# Patient Record
Sex: FEMALE | Race: WHITE | NOT HISPANIC OR LATINO | Employment: FULL TIME | URBAN - METROPOLITAN AREA
[De-identification: names, ages, dates, MRNs, and addresses within clinical notes are randomized per-mention and may not be internally consistent; named-entity substitution may affect disease eponyms.]

---
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
## 2020-01-08 ENCOUNTER — COMMUNICATION - HEALTHEAST (OUTPATIENT)
Dept: FAMILY MEDICINE | Facility: CLINIC | Age: 20
End: 2020-01-08

## 2020-01-17 ENCOUNTER — OFFICE VISIT - HEALTHEAST (OUTPATIENT)
Dept: FAMILY MEDICINE | Facility: CLINIC | Age: 20
End: 2020-01-17

## 2020-01-17 DIAGNOSIS — E66.01 CLASS 3 SEVERE OBESITY DUE TO EXCESS CALORIES WITHOUT SERIOUS COMORBIDITY WITH BODY MASS INDEX (BMI) OF 50.0 TO 59.9 IN ADULT (H): ICD-10-CM

## 2020-01-17 DIAGNOSIS — Z63.6 CAREGIVER BURDEN: ICD-10-CM

## 2020-01-17 DIAGNOSIS — E66.813 CLASS 3 SEVERE OBESITY DUE TO EXCESS CALORIES WITHOUT SERIOUS COMORBIDITY WITH BODY MASS INDEX (BMI) OF 50.0 TO 59.9 IN ADULT (H): ICD-10-CM

## 2020-01-17 SDOH — SOCIAL STABILITY - SOCIAL INSECURITY: DEPENDENT RELATIVE NEEDING CARE AT HOME: Z63.6

## 2020-01-17 ASSESSMENT — MIFFLIN-ST. JEOR: SCORE: 2236.43

## 2020-03-28 ENCOUNTER — RECORDS - HEALTHEAST (OUTPATIENT)
Dept: ADMINISTRATIVE | Facility: OTHER | Age: 20
End: 2020-03-28

## 2020-04-10 ENCOUNTER — COMMUNICATION - HEALTHEAST (OUTPATIENT)
Dept: SCHEDULING | Facility: CLINIC | Age: 20
End: 2020-04-10

## 2020-04-11 ENCOUNTER — COMMUNICATION - HEALTHEAST (OUTPATIENT)
Dept: SCHEDULING | Facility: CLINIC | Age: 20
End: 2020-04-11

## 2020-04-13 ENCOUNTER — OFFICE VISIT - HEALTHEAST (OUTPATIENT)
Dept: FAMILY MEDICINE | Facility: CLINIC | Age: 20
End: 2020-04-13

## 2020-04-13 DIAGNOSIS — R10.9 ABDOMINAL PAIN OF UNKNOWN CAUSE: ICD-10-CM

## 2020-04-13 DIAGNOSIS — K59.01 SLOW TRANSIT CONSTIPATION: ICD-10-CM

## 2020-04-15 ENCOUNTER — COMMUNICATION - HEALTHEAST (OUTPATIENT)
Dept: FAMILY MEDICINE | Facility: CLINIC | Age: 20
End: 2020-04-15

## 2020-04-17 ENCOUNTER — OFFICE VISIT - HEALTHEAST (OUTPATIENT)
Dept: FAMILY MEDICINE | Facility: CLINIC | Age: 20
End: 2020-04-17

## 2020-04-17 DIAGNOSIS — B37.31 YEAST INFECTION OF THE VAGINA: ICD-10-CM

## 2020-04-17 DIAGNOSIS — R10.11 RUQ ABDOMINAL PAIN: ICD-10-CM

## 2020-04-17 DIAGNOSIS — R11.0 NAUSEA: ICD-10-CM

## 2020-05-13 ENCOUNTER — COMMUNICATION - HEALTHEAST (OUTPATIENT)
Dept: SCHEDULING | Facility: CLINIC | Age: 20
End: 2020-05-13

## 2020-05-13 ENCOUNTER — COMMUNICATION - HEALTHEAST (OUTPATIENT)
Dept: FAMILY MEDICINE | Facility: CLINIC | Age: 20
End: 2020-05-13

## 2020-05-13 DIAGNOSIS — B37.31 CANDIDIASIS OF VAGINA: ICD-10-CM

## 2020-05-19 ENCOUNTER — COMMUNICATION - HEALTHEAST (OUTPATIENT)
Dept: FAMILY MEDICINE | Facility: CLINIC | Age: 20
End: 2020-05-19

## 2020-05-20 ENCOUNTER — COMMUNICATION - HEALTHEAST (OUTPATIENT)
Dept: FAMILY MEDICINE | Facility: CLINIC | Age: 20
End: 2020-05-20

## 2020-05-20 DIAGNOSIS — K59.01 SLOW TRANSIT CONSTIPATION: ICD-10-CM

## 2020-05-20 DIAGNOSIS — R10.84 ABDOMINAL PAIN, GENERALIZED: ICD-10-CM

## 2020-05-26 ENCOUNTER — TRANSFERRED RECORDS (OUTPATIENT)
Dept: HEALTH INFORMATION MANAGEMENT | Facility: CLINIC | Age: 20
End: 2020-05-26

## 2020-05-26 ENCOUNTER — RECORDS - HEALTHEAST (OUTPATIENT)
Dept: ADMINISTRATIVE | Facility: OTHER | Age: 20
End: 2020-05-26

## 2020-06-02 ENCOUNTER — OFFICE VISIT - HEALTHEAST (OUTPATIENT)
Dept: FAMILY MEDICINE | Facility: CLINIC | Age: 20
End: 2020-06-02

## 2020-06-02 ENCOUNTER — COMMUNICATION - HEALTHEAST (OUTPATIENT)
Dept: FAMILY MEDICINE | Facility: CLINIC | Age: 20
End: 2020-06-02

## 2020-06-02 ENCOUNTER — COMMUNICATION - HEALTHEAST (OUTPATIENT)
Dept: SCHEDULING | Facility: CLINIC | Age: 20
End: 2020-06-02

## 2020-06-02 DIAGNOSIS — B37.31 YEAST VAGINITIS: ICD-10-CM

## 2020-06-02 DIAGNOSIS — R11.2 NON-INTRACTABLE VOMITING WITH NAUSEA, UNSPECIFIED VOMITING TYPE: ICD-10-CM

## 2020-06-02 DIAGNOSIS — Z11.59 SCREENING FOR VIRAL DISEASE: ICD-10-CM

## 2020-06-17 ENCOUNTER — AMBULATORY - HEALTHEAST (OUTPATIENT)
Dept: FAMILY MEDICINE | Facility: CLINIC | Age: 20
End: 2020-06-17

## 2020-06-17 DIAGNOSIS — Z11.59 SCREENING FOR VIRAL DISEASE: ICD-10-CM

## 2020-06-18 ENCOUNTER — TRANSFERRED RECORDS (OUTPATIENT)
Dept: HEALTH INFORMATION MANAGEMENT | Facility: CLINIC | Age: 20
End: 2020-06-18

## 2020-06-18 ENCOUNTER — MEDICAL CORRESPONDENCE (OUTPATIENT)
Dept: HEALTH INFORMATION MANAGEMENT | Facility: CLINIC | Age: 20
End: 2020-06-18

## 2020-06-19 ENCOUNTER — TRANSFERRED RECORDS (OUTPATIENT)
Dept: HEALTH INFORMATION MANAGEMENT | Facility: CLINIC | Age: 20
End: 2020-06-19

## 2020-06-19 ENCOUNTER — COMMUNICATION - HEALTHEAST (OUTPATIENT)
Dept: FAMILY MEDICINE | Facility: CLINIC | Age: 20
End: 2020-06-19

## 2020-06-23 ENCOUNTER — RECORDS - HEALTHEAST (OUTPATIENT)
Dept: ADMINISTRATIVE | Facility: OTHER | Age: 20
End: 2020-06-23

## 2020-06-23 ENCOUNTER — COMMUNICATION - HEALTHEAST (OUTPATIENT)
Dept: FAMILY MEDICINE | Facility: CLINIC | Age: 20
End: 2020-06-23

## 2020-06-23 DIAGNOSIS — B37.31 YEAST VAGINITIS: ICD-10-CM

## 2020-06-25 ENCOUNTER — OFFICE VISIT - HEALTHEAST (OUTPATIENT)
Dept: FAMILY MEDICINE | Facility: CLINIC | Age: 20
End: 2020-06-25

## 2020-06-25 DIAGNOSIS — Z02.89 ENCOUNTER FOR COMPLETION OF FORM WITH PATIENT: ICD-10-CM

## 2020-07-07 ENCOUNTER — COMMUNICATION - HEALTHEAST (OUTPATIENT)
Dept: SCHEDULING | Facility: CLINIC | Age: 20
End: 2020-07-07

## 2020-09-22 ENCOUNTER — COMMUNICATION - HEALTHEAST (OUTPATIENT)
Dept: FAMILY MEDICINE | Facility: CLINIC | Age: 20
End: 2020-09-22

## 2020-09-22 DIAGNOSIS — B37.31 YEAST VAGINITIS: ICD-10-CM

## 2020-11-05 ENCOUNTER — RECORDS - HEALTHEAST (OUTPATIENT)
Dept: ADMINISTRATIVE | Facility: OTHER | Age: 20
End: 2020-11-05

## 2020-11-09 ENCOUNTER — RECORDS - HEALTHEAST (OUTPATIENT)
Dept: ADMINISTRATIVE | Facility: OTHER | Age: 20
End: 2020-11-09

## 2020-11-10 ENCOUNTER — TELEPHONE (OUTPATIENT)
Dept: FAMILY MEDICINE | Facility: CLINIC | Age: 20
End: 2020-11-10

## 2020-11-10 NOTE — TELEPHONE ENCOUNTER
I was asked to speak with pt.  She has appt tomorrow and pt asks if ok to wait until tomorrow for appt.    Pt has not established care with this care system yet but she has pending appt tomorrow at 4:20 with Dr Purcell.    Pt explains that she has generalized abdominal pain for 2 weeks.  Pt rates her pain as severe, 8 of 10.  Pt went to [facility] in [place] 5 days ago.  Had labs, urine, CT.  Diagnosed with cellulitis below belly button.  She was placed on cephalexin and oxycodone.      Pain persisted with no improvement so yesterday pt returned to [facility] (not [place] this time).  She had pelvic and abdominal ultrasound and was told that she may have a kidney stone too.    Pt says that she is eating and drinking.  She is drinking fluids.  Denies changes in bowel or bladder habits.  Urine is pale yellow and denies visible blood in uring.    Denies fever or chills.    Advised to return to ED if ANY new or worsening symptoms. Continue generous hydration.    Be seen tomorrow as planned.    Samara Kaufman RN

## 2020-11-10 NOTE — TELEPHONE ENCOUNTER
Reason for call:  Patient reporting a symptom    Symptom or request: Mother and patient on the phone at the same time. Patient went to [facility] Saturday. [place] they did a CT and stated that she has cellulitis under her belly button. They gave her oxycodone for the pain and the pain is still at a 8. Patient is also 18 days late on her period and is never late the last 3 years. Has been going on two weeks with this.     Also has appointment tomorrow at 4:20 with [facility]. They want to know if we are able to do Care Everywhere to get the records from the other facilities.     Duration (how long have symptoms been present): over a week almost 2 weeks    Have you been treated for this before? Yes    Phone Number patient can be reached at:  Home number on file [phone] (home)    Best Time:  any    Can we leave a detailed message on this number:  YES    Call taken on 11/10/2020 at 4:26 PM by Laury Flores

## 2020-11-11 ENCOUNTER — OFFICE VISIT (OUTPATIENT)
Dept: FAMILY MEDICINE | Facility: CLINIC | Age: 20
End: 2020-11-11
Payer: COMMERCIAL

## 2020-11-11 VITALS
WEIGHT: 293 LBS | HEART RATE: 91 BPM | BODY MASS INDEX: 47.09 KG/M2 | TEMPERATURE: 99.2 F | DIASTOLIC BLOOD PRESSURE: 82 MMHG | SYSTOLIC BLOOD PRESSURE: 128 MMHG | OXYGEN SATURATION: 98 % | HEIGHT: 66 IN

## 2020-11-11 DIAGNOSIS — N30.00 ACUTE CYSTITIS WITHOUT HEMATURIA: Primary | ICD-10-CM

## 2020-11-11 DIAGNOSIS — L03.311 CELLULITIS OF ABDOMINAL WALL: ICD-10-CM

## 2020-11-11 PROCEDURE — 87086 URINE CULTURE/COLONY COUNT: CPT | Performed by: FAMILY MEDICINE

## 2020-11-11 PROCEDURE — 87106 FUNGI IDENTIFICATION YEAST: CPT | Performed by: FAMILY MEDICINE

## 2020-11-11 PROCEDURE — 99214 OFFICE O/P EST MOD 30 MIN: CPT | Performed by: FAMILY MEDICINE

## 2020-11-11 RX ORDER — CEPHALEXIN 500 MG/1
500 CAPSULE ORAL
COMMUNITY
Start: 2020-11-05 | End: 2020-11-12

## 2020-11-11 RX ORDER — ONDANSETRON 4 MG/1
4 TABLET, ORALLY DISINTEGRATING ORAL EVERY 8 HOURS PRN
Qty: 15 TABLET | Refills: 1 | Status: SHIPPED | OUTPATIENT
Start: 2020-11-11 | End: 2021-06-11

## 2020-11-11 RX ORDER — CIPROFLOXACIN 500 MG/1
500 TABLET, FILM COATED ORAL 2 TIMES DAILY
Qty: 14 TABLET | Refills: 0 | Status: SHIPPED | OUTPATIENT
Start: 2020-11-11 | End: 2020-11-18

## 2020-11-11 RX ORDER — OXYCODONE HYDROCHLORIDE 5 MG/1
5 TABLET ORAL
COMMUNITY
Start: 2020-11-09 | End: 2020-12-08

## 2020-11-11 RX ORDER — KETOROLAC TROMETHAMINE 10 MG/1
10 TABLET, FILM COATED ORAL EVERY 6 HOURS PRN
Qty: 20 TABLET | Refills: 0 | Status: SHIPPED | OUTPATIENT
Start: 2020-11-11 | End: 2020-12-08

## 2020-11-11 SDOH — HEALTH STABILITY: MENTAL HEALTH: HOW OFTEN DO YOU HAVE A DRINK CONTAINING ALCOHOL?: NEVER

## 2020-11-11 ASSESSMENT — PAIN SCALES - GENERAL: PAINLEVEL: SEVERE PAIN (7)

## 2020-11-11 ASSESSMENT — MIFFLIN-ST. JEOR: SCORE: 2245.79

## 2020-11-11 NOTE — PATIENT INSTRUCTIONS
(N30.00) Acute cystitis without hematuria  (primary encounter diagnosis)  Comment:   Plan: ciprofloxacin (CIPRO) 500 MG tablet, Urine         Culture Aerobic Bacterial, ketorolac (TORADOL)         10 MG tablet, ondansetron (ZOFRAN-ODT) 4 MG ODT        tab         We reviewed and discussed the urine tests and there is an infection. See below and start Cipro at 500 mg twice daily with one tonight.   Take this for 7 days. The urine culture is done today and the results will be called in two days. The test will check sensitivity for about 15 antibiotics.   For the pain, we added Toradol at 10 mg per dose, with food, every 6 hours as needed. For the nausea use the Zofran ODT at 4 mg every 8 hours as needed.   See the work letter. If worse or even not better then return to the Emergency Dept.     (L03.311) Cellulitis of abdominal wall  Comment:   Plan: finish Cephalexin and see above.

## 2020-11-11 NOTE — PROGRESS NOTES
"Subjective     Norma Collins is a 20 year old female who presents to clinic today for the following health issues:  Chief Complaint   Patient presents with     ER F/U     [facility] 11/5/20 and 11/9/20, abdominal pain x 2 weeks      She states she is not sexually active.     The urine showed infection and no culture was done.     The others labs are essentially normal with a mildly elevated WBC count.     The pelvic US was normal.     The CT scan of the abdomen last week showed:     There is localized mild inflammation surrounding the umbilicus raising question of cellulitis along the lining of the umbilicus. There is no abscess or mass. No extension of this inflammatory appearance into the peritoneal cavity.    IMPRESSION:   1.  Appendix and bowel unremarkable.  2.  There is mild localized inflammatory soft tissue stranding involving the base of the umbilicus suggesting localized infection.    HPI         ED/UC Followup:    Facility:  [facility]   Date of visit: 11/5/20 and 11/9/20  Reason for visit: abdominal pain and lower back pain   Current Status: symptoms getting worse.   Pain is upper right, upper left, lower right and pain moved to lower back on Monday.   Family is concerned because pain in getting worse and moving a bit.   She is taking Oxycodone 5 mg twice daily with Tylenol.        Current Outpatient Medications   Medication Instructions     cephALEXin (KEFLEX) 500 mg, Oral     oxyCODONE (ROXICODONE) 5 mg, Oral       There is no problem list on file for this patient.      Blood pressure 128/82, pulse 91, temperature 99.2  F (37.3  C), temperature source Tympanic, height 1.687 m (5' 6.42\"), weight 145.2 kg (320 lb 3.2 oz), last menstrual period 09/22/2020, SpO2 98 %.    Exam:  GENERAL APPEARANCE: healthy, alert and no distress  EYES: EOMI,  PERRL  ABDOMEN: tender over the bladder and some on the bilateral CVA areas. No masses. No redness or warmth around the umbilicus.   SKIN: no suspicious " lesions or rashes  PSYCH: mentation appears normal and affect normal/bright      (N30.00) Acute cystitis without hematuria  (primary encounter diagnosis)  Comment:   Plan: ciprofloxacin (CIPRO) 500 MG tablet, Urine         Culture Aerobic Bacterial, ketorolac (TORADOL)         10 MG tablet, ondansetron (ZOFRAN-ODT) 4 MG ODT        tab         We reviewed and discussed the urine tests and there is an infection. See below and start Cipro at 500 mg twice daily with one tonight.   Take this for 7 days. The urine culture is done today and the results will be called in two days. The test will check sensitivity for about 15 antibiotics.   For the pain, we added Toradol at 10 mg per dose, with food, every 6 hours as needed. For the nausea use the Zofran ODT at 4 mg every 8 hours as needed.   See the work letter. If worse or even not better then return to the Emergency Dept.     (L03.311) Cellulitis of abdominal wall  Comment:   Plan: finish Cephalexin and see above.         Efra Purcell MD

## 2020-11-11 NOTE — LETTER
[facility]  [address] [address]  Phone: [phone]    November 11, 2020      Norma Collins  [address] [address]      Dear MsBalaji Collins    For medical reasons you should be excused from work from 11-7-20 to 11-18-20.   Return on 11-19-20.     Sincerely,    / Efra Purcell MD

## 2020-11-13 LAB
BACTERIA SPEC CULT: ABNORMAL
Lab: ABNORMAL
SPECIMEN SOURCE: ABNORMAL

## 2020-11-16 DIAGNOSIS — B37.41 CANDIDAL CYSTITIS AND URETHRITIS: Primary | ICD-10-CM

## 2020-11-16 RX ORDER — FLUCONAZOLE 150 MG/1
150 TABLET ORAL DAILY
Qty: 5 TABLET | Refills: 0 | Status: SHIPPED | OUTPATIENT
Start: 2020-11-16 | End: 2020-11-21

## 2020-12-01 ENCOUNTER — TELEPHONE (OUTPATIENT)
Dept: FAMILY MEDICINE | Facility: CLINIC | Age: 20
End: 2020-12-01

## 2020-12-04 ENCOUNTER — VIRTUAL VISIT (OUTPATIENT)
Dept: FAMILY MEDICINE | Facility: CLINIC | Age: 20
End: 2020-12-04
Payer: COMMERCIAL

## 2020-12-04 DIAGNOSIS — N30.00 ACUTE CYSTITIS WITHOUT HEMATURIA: Primary | ICD-10-CM

## 2020-12-04 PROCEDURE — 99212 OFFICE O/P EST SF 10 MIN: CPT | Mod: 95 | Performed by: FAMILY MEDICINE

## 2020-12-04 NOTE — TELEPHONE ENCOUNTER
"Dr. Purcell indicated he needs a telephone visit with patient in order to complete her short term disability form from [employer].    Form is in \"Awaiting Response\" tray on forms desk.   "

## 2020-12-04 NOTE — PATIENT INSTRUCTIONS
Thank you for choosing [facility].  You may be receiving an email and/or telephone survey request from [facility] [employer] regarding your visit today.  Please take a few minutes to respond to the survey to let us know how we are doing.      If you have questions or concerns, please contact us via [employer] or you can contact your care team at [phone].    Our Clinic hours are:  Monday 6:40 am  to 7:00 pm  Tuesday -Friday 6:40 am to 5:00 pm    The [place] outpatient lab hours are:  Monday - Friday 6:10 am to 4:45 pm  Saturdays 7:00 am to 11:00 am  Appointments are required, call [phone]    If you have clinical questions after hours or would like to schedule an appointment,  call the clinic at [phone].

## 2020-12-04 NOTE — TELEPHONE ENCOUNTER
I notified pt.    She says that these forms have to be in by today.    Will ask Dr Purcell if he is able to work pt into his schedule?    Samara Kaufman RN

## 2020-12-04 NOTE — PROGRESS NOTES
"Norma Collins is a 20 year old female who is being evaluated via a billable telephone visit.      The patient has been notified of following:     \"This telephone visit will be conducted via a call between you and your physician/provider. We have found that certain health care needs can be provided without the need for a physical exam.  This service lets us provide the care you need with a short phone conversation.  If a prescription is necessary we can send it directly to your pharmacy.  If lab work is needed we can place an order for that and you can then stop by our lab to have the test done at a later time.    Telephone visits are billed at different rates depending on your insurance coverage. During this emergency period, for some insurers they may be billed the same as an in-person visit.  Please reach out to your insurance provider with any questions.    If during the course of the call the physician/provider feels a telephone visit is not appropriate, you will not be charged for this service.\"    Patient has given verbal consent for Telephone visit?  Yes    What phone number would you like to be contacted at? [phone].    How would you like to obtain your AVS? MyChart    Subjective     Norma Collins is a 20 year old female who presents via phone visit today for the following health issues:    HPI     Chief Complaint   Patient presents with     Forms     Patient is needing a form completed for [employer].  This will need to be faxed today.  Primary condition is acute cystitis without hematuria.  Secondary condition is cellulitis of the abdominal wall.  Subjective symptoms are abdominal and lower back pain as listed on her form.       Current Outpatient Medications   Medication Instructions     ketorolac (TORADOL) 10 mg, Oral, EVERY 6 HOURS PRN     ondansetron (ZOFRAN-ODT) 4 mg, Oral, EVERY 8 HOURS PRN     oxyCODONE (ROXICODONE) 5 mg, Oral       There is no problem list on file for this " patient.    (N30.00) Acute cystitis without hematuria  (primary encounter diagnosis)  Comment:   Plan: we reviewed the recent tests and office visits. We filled out the form for her insurance, [employer], and faxed it to [phone].   We will keep a copy in our chart. She is feeling well and has recovered fully. Follow up here as needed.       Efra Purcell MD      Phone call duration:  12 minutes

## 2020-12-08 ENCOUNTER — OFFICE VISIT (OUTPATIENT)
Dept: FAMILY MEDICINE | Facility: CLINIC | Age: 20
End: 2020-12-08
Payer: COMMERCIAL

## 2020-12-08 VITALS
HEIGHT: 67 IN | BODY MASS INDEX: 45.99 KG/M2 | TEMPERATURE: 97 F | WEIGHT: 293 LBS | DIASTOLIC BLOOD PRESSURE: 70 MMHG | HEART RATE: 88 BPM | SYSTOLIC BLOOD PRESSURE: 124 MMHG | OXYGEN SATURATION: 98 %

## 2020-12-08 DIAGNOSIS — E66.813 CLASS 3 SEVERE OBESITY DUE TO EXCESS CALORIES WITHOUT SERIOUS COMORBIDITY WITH BODY MASS INDEX (BMI) OF 50.0 TO 59.9 IN ADULT (H): ICD-10-CM

## 2020-12-08 DIAGNOSIS — Z30.011 ENCOUNTER FOR INITIAL PRESCRIPTION OF CONTRACEPTIVE PILLS: ICD-10-CM

## 2020-12-08 DIAGNOSIS — Z23 NEED FOR PROPHYLACTIC VACCINATION AND INOCULATION AGAINST INFLUENZA: ICD-10-CM

## 2020-12-08 DIAGNOSIS — Z00.00 ENCOUNTER FOR PREVENTIVE HEALTH EXAMINATION: Primary | ICD-10-CM

## 2020-12-08 DIAGNOSIS — E66.01 CLASS 3 SEVERE OBESITY DUE TO EXCESS CALORIES WITHOUT SERIOUS COMORBIDITY WITH BODY MASS INDEX (BMI) OF 50.0 TO 59.9 IN ADULT (H): ICD-10-CM

## 2020-12-08 DIAGNOSIS — Z11.3 SCREEN FOR STD (SEXUALLY TRANSMITTED DISEASE): ICD-10-CM

## 2020-12-08 PROBLEM — J35.3 ADENOTONSILLAR HYPERTROPHY: Status: ACTIVE | Noted: 2017-01-04

## 2020-12-08 PROBLEM — M19.079 ARTHROPATHY OF ANKLE AND FOOT: Status: ACTIVE | Noted: 2020-12-08

## 2020-12-08 PROBLEM — L83 ACANTHOSIS NIGRICANS: Status: ACTIVE | Noted: 2020-12-08

## 2020-12-08 PROBLEM — R03.0 ELEVATED BP WITHOUT DIAGNOSIS OF HYPERTENSION: Status: ACTIVE | Noted: 2017-10-16

## 2020-12-08 PROBLEM — K80.20 CHOLELITHIASIS: Status: ACTIVE | Noted: 2017-10-05

## 2020-12-08 LAB
CHOLEST SERPL-MCNC: 164 MG/DL
GLUCOSE BLD-MCNC: 80 MG/DL (ref 70–99)
HDLC SERPL-MCNC: 57 MG/DL
LDLC SERPL CALC-MCNC: 82 MG/DL
NONHDLC SERPL-MCNC: 107 MG/DL
TRIGL SERPL-MCNC: 126 MG/DL

## 2020-12-08 PROCEDURE — 87591 N.GONORRHOEAE DNA AMP PROB: CPT | Performed by: NURSE PRACTITIONER

## 2020-12-08 PROCEDURE — 90686 IIV4 VACC NO PRSV 0.5 ML IM: CPT | Performed by: NURSE PRACTITIONER

## 2020-12-08 PROCEDURE — 87389 HIV-1 AG W/HIV-1&-2 AB AG IA: CPT | Performed by: NURSE PRACTITIONER

## 2020-12-08 PROCEDURE — 99213 OFFICE O/P EST LOW 20 MIN: CPT | Mod: 25 | Performed by: NURSE PRACTITIONER

## 2020-12-08 PROCEDURE — 99000 SPECIMEN HANDLING OFFICE-LAB: CPT | Performed by: NURSE PRACTITIONER

## 2020-12-08 PROCEDURE — 36415 COLL VENOUS BLD VENIPUNCTURE: CPT | Performed by: NURSE PRACTITIONER

## 2020-12-08 PROCEDURE — 80061 LIPID PANEL: CPT | Performed by: NURSE PRACTITIONER

## 2020-12-08 PROCEDURE — 82947 ASSAY GLUCOSE BLOOD QUANT: CPT | Performed by: NURSE PRACTITIONER

## 2020-12-08 PROCEDURE — 99395 PREV VISIT EST AGE 18-39: CPT | Mod: 25 | Performed by: NURSE PRACTITIONER

## 2020-12-08 PROCEDURE — 90471 IMMUNIZATION ADMIN: CPT | Performed by: NURSE PRACTITIONER

## 2020-12-08 PROCEDURE — 86780 TREPONEMA PALLIDUM: CPT | Mod: 90 | Performed by: NURSE PRACTITIONER

## 2020-12-08 PROCEDURE — 86803 HEPATITIS C AB TEST: CPT | Performed by: NURSE PRACTITIONER

## 2020-12-08 PROCEDURE — 87491 CHLMYD TRACH DNA AMP PROBE: CPT | Performed by: NURSE PRACTITIONER

## 2020-12-08 RX ORDER — NORGESTIMATE AND ETHINYL ESTRADIOL 7DAYSX3 28
1 KIT ORAL DAILY
Qty: 84 TABLET | Refills: 3 | Status: SHIPPED | OUTPATIENT
Start: 2020-12-08 | End: 2021-06-11

## 2020-12-08 ASSESSMENT — MIFFLIN-ST. JEOR: SCORE: 2264.68

## 2020-12-08 NOTE — PROGRESS NOTES
SUBJECTIVE:   CC: Norma Collins is an 20 year old woman who presents for preventive health visit.     Patient has been advised of split billing requirements and indicates understanding: Yes  Healthy Habits:     Getting at least 3 servings of Calcium per day:  Yes    Bi-annual eye exam:  Yes    Dental care twice a year:  NO    Sleep apnea or symptoms of sleep apnea:  None    Diet:  Regular (no restrictions)    Frequency of exercise:  1 day/week    Duration of exercise:  N/A    Taking medications regularly:  Yes    Medication side effects:  None    PHQ-2 Total Score: 0    Additional concerns today:  Yes (std testing and birth control )      Eye exam with ophthalmology on this date: Never has had a dilated eye exam  Chlamydia testing done on this date: 12/8/20  Dentist: scared of the dentist; recommended at least go once a year    Today's PHQ-2 Score:   PHQ-2 ( 1999 Pf[employer]) 12/7/2020   Q1: Little interest or pleasure in doing things 0   Q2: Feeling down, depressed or hopeless 0   PHQ-2 Score 0   Q1: Little interest or pleasure in doing things Not at all   Q2: Feeling down, depressed or hopeless Not at all   PHQ-2 Score 0     Abuse: Current or Past (Physical, Sexual or Emotional) - No  Do you feel safe in your environment? Yes    Social History     Tobacco Use     Smoking status: Former Smoker     Smokeless tobacco: Never Used   Substance Use Topics     Alcohol use: Never     Frequency: Never     If you drink alcohol do you typically have >3 drinks per day or >7 drinks per week? Not applicable    Alcohol Use 12/8/2020   Prescreen: >3 drinks/day or >7 drinks/week? -   Prescreen: >3 drinks/day or >7 drinks/week? Not Applicable     Reviewed orders with patient.  Reviewed health maintenance and updated orders accordingly - Yes  BP Readings from Last 3 Encounters:   12/08/20 124/70   11/11/20 128/82    Wt Readings from Last 3 Encounters:   12/08/20 146.7 kg (323 lb 6.4 oz)   11/11/20 145.2 kg (320 lb 3.2 oz)              Patient Active Problem List   Diagnosis     Acanthosis nigricans     PCOS (polycystic ovarian syndrome)     Obesity, Class III, BMI 40-49.9 (morbid obesity) (H)     Elevated BP without diagnosis of hypertension     Adenotonsillar hypertrophy     Arthropathy of ankle and foot     Cholelithiasis     Past Surgical History:   Procedure Laterality Date     LAPAROSCOPIC CHOLECYSTECTOMY       TONSILLECTOMY         Social History     Tobacco Use     Smoking status: Former Smoker     Smokeless tobacco: Never Used   Substance Use Topics     Alcohol use: Never     Frequency: Never     Family History   Problem Relation Age of Onset     Colon Cancer Mother 43     Liver Disease Father         cirrhosis of liver     Other - See Comments Father         high iron     Myocardial Infarction Maternal Grandmother      Other - See Comments Maternal Grandfather         heart attack or cancer/unsure     Cirrhosis Paternal Grandfather      Other - See Comments Paternal Grandfather         high iron     Diabetes Paternal Grandfather      Colon Cancer Maternal Great-Grandmother         70-90 years old          Current Outpatient Medications   Medication Sig Dispense Refill     norgestim-eth estrad triphasic (ORTHO TRI-CYCLEN, 28,) 0.18/0.215/0.25 MG-35 MCG tablet Take 1 tablet by mouth daily 84 tablet 3     ondansetron (ZOFRAN-ODT) 4 MG ODT tab Take 1 tablet (4 mg) by mouth every 8 hours as needed for nausea 15 tablet 1     Allergies   Allergen Reactions     [place] Sprouts [Brassica Oleracea Italica]      Other Food Allergy Rash     [place] sprouts     Mammogram not appropriate for this patient based on age.    Pertinent mammograms are reviewed under the imaging tab.  History of abnormal Pap smear: NO - under age 21, PAP not appropriate for age   Colonoscopy 2019 or  - Mom has hx of colon cancer and is  at age 43; normal for her test    Reviewed and updated as needed this visit by clinical staff  Tobacco  Allergies  Meds   "Problems  Med Hx  Surg Hx  Fam Hx  Soc Hx          Reviewed and updated as needed this visit by Provider  Tobacco  Allergies  Meds  Problems  Med Hx  Surg Hx  Fam Hx         History reviewed. No pertinent past medical history.   Past Surgical History:   Procedure Laterality Date     LAPAROSCOPIC CHOLECYSTECTOMY       TONSILLECTOMY         Review of Systems  CONSTITUTIONAL: NEGATIVE for fever, chills, change in weight  INTEGUMENTARU/SKIN: NEGATIVE for worrisome rashes, moles or lesions  EYES: NEGATIVE for vision changes or irritation  ENT: NEGATIVE for ear, mouth and throat problems  RESP: NEGATIVE for significant cough or SOB  BREAST: NEGATIVE for masses, tenderness or discharge  CV: NEGATIVE for chest pain, palpitations or peripheral edema  GI: POSITIVE for constipation and nausea   female: concerns today for STD due to recent sexual encounter and concerns since he has been with other people, frequent yeast infections usually before and after menses monthly, not always treating it.  MUSCULOSKELETAL: NEGATIVE for significant arthralgias or myalgia  NEURO: NEGATIVE for weakness, dizziness or paresthesias  ENDOCRINE: NEGATIVE for temperature intolerance, skin/hair changes  HEME/ALLERGY/IMMUNE: NEGATIVE for bleeding problems  PSYCHIATRIC: NEGATIVE for changes in mood or affect     OBJECTIVE:   /70 (BP Location: Right arm, Patient Position: Sitting, Cuff Size: Adult Large)   Pulse 88   Temp 97  F (36.1  C) (Tympanic)   Ht 1.694 m (5' 6.69\")   Wt 146.7 kg (323 lb 6.4 oz)   LMP 11/18/2020 (Exact Date)   SpO2 98%   BMI 51.12 kg/m    Physical Exam  GENERAL: healthy, alert and no distress  EYES: Eyes grossly normal to inspection, PERRL and conjunctivae and sclerae normal  HENT: ear canals and TM's normal, nose and mouth without ulcers or lesions  NECK: no adenopathy, no asymmetry, masses, or scars and thyroid normal to palpation  RESP: lungs clear to auscultation - no rales, rhonchi or " wheezes  BREAST: deferred  CV: regular rate and rhythm, normal S1 S2, no S3 or S4, no murmur, click or rub, no peripheral edema and peripheral pulses strong  ABDOMEN: soft, nontender, no hepatosplenomegaly, no masses and bowel sounds normal  MS: no gross musculoskeletal defects noted, no edema  SKIN: no suspicious lesions or rashes  NEURO: Normal strength and tone, mentation intact and speech normal  PSYCH: mentation appears normal, affect normal/bright  LYMPH: no cervical adenopathy    Diagnostic Test Results:  Labs reviewed in Epic    ASSESSMENT/PLAN:   1. Encounter for preventive health examination  Labs ordered since she is fasting today for lipids and glucose due to family history and obesity as a high risk. I will call her with results.  Referral given for comprehensive weight management since she is concerned about this ongoing.  STD testing ordered for high risk patient with new partner known to have multiple partners.  I will call with results.  Recommend follow-up in 1 year for preventative exam and can complete PAP at that time.  - Glucose whole blood  - Lipid panel reflex to direct LDL Fasting    2. Class 3 severe obesity due to excess calories without serious comorbidity with body mass index (BMI) of 50.0 to 59.9 in adult (H)  Discussed exercise and portion sizes with diet along with eating healthy.  Referral to weight management due to high BMI for assistance in management.  - COMPREHENSIVE WEIGHT MANAGEMENT    3. Encounter for initial prescription of contraceptive pills  Discussed all birth control methods and patient wants to start pill form.  Due to BMI will start her on ortho tri-cycline.  Recommend follow-up if unable to remember daily for further discussion on Nexplanon, Nuvaring and IUD options.  - norgestim-eth estrad triphasic (ORTHO TRI-CYCLEN, 28,) 0.18/0.215/0.25 MG-35 MCG tablet; Take 1 tablet by mouth daily  Dispense: 84 tablet; Refill: 3    4. Screen for STD (sexually transmitted  "disease)  Labs ordered.  Patient will be notified of results.  - NEISSERIA GONORRHOEA PCR  - CHLAMYDIA TRACHOMATIS PCR  - HIV Antigen Antibody Combo  - Treponema Abs w Reflex to RPR and Titer  - Hepatitis C antibody    5. Need for prophylactic vaccination and inoculation against influenza  - INFLUENZA VACCINE IM > 6 MONTHS VALENT IIV4 [09947]    Patient has been advised of split billing requirements and indicates understanding: Yes  COUNSELING:  Reviewed preventive health counseling, as reflected in patient instructions       Regular exercise       Healthy diet/nutrition       Vision screening       Immunizations    Vaccinated for: Influenza             Contraception       Safe sex practices/STD prevention    Estimated body mass index is 51.12 kg/m  as calculated from the following:    Height as of this encounter: 1.694 m (5' 6.69\").    Weight as of this encounter: 146.7 kg (323 lb 6.4 oz).    Weight management plan: Patient referred to endocrine and/or weight management specialty Discussed healthy diet and exercise guidelines    She reports that she has quit smoking. She has never used smokeless tobacco.    Harini Santamaria NP  [facility]  "

## 2020-12-08 NOTE — PATIENT INSTRUCTIONS
1.  Make appointment at [facility] weight management clinic.  2.  Start birth control after next period and continue daily.  3.  If you miss a dose, you could get pregnant for 7 days so you would need to use condoms.  4.  For BMs take fiber gummies daily, drink 64 oz of non caffienated beverages daily.  Add Colace 100 mg (stool softner) once daily and if still having constipation you can increase to 2 times daily.  Use Miralax as needed but if still constipated can start every other day or every couple days to keep stools normal.  5.  Labs today, I will call you with results and recommendations if needed.  6.  Follow-up in 1 year for preventative exam with PAP.    Preventive Health Recommendations  Female Ages 18 to 20     Yearly exam:     See your health care provider every year in order to  o Review health changes.   o Discuss preventive care.    o Review your medicines if your doctor has prescribed any.      You should be tested each year for STDs (sexually transmitted diseases).       After age 20, talk to your provider about how often you should have cholesterol testing.      If you are at risk for diabetes, you should have a diabetes test (fasting glucose).     Shots:     Get a flu shot each year.     Get a tetanus shot every 10 years.     Consider getting the shot (vaccine) that prevents cervical cancer (Gardasil).    Nutrition:     Eat at least 5 servings of fruits and vegetables each day.    Eat whole-grain bread, whole-wheat pasta and brown rice instead of white grains and rice.    Get adequate Calcium and Vitamin D.     Lifestyle    Exercise at least 150 minutes a week each week (30 minutes a day, 5 days a week). This will help you control your weight and prevent disease.    No smoking.     Wear sunscreen to prevent skin cancer.    See your dentist every six months for an exam and cleaning.

## 2020-12-09 LAB
C TRACH DNA SPEC QL NAA+PROBE: NEGATIVE
HCV AB SERPL QL IA: NONREACTIVE
HIV 1+2 AB+HIV1 P24 AG SERPL QL IA: NONREACTIVE
N GONORRHOEA DNA SPEC QL NAA+PROBE: NEGATIVE
SPECIMEN SOURCE: NORMAL
SPECIMEN SOURCE: NORMAL
T PALLIDUM AB SER QL: NONREACTIVE

## 2021-01-19 ENCOUNTER — OFFICE VISIT (OUTPATIENT)
Dept: FAMILY MEDICINE | Facility: CLINIC | Age: 21
End: 2021-01-19
Payer: COMMERCIAL

## 2021-01-19 VITALS
HEIGHT: 67 IN | SYSTOLIC BLOOD PRESSURE: 110 MMHG | BODY MASS INDEX: 45.99 KG/M2 | OXYGEN SATURATION: 98 % | DIASTOLIC BLOOD PRESSURE: 80 MMHG | RESPIRATION RATE: 16 BRPM | TEMPERATURE: 98 F | WEIGHT: 293 LBS | HEART RATE: 84 BPM

## 2021-01-19 DIAGNOSIS — B96.89 BACTERIAL VAGINOSIS: Primary | ICD-10-CM

## 2021-01-19 DIAGNOSIS — N76.0 BACTERIAL VAGINOSIS: Primary | ICD-10-CM

## 2021-01-19 DIAGNOSIS — R30.0 DYSURIA: ICD-10-CM

## 2021-01-19 LAB
ALBUMIN UR-MCNC: NEGATIVE MG/DL
APPEARANCE UR: ABNORMAL
BACTERIA #/AREA URNS HPF: ABNORMAL /HPF
BILIRUB UR QL STRIP: NEGATIVE
COLOR UR AUTO: YELLOW
GLUCOSE UR STRIP-MCNC: NEGATIVE MG/DL
HCG UR QL: NEGATIVE
HGB UR QL STRIP: NEGATIVE
KETONES UR STRIP-MCNC: NEGATIVE MG/DL
LEUKOCYTE ESTERASE UR QL STRIP: ABNORMAL
NITRATE UR QL: NEGATIVE
NON-SQ EPI CELLS #/AREA URNS LPF: ABNORMAL /LPF
PH UR STRIP: 6 PH (ref 5–7)
RBC #/AREA URNS AUTO: ABNORMAL /HPF
SOURCE: ABNORMAL
SP GR UR STRIP: 1.02 (ref 1–1.03)
SPECIMEN SOURCE: ABNORMAL
UROBILINOGEN UR STRIP-ACNC: 0.2 EU/DL (ref 0.2–1)
WBC #/AREA URNS AUTO: ABNORMAL /HPF
WET PREP SPEC: ABNORMAL

## 2021-01-19 PROCEDURE — 81001 URINALYSIS AUTO W/SCOPE: CPT | Performed by: FAMILY MEDICINE

## 2021-01-19 PROCEDURE — 81025 URINE PREGNANCY TEST: CPT | Performed by: FAMILY MEDICINE

## 2021-01-19 PROCEDURE — 87210 SMEAR WET MOUNT SALINE/INK: CPT | Performed by: FAMILY MEDICINE

## 2021-01-19 PROCEDURE — 99213 OFFICE O/P EST LOW 20 MIN: CPT | Performed by: FAMILY MEDICINE

## 2021-01-19 RX ORDER — METRONIDAZOLE 500 MG/1
500 TABLET ORAL 2 TIMES DAILY
Qty: 14 TABLET | Refills: 0 | Status: SHIPPED | OUTPATIENT
Start: 2021-01-19 | End: 2021-01-26

## 2021-01-19 ASSESSMENT — MIFFLIN-ST. JEOR: SCORE: 2300.17

## 2021-01-19 NOTE — RESULT ENCOUNTER NOTE
Pregnancy test has returned negative.  Urine studies show small leukocyte esterase which is an indication for infection likely caused by the bacterial vaginosis.  Complete the treatment as discussed at today's appointment.

## 2021-01-19 NOTE — PROGRESS NOTES
Assessment & Plan     Bacterial vaginosis  Wet prep positive for clue cells. Will also obtain urinalysis to ensure no UTI.also obtain urinalysis to rule out UTI. Pregnancy test will be completed as sexually active and not on birth control currently. No fevers, chills, or flank pain.   - metroNIDAZOLE (FLAGYL) 500 MG tablet  Dispense: 14 tablet; Refill: 0  - HCG qualitative urine    Dysuria  Wet prep positive for clue cells will treat as above. Will obtain UA to rule out UTI.   - Wet prep  - UA reflex to Microscopic and Culture      Boom Barnes DO  [facility] DARIO Green is a 21 year old who presents to clinic today for the following health issues     HPI   21-year-old female with a past medical history of PCOS, obesity who presents to clinic for urinary concerns.    Abdominal/Flank Pain  Onset/Duration: 1 week  Description:   Character: Cramping  Location: lower bilateral abdominal pain  Radiation: upper right back pain  Intensity: moderate  Progression of Symptoms:  same  Accompanying Signs & Symptoms:  Fever/Chills: no  Gas/Bloating: YES  Nausea: no  Vomitting: no  Diarrhea: no  Constipation: no  Dysuria or Hematuria: no  History:   Trauma: no  Previous similar pain: YES- has had this pain in the past and has been UTI or vaginal infection  Previous tests done: Ultrasounds in the past and other testing.  Precipitating factors:   Does the pain change with:     Food: no    Bowel Movement: no    Urination: no   Other factors:  Worse in the AM and PM, she quit her BCP 1 week because she thought it was causing her the abdominal pain, she has frequency at night.  Therapies tried and outcome: None  Patient's last menstrual period was 12/28/2020 (exact date).    Not currently on birth control. Is sexually active. Wet Prep positive for clue cells. No fevers or flank pain.     Review of Systems   Constitutional, HEENT, cardiovascular, pulmonary, gi and gu systems are negative, except  "as otherwise noted.      Objective    /80 (BP Location: Left arm, Patient Position: Chair, Cuff Size: Adult Large)   Pulse 84   Temp 98  F (36.7  C) (Tympanic)   Resp 16   Ht 1.689 m (5' 6.5\")   Wt (!) 151 kg (333 lb)   LMP 12/28/2020 (Exact Date)   SpO2 98%   BMI 52.94 kg/m    Body mass index is 52.94 kg/m .  Physical Exam   General: alert, cooperative, no acute distress   CV: RRR, no murmur  Resp: non-labored breathing, clear to auscultation, no wheezing or rales   Abdomen: Soft, non-tender, no guarding. No flank pain, no back pain.     "

## 2021-01-19 NOTE — PATIENT INSTRUCTIONS
Bacterial Vaginosis    Treatment with Metronidazole 500 mg twice daily for 7 days    Urine sample, and pregnancy test.      Patient Education     Bacterial Vaginosis    You have a vaginal infection called bacterial vaginosis (BV). Both good and bad bacteria are present in a healthy vagina. BV occurs when these bacteria get out of balance. The number of bad bacteria increase. And the number of good bacteria decrease. Although BV is associated with sexual activity, it is not a sexually transmitted disease.  BV may or may not cause symptoms. If symptoms do occur, they can include:    Thin, gray, milky-white, or sometimes green discharge    Unpleasant odor or  fishy  smell    Itching, burning, or pain in or around the vagina  It is not known what causes BV, but certain factors can make the problem more likely. This can include:    Douching    Having sex with a new partner    Having sex with more than one partner  BV will sometimes go away on its own. But treatment is usually recommended. This is because untreated BV can increase the risk of more serious health problems such as:    Pelvic inflammatory disease (PID)     delivery (giving birth to a baby early if you re pregnant)    HIV and certain other sexually transmitted diseases (STDs)    Infection after surgery on the reproductive organs  Home care  General care    BV is most often treated with medicines called antibiotics. These may be given as pills or as a vaginal cream. If antibiotics are prescribed, be sure to use them exactly as directed. Also, be sure to complete all of the medicine, even if your symptoms go away.    Don't douche or having sex during treatment.    If you have sex with a female partner, ask your healthcare provider if she should also be treated.  Prevention    Don't douche.    Don't have sex. If you do have sex, then take steps to lower your risk:  ? Use condoms when having sex.  ? Limit the number of sexual partners you have.  Follow-up  care  Follow up with your healthcare provider, or as advised.  When to seek medical advice  Call your healthcare provider right away if:    You have a fever of 100.4 F (38 C) or higher, or as directed by your provider.    Your symptoms worsen, or they don t go away within a few days of starting treatment.    You have new pain in the lower belly or pelvic region.    You have side effects that bother you or a reaction to the pills or cream you re prescribed.    You or any partners you have sex with have new symptoms, such as a rash, joint pain, or sores.  [employer] last reviewed this educational content on 10/1/2017    1274-6993 [employer] [address] [place] [address], [place] [address]. All rights reserved. This information is not intended as a substitute for professional medical care. Always follow your healthcare professional's instructions.               Thank you for choosing [facility].  You may be receiving an email and/or telephone survey request from [facility] regarding your visit today.  Please take a few minutes to respond to the survey to let us know how we are doing.      If you have questions or concerns, please contact us via [employer] or you can contact your care team at [phone].    Our Clinic hours are:  Monday 6:40 am  to 7:00 pm  Tuesday -Friday 6:40 am to 5:00 pm    The [place] outpatient lab hours are:  Monday - Friday 6:10 am to 4:45 pm  Saturdays 7:00 am to 11:00 am  Appointments are required, call [phone]    If you have clinical questions after hours or would like to schedule an appointment,  call the clinic at [phone].

## 2021-02-05 ENCOUNTER — RECORDS - HEALTHEAST (OUTPATIENT)
Dept: ADMINISTRATIVE | Facility: OTHER | Age: 21
End: 2021-02-05

## 2021-04-03 ENCOUNTER — HEALTH MAINTENANCE LETTER (OUTPATIENT)
Age: 21
End: 2021-04-03

## 2021-04-16 ENCOUNTER — RECORDS - HEALTHEAST (OUTPATIENT)
Dept: ADMINISTRATIVE | Facility: OTHER | Age: 21
End: 2021-04-16

## 2021-04-23 ENCOUNTER — OFFICE VISIT (OUTPATIENT)
Dept: FAMILY MEDICINE | Facility: CLINIC | Age: 21
End: 2021-04-23
Payer: COMMERCIAL

## 2021-04-23 VITALS
BODY MASS INDEX: 45.99 KG/M2 | SYSTOLIC BLOOD PRESSURE: 120 MMHG | OXYGEN SATURATION: 97 % | HEIGHT: 67 IN | HEART RATE: 67 BPM | DIASTOLIC BLOOD PRESSURE: 78 MMHG | WEIGHT: 293 LBS | TEMPERATURE: 98.2 F | RESPIRATION RATE: 16 BRPM

## 2021-04-23 DIAGNOSIS — R10.84 ABDOMINAL PAIN, GENERALIZED: Primary | ICD-10-CM

## 2021-04-23 DIAGNOSIS — N89.8 VAGINAL DISCHARGE: ICD-10-CM

## 2021-04-23 DIAGNOSIS — R11.2 NON-INTRACTABLE VOMITING WITH NAUSEA, UNSPECIFIED VOMITING TYPE: ICD-10-CM

## 2021-04-23 DIAGNOSIS — R82.81 PYURIA: ICD-10-CM

## 2021-04-23 LAB
ALBUMIN UR-MCNC: NEGATIVE MG/DL
APPEARANCE UR: ABNORMAL
BACTERIA #/AREA URNS HPF: ABNORMAL /HPF
BILIRUB UR QL STRIP: NEGATIVE
COLOR UR AUTO: YELLOW
GLUCOSE UR STRIP-MCNC: NEGATIVE MG/DL
HGB UR QL STRIP: NEGATIVE
KETONES UR STRIP-MCNC: 15 MG/DL
LEUKOCYTE ESTERASE UR QL STRIP: ABNORMAL
NITRATE UR QL: NEGATIVE
NON-SQ EPI CELLS #/AREA URNS LPF: ABNORMAL /LPF
PH UR STRIP: 6.5 PH (ref 5–7)
RBC #/AREA URNS AUTO: ABNORMAL /HPF
SOURCE: ABNORMAL
SP GR UR STRIP: 1.02 (ref 1–1.03)
SPECIMEN SOURCE: NORMAL
UROBILINOGEN UR STRIP-ACNC: 0.2 EU/DL (ref 0.2–1)
WBC #/AREA URNS AUTO: ABNORMAL /HPF
WET PREP SPEC: NORMAL

## 2021-04-23 PROCEDURE — 81001 URINALYSIS AUTO W/SCOPE: CPT | Performed by: NURSE PRACTITIONER

## 2021-04-23 PROCEDURE — 87086 URINE CULTURE/COLONY COUNT: CPT | Performed by: NURSE PRACTITIONER

## 2021-04-23 PROCEDURE — 87210 SMEAR WET MOUNT SALINE/INK: CPT | Performed by: NURSE PRACTITIONER

## 2021-04-23 PROCEDURE — 99214 OFFICE O/P EST MOD 30 MIN: CPT | Performed by: NURSE PRACTITIONER

## 2021-04-23 RX ORDER — METOCLOPRAMIDE 5 MG/1
5-10 TABLET ORAL 3 TIMES DAILY PRN
Qty: 30 TABLET | Refills: 1 | Status: SHIPPED | OUTPATIENT
Start: 2021-04-23 | End: 2021-06-11

## 2021-04-23 RX ORDER — NITROFURANTOIN 25; 75 MG/1; MG/1
100 CAPSULE ORAL 2 TIMES DAILY
Qty: 10 CAPSULE | Refills: 0 | Status: SHIPPED | OUTPATIENT
Start: 2021-04-23 | End: 2021-04-28

## 2021-04-23 RX ORDER — OMEPRAZOLE 10 MG/1
20 CAPSULE, DELAYED RELEASE ORAL DAILY
COMMUNITY
End: 2021-04-23

## 2021-04-23 ASSESSMENT — PAIN SCALES - GENERAL: PAINLEVEL: EXTREME PAIN (8)

## 2021-04-23 ASSESSMENT — MIFFLIN-ST. JEOR: SCORE: 2195.84

## 2021-04-23 NOTE — PATIENT INSTRUCTIONS
Schedule appointment with GI.  Start nitrofurantoin for urine.  Use metoclopramide for nausea.  Increase omeprazole to 40mg daily.

## 2021-04-23 NOTE — PROGRESS NOTES
Assessment & Plan     Abdominal pain, generalized  Unclear etiology. Negative CT on 4/21. Will increase omeprazole to intended dose. Recommend follow up with GI for further evaluation.  - UA reflex to Microscopic and Culture  - Urine Microscopic  - Urine Culture Aerobic Bacterial  - omeprazole (PRILOSEC) 20 MG DR capsule; Take 1 capsule (20 mg) by mouth 2 times daily  - GASTROENTEROLOGY ADULT REF CONSULT ONLY; Future    Vaginal discharge  Negative wet prep.  - Wet prep    Pyuria  5-10 WBC, however contaminated specimen. Will culture, however start macrobid in the meantime.  - nitroFURantoin macrocrystal-monohydrate (MACROBID) 100 MG capsule; Take 1 capsule (100 mg) by mouth 2 times daily for 5 days    Non-intractable vomiting with nausea, unspecified vomiting type  Can use reglan as she reports Zofran is not helpful. Recommend follow up with GI.  - metoclopramide (REGLAN) 5 MG tablet; Take 1-2 tablets (5-10 mg) by mouth 3 times daily as needed (nausea and vomiting)      Patient Instructions   Schedule appointment with GI.  Start nitrofurantoin for urine.  Use metoclopramide for nausea.  Increase omeprazole to 40mg daily.      Return in about 1 week (around 4/30/2021).    XENA Perez CNP  [facility] is a 21 year old who presents for the following health issues     HPI     Abdominal/Flank Pain  Onset/Duration: abdominal pain worsening x 2 weeks  - ER in [place] last Wednesday and Thursday - blood work and a CT was done -Dx'd with acid reflux  Description:   Character: Stabbing and Cramping  Location: bernabe-umbilical region  Radiation:  Back on left side  Intensity: severe - 8-9  Progression of Symptoms:  worsening and constant  Accompanying Signs & Symptoms:  Fever/Chills: YES  Gas/Bloating: no  Nausea: YES  Vomitting: YES  Diarrhea: YES - from dulcolax, miralax  - she thought it might be constipation  Constipation: no  Dysuria or Hematuria: YES - feels like  she has to go but doesn't  History:   Trauma: no  Previous similar pain: YES  Previous tests done: Colonoscopy, Upper Endoscopy and pill cam  Precipitating factors:   Does the pain change with:     Food: YES    Bowel Movement: no    Urination: no   Other factors:  no  Therapies tried and outcome: laxatives  Patient's last menstrual period was 03/27/2021 (exact date).      Genitourinary - Female  Onset/Duration: x weeks  Description:   Painful urination (Dysuria): YES           Frequency: YES  Blood in urine (Hematuria): no  Delay in urine (Hesitency): YES  Intensity: moderate  Progression of Symptoms:  constant  Accompanying Signs & Symptoms:  Fever/chills: YES  Flank pain: YES  Nausea and vomiting: YES  Vaginal symptoms: discharge  Abdominal/Pelvic Pain: YES  History:   History of frequent UTI s: YES  History of kidney stones: no  Sexually Active: no  Possibility of pregnancy: No  Precipitating or alleviating factors: None  Therapies tried and outcome: Increase fluid intake      Above HPI reviewed. Additionally, seen 4/21/21 for same, CT with noted normal appendix, mild tissue haziness and thickening around umbilicus but no clinical evidence of cellulitis, no leukocytosis. Remaining labs unremarkable. Did have negative UA at that time. Was advised to increase omeprazole to 40mg daily, although there was a miscommunication and she has been taking 20mg daily.  She did note some mild improvement in symptoms yesterday, but today started to vomit again. Pain has not improved. She is s/p cholecystectomy. Has had EGD and colonoscopy in 11/2019, she reports these were normal. Does note she has had some diarrhea but this started after using miralax.     Now notes bladder pressure and sensation of incomplete emptying and vaginal discharge. No fevers. Is not currently sexually active, has not been since last December and has had negative GC and chlamydia since then. Negative pregnancy test 4/21.  Denies having a GI provider.   "    Review of Systems   Constitutional, HEENT, cardiovascular, pulmonary, gi and gu systems are negative, except as otherwise noted.      Objective    /78 (BP Location: Right arm, Patient Position: Sitting, Cuff Size: Adult Large)   Pulse 67   Temp 98.2  F (36.8  C) (Tympanic)   Resp 16   Ht 1.689 m (5' 6.5\")   Wt 140.6 kg (310 lb)   LMP 03/27/2021 (Exact Date)   SpO2 97%   Breastfeeding No   BMI 49.29 kg/m    Body mass index is 49.29 kg/m .  Physical Exam  Vitals signs and nursing note reviewed.   Constitutional:       Appearance: Normal appearance. She is obese.   HENT:      Head: Normocephalic and atraumatic.      Right Ear: Tympanic membrane and ear canal normal.      Left Ear: Tympanic membrane and ear canal normal.      Nose: Nose normal. No rhinorrhea.      Right Sinus: No maxillary sinus tenderness or frontal sinus tenderness.      Left Sinus: No maxillary sinus tenderness or frontal sinus tenderness.      Mouth/Throat:      Lips: Pink.      Mouth: Mucous membranes are moist.      Pharynx: Oropharynx is clear.   Eyes:      General: Lids are normal.      Conjunctiva/sclera: Conjunctivae normal.      Comments: Non icteric   Neck:      Musculoskeletal: Neck supple.   Cardiovascular:      Rate and Rhythm: Normal rate and regular rhythm.      Pulses: Normal pulses.      Heart sounds: Normal heart sounds, S1 normal and S2 normal.   Pulmonary:      Effort: Pulmonary effort is normal.      Breath sounds: Normal breath sounds and air entry.   Abdominal:      General: Bowel sounds are normal.      Palpations: Abdomen is soft.      Tenderness: There is abdominal tenderness (generalized). There is no right CVA tenderness or left CVA tenderness.      Comments: Exam difficult due to body habitus   Lymphadenopathy:      Cervical: No cervical adenopathy.   Skin:     General: Skin is warm and dry.   Neurological:      General: No focal deficit present.      Mental Status: She is alert and oriented to person, " place, and time.   Psychiatric:         Mood and Affect: Mood normal.         Behavior: Behavior normal.         Thought Content: Thought content normal.         Judgment: Judgment normal.            Results for orders placed or performed in visit on 04/23/21 (from the past 24 hour(s))   UA reflex to Microscopic and Culture    Specimen: Midstream Urine   Result Value Ref Range    Color Urine Yellow     Appearance Urine Slightly Cloudy     Glucose Urine Negative NEG^Negative mg/dL    Bilirubin Urine Negative NEG^Negative    Ketones Urine 15 (A) NEG^Negative mg/dL    Specific Gravity Urine 1.020 1.003 - 1.035    Blood Urine Negative NEG^Negative    pH Urine 6.5 5.0 - 7.0 pH    Protein Albumin Urine Negative NEG^Negative mg/dL    Urobilinogen Urine 0.2 0.2 - 1.0 EU/dL    Nitrite Urine Negative NEG^Negative    Leukocyte Esterase Urine Small (A) NEG^Negative    Source Midstream Urine    Wet prep    Specimen: Vagina   Result Value Ref Range    Specimen Description Vagina     Wet Prep No Trichomonas seen     Wet Prep No clue cells seen     Wet Prep No yeast seen     Wet Prep WBC'S seen  Few      Urine Microscopic   Result Value Ref Range    WBC Urine 5-10 (A) OTO5^0 - 5 /HPF    RBC Urine O - 2 OTO2^O - 2 /HPF    Squamous Epithelial /LPF Urine Moderate (A) FEW^Few /LPF    Bacteria Urine Few (A) NEG^Negative /HPF

## 2021-04-23 NOTE — LETTER
April 23, 2021      Norma Collins  [address] [facility] [address]        To Whom It May Concern:    Norma Collins  was seen on 4/23/2021.  Kathryn may need to miss work or work reduced hours due to illness over the next 2 weeks.      Sincerely,        XENA Perez CNP

## 2021-04-24 DIAGNOSIS — R10.84 ABDOMINAL PAIN, GENERALIZED: ICD-10-CM

## 2021-04-24 LAB
BACTERIA SPEC CULT: NORMAL
Lab: NORMAL
SPECIMEN SOURCE: NORMAL

## 2021-04-26 RX ORDER — PANTOPRAZOLE SODIUM 40 MG/1
TABLET, DELAYED RELEASE ORAL
Qty: 90 TABLET | Refills: 1 | Status: SHIPPED | OUTPATIENT
Start: 2021-04-26 | End: 2021-06-11

## 2021-05-04 ENCOUNTER — TRANSFERRED RECORDS (OUTPATIENT)
Dept: HEALTH INFORMATION MANAGEMENT | Facility: CLINIC | Age: 21
End: 2021-05-04

## 2021-05-29 ENCOUNTER — RECORDS - HEALTHEAST (OUTPATIENT)
Dept: ADMINISTRATIVE | Facility: CLINIC | Age: 21
End: 2021-05-29

## 2021-05-30 ENCOUNTER — RECORDS - HEALTHEAST (OUTPATIENT)
Dept: ADMINISTRATIVE | Facility: CLINIC | Age: 21
End: 2021-05-30

## 2021-05-30 VITALS — WEIGHT: 293 LBS | BODY MASS INDEX: 45.99 KG/M2 | HEIGHT: 67 IN

## 2021-05-30 VITALS — WEIGHT: 293 LBS | HEIGHT: 66 IN | BODY MASS INDEX: 47.09 KG/M2

## 2021-05-30 NOTE — PROGRESS NOTES
Assessment:     1. Acute suppurative otitis media of right ear without spontaneous rupture of tympanic membrane, recurrence not specified  amoxicillin (AMOXIL) 250 MG capsule         Plan:     We will also recommend Benadryl 25 mg at night to help open the plugged eustachian tube on the right.    Subjective:      Gael is a 19 y.o. female presenting to my clinic for evaluation of right-sided ear pain and plugged ear on the right.  Kathryn says she noticed this after she went swimming about a week ago on Sunday.  She has been trying over-the-counter eardrops.  She does not have much of a history of ear infections.  She has been having this issue for at least a week.  Her discomfort is limited to the right side left is clear.  She denies throat pain or cough..        Current Outpatient Medications on File Prior to Visit   Medication Sig Dispense Refill     ALTAVERA, 28, 0.15-0.03 mg per tablet TAKE 1 TABLET BY MOUTH DAILY 84 tablet 2     methylPREDNISolone (MEDROL DOSEPACK) 4 mg tablet follow package directions 21 tablet 0     NECON 0.5/35, 28, 0.5-35 mg-mcg per tablet TAKE 1 TABLET BY MOUTH DAILY 84 tablet 2     cyclobenzaprine (FLEXERIL) 10 MG tablet Take 1 tablet (10 mg total) by mouth 3 (three) times a day as needed for muscle spasms. 30 tablet 0     ketoconazole (NIZORAL) 2 % cream Apply to the affected area twice daily. 60 g 2     No current facility-administered medications on file prior to visit.      Allergies   Allergen Reactions     Other Food Allergy Rash     Era sprouts     Past Medical History:   Diagnosis Date     Acanthosis nigricans      Adenotonsillar hypertrophy      Arthropathy of ankle/foot      Obesity      PCOS (polycystic ovarian syndrome)      Past Surgical History:   Procedure Laterality Date     none       TONSILLECTOMY AND ADENOIDECTOMY Bilateral 2/16/2017    Procedure: BILATERAL TONSILLECTOMY AND ADENOIDECTOMY;  Surgeon: Jacob Arguello MD;  Location: [facility];   Service:      Social History     Socioeconomic History     Marital status: Single     Spouse name: Not on file     Number of children: Not on file     Years of education: Not on file     Highest education level: Not on file   Occupational History     Not on file   Social Needs     Financial resource strain: Not on file     Food insecurity:     Worry: Not on file     Inability: Not on file     Transportation needs:     Medical: Not on file     Non-medical: Not on file   Tobacco Use     Smoking status: Never Smoker     Smokeless tobacco: Current User     Tobacco comment: occas. e-cigarette   Substance and Sexual Activity     Alcohol use: No     Drug use: No     Sexual activity: Not Currently   Lifestyle     Physical activity:     Days per week: Not on file     Minutes per session: Not on file     Stress: Not on file   Relationships     Social connections:     Talks on phone: Not on file     Gets together: Not on file     Attends [employer] service: Not on file     Active member of club or organization: Not on file     Attends meetings of clubs or organizations: Not on file     Relationship status: Not on file     Intimate partner violence:     Fear of current or ex partner: Not on file     Emotionally abused: Not on file     Physically abused: Not on file     Forced sexual activity: Not on file   Other Topics Concern     Not on file   Social History Narrative    Lives with father.  4 siblings including twin brother and 3 sisters.  Works at [employer].     Family History   Problem Relation Age of Onset     Cancer Mother         colon     Venous thrombosis Mother         cancer-related     Stroke Paternal Grandmother        ROS:  I have performed a 10 point ROS.  All pertinent positives and negatives are found in the HPI.  All others are negative.      Objective:     Physical Exam:  /70 (Patient Site: Right Arm, Patient Position: Sitting, Cuff Size: Adult Large)   Pulse 96   Wt (!) 320 lb 1.6 oz (145.2 kg)    SpO2 98%   BMI 50.89 kg/m    General Appearance: Alert, cooperative, no distress, appears stated age    Head: Normocephalic, without obvious abnormality, atraumatic  Eyes: PERRL, conjunctiva/corneas clear, EOM's intact  Ears: Right TM is dull bulging mild erythema and no light reflex/left TM is clear  Nose: Nares normal, septum midline,mucosa normal, no drainage  Throat: Lips, /posterior pharyngeal thick clear drainage/ tongue normal; teeth and gums normal  Neck: Supple, symmetrical, trachea midline, no adenopathy;  thyroid: not enlarged, symmetric, no tenderness/mass/nodules; no carotid bruit or JVD  Lungs: Clear to auscultation bilaterally, respirations unlabored  Heart: Regular rate and rhythm, S1 and S2 normal, no murmur, rub, or gallop,     Skin: Skin color, texture, turgor normal, no rashes or lesions  Lymph nodes: Cervical, supraclavicular, and axillary nodes normal  Neurologic: Normal   Mental status good spirits and cooperative:

## 2021-05-31 VITALS — WEIGHT: 293 LBS | HEIGHT: 67 IN | BODY MASS INDEX: 45.99 KG/M2

## 2021-05-31 VITALS — BODY MASS INDEX: 45.99 KG/M2 | HEIGHT: 67 IN | WEIGHT: 293 LBS

## 2021-05-31 VITALS — WEIGHT: 293 LBS | BODY MASS INDEX: 50 KG/M2

## 2021-05-31 VITALS — WEIGHT: 293 LBS | BODY MASS INDEX: 45.99 KG/M2 | HEIGHT: 67 IN

## 2021-05-31 NOTE — TELEPHONE ENCOUNTER
Patient has 2 different BCp on med list.  Both a little different strength.    Not sure which is current dose??    To provider for review.    Unable to fill per protocol.    Trinity Post, RN, [facility] Nurse Triage/Med Refills RN

## 2021-06-01 VITALS — WEIGHT: 293 LBS | HEIGHT: 67 IN | BODY MASS INDEX: 45.99 KG/M2

## 2021-06-01 VITALS — BODY MASS INDEX: 45.99 KG/M2 | HEIGHT: 67 IN | WEIGHT: 293 LBS

## 2021-06-01 NOTE — PROGRESS NOTES
Assessment and Plan:     1. Elevated ferritin  Ambulatory referral to Oncology/Hematology Adult   2. Abdominal pain, epigastric  Ambulatory referral to Gastroenterology    Celiac(Gluten)Antibody Panel ($$$)    oxyCODONE-acetaminophen (PERCOCET/ENDOCET) 5-325 mg per tablet   3. Abnormal chest CT  Ambulatory referral to Pulmonology   4. Rash  mupirocin (BACTROBAN) 2 % ointment     Patient had a CT scan showing a possible cystic structure on her left ovary.  Patient had a pelvic ultrasound yesterday which was normal.  Patient may have a ruptured ovarian cyst.  I did provide Percocet to use sparingly as needed.  She is to avoid taking this with other sedatives. She has canceled her appointment with gastroenterology twice due to these ER visits.  I encouraged her to reschedule this.  We will rule out celiac.  Patient does have an elevated ferritin and her father has hereditary hemochromatosis.  We did discuss symptoms of this which may be causing some of her abdominal pain.  Will refer to hematology for further evaluation.  Previously, we prescribed Seasonale which would only allow her to get her period every 3 months.  I recommend restarting a monthly contraceptive.  Patient states she is not currently taking any contraceptive and she is not sexually active.  She will wait until after she speaks to the hematologist.  Patient had an abnormal chest CT showing some atelectasis versus infiltrate.  She remains asymptomatic from pneumonia.  Will refer to pulmonology.  Provided refill of Bactroban to use as needed on her rash.  She is content with the plan.    Subjective:     Norma is a 19 y.o. female presenting to the clinic for concerns for worsening abdominal pain.  Patient was seen on 9/5/2019 for physical.  She was concerned of midepigastric abdominal pain for 1 month.  Patient has a history of a cholecystectomy.  She describes the pain as an intermittent ache within her mid epigastric abdominal region.  Initial labs  showed a normal hemogram, CMP, lipase.  Ferritin was mildly elevated at 65.  Her father has hereditary hemochromatosis.  Patient presented to the emergency room on 9/6/2019 with worsening symptoms.  White blood count had increased to 11.3.  ALT was mildly elevated at 51.  CT of the abdomen showed the following:    IMPRESSION:   CONCLUSION:   1.  4.0 x 3.6 x 2.9 cm cystic left adnexal lesion. See guidelines below.  2.  Mild fibroatelectasis with equivocal infiltrate left lower lobe consistent with pneumonia in the appropriate clinical setting although possibly just more prominent atelectasis.  3.  Hepatic steatosis.    Patient was not treated for pneumonia she did not exhibit symptoms of pneumonia.  She denied cough and fever.  There was a questionable cystic structure in the left ovary.  Patient refused endovaginal examination.  Patient was recommended to follow-up with gastroenterology.  Patient presented to the emergency room twice yesterday.  Patient states the initial evaluation was limited and the provider did not to run further testing.  She was told that she had gastritis and was to avoid NSAIDs.  Patient had a transvaginal ultrasound yesterday which was normal.  Patient was prescribed Bentyl.  Urinalysis was suspicious for urinary tract infection, so she was discharged with Macrobid.  She presents today with ongoing pain.  She describes the pain as an intermittent ache throughout her abdomen.  She has had nausea and has been dry heaving.  She is taking omeprazole which helps minimally.  Applying pressure to the abdomen provides assistance.  She has had lack of appetite.  She has been taking MiraLAX after meals and has been having more bowel movements which does improve her symptoms. No fever has been present.    Patient requests refill of Bactroban.  She uses this as needed a rash on her legs.  She denies rash today.    Review of Systems: A complete 14 point review of systems was obtained and is negative or  as stated in the history of present illness.    Social History     Socioeconomic History     Marital status: Single     Spouse name: Not on file     Number of children: Not on file     Years of education: Not on file     Highest education level: Not on file   Occupational History     Not on file   Social Needs     Financial resource strain: Not on file     Food insecurity:     Worry: Not on file     Inability: Not on file     Transportation needs:     Medical: Not on file     Non-medical: Not on file   Tobacco Use     Smoking status: Never Smoker     Smokeless tobacco: Current User     Tobacco comment: occas. e-cigarette   Substance and Sexual Activity     Alcohol use: No     Drug use: No     Sexual activity: Never     Birth control/protection: Pill   Lifestyle     Physical activity:     Days per week: Not on file     Minutes per session: Not on file     Stress: Not on file   Relationships     Social connections:     Talks on phone: Not on file     Gets together: Not on file     Attends Pentecostal service: Not on file     Active member of club or organization: Not on file     Attends meetings of clubs or organizations: Not on file     Relationship status: Not on file     Intimate partner violence:     Fear of current or ex partner: Not on file     Emotionally abused: Not on file     Physically abused: Not on file     Forced sexual activity: Not on file   Other Topics Concern     Not on file   Social History Narrative    Lives with father.  4 siblings including twin brother and 3 sisters.  Works at [employer].       Active Ambulatory Problems     Diagnosis Date Noted     Acanthosis Nigricans      Arthropathy Of The Ankle / Foot      Obesity, Class III, BMI 40-49.9 (morbid obesity) (H) 10/30/2015     PCOS (polycystic ovarian syndrome) 02/29/2016     Adenotonsillar hypertrophy 01/04/2017     Cholelithiasis 10/05/2017     Resolved Ambulatory Problems     Diagnosis Date Noted     Joint Pain Fingers      Obesity      Limb  "Pain      Acute pharyngitis      Acute Upper Respiratory Infection      Ankle Joint Pain      Ankle Sprain      Sore throat 07/02/2016     Past Medical History:   Diagnosis Date     Acanthosis nigricans      Adenotonsillar hypertrophy      Arthropathy of ankle/foot      Obesity      PCOS (polycystic ovarian syndrome)        Family History   Problem Relation Age of Onset     Cancer Mother         colon     Venous thrombosis Mother         cancer-related     Stroke Paternal Grandmother        Objective:     /60   Pulse 80   Ht 5' 6\" (1.676 m)   Wt (!) 311 lb (141.1 kg)   SpO2 97%   BMI 50.20 kg/m      Patient is alert, in no obvious distress.   Skin: Warm, dry.  No lesions or rashes.  Skin turgor rapid return.   HEENT:  Head normocephalic, atraumatic.  Eyes normal.  Ears normal.  Nose patent, mucosa pink.  Oropharynx mucosa pink.  No lesions or tonsillar enlargement.   Neck: Supple, no lymphadenopathy.  Lungs:  Clear to auscultation. Respirations even and unlabored.  No wheezing or rales noted.   Heart:  Regular rate and rhythm.  No murmurs, S3, S4, gallops, or rubs.    Abdomen: Soft, tenderness to palpation left lower abdomen and midepigastric.   No organomegaly. Bowel sounds normoactive. No guarding or masses noted.               "

## 2021-06-01 NOTE — TELEPHONE ENCOUNTER
Reason contacted:  results  Information relayed:  See message  Additional questions:  No  Further follow-up needed:  Yes  Okay to leave a detailed message:  No

## 2021-06-01 NOTE — PROGRESS NOTES
Patient here today for initial consultation with Dr. Alvarez for benign hematology (high iron)/VADIM Rascon RN

## 2021-06-01 NOTE — TELEPHONE ENCOUNTER
Reason for Disposition    [1] Drinking very little AND [2] dehydration suspected (e.g., no urine > 12 hours, very dry mouth, very lightheaded)    Protocols used: NAUSEA-A-AH

## 2021-06-01 NOTE — PROGRESS NOTES
Assessment and Plan:    1. Routine general medical examination at a health care facility  Discussed consuming a healthy diet and exercising.  She declines meningococcal B and Gardasil vaccines today.  Patient may schedule a nurse only appointment.    2. Class 3 severe obesity due to excess calories without serious comorbidity with body mass index (BMI) of 50.0 to 59.9 in adult (H)  We will check thyroid cascade and notify patient of results.   The following high BMI interventions were performed this visit: encouragement to exercise and lifestyle education regarding diet  - Thyroid Cascade    3. Screen for STD (sexually transmitted disease)  Discussed safe sex practices.  Will notify patient of results.  - Chlamydia trachomatis & Neisseria gonorrhoeae, Amplified Detection    4. Birth control counseling  Will start Seasonale.  Educate ossifications and side effects including increased risk of blood clots.  She denies family history of blood clots.  - levonorgestrel-ethinyl estradiol (SEASONALE) 0.15 mg-30 mcg (91) per tablet; Take 1 tablet by mouth daily.  Dispense: 84 tablet; Refill: 3    5. Abdominal pain, epigastric  Discussed possible dumping syndrome or sludge within her bile ducts.  Will refer to gastroenterology.  Will check hemogram, CMP, lipase.  Other differentials include GERD, gastritis, gastric ulcer, pancreatitis.  Will start omeprazole 20 mg daily.  Educated on its indications and side effects.  Educated on foods to avoid and remain upright for 2 hours after eating.  - Ambulatory referral to Gastroenterology  - HM2(CBC w/o Differential)  - Comprehensive Metabolic Panel  - Lipase  - omeprazole (PRILOSEC) 20 MG capsule; Take 1 capsule (20 mg total) by mouth daily.  Dispense: 30 capsule; Refill: 1    6. Family history of hemochromatosis  Suspect patient has family history of hemochromatosis.  We will check ferritin and rule out hemochromatosis.  We discussed that if she does test positive, we may have to  return to having her take monthly oral contraceptives so she has her period once a month.  She is content with the plan.  - Ferritin  - Hemochromatosis Mutation Analysis (S65C, C282Y and H63D) (HHMUT)      Subjective:     Norma is a 19 y.o. female presenting to the clinic for a female physical.     LMP: 8/4/19 regular   Hx of abnormal pap smear: n/a  Last pap smear: n/a  Perform self-breast exams: yes   Vaginal discharge or irritation: none   Sexually active: no, has not been sexually active ever; she is currently single   Contraception: ocp   Concerns for STDs: none   Previous pregnancies:none     Patient is interested in changing oral contraceptives.  She complains of menstrual cramps with her period.  Patient has been diagnosed with polycystic ovarian syndrome in the past.    Patient is concerned of midepigastric abdominal pain for 1 month.  Patient has a history of a cholecystectomy.  Most recent pain is an intermittent ache.  She has found no correlation with food.  She feels the pain when she wakes up in the morning or in mid afternoon.  She will have nausea and occasional diarrhea.  She does not vomit.  She denies any blood or mucus in the stool.    Patient was told that her father has elevated iron levels.  He is to have his blood drawn once a month due to this.  Patient needs to have her iron level checked today.  I asked her if her father has hemochromatosis, but she is unsure.    Lastly, patient is trying to lose weight.  She was seen at the [place] [facility] but did not find improvement with her weight.  She is consuming a healthy diet.  She would like her thyroid checked today.    Review of systems:  I performed a 10 point review of systems.  All pertinent positives and negatives are noted in the HPI. All others are negative.     Allergies   Allergen Reactions     Other Food Allergy Rash     [place] sprouts       Current Outpatient Medications on File Prior to Visit   Medication Sig  Dispense Refill     KURVELO, 28, 0.15-0.03 mg per tablet TAKE 1 TABLET BY MOUTH DAILY 84 tablet 0     mupirocin (BACTROBAN) 2 % ointment Apply topically.       methylPREDNISolone (MEDROL DOSEPACK) 4 mg tablet follow package directions 21 tablet 0     NECON 0.5/35, 28, 0.5-35 mg-mcg per tablet TAKE 1 TABLET BY MOUTH DAILY 84 tablet 2     No current facility-administered medications on file prior to visit.        Social History     Socioeconomic History     Marital status: Single     Spouse name: Not on file     Number of children: Not on file     Years of education: Not on file     Highest education level: Not on file   Occupational History     Not on file   Social Needs     Financial resource strain: Not on file     Food insecurity:     Worry: Not on file     Inability: Not on file     Transportation needs:     Medical: Not on file     Non-medical: Not on file   Tobacco Use     Smoking status: Never Smoker     Smokeless tobacco: Current User     Tobacco comment: occas. e-cigarette   Substance and Sexual Activity     Alcohol use: No     Drug use: No     Sexual activity: Not Currently   Lifestyle     Physical activity:     Days per week: Not on file     Minutes per session: Not on file     Stress: Not on file   Relationships     Social connections:     Talks on phone: Not on file     Gets together: Not on file     Attends [employer] service: Not on file     Active member of club or organization: Not on file     Attends meetings of clubs or organizations: Not on file     Relationship status: Not on file     Intimate partner violence:     Fear of current or ex partner: Not on file     Emotionally abused: Not on file     Physically abused: Not on file     Forced sexual activity: Not on file   Other Topics Concern     Not on file   Social History Narrative    Lives with father.  4 siblings including twin brother and 3 sisters.  Works at [employer]       Past Medical History:   Diagnosis Date     Acanthosis nigricans       Adenotonsillar hypertrophy      Arthropathy of ankle/foot      Obesity      PCOS (polycystic ovarian syndrome)        Family History   Problem Relation Age of Onset     Cancer Mother         colon     Venous thrombosis Mother         cancer-related     Stroke Paternal Grandmother        Past Surgical History:   Procedure Laterality Date     none       TONSILLECTOMY AND ADENOIDECTOMY Bilateral 2/16/2017    Procedure: BILATERAL TONSILLECTOMY AND ADENOIDECTOMY;  Surgeon: Jacob Arguello MD;  Location: [facility];  Service:        Objective:     Vitals:    09/05/19 0729   BP: 108/62   Pulse: 80   SpO2: 98%       Patient is alert, no obvious distress.   Skin: Warm, dry.  No rashes or lesions. Skin turgor rapid return.   HEENT:  Eyes normal.  Ears normal.  Nose patent, mucosa pink.  Oropharynx mucosa pink, no lesions or tonsil enlargement.   Neck:  Supple, without lymphadenopathy, bruits, JVD. Thyroid normal texture and size.    Lungs:  Clear to auscultation.  No wheezing, rales noted.  Respirations even and unlabored.   Heart:  Regular rate and rhythm.  No murmurs.   Breasts:  Normal.  No surrounding adenopathy.   Abdomen: Soft, tenderness to palpation midepigastric.  No organomegaly.  Bowel sounds normoactive.  No guarding or masses noted.   :  deferred  Musculoskeletal:  Full ROM of extremities.  Muscle strength equal +5/5.   Neurological:  Cranial nerves 2-12 intact.

## 2021-06-01 NOTE — TELEPHONE ENCOUNTER
----- Message from Zoraida Casey CMA sent at 9/16/2019  8:33 AM CDT -----      ----- Message -----  From: Kerline Silveira CNP  Sent: 9/15/2019   2:16 PM  To: Kerline Silveira Care Team Pool    Please notify the patient that her labs are normal except her ferritin remains elevated.  I still encourage her to see the hematologist as we discussed.  Thanks.

## 2021-06-01 NOTE — TELEPHONE ENCOUNTER
Seen ER last night and Friday by same provider at [facility] ED.  Dry heaves.  Bad pain in stomach.  Feels like something stuck in throat.   Constant nausea.    Rates pain now as 8/10.    Says she does not want to see same provider as before as he just gave her antinausea medication and sent her home.    Advised her to go to [place] ED.  Patient agrees.    Trinity Post, RN, [facility] Nurse Triage/Med Refills RN

## 2021-06-01 NOTE — PROGRESS NOTES
"Pulmonary Clinic Consult Note  Date of Service: 9/27/2019    Reason for Consultation: Abnormal CT scan of the chest    History:     HPI: Patient is a pleasant 19-year-old morbidly obese female, with minimal smoking history, and history of PCOS was referred here for abnormal imaging.    Patient had been having abdominal issues from possibly ruptured ovarian cyst.  During evaluation, she underwent CT scan of the abdomen that showed an infiltrate versus atelectasis at the lung base and was therefore sent to pulmonary clinic for further evaluation.  Pt denied any cough, fever or chills. She has PND.  She is not functionally limited. 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 She is asymptomatic from a respiratory perspective. No fever or night sweats.    PMHx/PSHx:  PCOS  Obesity  Acanthosis nigricans  History of cholecystectomy  Tonsillectomy and adenoidectomy 2017.    Social Hx:  Former smoker.  Used to smoke packs/day 2017-8/2019. She used to vape but she is not.  Works at [employer].      Review of Systems - 10 point review of system negative except for what is mentioned in the HPI.    Meds and Allergies:  See EHR for the updated medication list and Allergies. These were reviewed.     Family History   Problem Relation Age of Onset     Cancer Mother         colon     Venous thrombosis Mother         cancer-related     Stroke Paternal Grandmother        Exam/Data:   /62   Pulse 71   Resp 12   Ht 5' 6\" (1.676 m)   Wt (!) 308 lb (139.7 kg)   SpO2 97%   Breastfeeding? No   BMI 49.71 kg/m      EXAM:  GEN: comfortable, NAD  HEENT: NCAT, EMOI, mmm  LN: no cervical LAD   CVS: S1S2, RRR  Lung: good air entry bilaterally, no wheezing  Abd: soft, nt, + BS. No masses  Ext: no c/c/e  Vasc: intact radial pulses bilaterally  Neuro: nonfocal  Skin: no visible rash  Musculoskeletal: FROM all extremities  Psych: normal affect    DATA    Ct Abdomen Pelvis Without Oral With Iv " Contrast  Result Date: 9/6/2019  EXAM: CT ABDOMEN PELVIS WO ORAL W IV CONTRAST LOCATION: [facility] DATE/TIME: 9/6/2019 7:21 PM INDICATION: Abdominal pain, acute, nonlocalized diffuse abd pain. COMPARISON: Ultrasound 10/16/2017, no prior CT. TECHNIQUE: Helical enhanced thin-section CT scan of the abdomen and pelvis was performed following injection of IV contrast. Multiplanar reformats were obtained. Dose reduction techniques were used. CONTRAST: Iohexol (Omni) 100 mL. FINDINGS: LUNG BASES: Mild fibroatelectasis with possible superimposed infiltrate left lower lobe. ABDOMEN: Cholecystectomy. Hepatic steatosis. Spleen, adrenal glands, kidneys, and pancreas unremarkable. No aortic aneurysm. No adenopathy. PELVIS: 3.6 x 2.9 x 4.0 cm left adnexal cystic lesion. Normal appendix. No bowel obstruction. Pelvic organs otherwise unremarkable. MUSCULOSKELETAL: Degenerative disease.   CONCLUSION: 1.  4.0 x 3.6 x 2.9 cm cystic left adnexal lesion. See guidelines below. 2.  Mild fibroatelectasis with equivocal infiltrate left lower lobe consistent with pneumonia in the appropriate clinical setting although possibly just more prominent atelectasis. 3.  Hepatic steatosis. REFERENCE: Guidelines for incidental benign or probably benign adnexal cyst on CT/MRI. Managing Incidental Findings on Abdominal and Pelvic CT and MRI, Part 1: White Paper of the ACR Incidental Findings Committee II on Adnexal Findings. J Am Ross Radiol 2013;10:675-681. Premenopausal: Less than 5 cm: Benign, no follow up. Greater than 5 cm: US follow up in 8 weeks.       Assessment/Plan:   Norma Collins is a 19 y.o. female, with minimal smoking history, was referred here for abnormal CT scan.  Patient had a CT abdomen on September 6, 2019 that showed mild fibroatelectasis and equivocal infiltrate in the left lower lobe.  Patient is completely asymptomatic.  Unfortunately, we do not have dedicated CT scan of the chest.    Recommendations:  CT scan of the  chest without contrast    FOLLOW UP: 3 months    Kendra Carson MD  Pulmonary and Critical Care Medicine  9/27/2019        Allergies   Allergen Reactions     Other Food Allergy Rash     [place] sprouts       Medications:     Current Outpatient Medications   Medication Sig Dispense Refill     oxyCODONE-acetaminophen (PERCOCET/ENDOCET) 5-325 mg per tablet Take 1 tablet by mouth every 8 (eight) hours as needed for pain. 12 tablet 0     polyethylene glycol (MIRALAX) 17 gram packet Take 1 packet (17 g total) by mouth daily. 14 each 0     No current facility-administered medications for this visit.        Much or all of the text in this note was generated through the use of the Dragon Dictate voice-to-text software. Errors in spelling or words which seem out of context are unintentional. Sound alike errors, in particular, may have escaped editing.

## 2021-06-01 NOTE — TELEPHONE ENCOUNTER
Pt was called and scheduled for their Hematology appointment on 9/24 at 1:45pm with Dr. Alvarez at . Informational packet was mailed to pt today and included welcome letter with appointment information, MD bio card, 6-page health history form to fill out, and current medication list to review.       ----- Message from Porsha Larsen sent at 9/11/2019  9:35 AM CDT -----  Regarding: New pt benign heme WQ referral  Elevated ferritin.  Reason for Referral: elevated ferritin; father with hereditary hemochromatosis     Ref by Kerline Silveira, CNP.

## 2021-06-02 ENCOUNTER — RECORDS - HEALTHEAST (OUTPATIENT)
Dept: ADMINISTRATIVE | Facility: CLINIC | Age: 21
End: 2021-06-02

## 2021-06-02 VITALS — HEIGHT: 67 IN | WEIGHT: 293 LBS | BODY MASS INDEX: 45.99 KG/M2

## 2021-06-02 NOTE — TELEPHONE ENCOUNTER
RN cannot approve Refill Request    RN can NOT refill this medication medication not on med list. Last office visit: 9/10/2019 Kerline Silveira CNP Last Physical: 9/5/2019 Last MTM visit: Visit date not found Last visit same specialty: 9/10/2019 Kerline Silveira CNP.  Next visit within 3 mo: Visit date not found  Next physical within 3 mo: Visit date not found      Cassandra Villagran, [facility] Triage/Med Refill 11/1/2019    Requested Prescriptions   Pending Prescriptions Disp Refills     omeprazole (PRILOSEC) 20 MG capsule [Pharmacy Med Name: OMEPRAZOLE 20MG CAPSULES] 30 capsule 0     Sig: TAKE 1 CAPSULE(20 MG) BY MOUTH DAILY       GI Medications Refill Protocol Passed - 11/1/2019 10:00 AM        Passed - PCP or prescribing provider visit in last 12 or next 3 months.     Last office visit with prescriber/PCP: 9/10/2019 Kerline Silveira CNP OR same dept: 9/10/2019 Kerline Silveira CNP OR same specialty: 9/10/2019 Kerline Silveira CNP  Last physical: 9/5/2019 Last MTM visit: Visit date not found   Next visit within 3 mo: Visit date not found  Next physical within 3 mo: Visit date not found  Prescriber OR PCP: Kerline Silveira CNP  Last diagnosis associated with med order: 1. Abdominal pain, epigastric  - omeprazole (PRILOSEC) 20 MG capsule [Pharmacy Med Name: OMEPRAZOLE 20MG CAPSULES]; TAKE 1 CAPSULE(20 MG) BY MOUTH DAILY  Dispense: 30 capsule; Refill: 0    If protocol passes may refill for 12 months if within 3 months of last provider visit (or a total of 15 months).

## 2021-06-03 VITALS
WEIGHT: 293 LBS | BODY MASS INDEX: 47.09 KG/M2 | DIASTOLIC BLOOD PRESSURE: 62 MMHG | OXYGEN SATURATION: 97 % | RESPIRATION RATE: 12 BRPM | SYSTOLIC BLOOD PRESSURE: 110 MMHG | HEART RATE: 71 BPM | HEIGHT: 66 IN

## 2021-06-03 VITALS
BODY MASS INDEX: 45.99 KG/M2 | DIASTOLIC BLOOD PRESSURE: 62 MMHG | OXYGEN SATURATION: 98 % | SYSTOLIC BLOOD PRESSURE: 108 MMHG | HEART RATE: 80 BPM | HEIGHT: 67 IN | WEIGHT: 293 LBS

## 2021-06-03 VITALS
WEIGHT: 293 LBS | OXYGEN SATURATION: 97 % | DIASTOLIC BLOOD PRESSURE: 60 MMHG | BODY MASS INDEX: 47.09 KG/M2 | HEART RATE: 80 BPM | HEIGHT: 66 IN | SYSTOLIC BLOOD PRESSURE: 120 MMHG

## 2021-06-03 VITALS
DIASTOLIC BLOOD PRESSURE: 58 MMHG | HEART RATE: 71 BPM | OXYGEN SATURATION: 93 % | TEMPERATURE: 98.4 F | WEIGHT: 293 LBS | BODY MASS INDEX: 49.66 KG/M2 | SYSTOLIC BLOOD PRESSURE: 119 MMHG

## 2021-06-03 VITALS — BODY MASS INDEX: 50.89 KG/M2 | WEIGHT: 293 LBS

## 2021-06-03 NOTE — TELEPHONE ENCOUNTER
Medication Request  Medication name: Kurvelo  Pharmacy Name and Location: [facility]  Reason for request: Refill- not on current med list  When did you use medication last?:  Last filled per fax 8/20/2019  Okay to leave a detailed message: no

## 2021-06-04 VITALS
BODY MASS INDEX: 49.16 KG/M2 | RESPIRATION RATE: 24 BRPM | HEART RATE: 88 BPM | WEIGHT: 293 LBS | OXYGEN SATURATION: 97 % | SYSTOLIC BLOOD PRESSURE: 110 MMHG | DIASTOLIC BLOOD PRESSURE: 70 MMHG

## 2021-06-04 VITALS — WEIGHT: 293 LBS | BODY MASS INDEX: 45.99 KG/M2 | HEIGHT: 67 IN

## 2021-06-04 NOTE — PROGRESS NOTES
PULMONARY CLINIC FOLLOW UP NOTE    History:     HPI: Norma Collins is a 19 y.o. female, former smoker, with obesity and PCOS who is here for follow-up of abnormal imaging that she had back on September 2019 when she was being worked up for abdominal pain.    Interval History: She denies any respiratory symptoms.  She denies any respiratory limitations.  She denies any cough.  She denies any weight loss.  She continues to work at [employer].  She denies any exposures.  She had a CT scan of the chest for follow-up.    PMHx/PSHx:  PCOS  Obesity  Acanthosis nigricans  History of cholecystectomy  Tonsillectomy and adenoidectomy 2017.     Social Hx:  Former smoker.  Used to smoke packs/day 2017-8/2019. She used to vape but she is not.  Works at [employer].    ROS: 10 point review of system done. Pertinent findings are noted in the HPI.    Exam/Data:   /70   Pulse 88   Resp 24   Wt (!) 313 lb 14.4 oz (142.4 kg)   SpO2 97% Comment: RA  BMI 49.16 kg/m  , Body mass index is 49.16 kg/m .  GEN: comfortable, NAD  HEENT: NCAT, EMOI, mmm  CVS: S1S2, RRR  Lung: good air entry bilaterally, no wheezing  Abd: soft, nt, + BS. No masses  Ext: no c/c/e  Neuro: nonfocal  Skin: no visible rash  Vasc: intact radial pulses bilaterally  Musculoskeletal: FROM all extremities  Psych: normal affect    Data:   Labs and Imaging personally reviewed.  Pertinent findings include:    Ct Chest Without Contrast    Result Date: 12/26/2019  EXAM: CT CHEST WO CONTRAST LOCATION: [facility] DATE/TIME: 12/26/2019 2:14 PM INDICATION: Left lung base infiltrate/opacity on CT abdomen pelvis. CT chest as follow-up. COMPARISON: CT abdomen pelvis 09/06/2019. TECHNIQUE: CT chest without IV contrast. Multiplanar reformats were obtained. Dose reduction techniques were used. CONTRAST: None. FINDINGS: LUNGS AND PLEURA: Subtle area of linear atelectasis or fibrosis left lower lobe again seen. Lungs otherwise clear. Previous groundglass  opacities/infiltrates left lower lobe have resolved. No worrisome pulmonary mass or infiltrate. No effusion. MEDIASTINUM/AXILLAE: No lymphadenopathy. No thoracic aortic aneurysm. UPPER ABDOMEN: Cholecystectomy clips. Upper abdomen otherwise unremarkable. MUSCULOSKELETAL: Unremarkable.     1.  CT chest negative. Minimal fibrotic appearing scarring or linear atelectasis left lower lobe stable. Lungs otherwise clear. Previous left lower lobe infiltrates have resolved.     Mr Enterography With Without Contrast    Result Date: 12/17/2019  EXAM: MRI ENTEROGRAPHY/MRI ABDOMEN AND MRI PELVIS WITHOUT AND WITH CONTRAST LOCATION: [facility] DATE/TIME: 12/17/2019 11:45 AM INDICATION: Iron deficiency anemia secondary to blood loss (chronic) COMPARISON: None. TECHNIQUE: Routine enterography protocol including imaging of abdomen and pelvis with axial T1 in/out phase, axial T2, coronal T2. Post contrast abdomen and pelvis axial and coronal thin-section T1 with fat sat. 1 mg Glucagon. Oral VoLumen. CONTRAST: Gadavist 10 FINDINGS: ENTEROGRAPHY: Normal enterography. No mural thickening, enhancement or stratification of the bowel wall. No strictures or dilatation. No stranding in the adjacent mesenteric fat or engorgement of the vasa recta. No fistulas, abscess, or obstruction. The stomach and colon are within normal limits. ADDITIONAL FINDINGS: No significant abnormality in the liver, spleen, kidneys, pancreas, and adrenal glands where visualized. No adenopathy. Normal pelvis.     1.  Normal MR enterography.      Assessment/Plan:     Norma Collins is a 19 y.o. female, former smoker, with history of obesity and PCOS who was being worked up for abdominal pain back in September 2019 when the lower cuts of her CT abdomen showed possible infiltrate in the left lower lobe.  We did not have a dedicated CT scan of the chest at that time.  CT scan of the chest was done and shows minimal fibrotic scarring or linear atelectasis of the  left lower lobe stable.  Suspect this is most likely secondary to atelectasis given her obesity.    Recommendations:  Counseled on diet, weight loss, and exercise  No further work-up indicated    Follow-up as needed    Kendra Carson MD  Pulmonary and Critical Care Medicine  Electronically Signed on 12/28/2019    No current outpatient medications on file.     No current facility-administered medications for this visit.      Allergies   Allergen Reactions     Other Food Allergy Rash     [place] sprouts       Meds and Allergies: See EHR for the updated medication list and Allergies. These were reviewed.     Much or all of the text in this note was generated through the use of the Dragon Dictate voice-to-text software. Errors in spelling or words which seem out of context are unintentional. Sound alike errors, in particular, may have escaped editing.

## 2021-06-05 VITALS — WEIGHT: 293 LBS | BODY MASS INDEX: 46.99 KG/M2

## 2021-06-05 NOTE — TELEPHONE ENCOUNTER
Ok for an appointment on Friday with you?   Or do you want to work her into your schedule tomorrow?

## 2021-06-05 NOTE — PROGRESS NOTES
Assessment and Plan:     1. Caregiver burden  I completed patient's FMLA paperwork.  Please see forms for further details. She is content with the plan.     2. Class 3 severe obesity due to excess calories without serious comorbidity with body mass index (BMI) of 50.0 to 59.9 in adult (H)  The following high BMI interventions were performed this visit: encouragement to exercise and lifestyle education regarding diet          Subjective:     Norma is a 20 y.o. female presenting to the clinic for completion of FMLA paperwork.  Patient states her father was diagnosed with stage IV liver disease from hemochromatosis.  He has had fluctuating ammonia levels and has been hospitalized due to confusion and altered mental status.  Patient's father needs constant supervision as his ammonia level fluctuates.  She is requesting  FMLA so she is able to take him to schedule appointments and be at home with him if her sisters cannot be present. She has been coping well with this. She has no concerns for anxiety or depression.      Review of Systems: A complete 14 point review of systems was obtained and is negative or as stated in the history of present illness.    Social History     Socioeconomic History     Marital status: Single     Spouse name: Not on file     Number of children: Not on file     Years of education: Not on file     Highest education level: Not on file   Occupational History     Not on file   Social Needs     Financial resource strain: Not on file     Food insecurity:     Worry: Not on file     Inability: Not on file     Transportation needs:     Medical: Not on file     Non-medical: Not on file   Tobacco Use     Smoking status: Former Smoker     Packs/day: 0.00     Years: 2.00     Pack years: 0.00     Smokeless tobacco: Never Used   Substance and Sexual Activity     Alcohol use: No     Drug use: No     Sexual activity: Never     Birth control/protection: Pill   Lifestyle     Physical activity:     Days per week:  "Not on file     Minutes per session: Not on file     Stress: Not on file   Relationships     Social connections:     Talks on phone: Not on file     Gets together: Not on file     Attends Sikh service: Not on file     Active member of club or organization: Not on file     Attends meetings of clubs or organizations: Not on file     Relationship status: Not on file     Intimate partner violence:     Fear of current or ex partner: Not on file     Emotionally abused: Not on file     Physically abused: Not on file     Forced sexual activity: Not on file   Other Topics Concern     Not on file   Social History Narrative    Lives with father.  4 siblings including twin brother and 3 sisters.  Works at [employer].       Active Ambulatory Problems     Diagnosis Date Noted     Acanthosis Nigricans      Arthropathy Of The Ankle / Foot      Obesity, Class III, BMI 40-49.9 (morbid obesity) (H) 10/30/2015     PCOS (polycystic ovarian syndrome) 02/29/2016     Adenotonsillar hypertrophy 01/04/2017     Cholelithiasis 10/05/2017     Resolved Ambulatory Problems     Diagnosis Date Noted     Joint Pain Fingers      Obesity      Limb Pain      Acute pharyngitis      Acute Upper Respiratory Infection      Ankle Joint Pain      Ankle Sprain      Sore throat 07/02/2016     Past Medical History:   Diagnosis Date     Acanthosis nigricans      Obesity        Family History   Problem Relation Age of Onset     Cancer Mother         colon     Venous thrombosis Mother         cancer-related     Stroke Paternal Grandmother        Objective:     /78   Pulse 81   Ht 5' 7\" (1.702 m)   Wt (!) 318 lb 4.8 oz (144.4 kg)   SpO2 98%   BMI 49.85 kg/m      Patient is alert, in no obvious distress.   Skin: Warm, dry.   Other exam deferred per counseling.               "

## 2021-06-05 NOTE — TELEPHONE ENCOUNTER
Forms Request  Name of form/paperwork: JASMINE  Have you been seen for this request: No, patient has an appointment on 1/13/2020  Do we have the form: No  When is form needed by: patient would like to drop the form off tomorrow to get filled out, as she needs it filled out before 1/13/2020.  Her father had an appointment today with Kerline Silveira & brought the form but was told that Norma needed to be present. Please advise.  How would you like the form returned:   Patient Notified form requests are processed in 3-5 business days: No    Okay to leave a detailed message? Yes

## 2021-06-07 NOTE — TELEPHONE ENCOUNTER
Patient was seen at ED yesterday for abdominal pain and is now vomiting; has vomited twice.  Per ED note, advised to return to ED.    Viviana Simmons RN  [facility] Triage Nurse Advisor

## 2021-06-07 NOTE — PROGRESS NOTES
"Norma Collins is a 20 y.o. female who is being evaluated via a billable telephone visit.      The patient has been notified of following:     \"This telephone visit will be conducted via a call between you and your physician/provider. We have found that certain health care needs can be provided without the need for a physical exam.  This service lets us provide the care you need with a short phone conversation.  If a prescription is necessary we can send it directly to your pharmacy.  If lab work is needed we can place an order for that and you can then stop by our lab to have the test done at a later time.    Telephone visits are billed at different rates depending on your insurance coverage. During this emergency period, for some insurers they may be billed the same as an in-person visit.  Please reach out to your insurance provider with any questions.    If during the course of the call the physician/provider feels a telephone visit is not appropriate, you will not be charged for this service.\"    Patient has given verbal consent to a Telephone visit? Yes    Patient would like to receive their AVS by AVS Preference: Mail a copy.    Additional provider notes: Norma is evaluated today via telephone encounter for follow-up on emergency department visit.  She was seen at the emergency department on 2 occasions over the weekend for concerns of abdominal pain, nausea and vomiting.  Initially presented on Friday with symptoms of urinary frequency and nausea.  She was not having any other UTI symptoms such as dysuria and hematuria.  She was discharged home.  Returned to the emergency department the following day with new symptom of vomiting.  She did undergo a CT scan of the abdomen and pelvis as well as pelvic ultrasound.  No significant abnormalities.  She had blood work done which did show mild elevation of white blood cell count to 12.  Recheck of white blood cell count the following day showed white blood cell count " to be within normal range.  Wet prep did show yeast.  She was not having any symptoms of vaginal itching, irritation or discharge.  She was prescribed fluconazole as well as ondansetron and discharged home.  Today she reports that her pain is improved but is still present.  Currently rates it about a 5 out of 10.  She has had some trouble with constipation.  She did have a bowel movement this morning but it was hard.  She wonders if she could take a stool softener which she does have at home.  Pain in her abdomen is primarily on the right side.  It is in both the upper and lower abdomen.  States it feels similar to when she had her gallbladder removed a few years ago.  States she continues to have nausea but she feels this is due to some postnasal drainage.  She has not had fevers or chills.  She has not had any new symptoms.  She feels tired.  Pain is described as constant.  She is uncomfortable.  She feels full.  States she also has some discomfort in her lower back.  She has had decreased appetite so she has been only eating about once a day.  Discomfort is a little bit worse with eating.  She has not had any sick contacts.  She is not sexually active.  Review of systems is otherwise negative.  She has no other concerns or questions today.    Assessment/Plan:  1. Abdominal pain of unknown cause  Reviewed recent lab and imaging results that were performed in the emergency department with patient.  Provided reassurance that there were no significant lab or imaging abnormalities.  She is having some improvement in her discomfort.  At this time, ! recommended continuing with conservative treatments and observation for a few more days.  Suggested bland or liquid diet.  Continue ondansetron as needed.  Drink adequate fluids.  Send [employer] message later this week with an update on symptoms.  If worsening or not improving, would suggest GI consult as next step.    2. Slow transit constipation  Recommended use of MiraLAX.   She does not wish to go out to the store to purchase this and would rather try the stool softener that she has at home first.  Agreed that this would be reasonable but discussed it may only soften the stool and not necessarily promote a bowel movement.  We did discuss that some of her abdominal discomfort and nausea could be related to constipation.  Advised adequate fluids, regular physical activity and increased fiber as tolerated.  She will send [employer] message with update later this week.        Phone call duration:  14 minutes    Cyndi Malloy MD

## 2021-06-07 NOTE — TELEPHONE ENCOUNTER
Are we able to schedule this patient in clinic to see Dr. Jacques?  I would like him to recheck her abdomen since she is still very uncomfortable.

## 2021-06-07 NOTE — TELEPHONE ENCOUNTER
Appointment scheduled with DR Jacques 4/17/2020 at 2:20 PM per DR Malloy. Pt is still having stomach discomfort with nausea,  feels backed up but was able to have a BM after using Miralax.

## 2021-06-07 NOTE — PROGRESS NOTES
"Assessment/Plan:    1. RUQ abdominal pain  Right upper quadrant abdominal pain, intermittent question intestinal colic etiology with possible constipation.  Magnesium citrate 300 mL p.o. x1.  Use of fiber including MiraLAX 17 g daily plus dietary sources.  Notify of persistent concern and consider GI consult for consideration of repeat endoscopy versus small bowel follow-through testing.  Reviewed results of CT abdomen and pelvis as well as a pelvis ultrasound that appear nonspecific and unremarkable at this time.    2. Nausea  Zofran available.  Has not tried.  Will use as directed.    3. Yeast vaginitis  Completing treatment for yeast vaginitis.  Not sexually active previously.  No history of STI.        Subjective:    Norma Collins is seen today for abdominal pain.  Right upper quadrant described.  Was seen initially on Friday for right lower quadrant abdominal discomfort.  CT abdomen and pelvis as well as pelvic ultrasound were completed at that time that appeared nonspecific.  Labs including UA, comprehensive metabolic panel, CBC, CRP, lipase and lactic acid levels were obtained.  Mild white count elevation at 12.0 otherwise was seen the following day and [facility] ER for follow-up due to ongoing concerns and nausea with vomiting.  CBC improved to 10.5.  Denies cough or hemoptysis.  No shortness of breath.  No diarrhea.  Comprehensive review of systems as above otherwise all negative.    Single  No children  Tobacco: none (prior vape but quit)  EtOH: none  Mom -  45 \"stage 4 colon CA\"  Dad - \"stage 4 cirrhosis of liver due to high iron\"  3 sis - Mely Miller and Charlotte  1 fraternal twin brother - Efra  Surgeries: tonsils; gallbladder (~ 2018)  Hospitalizations: stayed overnight following gallbladder surgery  Work: [employer]  Hobbies:    Past Surgical History:   Procedure Laterality Date     CHOLECYSTECTOMY       none       TONSILLECTOMY AND ADENOIDECTOMY Bilateral 2017    Procedure: " BILATERAL TONSILLECTOMY AND ADENOIDECTOMY;  Surgeon: Jacob Arguello MD;  Location: [facility] OR;  Service:         Family History   Problem Relation Age of Onset     Cancer Mother         colon     Venous thrombosis Mother         cancer-related     Stroke Paternal Grandmother         Past Medical History:   Diagnosis Date     Acanthosis nigricans      Adenotonsillar hypertrophy      Arthropathy of ankle/foot      Obesity      PCOS (polycystic ovarian syndrome)         Social History     Tobacco Use     Smoking status: Former Smoker     Packs/day: 0.00     Years: 2.00     Pack years: 0.00     Smokeless tobacco: Never Used   Substance Use Topics     Alcohol use: No     Drug use: No        Current Outpatient Medications   Medication Sig Dispense Refill     ondansetron (ZOFRAN-ODT) 4 MG disintegrating tablet Take 1 tablet (4 mg total) by mouth every 8 (eight) hours as needed for nausea. 12 tablet 0     No current facility-administered medications for this visit.           Objective:    Vitals:    04/17/20 1424   BP: 120/76   Pulse: 80   SpO2: 97%   Weight: (!) 324 lb (147 kg)      Body mass index is 50.75 kg/m .    aAlert.  No apparent distress.  Morbid obesity.  HEENT exam normal.  Chest clear.  Cardiac exam regular.  Abdomen nondistended.  No guarding or rebound.  Extremities warm and dry.      Recent Results (from the past 240 hour(s))   Urinalysis-UC if Indicated   Result Value Ref Range    Color, UA Yellow Colorless, Yellow, Straw, Light Yellow    Clarity, UA Clear Clear    Glucose, UA Negative Negative    Bilirubin, UA Negative Negative    Ketones, UA Negative Negative    Specific Gravity, UA 1.010 1.001 - 1.030    Blood, UA Negative Negative    pH, UA 7.0 4.5 - 8.0    Protein, UA Negative Negative mg/dL    Urobilinogen, UA <2.0 E.U./dL <2.0 E.U./dL, 2.0 E.U./dL    Nitrite, UA Negative Negative    Leukocytes, UA Negative Negative   POCT pregnancy, urine   Result Value Ref Range    POC Preg, Urine  Negative Negative    POCT Kit Lot Number [id number]     POCT Kit Expiration Date 2021-09-30     Pos Control Valid Control Valid Control    Neg Control Valid Control Valid Control   Comprehensive Metabolic Panel   Result Value Ref Range    Sodium 140 136 - 145 mmol/L    Potassium 4.2 3.5 - 5.0 mmol/L    Chloride 105 98 - 107 mmol/L    CO2 23 22 - 31 mmol/L    Anion Gap, Calculation 12 5 - 18 mmol/L    Glucose 97 70 - 125 mg/dL    BUN 15 8 - 22 mg/dL    Creatinine 0.86 0.60 - 1.10 mg/dL    GFR MDRD Af Amer >60 >60 mL/min/1.73m2    GFR MDRD Non Af Amer >60 >60 mL/min/1.73m2    Bilirubin, Total 0.2 0.0 - 1.0 mg/dL    Calcium 9.5 8.5 - 10.5 mg/dL    Protein, Total 7.7 6.0 - 8.0 g/dL    Albumin 4.2 3.5 - 5.0 g/dL    Alkaline Phosphatase 84 45 - 120 U/L    AST 35 0 - 40 U/L    ALT 44 0 - 45 U/L   C-Reactive Protein   Result Value Ref Range    CRP 0.8 0.0 - 0.8 mg/dL   HM1 (CBC with Diff)   Result Value Ref Range    WBC 12.0 (H) 4.0 - 11.0 thou/uL    RBC 4.65 3.80 - 5.40 mill/uL    Hemoglobin 12.9 12.0 - 16.0 g/dL    Hematocrit 39.7 35.0 - 47.0 %    MCV 85 80 - 100 fL    MCH 27.7 27.0 - 34.0 pg    MCHC 32.5 32.0 - 36.0 g/dL    RDW 13.2 11.0 - 14.5 %    Platelets 291 140 - 440 thou/uL    MPV 10.1 8.5 - 12.5 fL    Neutrophils % 60 50 - 70 %    Lymphocytes % 30 20 - 40 %    Monocytes % 6 2 - 10 %    Eosinophils % 2 0 - 6 %    Basophils % 1 0 - 2 %    Neutrophils Absolute 7.3 2.0 - 7.7 thou/uL    Lymphocytes Absolute 3.7 0.8 - 4.4 thou/uL    Monocytes Absolute 0.8 0.0 - 0.9 thou/uL    Eosinophils Absolute 0.3 0.0 - 0.4 thou/uL    Basophils Absolute 0.1 0.0 - 0.2 thou/uL   Urinalysis-UC if Indicated   Result Value Ref Range    Color, UA Straw Colorless, Yellow, Straw, Light Yellow    Clarity, UA Clear Clear    Glucose, UA Negative Negative    Bilirubin, UA Negative Negative    Ketones, UA Negative Negative    Specific Gravity, UA 1.005 1.001 - 1.030    Blood, UA Negative Negative    pH, UA 5.0 4.5 - 8.0    Protein, UA Negative  Negative mg/dL    Urobilinogen, UA <2.0 E.U./dL <2.0 E.U./dL, 2.0 E.U./dL    Nitrite, UA Negative Negative    Leukocytes, UA Negative Negative   Comprehensive Metabolic Panel   Result Value Ref Range    Sodium 139 136 - 145 mmol/L    Potassium 4.0 3.5 - 5.0 mmol/L    Chloride 107 98 - 107 mmol/L    CO2 23 22 - 31 mmol/L    Anion Gap, Calculation 9 5 - 18 mmol/L    Glucose 102 70 - 125 mg/dL    BUN 12 8 - 22 mg/dL    Creatinine 0.76 0.60 - 1.10 mg/dL    GFR MDRD Af Amer >60 >60 mL/min/1.73m2    GFR MDRD Non Af Amer >60 >60 mL/min/1.73m2    Bilirubin, Total 0.4 0.0 - 1.0 mg/dL    Calcium 9.3 8.5 - 10.5 mg/dL    Protein, Total 7.3 6.0 - 8.0 g/dL    Albumin 4.2 3.5 - 5.0 g/dL    Alkaline Phosphatase 77 45 - 120 U/L    AST 24 0 - 40 U/L    ALT 38 0 - 45 U/L   Lipase   Result Value Ref Range    Lipase 18 0 - 52 U/L   Lactic Acid   Result Value Ref Range    Lactic Acid 0.7 0.5 - 2.2 mmol/L   ECG 12 lead nursing unit performed   Result Value Ref Range    SYSTOLIC BLOOD PRESSURE      DIASTOLIC BLOOD PRESSURE      VENTRICULAR RATE 73 BPM    ATRIAL RATE 73 BPM    P-R INTERVAL 140 ms    QRS DURATION 82 ms    Q-T INTERVAL 386 ms    QTC CALCULATION (BEZET) 425 ms    P Axis 39 degrees    R AXIS 68 degrees    T AXIS 45 degrees    MUSE DIAGNOSIS       Normal sinus rhythm with sinus arrhythmia  Normal ECG  When compared with ECG of 09-SEP-2019 00:02,  No significant change was found  Confirmed by SEE ED PROVIDER NOTE FOR, ECG INTERPRETATION ([id number]),  SIM ACHARYA ([id number]) on 4/11/2020 1:56:53 PM     HM2 (CBC W/O DIFF)   Result Value Ref Range    WBC 10.5 4.0 - 11.0 thou/uL    RBC 4.42 3.80 - 5.40 mill/uL    Hemoglobin 12.4 12.0 - 16.0 g/dL    Hematocrit 37.9 35.0 - 47.0 %    MCV 86 80 - 100 fL    MCH 28.1 27.0 - 34.0 pg    MCHC 32.7 32.0 - 36.0 g/dL    RDW 13.0 11.0 - 14.5 %    Platelets 276 140 - 440 thou/uL    MPV 9.8 8.5 - 12.5 fL   Wet Prep, Vaginal    Specimen: Genital   Result Value Ref Range    Yeast Result Yeast Seen (!)  No yeast seen    Trichomonas No Trichomonas seen No Trichomonas seen    Clue Cells, Wet Prep No Clue cells seen No Clue cells seen          EXAM: CT ABDOMEN AND PELVIS WITHOUT CONTRAST  LOCATION: [facility]  DATE/TIME: 4/11/2020 2:30 AM     INDICATION: Nonlocalized right lower quadrant abdominal pain.     COMPARISON: 9/6/2019.     TECHNIQUE: CT scan of the abdomen and pelvis was performed without IV contrast. Multiplanar reformats were obtained. Dose reduction techniques were used.     CONTRAST: None.     FINDINGS:  LOWER CHEST: Unremarkable.     HEPATOBILIARY: Prior cholecystectomy.     SPLEEN: Unremarkable.     PANCREAS: Unremarkable.     ADRENAL GLANDS: Unremarkable.     KIDNEYS/BLADDER: Unremarkable.     BOWEL: Normal appendix.     LYMPH NODES: Unremarkable.     OTHER: A trace amount of free fluid in the pelvis.     IMPRESSION:   1. A trace amount of nonspecific free fluid in the pelvis.  2. No other acute abnormality identified in the abdomen or pelvis. Normal appendix.         Us Pelvis With Transvaginal Non Ob  Result Date: 4/11/2020    EXAM: ULTRASOUND PELVIS WITH ENDOVAGINAL IMAGING AND DOPPLER LOCATION: [facility] DATE/TIME: 4/11/2020 1:51 AM INDICATION: Right adnexal pain. COMPARISON: None. TECHNIQUE: Transabdominal scans were performed. Endovaginal ultrasound was performed to better visualize the adnexa. Spectral waveform and color Doppler evaluation were performed of the ovaries. FINDINGS: UTERUS: 6.6 x 3.5 x 3.1 cm in long axis, short axis and transverse dimensions respectively. No myometrial masses. ENDOMETRIUM: Trilaminar, measuring 0.7 cm in thickness. RIGHT OVARY: Unremarkable, measuring 4.3 x 2.4 x 2.0 cm. Blood flow is visualized in the ovary. LEFT OVARY: Unremarkable, measuring 4.8 x 3.4 x 3.0 cm. Blood flow is visualized in the ovary. OTHER: No adnexal masses. A trace amount of simple-appearing free fluid in the pelvis.      1. Unremarkable appearance of the uterus and ovaries.  Blood flow is visualized in both ovaries. 2. A trace amount of nonspecific free fluid in the pelvis.       This note has been dictated using voice recognition software and as a result may contain minor grammatical errors and unintended word substitutions.

## 2021-06-07 NOTE — TELEPHONE ENCOUNTER
Pt reports bilateral lower abdominal pain in stomach, feeling need to urinate frequently every 2-5 minutes but having a little bit of output when she does urinate. Shooting pain in bottom of R heel. Some chest pain pt thinks is related to heartburn. Pain is intermittent and comes back when urge to void occurs. Feels it's progressing. 8/10 on pain scale. Had abdominal pain similar previously associated with gallstones but without the other symptoms listed in this call. C/o nausea, 2 episodes of diarrhea. States no chance of pregnancy. LMP was 4/3/20. Oral temp 97.1.    Pt advised to be seen in [facility] ED now d/t severe pain rating and presence over an hour. Pt agreeable. Advised to call back if symptoms worsen.       Reason for Disposition    [1] SEVERE pain (e.g., excruciating) AND [2] present > 1 hour    Protocols used: ABDOMINAL PAIN - FEMALE-SUKHI-JACKIE Leyva RN

## 2021-06-08 NOTE — ANESTHESIA POSTPROCEDURE EVALUATION
Patient: Norma Collins  BILATERAL TONSILLECTOMY AND ADENOIDECTOMY  Anesthesia type: general    Patient location: PACU  Last vitals:   Vitals:    02/16/17 1415   BP: (P) 126/62   Pulse:    Resp:    Temp:    SpO2:      Post vital signs: stable  Level of consciousness: drowsy but answers questions.  Post-anesthesia pain: pain controlled  Post-anesthesia nausea and vomiting: no  Pulmonary: supplemental oxygen when seen.  Cardiovascular: stable and blood pressure at baseline  Hydration: adequate  Anesthetic events: no    QCDR Measures:  ASA# 11 - Betsy-op Cardiac Arrest: ASA11B - Patient did NOT experience unanticipated cardiac arrest  ASA# 12 - Betsy-op Mortality Rate: ASA12B - Patient did NOT die  ASA# 13 - PACU Re-Intubation Rate: ASA13B - Patient did NOT require a new airway mgmt  ASA# 10 - Composite Anes Safety: ASA10A - No serious adverse event  ASA# 38 - New Corneal Injury: ASA38A - No new exposure keratitis or corneal abrasion in PACU    Additional Notes:

## 2021-06-08 NOTE — TELEPHONE ENCOUNTER
Left message to call back for: appointment   Information to relay to patient:  Please help arrange a virtual visit for patient.

## 2021-06-08 NOTE — PROGRESS NOTES
Assessment/Plan:     1. Preop examination  Surgical risks include obesity and suspected sleep apnea which should improve with surgery.  She may proceed with surgery as scheduled without further clinical clarification or evaluation.  May proceed with choice of anesthesia.  Would advise pregnancy test morning of surgery, did not perform today as surgery is more than 1 week away.  - Hemoglobin    2. Adenotonsillar hypertrophy  To proceed with surgical treatment as scheduled.    3. Snoring  To proceed with tonsillectomy and adenoidectomy as scheduled.  If persistent snoring following this, would consider formal sleep study.    4. Obesity, Class III, BMI 40-49.9 (morbid obesity)    5. PCOS (polycystic ovarian syndrome)  Stable currently.    Subjective:     Scheduled Procedure: Tonsillectomy and Adenoidectomy  Surgery Date:  2-16-17  Surgery Location:  [facility]  Surgeon:  Dr Saundra Collins is a 17-year-old female presented to clinic today for preoperative assessment prior to tonsillectomy and adenoidectomy.  She has had large tonsils her entire life.  She has snoring as result of this, also has had recurrent strep throat infections, about 3 times each year.  For that reason is interested in pursuing tonsillectomy and adenoidectomy.    She is otherwise been healthy.  Recent difficulties with ankle pain.  No prior major hospitalizations or's previous surgeries.  Intermittent difficulty from external hemorrhoids.  History of obesity, working lifestyle habits to assist with management of this.  Known history of PCO S as well.    Current Outpatient Prescriptions   Medication Sig Dispense Refill     acetaminophen (TYLENOL) 325 MG tablet Take 650 mg by mouth every 6 (six) hours as needed for pain.       No current facility-administered medications for this visit.        No Known Allergies    Immunization History   Administered Date(s) Administered     Hep B, historic 2000     Influenza, seasonal,quad inj 6-35 mos  01/16/2013     MMR 2000     Meningococcal MCV4P 07/26/2012, 02/02/2017     Tdap 07/26/2012     Varicella 07/26/2012    Our vaccination records are incomplete, we suspect that she has had prior MMR and varicella vaccines, beyond those listed, will check her records at home and school.    Patient Active Problem List   Diagnosis     Acanthosis Nigricans     Arthropathy Of The Ankle / Foot     Obesity, Class III, BMI 40-49.9 (morbid obesity)     PCOS (polycystic ovarian syndrome)     Adenotonsillar hypertrophy       History reviewed. No pertinent past medical history.    Social History     Social History     Marital status: Single     Spouse name: N/A     Number of children: N/A     Years of education: N/A     Occupational History     Not on file.     Social History Main Topics     Smoking status: Never Smoker     Smokeless tobacco: Never Used     Alcohol use No     Drug use: No     Sexual activity: Not Currently     Other Topics Concern     Not on file     Social History Narrative    Lives with father.  4 siblings including twin brother and 3 sisters.  Works at [employer].       History reviewed. No pertinent surgical history.    History of Present Illness  Recent Health  Fever: no  Chills: no  Fatigue: no  Chest Pain: no  Cough: no  Dyspnea: no  Urinary Frequency: no  Nausea: no  Vomiting: no  Diarrhea: no  Abdominal Pain: no  Easy Bruising: no  Lower Extremity Swelling: no  Poor Exercise Tolerance: no    Pertinent History  Prior Anesthesia: no  Previous Anesthesia Reaction:  NA  Diabetes: no  Cardiovascular Disease: no  Pulmonary Disease: no  Renal Disease: no  GI Disease: no  Sleep Apnea: snoring; ROXANA suspected, which in art is why she is pursuing surgery  Thromboembolic Problems: no  Clotting Disorder: no  Bleeding Disorder: no  Transfusion Reaction: no  Impaired Immunity: no  Steroid use in the last 6 months: no  Frequent Aspirin use: no    After surgery, the patient plans to recover at home with  "family.    Review of Systems  Pertinent items are noted in HPI.  A 12 point comprehensive review of systems was negative except as noted.          Objective:         Vitals:    02/02/17 0741   BP: 122/80   Pulse: 88   Resp: 20   Temp: 98.1  F (36.7  C)   TempSrc: Oral   SpO2: 98%   Weight: (!) 322 lb (146.1 kg)   Height: 5' 6\" (1.676 m)       Physical Exam:  General Appearance: Alert, cooperative, no distress, appears stated age, obese  Head: Normocephalic, without obvious abnormality, atraumatic  Eyes: PERRL, conjunctiva/corneas clear, EOM's intact; glasses  Ears: Normal TM's and external ear canals, both ears  Nose: Nares normal, septum midline,mucosa normal, no drainage  Throat: Lips, mucosa, and tongue normal; teeth and gums normal; 3+ size tonsils  Neck: Supple, symmetrical, trachea midline, no adenopathy;  thyroid: not enlarged  Lungs: Clear to auscultation bilaterally, respirations unlabored  Heart: Regular rate and rhythm, S1 and S2 normal, no murmur, rub, or gallop,   Abdomen: Soft, non-tender, bowel sounds active all four quadrants,  no masses, no organomegaly  Pelvic:Not examined  Extremities: Extremities normal, atraumatic, no cyanosis or edema  Skin: Skin color, texture, turgor normal, no rashes or lesions  Lymph nodes: Cervical, supraclavicular, and axillary nodes normal  Neurologic: Normal          Physical on 02/02/2017   Component Date Value Ref Range Status     Hemoglobin 02/02/2017 12.6  12.0 - 16.0 g/dL Final      "

## 2021-06-08 NOTE — PROGRESS NOTES
"Norma Collins is a 20 y.o. female who is being evaluated via a billable video visit.      The patient has been notified of following:     \"This video visit will be conducted via a call between you and your physician/provider. We have found that certain health care needs can be provided without the need for an in-person physical exam.  This service lets us provide the care you need with a video conversation.  If a prescription is necessary we can send it directly to your pharmacy.  If lab work is needed we can place an order for that and you can then stop by our lab to have the test done at a later time.    Video visits are billed at different rates depending on your insurance coverage. Please reach out to your insurance provider with any questions.    If during the course of the call the physician/provider feels a video visit is not appropriate, you will not be charged for this service.\"    Patient has given verbal consent to a Video visit? Yes    Patient would like to receive their AVS by AVS Preference: Talat.    Patient would like the video invitation sent by: Text to cell phone: [phone]    Will anyone else be joining your video visit? No        Video Start Time: 3:27 PM     Additional provider notes:  Assessment and Plan:     1. Non-intractable vomiting with nausea, unspecified vomiting type  Patient is currently being evaluated by [place] [facility].  She continues Zofran as needed.  She has lab work and a camera endoscopy ordered.  - ondansetron (ZOFRAN-ODT) 4 MG disintegrating tablet; Take 1 tablet (4 mg total) by mouth every 8 (eight) hours as needed for nausea.  Dispense: 12 tablet; Refill: 1    2. Yeast vaginitis  Provided prescription for Diflucan to take as needed for yeast vaginitis.  - fluconazole (DIFLUCAN) 150 MG tablet; Take 1 tablet (150 mg total) by mouth once for 1 dose.  Dispense: 1 tablet; Refill: 1    3. Screening for viral disease  Provided order for COVID screening prior to " her upcoming procedure.  She is content with the plan.  - Asymptomatic COVID-19 Virus (CORONAVIRUS) PCR; Future        Subjective:     Norma is a 20 y.o. female presenting for a video visit.  Patient has been experiencing abdominal pain intermittently since August 2019.  She has been evaluated in the emergency room.  She is currently seeing [place] [facility] with labs ordered for the 11th and a camera endoscopy scheduled for the 18th.  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  Patient did have an episode of vomiting last Thursday.  She has had intermittent nausea since.  She has had bouts of diarrhea.  She denies blood or mucus in the stool.  Patient has been taking Zofran as needed.  Secondly, patient requests refill of Diflucan.  Patient develops vaginal discharge and urinary urgency after her menstrual periods.  She is due for her period in 3 days.  She has no concerns for STDs.  She is currently single.    Review of Systems: A complete 14 point review of systems was obtained and is negative or as stated in the history of present illness.    Social History     Socioeconomic History     Marital status: Single     Spouse name: Not on file     Number of children: Not on file     Years of education: Not on file     Highest education level: Not on file   Occupational History     Not on file   Social Needs     Financial resource strain: Not on file     Food insecurity     Worry: Not on file     Inability: Not on file     Transportation needs     Medical: Not on file     Non-medical: Not on file   Tobacco Use     Smoking status: Former Smoker     Packs/day: 0.00     Years: 2.00     Pack years: 0.00     Smokeless tobacco: Never Used   Substance and Sexual Activity     Alcohol use: No     Drug use: No     Sexual activity: Never     Birth control/protection: Pill   Lifestyle     Physical activity     Days per week: Not on file     Minutes per session:  Not on file     Stress: Not on file   Relationships     Social connections     Talks on phone: Not on file     Gets together: Not on file     Attends [employer] service: Not on file     Active member of club or organization: Not on file     Attends meetings of clubs or organizations: Not on file     Relationship status: Not on file     Intimate partner violence     Fear of current or ex partner: Not on file     Emotionally abused: Not on file     Physically abused: Not on file     Forced sexual activity: Not on file   Other Topics Concern     Not on file   Social History Narrative    Lives with father.  4 siblings including twin brother and 3 sisters.  Works at [employer].       Active Ambulatory Problems     Diagnosis Date Noted     Acanthosis Nigricans      Arthropathy Of The Ankle / Foot      Obesity, Class III, BMI 40-49.9 (morbid obesity) (H) 10/30/2015     PCOS (polycystic ovarian syndrome) 02/29/2016     Adenotonsillar hypertrophy 01/04/2017     Cholelithiasis 10/05/2017     Resolved Ambulatory Problems     Diagnosis Date Noted     Joint Pain Fingers      Obesity      Limb Pain      Acute pharyngitis      Acute Upper Respiratory Infection      Ankle Joint Pain      Ankle Sprain      Sore throat 07/02/2016     Past Medical History:   Diagnosis Date     Acanthosis nigricans      Obesity        Family History   Problem Relation Age of Onset     Cancer Mother         colon     Venous thrombosis Mother         cancer-related     Stroke Paternal Grandmother        Objective:     There were no vitals taken for this visit.     GENERAL: Healthy, alert and no distress  EYES: Eyes grossly normal to inspection. No discharge or erythema, or obvious scleral/conjunctival abnormalities.  HENT: Normal cephalic/atraumatic.  External ears, nose and mouth without ulcers or lesions. No nasal drainage visible.  NECK: No asymmetry, masses or scars  RESP: No audible wheeze, cough, or visible cyanosis.  No visible retractions or  increased work of breathing.    MS: No gross musculoskeletal defects noted.  Normal range of motion. No visible edema.  SKIN: Visible skin clear. No significant rash, abnormal pigmentation or lesions.  NEURO: Cranial nerves grossly intact. Mentation and speech appropriate for age.  PSYCH: Mentation appears normal, affect normal/bright, judgement and insight intact, normal speech and appearance well-groomed      Video-Visit Details    Type of service:  Video Visit    Video End Time (time video stopped): 3:35 PM  Originating Location (pt. Location): Home    Distant Location (provider location):  [facility]     Platform used for Video Visit: Odalis Silveira, YOLI

## 2021-06-08 NOTE — TELEPHONE ENCOUNTER
RN Triage:    Patient is calling about: COVID testing questions    Patient states she has a lot of medical issues going on and that she needs her blood drawn.    Patient was scheduled to have blood drawn today but it was cancelled due to a positive COVID case at her work.    Patient states she was told she needs a COVID test before she can get her blood drawn.    Patient denies all COVID symptoms     Writer informed patient that we do not order or schedule the testing. Encouraged her to reach out to her PCP and ask for assistance. Also provided patient with [email or web address] website on COVID testing info.      Viviana Sage RN       Reason for Disposition    COVID-19 Testing, questions about    Additional Information    Negative: SEVERE difficulty breathing (e.g., struggling for each breath, speaks in single words)    Negative: Bluish (or gray) lips or face now    Negative: Sounds like a life-threatening emergency to the triager    Negative: [1] Adult has symptoms of COVID-19 (fever, cough, or SOB) AND [2] lab test positive    Negative: [1] Adult has symptoms of COVID-19 (fever, cough or SOB) AND [2] major community spread where patient lives AND [3] testing not being done for mild symptoms    Negative: [1] Difficulty breathing (shortness of breath) occurs AND [2] onset > 14 days after COVID-19 EXPOSURE (Close Contact) AND [3] no major community spread    Negative: [1] Dry cough occurs AND [2] onset > 14 days after COVID-19 EXPOSURE AND [3] no major community spread    Negative: [1] Wet cough (i.e., white-yellow, yellow, green, or agnes colored sputum) AND [2] onset > 14 days after COVID-19 EXPOSURE AND [3] no major community spread    Negative: [1] Common cold symptoms AND [2] onset > 14 days after COVID-19 EXPOSURE AND [3] no major community spread    Negative: [1] Difficulty breathing occurs AND [2] within 14 days of COVID-19 EXPOSURE (Close Contact)    Negative: Patient sounds very sick or weak to the triager    Negative:  [1] Fever (or feeling feverish) OR cough AND [2] within 14 Days of COVID-19 EXPOSURE (Close Contact)    Negative: [1] Fever (or feeling feverish) OR cough occurs AND [2] within 14 days of travel from another country (international travel)    Negative: [1] Fever (or feeling feverish) OR cough occurs AND [2] within 14 days of travel from a city or area with major community spread    Negative: [1] Fever (or feeling feverish) OR cough occurs AND [2] living in area with major community spread AND [3] testing being done in the community for symptoms    Negative: [1] Mild body aches, chills, diarrhea, headache, runny nose, or sore throat AND [2] within 14 days of COVID-19 EXPOSURE    Negative: [1] COVID-19 EXPOSURE within last 14 days AND [2] NO cough, fever, or breathing difficulty AND [3] exposed person is a healthcare worker who was NOT using all recommended personal protective equipment (i.e., a respirator-N95 mask, eye protection, gloves, and gown)    Negative: [1] COVID-19 EXPOSURE (Close Contact) within last 14 days AND [2] NO cough, fever, or breathing difficulty    Negative: [1] Living in area with major community spread within last 14 days AND [2] NO cough or fever or breathing difficulty    Negative: [1] Travel from area with major community spread or international travel within last 14 days AND [2] NO cough or fever or breathing difficulty    Negative: [1] COVID-19 EXPOSURE AND [2] 15 or more days ago AND [3] NO cough or fever or breathing difficulty    Negative: [1] No COVID-19 EXPOSURE BUT [2] living with someone who was exposed and who has no fever or cough    Negative: [1] Caller concerned that exposure to COVID-19 occurred BUT [2] does not meet COVID-19 EXPOSURE criteria from CDC    Negative: COVID -19, questions about    Negative: COVID-19 Prevention and Healthy Living, questions about    [facility] [place] Specific Disposition  - REQUIRED: [facility] [place] Specific Patient Instructions  COVID 19 Nurse  Triage Plan/Patient Instructions    Please be aware that novel coronavirus (COVID-19) may be circulating in the community. If you develop symptoms such as fever, cough, or SOB or if you have concerns about the presence of another infection including coronavirus (COVID-19), please contact your health care provider or visit [email or web address].       Patient to stay at home and follow home care protocol based instructions. and Additional COVID19 information to add for patients.       Additional General Information About COVID-19    Whether or not you've been tested for COVID-19    Stay home and away from others (self-isolate) until:  At least 10 days have passed since your symptoms started. And   You've had no fever--and no medicine that reduces fever--for 3 full days (72 hours). And    Your other symptoms have resolved (gotten better).     During this time:  Stay in your own room (and use your own bathroom), if you can.  Stay away from others in your home. No hugging, kissing or shaking hands.  No visitors.  Don't go to work, school or anywhere else.   Clean  high touch  surfaces often (doorknobs, counters, handles, etc.). Use a household cleaning spray or wipes.  Cover your mouth and nose with a mask, tissue or wash cloth to avoid spreading germs.  Wash your hands and face often. Use soap and water.  For more tips, go to https://www.cdc.gov/coronavirus/2019-ncov/downloads/10Things.pdf.    How can I take care of myself?    Get lots of rest. Drink extra fluids (unless a doctor has told you not to).     Take Tylenol (acetaminophen) for fever or pain. If you have liver or kidney problems, ask your family doctor if it's okay to take Tylenol.     Adults can take either:   650 mg (two 325 mg pills) every 4 to 6 hours, or   1,000 mg (two 500 mg pills) every 8 hours as needed.   Note: Don't take more than 3,000 mg in one day.   Acetaminophen is found in many medicines (both prescribed and over-the-counter medicines). Read all  labels to be sure you don't take too much.   For children, check the Tylenol bottle for the right dose. The dose is based on  the child's age or weight.    If you have other health problems (like cancer, heart failure, an organ transplant or severe kidney disease): Call your specialty clinic if you don't feel better in the next 2 days.    Know when to call 911: If your breathing is so bad that it keeps you from doing normal activities, call 911 or go to the emergency room. Tell them that you've been staying home and may have COVID-19..      What are the symptoms of COVID-19?   The most common symptoms are cough, fever and trouble breathing.   Less common symptoms include body aches, chills, diarrhea (loose, watery poops), fatigue (feeling very tired), headache, runny nose, sore throat and loss of smell.   COVID-19 can cause severe coughing (bronchitis) and lung infection (pneumonia).    How does it spread?   The virus may spread when a person coughs or sneezes into the air. The virus can travel about 6 feet this way, and it can live on surfaces.    Common  (household disinfectants) will kill the virus.    Who is at risk?  Anyone can catch COVID-19 if they're around someone who has the virus.    How can others protect themselves?   Stay away from people who have COVID-19 (or symptoms of COVID-19).  Wash hands often with soap and water. Or, use hand  with at least 60% alcohol.  Avoid touching the eyes, nose or mouth.   Wear a face mask when you go out in public, when sick or when caring for a sick person.      For more about COVID-19 and caring for yourself at home, please visit the CDC website at https://www.cdc.gov/coronavirus/2019-ncov/about/steps-when-sick.html.     To learn about care at [facility], go to [email or web address].    Below are the COVID-19 hotlines at the [place] [facility] ([facility]). Interpreters are available.   For health questions: Call  [phone] or [phone] (7 a.m. to 7 p.m.)  For questions about schools and childcare: Call [phone] or [phone] (7 a.m. to 7 p.m.)        Thank you for limiting contact with others, wearing a simple mask to cover your cough, practice good hand hygiene habits and accessing our virtual services where possible to limit the spread of this virus.    For more information about COVID19 and options for caring for yourself at home, please visit the CDC website at https://www.cdc.gov/coronavirus/2019-ncov/about/steps-when-sick.html  For more options for care at [facility], please visit our website at [email or web address]    For more information, please use the [place] [facility] ([facility]) COVID-19 Hotlines (Interpreters available):   Health questions: Phone Number: [phone] or [phone] and Hours: 7 a.m. to 7 p.m.  Schools and  questions: Phone Number: [phone] or [phone] and Hours 7 a.m. to 7 p.m.    Protocols used: CORONAVIRUS (COVID-19) EXPOSURE-A- 4.22.20

## 2021-06-08 NOTE — ANESTHESIA CARE TRANSFER NOTE
Last vitals:   Vitals:    02/16/17 1357   BP: (!) 148/71   Pulse: 100   Resp: 20   Temp: 36.9  C (98.5  F)   SpO2: 100%     Patient's level of consciousness is awake  Spontaneous respirations: yes  Maintains airway independently: yes  Dentition unchanged: yes  Oropharynx: oropharynx clear of all foreign objects    QCDR Measures:  ASA# 20 - Surgical Safety Checklist: ASA20A - Safety Checks Done  PQRS# 430 - Adult PONV Prevention: 4558F - Pt received => 2 anti-emetic agents (different classes) preop & intraop  ASA# 8 - Peds PONV Prevention: NA - Not pediatric patient, not GA or 2 or more risk factors NOT present  PQRS# 424 - Betsy-op Temp Management: 4559F - At least one body temp DOCUMENTED => 35.5C or 95.9F within required timeframe  PQRS# 426 - PACU Transfer Protocol: - Transfer of care checklist used  ASA# 14 - Acute Post-op Pain: ASA14B - Patient did NOT experience pain >= 7 out of 10    I completed my SBAR handoff to the receiving nurse per policy and procedure.

## 2021-06-08 NOTE — ANESTHESIA PREPROCEDURE EVALUATION
Anesthesia Evaluation      Patient summary reviewed   No history of anesthetic complications     Airway   Mallampati: I  Neck ROM: full   Pulmonary - negative ROS and normal exam                          Cardiovascular - negative ROS and normal exam   Neuro/Psych - negative ROS     Endo/Other    (+) obesity,      GI/Hepatic/Renal - negative ROS      Other findings: Morbid obesity, BMI 50. Very large tonsils, likely obstructive.  PCOS.      Dental - normal exam                        Anesthesia Plan  Planned anesthetic: general endotracheal    ASA 3   Induction: intravenous   Anesthetic plan and risks discussed with: patient and parent/guardian    Post-op plan: routine recovery

## 2021-06-08 NOTE — PROGRESS NOTES
Norma Collins is a 16 y.o. female seen in consultation at the request of Dr. Coreas for adenotonsillar hypertrophy. Patient notes significant snoring at night and obstruction to breathing when exercising.  She notes 3 episodes of strep per year for years.     ALLERGY:  No Known Allergies    MEDICATIONS:     Current Outpatient Prescriptions on File Prior to Visit   Medication Sig Dispense Refill     acetaminophen (TYLENOL) 325 MG tablet Take 650 mg by mouth every 6 (six) hours as needed for pain.       No current facility-administered medications on file prior to visit.        Past Medical/Surgical History, Family History and Social History reviewed in detail and documented separately in the medical record.    Complete Review of Systems:  A 10-point review was performed.  Pertinent positives are noted in the HPI and on a separate scanned document in the chart.    EXAM:  There were no vitals filed for this visit.    Nurse documentation reviewed  and documented separately.    General Appearance: Pleasant, alert, appropriate appearance for age. No acute distress.  Obese    Head Exam: Normal. Normocephalic, atraumatic.    Eye Exam: Normal external eye, conjunctiva, lids, cornea. Extra-ocular movements are intact.    Left external ear: normal  Left otoscopic exam: Normal EAC. Normal TM     Right external ear: normal  Right otoscopic exam: Normal EAC. Normal TM    Nose Exam: Normal external nose. Septum midline. Nasal mucosa normal.  Inferior turbinates normal.    OroPharynx Exam: Dental hygiene adequate. Normal tongue. Normal buccal mucosa. Normal palate.  Normal pharynx. Tonsils 3 + quiet.    Neck Exam: Supple, no masses or nodes. Trachea and larynx midline.    Thyroid Exam: No tenderness, nodules or enlargement.    Salivary Glands: nontender without masses    Neuro: Alert and oriented times 3, CN 2-12 grossly intact, no nystagmus, PERRL, EOMI, normal speech and gait    Chest/Respiratory Exam: Normal chest wall motion  and respiratory effort. No audible stridor or wheezing.    Cardiovascular Exam: Regular rate and rhythm.  No cyanosis, clubbing or edema.    Pulses: carotid pulses normal    ASSESSMENT:  1. Adenotonsillar hypertrophy        PLAN: Findings, assessment, and management options were discussed. Discussed rationale for adenotonsillectomy.   Discussed risks, benefits and alternatives to surgery.  The patient and her mother understood the discussion and agreed with the plan.  We will be in contact with them soon to schedule.

## 2021-06-09 NOTE — PATIENT INSTRUCTIONS - HE
Try alternative masks such as: cloth masks, bandana, scarf, buff, etc  Take breaks as able and remove mask for short time (away from others or outside)  Think calm thoughts while wearing mask and focus on slow, even breaths

## 2021-06-09 NOTE — PROGRESS NOTES
"Norma Collins is a 20 y.o. female who is being evaluated via a billable video visit.      The patient has been notified of following:     \"This video visit will be conducted via a call between you and your physician/provider. We have found that certain health care needs can be provided without the need for an in-person physical exam.  This service lets us provide the care you need with a video conversation.  If a prescription is necessary we can send it directly to your pharmacy.  If lab work is needed we can place an order for that and you can then stop by our lab to have the test done at a later time.    Video visits are billed at different rates depending on your insurance coverage. Please reach out to your insurance provider with any questions.    If during the course of the call the physician/provider feels a video visit is not appropriate, you will not be charged for this service.\"    Patient has given verbal consent to a Video visit? Yes    Will anyone else be joining your video visit? No        Video Start Time: 1:39pm    Additional provider notes:   Assessment/Plan:    1. Encounter for completion of form with patient  Discussed that there are very few medical conditions that would warrant a doctor's note to not wear a mask - pt does not have any of these conditions. I do recommend wearing a mask for her and customer safety. I recommend trying alternative mask options (cloth masks, different type of medical mask, scarf/bandana/buff) to see what works better for her. We discussed taking breaks and removing mask away from others when able/needed. We discussed thinking calm thoughts and focusing on slow, even breaths when wearing mask. A letter was provided regarding these recommendations for work if needed.      Follow up: as needed    Bev Denny MD  [facility]    Subjective:    Patient ID: Norma Collins is a 20 y.o. female being seen today via video visit for letter for work    Letter " "for work  -pt works at [employer] and reports that it is mandatory for employees to wear masks at all times during their shift   -pt reports working 10 hr shifts and feels like she will have difficulty breathing, \"see stars\" and almost pass out when she wears a mask that long  -she is currently wearing a blue medical mask  -she is wondering if she can get a note saying that she doesn't need to wear a mask      Patient Active Problem List   Diagnosis     Acanthosis Nigricans     Arthropathy Of The Ankle / Foot     Obesity, Class III, BMI 40-49.9 (morbid obesity) (H)     PCOS (polycystic ovarian syndrome)     Adenotonsillar hypertrophy     Cholelithiasis     Past Medical History:   Diagnosis Date     Acanthosis nigricans      Adenotonsillar hypertrophy      Arthropathy of ankle/foot      Obesity      PCOS (polycystic ovarian syndrome)      Past Surgical History:   Procedure Laterality Date     CHOLECYSTECTOMY       none       TONSILLECTOMY AND ADENOIDECTOMY Bilateral 2/16/2017    Procedure: BILATERAL TONSILLECTOMY AND ADENOIDECTOMY;  Surgeon: Jacob Arguello MD;  Location: [facility];  Service:      Current Outpatient Medications on File Prior to Visit   Medication Sig Dispense Refill     fluconazole (DIFLUCAN) 150 MG tablet Take 1 tablet (150 mg total) by mouth every 30 (thirty) days. 3 tablet 0     ondansetron (ZOFRAN-ODT) 4 MG disintegrating tablet Take 1 tablet (4 mg total) by mouth every 8 (eight) hours as needed for nausea. 12 tablet 1     No current facility-administered medications on file prior to visit.      Allergies   Allergen Reactions     Other Food Allergy Rash     Goodrich sprouts     Social History     Socioeconomic History     Marital status: Single     Spouse name: Not on file     Number of children: Not on file     Years of education: Not on file     Highest education level: Not on file   Occupational History     Not on file   Social Needs     Financial resource strain: Not on file "     Food insecurity     Worry: Not on file     Inability: Not on file     Transportation needs     Medical: Not on file     Non-medical: Not on file   Tobacco Use     Smoking status: Former Smoker     Packs/day: 0.00     Years: 2.00     Pack years: 0.00     Smokeless tobacco: Never Used   Substance and Sexual Activity     Alcohol use: No     Drug use: No     Sexual activity: Never     Birth control/protection: Pill   Lifestyle     Physical activity     Days per week: Not on file     Minutes per session: Not on file     Stress: Not on file   Relationships     Social connections     Talks on phone: Not on file     Gets together: Not on file     Attends Mosque service: Not on file     Active member of club or organization: Not on file     Attends meetings of clubs or organizations: Not on file     Relationship status: Not on file     Intimate partner violence     Fear of current or ex partner: Not on file     Emotionally abused: Not on file     Physically abused: Not on file     Forced sexual activity: Not on file   Other Topics Concern     Not on file   Social History Narrative    Lives with father.  4 siblings including twin brother and 3 sisters.  Works at [employer].     Family History   Problem Relation Age of Onset     Cancer Mother         colon     Venous thrombosis Mother         cancer-related     Stroke Paternal Grandmother      Review of systems is as stated in HPI, and the remainder of system review is otherwise negative.    Objective:      There were no vitals taken for this visit.    General appearance: awake, NAD, obese  HEENT: atraumatic, normocephalic  Lungs: breathing comfortably on room air, no cough  Neuro: alert, oriented x3, CNs grossly intact, no focal deficits appreciated  Psych: normal mood/affect/behavior, answering questions appropriately, linear thought process        Video-Visit Details    Type of service:  Video Visit    Video End Time (time video stopped): 1:48pm  Originating Location  (pt. Location): Home    Distant Location (provider location):  [facility] MEDICINE/OB     Platform used for Video Visit: Rick Denny MD

## 2021-06-09 NOTE — TELEPHONE ENCOUNTER
She needs a virtual visit to discuss a letter for mask use.  There are often alternatives that can be made (masking material used, relaxation exercises, etc) and a medical determination should be made before I would be comfortable writing a letter.  Rx for diflucan sent.  VJ

## 2021-06-11 ENCOUNTER — OFFICE VISIT (OUTPATIENT)
Dept: FAMILY MEDICINE | Facility: CLINIC | Age: 21
End: 2021-06-11
Payer: COMMERCIAL

## 2021-06-11 VITALS
RESPIRATION RATE: 18 BRPM | OXYGEN SATURATION: 98 % | WEIGHT: 293 LBS | BODY MASS INDEX: 45.99 KG/M2 | HEART RATE: 74 BPM | HEIGHT: 67 IN | DIASTOLIC BLOOD PRESSURE: 70 MMHG | SYSTOLIC BLOOD PRESSURE: 122 MMHG | TEMPERATURE: 98.3 F

## 2021-06-11 DIAGNOSIS — K59.00 CONSTIPATION, UNSPECIFIED CONSTIPATION TYPE: ICD-10-CM

## 2021-06-11 DIAGNOSIS — N76.0 BACTERIAL VAGINOSIS: ICD-10-CM

## 2021-06-11 DIAGNOSIS — B96.89 BACTERIAL VAGINOSIS: ICD-10-CM

## 2021-06-11 DIAGNOSIS — R39.9 URINARY SYMPTOM OR SIGN: ICD-10-CM

## 2021-06-11 DIAGNOSIS — N89.8 VAGINAL DISCHARGE: ICD-10-CM

## 2021-06-11 DIAGNOSIS — N30.00 ACUTE CYSTITIS WITHOUT HEMATURIA: Primary | ICD-10-CM

## 2021-06-11 LAB
ALBUMIN UR-MCNC: ABNORMAL MG/DL
APPEARANCE UR: CLEAR
BACTERIA #/AREA URNS HPF: ABNORMAL /HPF
BILIRUB UR QL STRIP: NEGATIVE
COLOR UR AUTO: YELLOW
GLUCOSE UR STRIP-MCNC: NEGATIVE MG/DL
HGB UR QL STRIP: NEGATIVE
KETONES UR STRIP-MCNC: NEGATIVE MG/DL
LEUKOCYTE ESTERASE UR QL STRIP: ABNORMAL
NITRATE UR QL: NEGATIVE
NON-SQ EPI CELLS #/AREA URNS LPF: ABNORMAL /LPF
PH UR STRIP: 6 PH (ref 5–7)
RBC #/AREA URNS AUTO: ABNORMAL /HPF
SOURCE: ABNORMAL
SP GR UR STRIP: >1.03 (ref 1–1.03)
SPECIMEN SOURCE: ABNORMAL
UROBILINOGEN UR STRIP-ACNC: 1 EU/DL (ref 0.2–1)
WBC #/AREA URNS AUTO: ABNORMAL /HPF
WET PREP SPEC: ABNORMAL

## 2021-06-11 PROCEDURE — 99214 OFFICE O/P EST MOD 30 MIN: CPT | Performed by: NURSE PRACTITIONER

## 2021-06-11 PROCEDURE — 81001 URINALYSIS AUTO W/SCOPE: CPT | Performed by: NURSE PRACTITIONER

## 2021-06-11 PROCEDURE — 87210 SMEAR WET MOUNT SALINE/INK: CPT | Performed by: NURSE PRACTITIONER

## 2021-06-11 RX ORDER — METRONIDAZOLE 500 MG/1
500 TABLET ORAL 2 TIMES DAILY
Qty: 14 TABLET | Refills: 0 | Status: SHIPPED | OUTPATIENT
Start: 2021-06-11 | End: 2021-06-18

## 2021-06-11 RX ORDER — OMEPRAZOLE 40 MG/1
20 CAPSULE, DELAYED RELEASE ORAL 2 TIMES DAILY
COMMUNITY
Start: 2021-04-16 | End: 2022-08-23

## 2021-06-11 RX ORDER — DICYCLOMINE HCL 20 MG
20 TABLET ORAL 2 TIMES DAILY
COMMUNITY
Start: 2021-06-02 | End: 2021-07-08

## 2021-06-11 RX ORDER — SULFAMETHOXAZOLE/TRIMETHOPRIM 800-160 MG
1 TABLET ORAL 2 TIMES DAILY
Qty: 6 TABLET | Refills: 0 | Status: SHIPPED | OUTPATIENT
Start: 2021-06-11 | End: 2021-06-14

## 2021-06-11 ASSESSMENT — MIFFLIN-ST. JEOR: SCORE: 2288.37

## 2021-06-11 NOTE — PROGRESS NOTES
Assessment/Plan:     1. PCOS (polycystic ovarian syndrome)  2. Obesity, Class III, BMI 40-49.9 (morbid obesity)  Did discuss continued weight loss to help with PCO S.  Patient did not find metformin to be very helpful and does not want to restart.  She would like to try birth control pills at this time.  Did discuss other methods of contraception and she likes the idea of pills.  Possible risks and side effects discussed.  Also discussed possible further workup including pelvic exam or ultrasound but declines will consider if symptoms worsen or do not improve.  Also advised nonsteroidal anti-inflammatories or pain.    - levonorgestrel-ethinyl estradiol (SRONYX) 0.1-20 mg-mcg per tablet; Take 1 tablet by mouth daily.  Dispense: 1 Package; Refill: 13    Note given for school since she missed due to heavy painful periods    Subjective:      Norma Collins is a 17 y.o. female comes in today to discuss painful irregular periods.  She states that she had history of irregular periods in the past and then they had become more consistent and then she states last several years she will have many months in between having periods.  Over the last several months she states that periods have been coming consistent again but it they have been coming about every 3 weeks.  She states they are pretty regular.  She states that she will get significant cramping she did find Midol to be helpful and has been taking it.  She states that she has a history of being diagnosed with PCO S and wants to try birth control to control her periods.  She has missed school because of the have a heavy painful periods.  She was previously tried on Metformin and she did notice that that helped.  Had tried to lose weight with the [place] [facility] clinic.  Is my first time meeting with her so did briefly review past visit notes labs from primary care provider and weight loss provider.  She actually has lost some weight on her own after discontinuing  the weight loss program since her last visit here.  She has not previously been on birth control pills.  She denies ever being sexually active and has no concerns for pregnancies or STDs.  She has no other new concerns today.    Current Outpatient Prescriptions   Medication Sig Dispense Refill     levonorgestrel-ethinyl estradiol (SRONYX) 0.1-20 mg-mcg per tablet Take 1 tablet by mouth daily. 1 Package 13     No current facility-administered medications for this visit.        Past Medical History, Family History, and Social History reviewed.  Past Medical History:   Diagnosis Date     Acanthosis nigricans      Adenotonsillar hypertrophy      Arthropathy of ankle/foot      Obesity      PCOS (polycystic ovarian syndrome)      Past Surgical History:   Procedure Laterality Date     none       TONSILLECTOMY AND ADENOIDECTOMY Bilateral 2/16/2017    Procedure: BILATERAL TONSILLECTOMY AND ADENOIDECTOMY;  Surgeon: Jacob Arguello MD;  Location: [facility];  Service:      Other food allergy  Family History   Problem Relation Age of Onset     Cancer Mother      colon     Venous thrombosis Mother      cancer-related     Stroke Paternal Grandmother      Social History     Social History     Marital status: Single     Spouse name: N/A     Number of children: N/A     Years of education: N/A     Occupational History     Not on file.     Social History Main Topics     Smoking status: Never Smoker     Smokeless tobacco: Current User      Comment: occas. e-cigarette     Alcohol use No     Drug use: No     Sexual activity: Not Currently     Other Topics Concern     Not on file     Social History Narrative    Lives with father.  4 siblings including twin brother and 3 sisters.  Works at [employer].         Review of systems is as stated in HPI, and the remainder of the 10 system review is otherwise negative.    Objective:     Vitals:    05/30/17 1128   BP: 118/88   Pulse: 77   SpO2: 99%   Weight: (!) 309 lb (140.2 kg)  "  Height: 5' 7\" (1.702 m)    Body mass index is 48.4 kg/(m^2).    General Appearance:    Alert, cooperative, no distress, appears stated age, obese    Head:    Normocephalic, without obvious abnormality, atraumatic   Eyes:    PERRL   Ears:    Normalexternal ear canals   Nose:   Mucosa normal, no drainage      Throat:   Oropharynx is clear   Neck:   Supple, symmetrical, no adenopathy, no thyromegally       Lungs:     Clear to auscultation bilaterally, respirations unlabored   Chest Wall:    No tenderness or deformity    Heart:    Regular rate and rhythm, S1 and S2 normal, no murmur, rub    or gallop       Abdomen:    body habitus does limit exam however abdomen is soft, non-tender, bowel sounds active all four quadrants,     no masses, no organomegaly declines pelvic exam            Extremities:   Extremities normal, atraumatic, no cyanosis or edema       Skin:   No rashes or lesions         This note has been dictated using voice recognition software. Any grammatical or context distortions are unintentional and inherent to the the software.   "

## 2021-06-11 NOTE — PATIENT INSTRUCTIONS
1.  Take antibiotics as ordered.  2.  Take probiotic daily while on Flagyl (7 days) - Align, culturelle or generic of these you can get them at the pharmacy  3.  Push 64 oz of fluids daily.  Take metamucil/citrucel once or twice daily OR Benefiber 2-3 times daily OR you can do fiber gummies.  4.  Add stool softeners (colace 100mg capsules) start once daily and go to twice daily and increase up to 2 capsules twice daily.  5.  Add miralax if needed (daily, every other day or every couple days)  6.  Starting today, use Miralax one capful in a liquid drink twice daily for 3 days to clean out.

## 2021-06-11 NOTE — LETTER
[facility]  [address] [address]  Phone: [phone]    June 11, 2021        Norma Collins  [address] [address] [address]          To whom it may concern:    RE: Norma Collins    Patient was seen and treated today at our clinic and missed work 6/10/21 - 6/13/21 and can return to work on 6/14/21.    Please contact me for questions or concerns.      Sincerely,        Harini Santamaria NP

## 2021-06-11 NOTE — PROGRESS NOTES
Assessment & Plan     Acute cystitis without hematuria  UA is positive for UTI.  Treating with Bactrim DS for 3 days.  Recommend pushing fluids and follow-up if no resolution of symptoms.  - sulfamethoxazole-trimethoprim (BACTRIM DS) 800-160 MG tablet; Take 1 tablet by mouth 2 times daily for 3 days    Urinary symptom or sign  - UA with Microscopic reflex to Culture    Bacterial vaginosis  Wet prep is positive for clue cells.  Treating with Flagyl for 7 days.  Recommended probiotic with this daily.  Follow-up if no improvement in symptoms.  - metroNIDAZOLE (FLAGYL) 500 MG tablet; Take 1 tablet (500 mg) by mouth 2 times daily for 7 days    Vaginal discharge  - Wet prep    Constipation, unspecified constipation type  Recommended fluids and fiber daily, use of stool softeners as needed and miralax as needed ongoing.  Today, she should start Miralax twice daily for 3 days for cleanout.  Recommend follow-up if no improvement in symptoms.    See Patient Instructions    Return in about 1 week (around 6/18/2021), or if symptoms worsen or fail to improve.    Harini Santamaria NP  [facility] DARIO Green is a 21 year old who presents for the following health issues;     HPI     Genitourinary - Female  Onset/Duration: 1 week   Description:   Painful urination (Dysuria): no           Frequency: YES  Blood in urine (Hematuria): no  Delay in urine (Hesitency): no  Intensity: moderate  Progression of Symptoms:  worsening and constant  Accompanying Signs & Symptoms:  Fever/chills: YES- chills   Flank pain: no  Nausea and vomiting: YES- nausea and vomiting  Vaginal symptoms: discharge clear and mucousy, this seems to typically occur after periods and she gets infection, denies anything exposed to vaginal area than usual soap etc.  Abdominal/Pelvic Pain: YES- upper abdomen cramping  History:   History of frequent UTI s: no  History of kidney stones: YES  Sexually Active: no  Possibility of  "pregnancy: No  Precipitating or alleviating factors: None  Therapies tried and outcome:  Azo yeast plus, did not help      Always happens after period and now feels worse.  No use of vaginal creams or douches.  Has been having diarrhea in small amounts for 1 week with no real bowel movement and states that she typically has issues with contipation.  Has taken 2 stool softeners on Monday. Still having trouble with the constipation.  She daily drinks 2-3 16 oz water bottles and a propel with this during the day.      Review of Systems   CONSTITUTIONAL: NEGATIVE for fever, chills, change in weight  RESP: NEGATIVE for significant cough or SOB  CV: NEGATIVE for chest pain, palpitations or peripheral edema  GI: POSITIVE for abdominal pain epigastric, LUQ and LLQ, constipation, diarrhea, nausea and vomiting  : frequency and vaginal discharge  PSYCHIATRIC: NEGATIVE for changes in mood or affect  ROS otherwise negative      Objective    /70   Pulse 74   Temp 98.3  F (36.8  C) (Tympanic)   Resp 18   Ht 1.689 m (5' 6.5\")   Wt 149.9 kg (330 lb 6.4 oz)   SpO2 98%   BMI 52.53 kg/m    Body mass index is 52.53 kg/m .  Physical Exam   GENERAL: healthy, alert and no distress  ABDOMEN: tenderness epigastric, LUQ and LLQ, no organomegaly or masses and bowel sounds hypoactive  MS: no gross musculoskeletal defects noted, no edema  PSYCH: mentation appears normal, affect normal/bright    Results for orders placed or performed in visit on 06/11/21 (from the past 24 hour(s))   UA with Microscopic reflex to Culture    Specimen: Midstream Urine   Result Value Ref Range    Color Urine Yellow     Appearance Urine Clear     Glucose Urine Negative NEG^Negative mg/dL    Bilirubin Urine Negative NEG^Negative    Ketones Urine Negative NEG^Negative mg/dL    Specific Gravity Urine >1.030 1.003 - 1.035    pH Urine 6.0 5.0 - 7.0 pH    Protein Albumin Urine Trace (A) NEG^Negative mg/dL    Urobilinogen Urine 1.0 0.2 - 1.0 EU/dL    Nitrite " Urine Negative NEG^Negative    Blood Urine Negative NEG^Negative    Leukocyte Esterase Urine Small (A) NEG^Negative    Source Midstream Urine     WBC Urine 5-10 (A) OTO5^0 - 5 /HPF    RBC Urine O - 2 OTO2^O - 2 /HPF    Squamous Epithelial /LPF Urine Few FEW^Few /LPF    Bacteria Urine Few (A) NEG^Negative /HPF   Wet prep    Specimen: Vagina   Result Value Ref Range    Specimen Description Vagina     Wet Prep No Trichomonas seen     Wet Prep Clue cells seen  Few   (A)     Wet Prep No yeast seen     Wet Prep WBC'S seen  Rare

## 2021-06-11 NOTE — LETTER
[facility]  [address] [address]  Phone: [phone]    June 11, 2021        Norma Collins  [address] [address] [address]          To whom it may concern:    RE: Norma Collins    Patient was seen and treated today at our clinic and missed work 6/10/21.    Please contact me for questions or concerns.      Sincerely,        Harini Santamaria NP

## 2021-06-11 NOTE — TELEPHONE ENCOUNTER
RN cannot approve Refill Request    RN can NOT refill this medication med is not covered by policy/route to provider. Last office visit: 1/17/2020 Kerline Silveira CNP Last Physical: 9/5/2019 Last MTM visit: Visit date not found Last visit same specialty: 4/17/2020 Christophe Jacques MD.  Next visit within 3 mo: Visit date not found  Next physical within 3 mo: Visit date not found      Yuliana Diaz, [facility] Triage/Med Refill 9/23/2020    Requested Prescriptions   Pending Prescriptions Disp Refills     fluconazole (DIFLUCAN) 150 MG tablet [Pharmacy Med Name: fluconazole 150 mg tablet] 1 tablet 0     Sig: TAKE 1 TABLET BY MOUTH ONCE       There is no refill protocol information for this order

## 2021-06-12 NOTE — PROGRESS NOTES
Assessment/Plan:     1. Epigastric pain  We discussed differential diagnosis.  Information provided regarding gastritis diet, to avoid NSAIDs, to avoid excessive fatty foods which could increase risk of gallbladder disease or hepatic steatosis.  Will await labs as noted.  If unremarkable, advised that she initiate omeprazole daily for at least 2 weeks.  If symptoms persist despite this, would plan to obtain ultrasound.  - Comprehensive Metabolic Panel  - Lipase  - HM2(CBC w/o Differential)    2. Irregular menses  Will increase dose of hormones in her oral contraceptive pill.  Notify with inadequate effect.      Patient presented to clinic today unaccompanied by an adult.  I contacted her father via phone who provided permission for evaluation.  I advised that at future visits to be present with her.  I discussed my findings, patient concerns, and he did not have further information to add.  He is comfortable with the plan as outlined.      Subjective:      Norma Collins is a 17 y.o. female presented to clinic today along with a friend for evaluation of epigastric pain.  She felt that it was occurring primarily between her menses so she also notes her most recent episode was during menses.  Typically will last 1-2 hours though sometimes it will last throughout the day or cause her to need to leave school.  Occurring about 4 times per month.  Describes stab-like pain in her upper stomach, occasionally will radiate lower.  Seems to come and go.  She has not identified any pattern or triggers.  She has not noticed if it is associated with eating or fasting but it does cause her to feel little nauseated at times and makes her not want to eat.  No vomiting.  Bowel movements are normal.  No melena or hematochezia.  Occasionally mildly bloated.  Tylenol and ibuprofen have not helped.  She took some Midol and that maybe helped a bit.  No prior history of gallbladder disease or ulcer.  No family history of same.  Has a  sister who had her gallbladder removed as part of weight loss surgery.  She denies use of alcohol or tobacco.  She does drink coffee.    In May she was initiated on oral contraceptive pill to regulate menses due to polycystic ovarian syndrome.  Initially notes menses are regular, now they become more heavy and sometimes are occurring every 2 weeks, not following anticipated pattern with birth control pills.  She is wondering about other options.  She feels that she has been taking her pills on a regular basis and not missing doses.  Denies risk of pregnancy.      Current Outpatient Prescriptions   Medication Sig Dispense Refill     levonorgestrel-ethinyl estradiol (NORDETTE) 0.15-0.03 mg per tablet Take 1 tablet by mouth daily. 84 tablet 3     No current facility-administered medications for this visit.        Past Medical History, Family History, and Social History reviewed.  Past Medical History:   Diagnosis Date     Acanthosis nigricans      Adenotonsillar hypertrophy      Arthropathy of ankle/foot      Obesity      PCOS (polycystic ovarian syndrome)      Past Surgical History:   Procedure Laterality Date     none       TONSILLECTOMY AND ADENOIDECTOMY Bilateral 2/16/2017    Procedure: BILATERAL TONSILLECTOMY AND ADENOIDECTOMY;  Surgeon: Jacob Arguello MD;  Location: [facility] OR;  Service:      Other food allergy  Family History   Problem Relation Age of Onset     Cancer Mother      colon     Venous thrombosis Mother      cancer-related     Stroke Paternal Grandmother      Social History     Social History     Marital status: Single     Spouse name: N/A     Number of children: N/A     Years of education: N/A     Occupational History     Not on file.     Social History Main Topics     Smoking status: Never Smoker     Smokeless tobacco: Current User      Comment: occas. e-cigarette     Alcohol use No     Drug use: No     Sexual activity: Not Currently     Other Topics Concern     Not on file  "    Social History Narrative    Lives with father.  4 siblings including twin brother and 3 sisters.  Works at [employer].         Review of systems is as stated in HPI, and the remainder of the 10 system review is otherwise negative.    Objective:     Vitals:    08/16/17 1048   BP: 120/82   Patient Site: Right Arm   Patient Position: Sitting   Cuff Size: Adult Large   Pulse: 72   Resp: 20   Temp: 98.4  F (36.9  C)   TempSrc: Oral   Weight: (!) 317 lb 9.6 oz (144.1 kg)   Height: 5' 7\" (1.702 m)    Body mass index is 49.74 kg/(m^2).    Alert female.  Mucous members moist.  Heart with regular rate and rhythm.  Lungs clear.  Abdomen is soft.  She has mild tenderness palpation in the epigastric region, no palpable organomegaly or masses.  She is obese which limits exam.  No CVA tenderness.      This note has been dictated using voice recognition software. Any grammatical or context distortions are unintentional and inherent to the the software.   "

## 2021-06-15 NOTE — PROGRESS NOTES
Assessment/Plan:     1. Dysfunctional uterine bleeding  Thyroid Stimulating Hormone (TSH)    HM2(CBC w/o Differential)   2. Tremor of left hand         Diagnoses and all orders for this visit:    Dysfunctional uterine bleeding  -     Thyroid Stimulating Hormone (TSH)  -     HM2(CBC w/o Differential)  -Check above labs today.  Dysfunctional uterine bleeding likely due to instability of endometrial lining.  She is contemplating the Nexplanon but would like to try a different birth control pill in the interim.  Provided prescription for Necon.  Discussed that this contains a different type of progesterone than the previous two birth control pills that she has tried.  Directed her to start taking them birth control today.  Discussed risks and side effects of birth control pills including headaches, nausea, breast tenderness and risk of blood clots.  Follow-up with primary care provider if symptoms do not improve with this treatment.    Tremor of left hand  This occurred once on Nathanael day and has not recurred.  No neurological abnormalities on exam at this time.  Provided reassurance.  Follow-up with primary care provider if she has recurrent symptoms.    Other orders  -     norethindrone-ethinyl estradiol (NECON) 0.5-35 mg-mcg per tablet; Take 1 tablet by mouth daily.  Dispense: 84 tablet; Refill: 3             Subjective:      Norma Collins is a 17 y.o. female who comes in today to discuss concern about irregular menstrual bleeding.  She has a history of polycystic ovarian syndrome.  She saw Dr. Fontana in May of this year and was given a prescription for oral birth control pills.  Saw her primary care provider a few months ago and the dosage of the birth control pills was increased.  She reports that this seemed to work okay to help regulate her menstrual cycles.  She would typically have her period during the placebo week of the pills.  However she is concerned because she now has had menstrual bleeding for  the past 4 weeks.  She took her last placebo pill of her last pill pack on November 30, 2017.  She reports that she started having some bleeding on November 28, 2017.  She did not call to request a refill of her birth control pills and has continued to have menstrual bleeding since November 30.  She reports that the first 2 weeks of bleeding was heavier and now for the past couple of weeks it has been light spotting and brownish discharge.  She has not had any pelvic pain or cramping.  She has not had any vaginal discharge, itching or odor.  She is not sexually active and never has been.  She reports that she did have a tremor of her left hand on Nathanael Day, which is 2 days ago.  Hand was resting on the table and she noticed that her hand started shaking for no particular reason.  No other associated symptoms.  Has not had recurrence of that tremor.  She is otherwise feeling well.  Review of systems is assessed and is otherwise negative.  Medications and allergies are reviewed and updated.    Current Outpatient Prescriptions   Medication Sig Dispense Refill     norethindrone-ethinyl estradiol (NECON) 0.5-35 mg-mcg per tablet Take 1 tablet by mouth daily. 84 tablet 3     No current facility-administered medications for this visit.        Past Medical History, Family History, and Social History reviewed.  Past Medical History:   Diagnosis Date     Acanthosis nigricans      Adenotonsillar hypertrophy      Arthropathy of ankle/foot      Obesity      PCOS (polycystic ovarian syndrome)      Past Surgical History:   Procedure Laterality Date     none       TONSILLECTOMY AND ADENOIDECTOMY Bilateral 2/16/2017    Procedure: BILATERAL TONSILLECTOMY AND ADENOIDECTOMY;  Surgeon: Jacob Arguello MD;  Location: [facility];  Service:      Other food allergy  Family History   Problem Relation Age of Onset     Cancer Mother      colon     Venous thrombosis Mother      cancer-related     Stroke Paternal Grandmother       Social History     Social History     Marital status: Single     Spouse name: N/A     Number of children: N/A     Years of education: N/A     Occupational History     Not on file.     Social History Main Topics     Smoking status: Never Smoker     Smokeless tobacco: Current User      Comment: occas. e-cigarette     Alcohol use No     Drug use: No     Sexual activity: Not Currently     Other Topics Concern     Not on file     Social History Narrative    Lives with father.  4 siblings including twin brother and 3 sisters.  Works at [employer].         Review of systems is as stated in HPI, and the remainder of the 10 system review is otherwise negative.    Objective:     Vitals:    12/27/17 1449 12/27/17 1504   BP: (!) 168/98 112/68   Patient Site: Left Arm    Patient Position: Sitting    Cuff Size: Adult Large    Pulse: 97    Temp: 98.8  F (37.1  C)    TempSrc: Tympanic    SpO2: 98%    Weight: (!) 319 lb 4 oz (144.8 kg)     There is no height or weight on file to calculate BMI.    General appearance: alert, appears stated age and cooperative  Lungs: clear to auscultation bilaterally  Heart: regular rate and rhythm, S1, S2 normal, no murmur, click, rub or gallop  Abdomen: soft, non-tender; bowel sounds normal; no masses,  no organomegaly  Neurologic: Grossly normal; no tremor present      This note has been dictated using voice recognition software. Any grammatical or context distortions are unintentional and inherent to the the software.

## 2021-06-15 NOTE — PROGRESS NOTES
Assessment and Plan:     1. Insomnia, unspecified type  Discussed good sleep hygiene.  Will start hydroxyzine to be used as needed.  Educated on its indications and side effects.  She is to avoid taking this with other sedatives.  If symptoms persist, may consider referral to sleep center for further evaluation of underlying obstructive sleep apnea.  - hydrOXYzine HCl (ATARAX) 25 MG tablet; Take 1-2 tablets (25-50 mg total) by mouth at bedtime as needed (for sleep).  Dispense: 30 tablet; Refill: 0    2. Birth control counseling  Patient is not using her oral contraceptive as birth control.  She is concerned as she tends to take the oral contraceptive 2-3 hours later than the scheduled time.  Her health teacher stressed that it needs to be taken at the same time every day.  We discussed that if she is not using this as birth control, then she does not need to necessarily take it within the same hour every day.  Patient is comfortable continuing an oral contraceptive then.  Discussed setting an alarm on her cell phone as a reminder. She is content with the plan.     3. Health care maintenance  - Influenza, Seasonal,Quad Inj, 36+ MOS        Subjective:     Norma is a 18 y.o. female presenting to the clinic for multiple concerns.  Patient states that she occasionally forgets to take oral contraceptive.  Her last menstrual period was on 1/24/18.  She is single and is not sexually active.  She has not been sexually active in the past.  Patient does states she uses the oral contraceptive to regulate her period.  She is interested in another form of contraception.  Patient also complains of insomnia.  Over the past 3-4 months, she has had difficulty both falling asleep and staying asleep.  Patient works at [employer] and has a regular sleep pattern except occasionally she sleeps later on the weekends.  She does snore.  She complains of daytime fatigue.  She has tried over-the-counter melatonin, Benadryl, NyQuil with no  EXAM DESCRIPTION:  MRI HEAD COMBO



IMAGES COMPLETED DATE/TIME:  6/5/2020 6:38 pm



REASON FOR STUDY:  SZ ACTIVITY, SYNCOPAL EPISODES



COMPARISON:  None.



TECHNIQUE:  Multiplanar imaging includes noncontrasted T1, T2, FLAIR, diffusion with ADC map and post
gadolinium contrast T1 sequences. Images stored on PACS.



CONTRAST TYPE AND DOSE:  20 mL Prohance.



RENAL FUNCTION:  Not indicated.  ACR Type II contrast agent associated with few, if any, unconfounded
 cases of NSF



LIMITATIONS:  None.



FINDINGS:  ANATOMY: No anomalies. Normal vascular flow voids. Pituitary fossa normal.

CSF SPACES: Normal in size and contour. No hemorrhage.

CEREBRUM: Sulci and gyri normal in size and contour. Normal white matter signal on FLAIR imaging. No 
evidence of hemorrhage, mass, or extraaxial fluid collection. No abnormal enhancement post contrast.

POSTERIOR FOSSA: No signal alteration. No hemorrhage. No edema, masses, or mass effect. Internal suha
tory canals, cerebellopontine angles, mastoids normal. No enhancing lesions. No abnormal enhancement 
post contrast.

DIFFUSION IMAGING: Negative for acute or subacute infarction.

ORBITS: No masses. Globes normal.

PARANASAL SINUSES: No fluid levels. Mucosa normal.

OTHER: No other significant finding.



IMPRESSION:  NORMAL MRI OF THE BRAIN WITHOUT AND WITH INTRAVENOUS GADOLINIUM CONTRAST.

EVIDENCE OF ACUTE STROKE: NO.



TECHNICAL DOCUMENTATION:  JOB ID:  [id number]

 2011 [employer]- All Rights Reserved



Reading location - IP/workstation name: [email or web address] "[email or web address].    Review of Systems: A complete 14 point review of systems was obtained and is negative or as stated in the history of present illness.    Social History     Social History     Marital status: Single     Spouse name: N/A     Number of children: N/A     Years of education: N/A     Occupational History     Not on file.     Social History Main Topics     Smoking status: Never Smoker     Smokeless tobacco: Current User      Comment: occas. e-cigarette     Alcohol use No     Drug use: No     Sexual activity: Not Currently     Other Topics Concern     Not on file     Social History Narrative    Lives with father.  4 siblings including twin brother and 3 sisters.  Works at [employer].       Active Ambulatory Problems     Diagnosis Date Noted     Acanthosis Nigricans      Arthropathy Of The Ankle / Foot      Obesity, Class III, BMI 40-49.9 (morbid obesity) 10/30/2015     PCOS (polycystic ovarian syndrome) 02/29/2016     Adenotonsillar hypertrophy 01/04/2017     Cholelithiasis 10/05/2017     Resolved Ambulatory Problems     Diagnosis Date Noted     Joint Pain Fingers      Obesity      Limb Pain      Acute pharyngitis      Acute Upper Respiratory Infection      Ankle Joint Pain      Ankle Sprain      Sore throat 07/02/2016     Past Medical History:   Diagnosis Date     Acanthosis nigricans      Adenotonsillar hypertrophy      Arthropathy of ankle/foot      Obesity      PCOS (polycystic ovarian syndrome)        Family History   Problem Relation Age of Onset     Cancer Mother      colon     Venous thrombosis Mother      cancer-related     Stroke Paternal Grandmother        Objective:     /68  Pulse 83  Ht 5' 7\" (1.702 m)  Wt (!) 323 lb 14.4 oz (146.9 kg)  LMP 01/24/2018 (Approximate)  BMI 50.73 kg/m2    Patient is alert, in no obvious distress.   Skin: Warm, dry.    Lungs:  Clear to auscultation. Respirations even and unlabored.  No wheezing or rales noted.   Heart:  Regular rate and rhythm.  No " murmurs.  Abdomen: Soft, nontender.  No organomegaly. Bowel sounds normoactive. No guarding or masses noted.

## 2021-06-16 NOTE — PROGRESS NOTES
Assessment and Plan:     1. Insomnia, unspecified type  She will discontinue hydroxyzine.  Discussed other treatment options.  Will start trazodone 50 mg 1/2-1 tablet at bedtime as needed.  If no improvement in symptoms, may consider increasing the dose. She will notify me via "[employer]hart.  If she does not respond well to trazodone, will refer to sleep center for further evaluation of underlying sleep apnea or sleep disorders.  - traZODone (DESYREL) 50 MG tablet; Take 0.5-1 tablets (25-50 mg total) by mouth at bedtime. As needed for sleep  Dispense: 60 tablet; Refill: 0    2. Bunionette of left foot  Discussed symptomatic treatment.  Will refer to podiatry for possible orthotics.  She is content with the plan.  - Ambulatory referral to Podiatry        Subjective:     Norma is a 18 y.o. female presenting to the clinic for multiple concerns today.  Patient has been experiencing insomnia for 4-5 months.  She has had difficulty both falling asleep and staying asleep.  She does have a regular sleep pattern but occasionally sleeps later on the weekends.  She does snore.  She has tried over-the-counter melatonin, Benadryl, NyQuil with no relief.  Patient was seen on 2/6/18 where I prescribed hydroxyzine.  Patient has found no improvement with her symptoms.  She would like to try another sleep aid.  Patient is also concerned of left foot fifth toe pain.  This is been present for 1 year.  She has noticed some redness after exercising.  Tighter shoes are bothersome to her.  She denies any difficulty ambulating.  She denies any injury.    Review of Systems: A complete 14 point review of systems was obtained and is negative or as stated in the history of present illness.    Social History     Social History     Marital status: Single     Spouse name: N/A     Number of children: N/A     Years of education: N/A     Occupational History     Not on file.     Social History Main Topics     Smoking status: Never Smoker     Smokeless  "tobacco: Current User      Comment: occas. e-cigarette     Alcohol use No     Drug use: No     Sexual activity: Not Currently     Other Topics Concern     Not on file     Social History Narrative    Lives with father.  4 siblings including twin brother and 3 sisters.  Works at [employer].       Active Ambulatory Problems     Diagnosis Date Noted     Acanthosis Nigricans      Arthropathy Of The Ankle / Foot      Obesity, Class III, BMI 40-49.9 (morbid obesity) 10/30/2015     PCOS (polycystic ovarian syndrome) 02/29/2016     Adenotonsillar hypertrophy 01/04/2017     Cholelithiasis 10/05/2017     Resolved Ambulatory Problems     Diagnosis Date Noted     Joint Pain Fingers      Obesity      Limb Pain      Acute pharyngitis      Acute Upper Respiratory Infection      Ankle Joint Pain      Ankle Sprain      Sore throat 07/02/2016     Past Medical History:   Diagnosis Date     Acanthosis nigricans      Adenotonsillar hypertrophy      Arthropathy of ankle/foot      Obesity      PCOS (polycystic ovarian syndrome)        Family History   Problem Relation Age of Onset     Cancer Mother      colon     Venous thrombosis Mother      cancer-related     Stroke Paternal Grandmother        Objective:     /68  Pulse 92  Ht 5' 7\" (1.702 m)  Wt (!) 320 lb 8 oz (145.4 kg)  BMI 50.2 kg/m2    Patient is alert, in no obvious distress.   Skin: Warm, dry.    Lungs:  Clear to auscultation. Respirations even and unlabored.  No wheezing or rales noted.   Heart:  Regular rate and rhythm.  No murmurs.   Musculoskeletal:  She has a small bunionette present within her left lateral foot.                "

## 2021-06-16 NOTE — PROGRESS NOTES
ASSESSMENT: Left bunionette    PLAN: We reviewed conservative options including wider shoes and doughnut pads.  There are surgical options, but I think it wise to hold off on any until absolutely necessary.  She agrees.  Return to clinic as needed.      SUBJECTIVE: New patient visit at the [facility] regarding a painful bump on the lateral side of her left foot near the fifth toe.  Bunionette deformities run in her family.  This has been increasingly painful with standing and walking.  She was rubbing against the bump.  She denies any open wound or bleeding.  No injury or previous surgery.  Some shoes are more comfortable than others for her.  She had a cousin who had bunionette surgery, and ended up with some significant complications.  That makes her nervous.  BMI greater than 50.    PHYSICAL EXAM:  General: Pleasant 18 y.o. female in no acute distress.  Vascular: DP pulses are palpable. PT pulses are palpable. Pedal hair is present. Feet are warm to the touch.  Cardiac: Pulse is regular.  Lymphatic: No edema at the ankles.  Neuro: Sensation in the feet is grossly intact to light touch.  Derm: No open lesions.  Musculoskeletal: Prominent bunionette deformity on the left foot.  No tenderness with range of motion.    Past Medical History:   Diagnosis Date     Acanthosis nigricans      Adenotonsillar hypertrophy      Arthropathy of ankle/foot      Obesity      PCOS (polycystic ovarian syndrome)        She has a past surgical history that includes none and Tonsillectomy and adenoidectomy (Bilateral, 2/16/2017).    Allergies   Allergen Reactions     Other Food Allergy Rash     [place] sprouts       Current Outpatient Prescriptions   Medication Sig Dispense Refill     BO 0.15-0.03 mg per tablet Take 1 tablet by mouth daily.  3     traZODone (DESYREL) 50 MG tablet Take 0.5-1 tablets (25-50 mg total) by mouth at bedtime. As needed for sleep 60 tablet 0     No current facility-administered medications for this  visit.        Family History:  family history includes Cancer in her mother; Stroke in her paternal grandmother; Venous thrombosis in her mother.    Social History:  Reviewed, and she reports that she has never smoked. She uses smokeless tobacco. She reports that she does not drink alcohol or use illicit drugs.    Review of Systems:  A 12 point comprehensive review of systems was negative except as noted.

## 2021-06-19 NOTE — PROGRESS NOTES
Assessment and Plan:     1. Contusion of left foot, initial encounter  X-ray shows no acute fracture.  Discussed symptomatic treatment including rest, ice, elevation.  Provided a prescription for naproxen 500 mg twice daily with food as needed.  She is to follow-up in 1-2 weeks if symptoms persist or worsen.  She is content with the plan.  - XR Foot Left 3 or More VWS; Future    Subjective:     Norma is a 18 y.o. female presenting to the clinic for concerns for left foot injury.  Patient states she had a hitch drop on her foot on Tuesday.  Since then, it has been red and swollen.  She is able to bear weight.  States the pain is now radiating to her patella.  She does have a history of injury to her left foot in the past.  X-ray 2014 was negative for fracture.  She has not been taking any pain medication.  She rates her pain at 5/10.  She feels as though her symptoms are worsening.    Review of Systems: A complete 14 point review of systems was obtained and is negative or as stated in the history of present illness.    Social History     Social History     Marital status: Single     Spouse name: N/A     Number of children: N/A     Years of education: N/A     Occupational History     Not on file.     Social History Main Topics     Smoking status: Never Smoker     Smokeless tobacco: Current User      Comment: occas. e-cigarette     Alcohol use No     Drug use: No     Sexual activity: Not Currently     Other Topics Concern     Not on file     Social History Narrative    Lives with father.  4 siblings including twin brother and 3 sisters.  Works at [employer].       Active Ambulatory Problems     Diagnosis Date Noted     Acanthosis Nigricans      Arthropathy Of The Ankle / Foot      Obesity, Class III, BMI 40-49.9 (morbid obesity) (H) 10/30/2015     PCOS (polycystic ovarian syndrome) 02/29/2016     Adenotonsillar hypertrophy 01/04/2017     Cholelithiasis 10/05/2017     Resolved Ambulatory Problems     Diagnosis Date  "Noted     Joint Pain Fingers      Obesity      Limb Pain      Acute pharyngitis      Acute Upper Respiratory Infection      Ankle Joint Pain      Ankle Sprain      Sore throat 07/02/2016     Past Medical History:   Diagnosis Date     Acanthosis nigricans      Adenotonsillar hypertrophy      Arthropathy of ankle/foot      Obesity      PCOS (polycystic ovarian syndrome)        Family History   Problem Relation Age of Onset     Cancer Mother      colon     Venous thrombosis Mother      cancer-related     Stroke Paternal Grandmother        Objective:     /78  Pulse 82  Ht 5' 7\" (1.702 m)  Wt (!) 302 lb 6.4 oz (137.2 kg)  LMP 08/03/2018  BMI 47.36 kg/m2    Patient is alert, in no obvious distress.   Skin: Warm, dry.    Musculoskeletal:  She has full ROM of her left ankle foot and ankle.  Talar tilt and anterior drawer are negative.  She is tender to palpation of the dorsal mid food. She has mild erythema and swelling.  Pedal pulses present and palpable.  She ambulates without difficulty.      I ordered and personally reviewed left foot x-rays showing no obvious fracture.  Will have radiology review.              "

## 2021-06-19 NOTE — LETTER
Letter by Kerline Silveira CNP at      Author: Kerline Silveira CNP Service: -- Author Type: --    Filed:  Encounter Date: 9/10/2019 Status: (Other)         September 10, 2019     Patient: Norma Collins   YOB: 2000   Date of Visit: 9/10/2019       To Whom it May Concern:    Norma Collins was seen in my clinic on 9/10/2019. Please excuse patient from work from 9/6/19-9/10/19.     If you have any questions or concerns, please don't hesitate to call.    Sincerely,         Electronically signed by Kerline Silveira CNP

## 2021-06-19 NOTE — LETTER
Letter by Maggy Muller RN at      Author: Maggy Muller RN Service: -- Author Type: --    Filed:  Encounter Date: 9/11/2019 Status: (Other)       Dear Norma Collins    Thank you for choosing [facility] for your care.  We are committed to providing you with the highest quality and compassionate healthcare services.  The following information pertains to your first appointment with our clinic.    Date/Time of appointment:  Tuesday Sept. 24, 2019 at 1:45pm      Note: Please arrive 15 minutes prior to your appointment time.  This allows time to complete forms, possible labs and nursing assessment.    Name of your Physician:  Franklyn Alvarez MD    What to bring to your appointment:    Completed Patient History/Initial Nursing Assessment and Medication/Allergy List (these forms were sent to you).    Any paperwork or films from your physician that we have asked you to bring.    Your current insurance card(s).    Parking:    Please refer to the map included to direct you to [facility].    You can park in any visitor/patient parking area you choose. There is no charge for parking at [facility].     Enter the hospital at the front/main entrance.      Please check in with our  representatives who will escort you to the clinic located in [address] of the [facility].    We hope these instructions are helpful to you.  If you have any questions or concerns, please call us at [phone].  It is our pleasure to assist you.    Warm Regards,  Maggy Muller  Nurse Navigator  [phone]

## 2021-06-20 NOTE — LETTER
Letter by Bev Denny MD at      Author: Bev Denny MD Service: -- Author Type: --    Filed:  Encounter Date: 6/25/2020 Status: (Other)         June 25, 2020     Patient: Norma Collins   YOB: 2000   Date of Visit: 6/25/2020       To Whom It May Concern:    It is my medical opinion that Norma Collins be allowed to try alternative mask options as she is having difficulty wearing the blue medical mask that is currently provided at work. We discussed trying other masks such as cloth masks, alternative medical masks, scarf/bandana/buff to see which option works best for her and keeps her and customers safe. I recommend that she be allowed to take breaks and remove mask when away from others a few times each day to prevent anxiety while wearing mask.    If you have any questions or concerns, please don't hesitate to call.    Sincerely,        Electronically signed by Bev Denny MD

## 2021-06-20 NOTE — LETTER
Letter by Sneha Davis RN at      Author: Sneha Davis RN Service: -- Author Type: --    Filed:  Encounter Date: 6/19/2020 Status: (Other)       6/19/2020        Norma Collins  [address]  [facility] [address]    This letter provides a written record that you were tested for COVID-19 on 6/18/20.     Your result was negative. This means that we didnt find the virus that causes COVID-19 in your sample. A test may show negative when you do actually have the virus. This can happen when the virus is in the early stages of infection, before you feel illness symptoms.    If you have symptoms   Stay home and away from others (self-isolate) until you meet ALL of the guidelines below:    Youve had no fever--and no medicine that reduces fever--for 3 full days (72 hours). And ?    Your other symptoms have gotten better. For example, your cough or breathing has improved. And?    At least 10 days have passed since your symptoms started.    During this time:    Stay home. Dont go to work, school or anywhere else.     Stay in your own room, including for meals. Use your own bathroom if you can.    Stay away from others in your home. No hugging, kissing or shaking hands. No visitors.    Clean high touch surfaces often (doorknobs, counters, handles, etc.). Use a household cleaning spray or wipes. You can find a full list on the EPA website at www.epa.gov/pesticide-registration/list-n-disinfectants-use-against-sars-cov-2.    Cover your mouth and nose with a mask, tissue or washcloth to avoid spreading germs.    Wash your hands and face often with soap and water.    Going back to work  Check with your employer for any guidelines to follow for going back to work.    Employers: This document serves as formal notice that your employee tested negative for COVID-19, as of the testing date shown above.
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
## 2021-06-21 NOTE — PROGRESS NOTES
Assessment and Plan:     1. Candidal intertrigo  Will treat with ketoconazole cream.  Educated on its indications and side effects. Recommend keeping area dry.  Will refer to dermatology if symptoms persist.   - ketoconazole (NIZORAL) 2 % cream; Apply to the affected area twice daily.  Dispense: 60 g; Refill: 2  - Ambulatory referral to Dermatology    2. Obesity, Class III, BMI 40-49.9 (morbid obesity) (H)  The following high BMI interventions were performed this visit: encouragement to exercise and lifestyle education regarding diet      Subjective:     Norma is a 18 y.o. female presenting to the clinic for concerns for rash present since August.  Patient complains of erythema and dry skin within her inner thighs and around her waist.  Patient states the rash is primarily pruritic at night.  She has been applying moisturizing lotion.  She lives with her family and no one else has a rash.  She is feeling well and denies any recent cold symptoms or fever.  She has not tried any over-the-counter products for her symptoms.    Review of Systems: A complete 14 point review of systems was obtained and is negative or as stated in the history of present illness.    Social History     Social History     Marital status: Single     Spouse name: N/A     Number of children: N/A     Years of education: N/A     Occupational History     Not on file.     Social History Main Topics     Smoking status: Never Smoker     Smokeless tobacco: Current User      Comment: occas. e-cigarette     Alcohol use No     Drug use: No     Sexual activity: Not Currently     Other Topics Concern     Not on file     Social History Narrative    Lives with father.  4 siblings including twin brother and 3 sisters.  Works at [employer].       Active Ambulatory Problems     Diagnosis Date Noted     Acanthosis Nigricans      Arthropathy Of The Ankle / Foot      Obesity, Class III, BMI 40-49.9 (morbid obesity) (H) 10/30/2015     PCOS (polycystic ovarian  "syndrome) 02/29/2016     Adenotonsillar hypertrophy 01/04/2017     Cholelithiasis 10/05/2017     Resolved Ambulatory Problems     Diagnosis Date Noted     Joint Pain Fingers      Obesity      Limb Pain      Acute pharyngitis      Acute Upper Respiratory Infection      Ankle Joint Pain      Ankle Sprain      Sore throat 07/02/2016     Past Medical History:   Diagnosis Date     Acanthosis nigricans      Adenotonsillar hypertrophy      Arthropathy of ankle/foot      Obesity      PCOS (polycystic ovarian syndrome)        Family History   Problem Relation Age of Onset     Cancer Mother      colon     Venous thrombosis Mother      cancer-related     Stroke Paternal Grandmother        Objective:     /70  Pulse 72  Ht 5' 7\" (1.702 m)  Wt (!) 317 lb 3.2 oz (143.9 kg)  BMI 49.68 kg/m2    Patient is alert, in no obvious distress.   Skin: Warm, dry. Patient has an erythematous, dry scaly rash within her inner thighs and beneath the pannus of her abdomen.  Small scabs are present.   Lungs:  Clear to auscultation. Respirations even and unlabored.  No wheezing or rales noted.   Heart:  Regular rate and rhythm.  No murmurs, S3, S4, gallops, or rubs.    Abdomen: Soft, nontender.  No organomegaly. Bowel sounds normoactive. No guarding or masses noted.                "

## 2021-06-24 NOTE — TELEPHONE ENCOUNTER
Refill Approved    Rx renewed per Medication Renewal Policy. Medication was last renewed on 12/12/18.    Marti Morales, [facility] Triage/Med Refill 3/1/2019     Requested Prescriptions   Pending Prescriptions Disp Refills     NECON 0.5/35, 28, 0.5-35 mg-mcg per tablet [Pharmacy Med Name: NECON 0.5/35 TABLETS 28S (BLUE)] 84 tablet 0     Sig: TAKE 1 TABLET BY MOUTH DAILY    Oral Contraceptives Protocol Passed - 3/1/2019  3:15 AM       Passed - Visit with PCP or prescribing provider visit in last 12 months     Last office visit with prescriber/PCP: 12/27/2017 Cyndi Malloy MD OR same dept: 11/8/2018 Kerline Silveira CNP OR same specialty: 11/8/2018 Kerline Silveira CNP  Last physical: Visit date not found Last MTM visit: Visit date not found   Next visit within 3 mo: Visit date not found  Next physical within 3 mo: Visit date not found  Prescriber OR PCP: Cyndi Malloy MD  Last diagnosis associated with med order: There are no diagnoses linked to this encounter.  If protocol passes may refill for 12 months if within 3 months of last provider visit (or a total of 15 months).

## 2021-07-03 NOTE — ADDENDUM NOTE
Addendum Note by Raffi Meraz MD at 2/16/2017  3:49 PM     Author: Raffi Meraz MD Service: -- Author Type: Physician    Filed: 2/16/2017  3:49 PM Date of Service: 2/16/2017  3:49 PM Status: Signed    : Raffi Meraz MD (Physician)       Addendum  created 02/16/17 1549 by Raffi Meraz MD    Order sets accessed

## 2021-07-08 ENCOUNTER — OFFICE VISIT (OUTPATIENT)
Dept: FAMILY MEDICINE | Facility: CLINIC | Age: 21
End: 2021-07-08
Payer: COMMERCIAL

## 2021-07-08 VITALS
HEIGHT: 67 IN | DIASTOLIC BLOOD PRESSURE: 86 MMHG | HEART RATE: 87 BPM | OXYGEN SATURATION: 98 % | RESPIRATION RATE: 17 BRPM | BODY MASS INDEX: 45.99 KG/M2 | TEMPERATURE: 98.2 F | WEIGHT: 293 LBS | SYSTOLIC BLOOD PRESSURE: 122 MMHG

## 2021-07-08 DIAGNOSIS — E66.01 CLASS 3 SEVERE OBESITY DUE TO EXCESS CALORIES WITHOUT SERIOUS COMORBIDITY WITH BODY MASS INDEX (BMI) OF 50.0 TO 59.9 IN ADULT (H): ICD-10-CM

## 2021-07-08 DIAGNOSIS — R10.13 ABDOMINAL PAIN, EPIGASTRIC: ICD-10-CM

## 2021-07-08 DIAGNOSIS — Z83.49 FAMILY HISTORY OF HEMOCHROMATOSIS: ICD-10-CM

## 2021-07-08 DIAGNOSIS — E66.813 CLASS 3 SEVERE OBESITY DUE TO EXCESS CALORIES WITHOUT SERIOUS COMORBIDITY WITH BODY MASS INDEX (BMI) OF 50.0 TO 59.9 IN ADULT (H): ICD-10-CM

## 2021-07-08 DIAGNOSIS — Z80.3 FAMILY HISTORY OF MALIGNANT NEOPLASM OF BREAST: ICD-10-CM

## 2021-07-08 DIAGNOSIS — N76.0 VAGINITIS AND VULVOVAGINITIS: ICD-10-CM

## 2021-07-08 DIAGNOSIS — Z80.0 FAMILY HISTORY OF COLON CANCER: ICD-10-CM

## 2021-07-08 DIAGNOSIS — Z12.4 SCREENING FOR MALIGNANT NEOPLASM OF CERVIX: Primary | ICD-10-CM

## 2021-07-08 LAB
SPECIMEN SOURCE: NORMAL
WET PREP SPEC: NORMAL

## 2021-07-08 PROCEDURE — G0145 SCR C/V CYTO,THINLAYER,RESCR: HCPCS | Performed by: NURSE PRACTITIONER

## 2021-07-08 PROCEDURE — 87210 SMEAR WET MOUNT SALINE/INK: CPT | Performed by: NURSE PRACTITIONER

## 2021-07-08 PROCEDURE — 99214 OFFICE O/P EST MOD 30 MIN: CPT | Performed by: NURSE PRACTITIONER

## 2021-07-08 RX ORDER — FLUCONAZOLE 150 MG/1
150 TABLET ORAL ONCE
Qty: 1 TABLET | Refills: 0 | Status: SHIPPED | OUTPATIENT
Start: 2021-07-08 | End: 2021-07-08

## 2021-07-08 RX ORDER — DICYCLOMINE HCL 20 MG
20 TABLET ORAL 2 TIMES DAILY
Qty: 60 TABLET | Refills: 0 | Status: SHIPPED | OUTPATIENT
Start: 2021-07-08 | End: 2021-09-20

## 2021-07-08 RX ORDER — METRONIDAZOLE 7.5 MG/G
1 GEL VAGINAL DAILY
Qty: 25 G | Refills: 4 | Status: SHIPPED | OUTPATIENT
Start: 2021-07-08 | End: 2021-07-13

## 2021-07-08 ASSESSMENT — MIFFLIN-ST. JEOR: SCORE: 2313.78

## 2021-07-08 NOTE — RESULT ENCOUNTER NOTE
Zena Green    Your vaginal swab results came back within normal limits. Since I saw evidence of both BV and yeast on exam I will treat you for both. The yeast treatment is a 1 time pill. The BV treatment is a vaginal gel insert at bedtime for 5 nights. I did put some refills on it if you have recurrent symptoms after future periods. If you are still having symptoms after the next 3 days please follow up in clinic for a recheck. Please let us know if you have any questions.     Take care,    XENA Rojo CNP

## 2021-07-08 NOTE — PROGRESS NOTES
"    Assessment & Plan     Screening for malignant neoplasm of cervix    - Pap imaged thin layer screen only - recommended age 21 - 24 years    Vaginitis and vulvovaginitis    - Wet prep  - metroNIDAZOLE (METROGEL) 0.75 % vaginal gel; Place 1 applicator (5 g) vaginally daily for 5 days  - fluconazole (DIFLUCAN) 150 MG tablet; Take 1 tablet (150 mg) by mouth once for 1 dose    Abdominal pain, epigastric  Following with [facility]- due for follow up  - dicyclomine (BENTYL) 20 MG tablet; Take 1 tablet (20 mg) by mouth 2 times daily    Family history of colon cancer  Mother at age 40  - CANCER RISK MGMT/CANCER GENETIC COUNSELING REFERRAL    Family history of malignant neoplasm of breast  Grandmother  - CANCER RISK MGMT/CANCER GENETIC COUNSELING REFERRAL    Family history of hemochromatosis  Father and Grandfather  - CANCER RISK MGMT/CANCER GENETIC COUNSELING REFERRAL    Class 3 severe obesity due to excess calories without serious comorbidity with body mass index (BMI) of 50.0 to 59.9 in adult (H)        Review of prior external note(s) from - Outside records from [facility]         BMI:   Estimated body mass index is 53.42 kg/m  as calculated from the following:    Height as of this encounter: 1.689 m (5' 6.5\").    Weight as of this encounter: 152.4 kg (336 lb).       FUTURE APPOINTMENTS:       - Follow up in 3 days for symptoms that are not improving, sooner for new or worsening sx.     Time spent in chart review in preparation to see patient, time with patient for interview/exam, ordering medications/tests/and/or procedures, and time spent in charting and coordinating care: 32 minutes.       Patient Instructions       Patient Education     Preventing Vaginitis     Use mild, unscented soap when you bathe or shower to avoid irritating your vagina.    Vaginitis is irritation or infection of the vagina or the outside opening of it (vulva). Vaginitis can be caused by bacteria, viruses, parasites, or yeast. Chemicals such as in " perfumes or soaps or in spermicides can sometimes be a cause. Vaginitis can be caused by hormone changes in pregnancy or with menopause. You can help prevent vaginitis. Follow the tips below. And see your healthcare provider if you have any symptoms.   Hygiene    Stay away from chemicals. Don't use vaginal sprays. Don't use scented toilet paper or tampons that are scented. Sprays and scents have chemicals that can irritate your vagina.    Don't douche unless you are told to by your healthcare provider. Douching is rarely needed. And it upsets the normal balance in the vagina.    Wash yourself well. Wash the outer vaginal area (vulva) every day with mild, unscented soap. Keep it as dry as possible.    Wipe correctly. Make sure to wipe from front to back after a bowel movement. This helps keep from spreading bacteria from your anus to your vagina.    Change your tampon often. During your period, make sure to change your tampon as often as directed on the package. This allows the normal flow of vaginal discharge and blood.    Lifestyle    Limit your number of sexual partners. The more partners you have, the greater your risk of infection. Using condoms helps reduce your risk.    Get enough sleep. Sleep helps keep your body s immune system healthy. This helps you fight infection.    Lose weight, if needed. Excess weight can reduce air circulation around your vagina. This can increase your risk of infection.    Exercise regularly. Regular activity helps keep your body healthy.    Take antibiotics only as directed. Antibiotics can change the normal chemical balance in the vagina.      Clothing    Don t sit in wet clothes. Yeast thrives when it s warm and damp.    Don t wear tight pants. And don t wear tights, leggings, or hose without a cotton crotch. These types of clothing trap warmth and moisture.    Wear cotton underwear. Cotton lets air circulate around the vagina.    Symptoms of vaginitis    Irritation, swelling, or  itching of the genital area    Vaginal discharge    Bad vaginal odor    Pain or burning during urination    [employer] last reviewed this educational content on 2019-2021 [employer]. All rights reserved. This information is not intended as a substitute for professional medical care. Always follow your healthcare professional's instructions.           Patient Education     Bacterial Vaginosis    You have a vaginal infection called bacterial vaginosis (BV). Both good and bad bacteria are present in a healthy vagina. BV occurs when these bacteria get out of balance. The number of bad bacteria increase. And the number of good bacteria decrease. BV is linked with sexual activity, but it's not a sexually transmitted infection (STI).   BV may or may not cause symptoms. If symptoms do occur, they can include:     Thin, gray, milky-white, or sometimes green discharge    Unpleasant odor or  fishy  smell    Itching, burning, or pain in or around the vagina  It is not known what causes BV, but certain factors can make the problem more likely. These can include:     Douching    Spermicides    Use of antibiotics    Change in hormone levels with pregnancy, breastfeeding, or menopause    Having sex with a new partner    Having sex with more than one partner  BV will sometimes go away on its own. But treatment is often advised. This is because untreated BV can raise the risk of more serious health problems such as:     Pelvic inflammatory disease (PID)     delivery (giving birth to a baby early if you re pregnant)    HIV and some other sexually transmitted infections (STIs)    Infection after surgery on the reproductive organs  Home care  General care    BV is most often treated with medicines called antibiotics. These may be given as pills or as a vaginal cream. If antibiotics are prescribed, be sure to use them exactly as directed. And complete all of the medicine, even if your symptoms go  away.    Don't douche or having sex during treatment.    If you have sex with a female partner, ask your healthcare provider if she should also be treated.  Prevention    Don't douche.    Don't have sex. If you do have sex, then take steps to lower your risk:  ? Use condoms when having sex.  ? Limit the number of sex partners you have.    Follow-up care  Follow up with your healthcare provider, or as advised.   When to get medical advice  Call your healthcare provider right away if:     You have a fever of 100.4 F (38 C) or higher, or as directed by your provider.    Your symptoms get worse, or they don t go away within a few days of starting treatment.    You have new pain in the lower belly or pelvic region.    You have side effects that bother you or a reaction to the pills or cream you re prescribed.    You or any of your sex partners have new symptoms, such as a rash, joint pain, or sores.  [employer] last reviewed this educational content on 6/1/2020 2000-2021 [employer]. All rights reserved. This information is not intended as a substitute for professional medical care. Always follow your healthcare professional's instructions.               No follow-ups on file.    XENA Deng [facility]    Jason Green is a 21 year old who presents for the following health issues;     HPI   Chief Complaint   Patient presents with     Vaginal Problem     Gyn Exam       Vaginal Symptoms  Onset/Duration: 1 week   Description:  Vaginal Discharge: white clear   Itching (Pruritis): YES  Burning sensation:  no  Odor: YES  Accompanying Signs & Symptoms:  Urinary symptoms: no  Abdominal pain: YES  Fever: no  History:   Sexually active: no  New Partner: no  Possibility of Pregnancy:  no  Recent antibiotic use: no  Previous vaginitis issues: YES- has a history of  BV,  Yeast and bladder infections. This happens after every period.  Denies dysuria or frequency.  "  Precipitating or alleviating factors: None  Therapies tried and outcome: Azo yeast and cranberry juice never help       Medication Followup of Dicyclomine    Taking Medication as prescribed: yes    Side Effects:  None    Medication Helping Symptoms:  yes       Review of Systems   Constitutional, HEENT, cardiovascular, pulmonary, gi and gu systems are negative, except as otherwise noted.      Objective    /86   Pulse 87   Temp 98.2  F (36.8  C) (Tympanic)   Resp 17   Ht 1.689 m (5' 6.5\")   Wt (!) 152.4 kg (336 lb)   SpO2 98%   BMI 53.42 kg/m    Body mass index is 53.42 kg/m .  Physical Exam   GENERAL: healthy, alert and no distress   (female): normal female external genitalia, normal urethral meatus , vaginal mucosa pink, moist, well rugated, vaginal discharge - white, thick and curd-like, normal cervix, adnexae, and uterus without masses. and whiff test positive  NEURO: Normal strength and tone, mentation intact and speech normal    Results for orders placed or performed in visit on 07/08/21 (from the past 24 hour(s))   Wet prep    Specimen: Vagina   Result Value Ref Range    Specimen Description Vagina     Wet Prep No yeast seen     Wet Prep No clue cells seen     Wet Prep No Trichomonas seen     Wet Prep Moderate  WBC'S seen                  "

## 2021-07-08 NOTE — PATIENT INSTRUCTIONS
Patient Education     Preventing Vaginitis     Use mild, unscented soap when you bathe or shower to avoid irritating your vagina.    Vaginitis is irritation or infection of the vagina or the outside opening of it (vulva). Vaginitis can be caused by bacteria, viruses, parasites, or yeast. Chemicals such as in perfumes or soaps or in spermicides can sometimes be a cause. Vaginitis can be caused by hormone changes in pregnancy or with menopause. You can help prevent vaginitis. Follow the tips below. And see your healthcare provider if you have any symptoms.   Hygiene    Stay away from chemicals. Don't use vaginal sprays. Don't use scented toilet paper or tampons that are scented. Sprays and scents have chemicals that can irritate your vagina.    Don't douche unless you are told to by your healthcare provider. Douching is rarely needed. And it upsets the normal balance in the vagina.    Wash yourself well. Wash the outer vaginal area (vulva) every day with mild, unscented soap. Keep it as dry as possible.    Wipe correctly. Make sure to wipe from front to back after a bowel movement. This helps keep from spreading bacteria from your anus to your vagina.    Change your tampon often. During your period, make sure to change your tampon as often as directed on the package. This allows the normal flow of vaginal discharge and blood.    Lifestyle    Limit your number of sexual partners. The more partners you have, the greater your risk of infection. Using condoms helps reduce your risk.    Get enough sleep. Sleep helps keep your body s immune system healthy. This helps you fight infection.    Lose weight, if needed. Excess weight can reduce air circulation around your vagina. This can increase your risk of infection.    Exercise regularly. Regular activity helps keep your body healthy.    Take antibiotics only as directed. Antibiotics can change the normal chemical balance in the vagina.      Clothing    Don t sit in wet  clothes. Yeast thrives when it s warm and damp.    Don t wear tight pants. And don t wear tights, leggings, or hose without a cotton crotch. These types of clothing trap warmth and moisture.    Wear cotton underwear. Cotton lets air circulate around the vagina.    Symptoms of vaginitis    Irritation, swelling, or itching of the genital area    Vaginal discharge    Bad vaginal odor    Pain or burning during urination    [employer] last reviewed this educational content on 2019-2021 [employer]. All rights reserved. This information is not intended as a substitute for professional medical care. Always follow your healthcare professional's instructions.           Patient Education     Bacterial Vaginosis    You have a vaginal infection called bacterial vaginosis (BV). Both good and bad bacteria are present in a healthy vagina. BV occurs when these bacteria get out of balance. The number of bad bacteria increase. And the number of good bacteria decrease. BV is linked with sexual activity, but it's not a sexually transmitted infection (STI).   BV may or may not cause symptoms. If symptoms do occur, they can include:     Thin, gray, milky-white, or sometimes green discharge    Unpleasant odor or  fishy  smell    Itching, burning, or pain in or around the vagina  It is not known what causes BV, but certain factors can make the problem more likely. These can include:     Douching    Spermicides    Use of antibiotics    Change in hormone levels with pregnancy, breastfeeding, or menopause    Having sex with a new partner    Having sex with more than one partner  BV will sometimes go away on its own. But treatment is often advised. This is because untreated BV can raise the risk of more serious health problems such as:     Pelvic inflammatory disease (PID)     delivery (giving birth to a baby early if you re pregnant)    HIV and some other sexually transmitted infections (STIs)    Infection  after surgery on the reproductive organs  Home care  General care    BV is most often treated with medicines called antibiotics. These may be given as pills or as a vaginal cream. If antibiotics are prescribed, be sure to use them exactly as directed. And complete all of the medicine, even if your symptoms go away.    Don't douche or having sex during treatment.    If you have sex with a female partner, ask your healthcare provider if she should also be treated.  Prevention    Don't douche.    Don't have sex. If you do have sex, then take steps to lower your risk:  ? Use condoms when having sex.  ? Limit the number of sex partners you have.    Follow-up care  Follow up with your healthcare provider, or as advised.   When to get medical advice  Call your healthcare provider right away if:     You have a fever of 100.4 F (38 C) or higher, or as directed by your provider.    Your symptoms get worse, or they don t go away within a few days of starting treatment.    You have new pain in the lower belly or pelvic region.    You have side effects that bother you or a reaction to the pills or cream you re prescribed.    You or any of your sex partners have new symptoms, such as a rash, joint pain, or sores.  [employer] last reviewed this educational content on 6/1/2020 2000-2021 [employer]. All rights reserved. This information is not intended as a substitute for professional medical care. Always follow your healthcare professional's instructions.

## 2021-07-10 LAB
COPATH REPORT: NORMAL
PAP: NORMAL

## 2021-08-19 ENCOUNTER — OFFICE VISIT (OUTPATIENT)
Dept: FAMILY MEDICINE | Facility: CLINIC | Age: 21
End: 2021-08-19
Payer: COMMERCIAL

## 2021-08-19 VITALS
HEART RATE: 82 BPM | SYSTOLIC BLOOD PRESSURE: 112 MMHG | HEIGHT: 67 IN | BODY MASS INDEX: 45.99 KG/M2 | OXYGEN SATURATION: 94 % | TEMPERATURE: 97.2 F | WEIGHT: 293 LBS | RESPIRATION RATE: 16 BRPM | DIASTOLIC BLOOD PRESSURE: 88 MMHG

## 2021-08-19 DIAGNOSIS — Z83.49 FAMILY HISTORY OF HEMOCHROMATOSIS: Primary | ICD-10-CM

## 2021-08-19 DIAGNOSIS — R74.8 ELEVATED LIVER ENZYMES: ICD-10-CM

## 2021-08-19 DIAGNOSIS — K76.0 STEATOSIS OF LIVER: ICD-10-CM

## 2021-08-19 LAB
ALBUMIN SERPL-MCNC: 3.7 G/DL (ref 3.4–5)
ALP SERPL-CCNC: 86 U/L (ref 40–150)
ALT SERPL W P-5'-P-CCNC: 93 U/L (ref 0–50)
ANION GAP SERPL CALCULATED.3IONS-SCNC: 7 MMOL/L (ref 3–14)
AST SERPL W P-5'-P-CCNC: 51 U/L (ref 0–45)
BILIRUB SERPL-MCNC: 0.2 MG/DL (ref 0.2–1.3)
BUN SERPL-MCNC: 14 MG/DL (ref 7–30)
CALCIUM SERPL-MCNC: 9 MG/DL (ref 8.5–10.1)
CHLORIDE BLD-SCNC: 106 MMOL/L (ref 94–109)
CO2 SERPL-SCNC: 25 MMOL/L (ref 20–32)
CREAT SERPL-MCNC: 0.78 MG/DL (ref 0.52–1.04)
ERYTHROCYTE [DISTWIDTH] IN BLOOD BY AUTOMATED COUNT: 13.5 % (ref 10–15)
FERRITIN SERPL-MCNC: 61 NG/ML (ref 12–150)
GFR SERPL CREATININE-BSD FRML MDRD: >90 ML/MIN/1.73M2
GLUCOSE BLD-MCNC: 92 MG/DL (ref 70–99)
HCT VFR BLD AUTO: 40 % (ref 35–47)
HGB BLD-MCNC: 13.1 G/DL (ref 11.7–15.7)
MCH RBC QN AUTO: 28.2 PG (ref 26.5–33)
MCHC RBC AUTO-ENTMCNC: 32.8 G/DL (ref 31.5–36.5)
MCV RBC AUTO: 86 FL (ref 78–100)
PLATELET # BLD AUTO: 256 10E3/UL (ref 150–450)
POTASSIUM BLD-SCNC: 4 MMOL/L (ref 3.4–5.3)
PROT SERPL-MCNC: 7.3 G/DL (ref 6.8–8.8)
RBC # BLD AUTO: 4.64 10E6/UL (ref 3.8–5.2)
SODIUM SERPL-SCNC: 138 MMOL/L (ref 133–144)
WBC # BLD AUTO: 9.6 10E3/UL (ref 4–11)

## 2021-08-19 PROCEDURE — 36415 COLL VENOUS BLD VENIPUNCTURE: CPT | Performed by: NURSE PRACTITIONER

## 2021-08-19 PROCEDURE — 82728 ASSAY OF FERRITIN: CPT | Performed by: NURSE PRACTITIONER

## 2021-08-19 PROCEDURE — 85027 COMPLETE CBC AUTOMATED: CPT | Performed by: NURSE PRACTITIONER

## 2021-08-19 PROCEDURE — 80053 COMPREHEN METABOLIC PANEL: CPT | Performed by: NURSE PRACTITIONER

## 2021-08-19 PROCEDURE — 99213 OFFICE O/P EST LOW 20 MIN: CPT | Performed by: NURSE PRACTITIONER

## 2021-08-19 ASSESSMENT — MIFFLIN-ST. JEOR: SCORE: 2359.14

## 2021-08-19 NOTE — PROGRESS NOTES
"    Assessment & Plan     Family history of hemochromatosis    - Ferritin; Future  - CBC with platelets; Future  - Comprehensive metabolic panel (BMP + Alb, Alk Phos, ALT, AST, Total. Bili, TP); Future  - Ferritin  - CBC with platelets  - Comprehensive metabolic panel (BMP + Alb, Alk Phos, ALT, AST, Total. Bili, TP)             BMI:   Estimated body mass index is 55.01 kg/m  as calculated from the following:    Height as of this encounter: 1.689 m (5' 6.5\").    Weight as of this encounter: 156.9 kg (346 lb).       FUTURE APPOINTMENTS:       - Follow-up for annual visit or as needed    No follow-ups on file.    XENA Deng CNP  [facility]    Jason Green is a 21 year old who presents for the following health issues     HPI     Chief Complaint   Patient presents with     Patient Request     Father recently passed hemachromatosis. Paternal grandfather also had it. Here to be tested.       Grandfather and Father with hx of hemachromatosis. Has had elevated ferritin in the past. Upon chart review was tested for the mutation in 09/2019- she did not remember this. She was having abdominal pain at the time which she says has resolved since she cut out dairy.     Review of Systems   Constitutional, HEENT, cardiovascular, pulmonary, gi and gu systems are negative, except as otherwise noted.      Objective    /88   Pulse 82   Temp 97.2  F (36.2  C) (Tympanic)   Resp 16   Ht 1.689 m (5' 6.5\")   Wt (!) 156.9 kg (346 lb)   SpO2 94%   BMI 55.01 kg/m    Body mass index is 55.01 kg/m .  Physical Exam   GENERAL: healthy, alert and no distress  CV: regular rates and rhythm  NEURO: Normal strength and tone, mentation intact and speech normal                "

## 2021-08-19 NOTE — RESULT ENCOUNTER NOTE
Zena Green    Your iron level came back within normal limits. Blood counts are normal. The kidney function and blood sugar/ electrolytes are all normal. The liver enzymes are up from last check. I did order an ultrasound to evaluate the liver. Please call [facility] Department to schedule your imaging [phone].    Please let us know if you have any questions.     Take care,    XENA Rojo CNP

## 2021-08-19 NOTE — PATIENT INSTRUCTIONS
Patient Education     Discharge Instructions for Hereditary Hemochromatosis  You have been diagnosed with hereditary hemochromatosis (HH). This is an inherited disease that causes you to absorb too much iron. Iron is needed for making red blood cells. But too much of it can cause serious health problems. Here's what you need to know.   Home care    Tell your children, brothers, and sisters that you have hemochromatosis. The disease is inherited. Other family members may have it and not know it. Your first-degree family members should talk to their healthcare providers about the need for blood testing.    Have your iron levels checked regularly.    Don't eat large amounts of iron-rich foods. These include red meats (especially liver) and food products with iron added.    Never take iron supplements.    Don't take multivitamins that have iron. Even small amounts of iron in some multivitamins can be harmful.    Don t take pills with more than 500 mg of vitamin C each day. Vitamin C increases iron absorption from food. It s OK to eat foods that have vitamin C.    Follow-up care    Make a follow-up appointment.    Keep your follow-up appointments. You may need to have a pint of blood removed (phlebotomy) on a regular basis to keep your iron levels normal.    When to call your healthcare provider  Call your healthcare provider right away if you have any of these:     Tiredness    Irregular pulse or heartbeat    Any chest pain    Loss of appetite, nausea, or vomiting    Trouble breathing or exercising    Increased thirst or increased need to urinate    Fever of 100.4 F (38 C) or higher, or as directed by your healthcare provider    Muscle aches, joint pains, or pain in your belly    Yellowing of the skin or whites of the eyes (jaundice)  [employer] last reviewed this educational content on 6/1/2020 2000-2021 [employer]. All rights reserved. This information is not intended as a substitute for professional  medical care. Always follow your healthcare professional's instructions.

## 2021-08-26 ENCOUNTER — HOSPITAL ENCOUNTER (OUTPATIENT)
Dept: ULTRASOUND IMAGING | Facility: CLINIC | Age: 21
Discharge: HOME | End: 2021-08-26
Attending: NURSE PRACTITIONER | Admitting: NURSE PRACTITIONER
Payer: COMMERCIAL

## 2021-08-26 DIAGNOSIS — Z83.49 FAMILY HISTORY OF HEMOCHROMATOSIS: ICD-10-CM

## 2021-08-26 DIAGNOSIS — R74.8 ELEVATED LIVER ENZYMES: ICD-10-CM

## 2021-08-26 PROCEDURE — 76705 ECHO EXAM OF ABDOMEN: CPT

## 2021-08-26 NOTE — RESULT ENCOUNTER NOTE
"Zena Green    Your ultrasound shows some liver disease that could be \"fatty liver\" but I want you to see a liver specialist to rule out other causes given your family history.      [facility]  [facility] [address]  Phone: [phone]    [facility] will call you to coordinate your care as prescribed by the provider.  If you don't hear from a representative within 2 business days, please call the number listed above.    Please let us know if you have any questions.     Take care,    XENA Rojo CNP"

## 2021-08-26 NOTE — TELEPHONE ENCOUNTER
RECORDS RECEIVED FROM: Internal   Appt Date: 08.31.2021   NOTES STATUS DETAILS   OFFICE NOTE from referring provider Internal 08.19.2021 Consult Nathaly Erazo APRN CNP   OFFICE NOTES from other specialists N/A    DISCHARGE SUMMARY from hospital N/A    MEDICATION LIST Internal    LIVER BIOSPY (IF APPLICABLE)      PATHOLOGY REPORTS  N/A    IMAGING     ENDOSCOPY (IF AVAILABLE) Care Everywhere 11.22.2019   COLONOSCOPY (IF AVAILABLE) Care Everywhere 10.30.2019   ULTRASOUND LIVER Internal 08.26.2021 US ABDOMEN LIMITED   CT OF ABDOMEN N/A    MRI OF LIVER N/A    FIBROSCAN, US ELASTOGRAPHY, FIBROSIS SCAN, MR ELASTOGRAPHY N/A    LABS     HEPATIC PANEL (LIVER PANEL) Care Everywhere 04.16.2021   BASIC METABOLIC PANEL Care Everywhere 06.09.2021   COMPLETE METABOLIC PANEL Internal 08.19.2021   COMPLETE BLOOD COUNT (CBC) Internal 08.19.2021   INTERNATIONAL NORMALIZED RATIO (INR) N/A    HEPATITIS C ANTIBODY Internal 12.08.2020   HEPATITIS C VIRAL LOAD/PCR N/A    HEPATITIS C GENOTYPE N/A    HEPATITIS B SURFACE ANTIGEN N/A    HEPATITIS B SURFACE ANTIBODY N/A    HEPATITIS B DNA QUANT LEVEL N/A    HEPATITIS B CORE ANTIBODY N/A

## 2021-08-31 ENCOUNTER — OFFICE VISIT (OUTPATIENT)
Dept: GASTROENTEROLOGY | Facility: CLINIC | Age: 21
End: 2021-08-31
Attending: NURSE PRACTITIONER
Payer: COMMERCIAL

## 2021-08-31 ENCOUNTER — PRE VISIT (OUTPATIENT)
Dept: GASTROENTEROLOGY | Facility: CLINIC | Age: 21
End: 2021-08-31

## 2021-08-31 VITALS
SYSTOLIC BLOOD PRESSURE: 117 MMHG | BODY MASS INDEX: 54.85 KG/M2 | OXYGEN SATURATION: 99 % | HEART RATE: 98 BPM | WEIGHT: 293 LBS | DIASTOLIC BLOOD PRESSURE: 72 MMHG

## 2021-08-31 DIAGNOSIS — Z83.49 FAMILY HISTORY OF HEMOCHROMATOSIS: ICD-10-CM

## 2021-08-31 DIAGNOSIS — R74.8 ELEVATED LIVER ENZYMES: ICD-10-CM

## 2021-08-31 DIAGNOSIS — K76.0 STEATOSIS OF LIVER: ICD-10-CM

## 2021-08-31 PROCEDURE — 99204 OFFICE O/P NEW MOD 45 MIN: CPT | Performed by: INTERNAL MEDICINE

## 2021-08-31 RX ORDER — POLYETHYLENE GLYCOL 3350 17 G/17G
1 POWDER, FOR SOLUTION ORAL DAILY
COMMUNITY
End: 2022-08-23

## 2021-08-31 NOTE — LETTER
2021         RE: Norma Collins  [address] [facility] [address]        Dear Colleague,    Thank you for referring your patient, Norma Collins, to the [facility] [place]. Please see a copy of my visit note below.    Date of Service: 2021     Referring Provider: XENA Rojo    Subjective:            Norma Collins is a 21 year old female presenting for evaluation of abnormal liver tests    History of Present Illness   Norma Collins is a 21 year old female with past medical history of class III obesity and reports of polycystic ovarian syndrome who presents with concerns of abnormal liver tests and imaging demonstrating hepatic steatosis.    On today's clinical encounter she weighed around 345 pounds.  In discussion she reports she is weighed close to this weight since her senior year of high school and has not had significant fluctuations.  She notes that she has reached out to the weight loss clinic in [place] several years ago, but did not have close clinical follow-up.  She has had relatively close follow-up with primary provider as she has had issues with abnormal labs and a concern for hemochromatosis as she reports her father and paternal grandfather both  of complications of the disease.  She reports that her father  of complications of cirrhosis and was followed at [facility].  Her mother had colon cancer in her early 40s and  from complications of disease.  She reports that both she and her twin brother have undergone screening colonoscopies already.  As part of routine care she underwent labs on  which demonstrated AST 51, ALT 93, total bilirubin 0.2 and otherwise normal labs.  An abdominal ultrasound was then performed on  which demonstrated liver parenchyma consistent with moderate to severe hepatic steatosis.    Notes very rare alcohol use, no more than twice per week.  Denies any IV  inhaled drugs of abuse.  Notes intermittent CBD or marijuana use.    She reports she was given a diagnosis of PCOS during a encounter with a nonclinician, and she is unsure if she truly has this diagnosis.    Past Medical History:  Past Medical History:   Diagnosis Date     Acanthosis nigricans      Adenotonsillar hypertrophy      Arthropathy of ankle and foot      Obesity      PCOS (polycystic ovarian syndrome)        Surgical History:  Past Surgical History:   Procedure Laterality Date     CHOLECYSTECTOMY       LAPAROSCOPIC CHOLECYSTECTOMY       OTHER SURGICAL HISTORY      none     TONSILLECTOMY       TONSILLECTOMY & ADENOIDECTOMY Bilateral 2/16/2017    Procedure: BILATERAL TONSILLECTOMY AND ADENOIDECTOMY;  Surgeon: Jacob Arguello MD;  Location: [facility];  Service:        Social History:  Social History     Tobacco Use     Smoking status: Former Smoker     Smokeless tobacco: Never Used   Substance Use Topics     Alcohol use: Yes     Comment: occ     Drug use: Never        Family History:  Family History   Problem Relation Age of Onset     Colon Cancer Mother 43     Cancer Mother         colon     Venous thrombosis Mother         cancer-related     Cerebrovascular Disease Mother      Liver Disease Father         cirrhosis of liver     Other - See Comments Father         high iron     Cirrhosis Father      Hemochromatosis Father      Hypertension Father      Hyperlipidemia Father      Myocardial Infarction Maternal Grandmother      Heart Disease Maternal Grandmother      Dementia Maternal Grandmother      Cerebrovascular Disease Paternal Grandmother      Other - See Comments Maternal Grandfather         heart attack or cancer/unsure     Bladder Cancer Maternal Grandfather      Cirrhosis Paternal Grandfather      Other - See Comments Paternal Grandfather         high iron     Diabetes Paternal Grandfather      Hemochromatosis Paternal Grandfather      Kidney Disease Paternal Grandfather       Hypertension Paternal Grandfather      Colon Cancer Maternal Great-Grandmother         70-90 years old      Uterine Cancer Maternal Great-Grandmother      Breast Cancer Maternal Great-Grandmother      Brain Cancer Maternal Aunt        Medications:  Current Outpatient Medications   Medication     omeprazole (PRILOSEC) 40 MG DR capsule     polyethylene glycol (MIRALAX) 17 g packet     dicyclomine (BENTYL) 20 MG tablet     No current facility-administered medications for this visit.       Review of Systems  A complete 10 point review of systems was asked and answered in the negative unless specifically commented upon in the HPI    Objective:         Vitals:    08/31/21 1031   BP: 117/72   Pulse: 98   SpO2: 99%   Weight: (!) 156.5 kg (345 lb)     Body mass index is 54.85 kg/m .     Physical Exam    Vitals reviewed.   Constitutional: Well-developed, well-nourished, in no apparent distress.    HEENT: Normocephalic. no scleral icterus.   Neck/Lymph: Normal ROM, supple.  Cardiac:  Regular rate  Respiratory: Normal respiratory excursion   GI:  Abdomen soft, non-distended, obese  Skin:  Skin is warm and dry. No rash noted.   Peripheral Vascular: no lower extremity edema. 2+ pulses in all extremities  Musculoskeletal:  ROM intact, normal muscle bulk    Psychiatric: Normal mood and affect. Behavior is normal.  Neuro:  no tremor    Labs and Diagnostic tests:  Lab Results   Component Value Date    BILITOTAL 0.2 08/19/2021    ALT 93 08/19/2021    AST 51 08/19/2021    ALKPHOS 86 08/19/2021     Lab Results   Component Value Date    ALBUMIN 3.7 08/19/2021    PROTTOTAL 7.3 08/19/2021          Imaging:  Abd US 8/26/2021  FINDINGS:     GALLBLADDER: Cholecystectomy.     BILE DUCTS: No biliary dilatation. The common duct measures 4 mm.     LIVER: Echogenic parenchyma with smooth contour. No focal mass.     RIGHT KIDNEY: No hydronephrosis.     PANCREAS: The visualized portions.     No ascites.                                                                       IMPRESSION:  1.  Moderate to severe hepatic steatosis.  2.  Cholecystectomy.    Assessment and Plan:    Abnormal Liver Tests:    -Based on available information it does appear most likely the patient has abnormal liver tests related to nonalcoholic fatty liver disease with nonalcoholic steatohepatitis  -She does not have overt risk factors for the development of chronic liver disease from other causes  -She has had HFE gene testing and has been negative for any mutation and iron studies have been within normal limits  -Her risk factors for development of nonalcoholic fatty liver disease would include class III obesity and possibly polycystic ovarian syndrome  -Felt appropriate to screen for autoimmune liver disease as well, although this is less likely based on clinical presentation and imaging  -We will plan to obtain completion of hepatitis serologies and autoimmune liver tests with her next blood draw  -Repeat liver tests in 3 months after implementing lifestyle modifications as per below    Non-Alcoholic Fatty Liver Disease  - I had a long discussion with the patient about nonalcoholic fatty liver disease (NAFLD).  We discussed how fat deposits in the liver, how this leads to inflammation, and how chronic inflammation (MCCALL) can ultimately lead to scarring and cirrhosis.  The long-term complications of fatty liver disease were discussed with the patient, including the increased risk of cardiovascular disease complications,the risk of developing diabetes (if not already), and variable progression to cirrhosis and end stage liver disease.    Management of NAFLD/MCCALL  - We spent time discussing an appropriate diet, exercise and weight loss plan.      - Recommend exercise regularly: 4+ times per week, with an average of about 45 minutes per day.      - It has shown that patients who exercise regularly can have improvement of insulin resistance and resolution of fatty liver disease, even if they  "are not able to lose weight.     - Recommend a low-carbohydrate, low-calorie diet (3577-7970 calories per day).  - An ideal weight loss plan would be to lose 7-10% of body weight over the next six months  - Recommend aggressive diabetes management: ideal goal Hgb A1c goal of =/< 7%. If possible addition of insulin sensitizing agents like metformin or liraglutide will be helpful.    - Management of cholesterol is also very important.    - The use of \"statins\" (HMG-CoA reductase medications) are an effective means of therapy and are not contra-indicated in those with abnormal liver tests OR those with cirrhosis.  The value of these medications in this population far outweigh the minor risks of abnormal liver tests.   - Goal LDL in those with MCCALL are < 100 mg/dL  - Consider the utility of liberalizing coffee consumption as some data that this may slow progression and reverse effects of MCCALL-related fibrosis.  - We plan to order liver tests to be checked every 3 months to assess for dynamic changes, as a potential marker of another cause of chronic liver disease.  - We will place a referral to the Weight Management clinic      Routine Health Care in Patient with Chronic Liver Disease:  - We recommend screening for hepatitis A and B, please vaccinate if not immune  - All patients with liver disease, particularly those with cholestatic liver disease, are at an increased risk for osteoporosis.  We strongly recommend screening for Vitamin D deficiency at least twice yearly with aggressive supplementation/replacement as indicated.    - We also recommend a screening DEXA scan to evaluate for osteoporosis.  If present, should treat with calcium, Vitamin D supplementation, and recommend consideration of bisphosphonate therapy.  Also recommend follow up DEXA scans to evaluate for improvement of bone density on therapy.  - All patients with liver disease should avoid the use of Non-steroidal Anti-Inflammatory (NSAID) medications " as they can cause significant injury to the kidneys in this population    Follow Up:  ~ 3 months     Thank you very much for the opportunity to participate in the care of this patient.  If you have any further questions, please don't hesitate to contact our office.    Thomas M. Leventhal, M.D.   of Medicine  Advanced & Transplant Hepatology  [facility]        Again, thank you for allowing me to participate in the care of your patient.        Sincerely,        Thomas M. Leventhal, MD

## 2021-08-31 NOTE — PATIENT INSTRUCTIONS
"- Labs in 3 months      Non-Alcoholic Fatty Liver Disease  - I had a long discussion with the patient about nonalcoholic fatty liver disease (NAFLD).  We discussed how fat deposits in the liver, how this leads to inflammation, and how chronic inflammation (MCCALL) can ultimately lead to scarring and cirrhosis.  The long-term complications of fatty liver disease were discussed with the patient, including the increased risk of cardiovascular disease complications,the risk of developing diabetes (if not already), and variable progression to cirrhosis and end stage liver disease.  - If the patient only has benign steatosis, they will have low risk of progression and no treatment will be needed at that time except for lifestyle modifications.    Management of NAFLD/MCCALL  - We spent time discussing an appropriate diet, exercise and weight loss plan.      - Recommend exercise regularly: 4+ times per week, with an average of about 45 minutes per day.      - It has shown that patients who exercise regularly can have improvement of insulin resistance and resolution of fatty liver disease, even if they are not able to lose weight.     - Recommend a low-carbohydrate, low-calorie diet (4557-9734 calories per day).    - An ideal weight loss plan would be to lose 7-10% of body weight over the next six months  - Recommend aggressive diabetes management: ideal goal Hgb A1c goal of =/< 7%. If possible addition of insulin sensitizing agents like metformin or liraglutide will be helpful.    - Management of cholesterol is also very important.    - The use of \"statins\" (HMG-CoA reductase medications) are an effective means of therapy and are not contra-indicated in those with abnormal liver tests OR those with cirrhosis.  The value of these medications in this population far outweigh the minor risks of abnormal liver tests.   - Goal LDL in those with MCCALL are < 100 mg/dL  - Consider the utility of liberalizing coffee consumption as some " data that this may slow progression and reverse effects of MCCALL-related fibrosis.  - We plan to order liver tests to be checked every 3 months to assess for dynamic changes, as a potential marker of another cause of chronic liver disease.

## 2021-08-31 NOTE — PROGRESS NOTES
Date of Service: 2021     Referring Provider: XENA Rojo    Subjective:            Norma Collins is a 21 year old female presenting for evaluation of abnormal liver tests    History of Present Illness   Norma Collins is a 21 year old female with past medical history of class III obesity and reports of polycystic ovarian syndrome who presents with concerns of abnormal liver tests and imaging demonstrating hepatic steatosis.    On today's clinical encounter she weighed around 345 pounds.  In discussion she reports she is weighed close to this weight since her senior year of high school and has not had significant fluctuations.  She notes that she has reached out to the weight loss clinic in [place] several years ago, but did not have close clinical follow-up.  She has had relatively close follow-up with primary provider as she has had issues with abnormal labs and a concern for hemochromatosis as she reports her father and paternal grandfather both  of complications of the disease.  She reports that her father  of complications of cirrhosis and was followed at [facility].  Her mother had colon cancer in her early 40s and  from complications of disease.  She reports that both she and her twin brother have undergone screening colonoscopies already.  As part of routine care she underwent labs on  which demonstrated AST 51, ALT 93, total bilirubin 0.2 and otherwise normal labs.  An abdominal ultrasound was then performed on  which demonstrated liver parenchyma consistent with moderate to severe hepatic steatosis.    Notes very rare alcohol use, no more than twice per week.  Denies any IV inhaled drugs of abuse.  Notes intermittent CBD or marijuana use.    She reports she was given a diagnosis of PCOS during a encounter with a nonclinician, and she is unsure if she truly has this diagnosis.    Past Medical History:  Past Medical History:    Diagnosis Date     Acanthosis nigricans      Adenotonsillar hypertrophy      Arthropathy of ankle and foot      Obesity      PCOS (polycystic ovarian syndrome)        Surgical History:  Past Surgical History:   Procedure Laterality Date     CHOLECYSTECTOMY       LAPAROSCOPIC CHOLECYSTECTOMY       OTHER SURGICAL HISTORY      none     TONSILLECTOMY       TONSILLECTOMY & ADENOIDECTOMY Bilateral 2/16/2017    Procedure: BILATERAL TONSILLECTOMY AND ADENOIDECTOMY;  Surgeon: Jacob Arguello MD;  Location: [facility];  Service:        Social History:  Social History     Tobacco Use     Smoking status: Former Smoker     Smokeless tobacco: Never Used   Substance Use Topics     Alcohol use: Yes     Comment: occ     Drug use: Never        Family History:  Family History   Problem Relation Age of Onset     Colon Cancer Mother 43     Cancer Mother         colon     Venous thrombosis Mother         cancer-related     Cerebrovascular Disease Mother      Liver Disease Father         cirrhosis of liver     Other - See Comments Father         high iron     Cirrhosis Father      Hemochromatosis Father      Hypertension Father      Hyperlipidemia Father      Myocardial Infarction Maternal Grandmother      Heart Disease Maternal Grandmother      Dementia Maternal Grandmother      Cerebrovascular Disease Paternal Grandmother      Other - See Comments Maternal Grandfather         heart attack or cancer/unsure     Bladder Cancer Maternal Grandfather      Cirrhosis Paternal Grandfather      Other - See Comments Paternal Grandfather         high iron     Diabetes Paternal Grandfather      Hemochromatosis Paternal Grandfather      Kidney Disease Paternal Grandfather      Hypertension Paternal Grandfather      Colon Cancer Maternal Great-Grandmother         70-90 years old      Uterine Cancer Maternal Great-Grandmother      Breast Cancer Maternal Great-Grandmother      Brain Cancer Maternal Aunt        Medications:  Current  Outpatient Medications   Medication     omeprazole (PRILOSEC) 40 MG DR capsule     polyethylene glycol (MIRALAX) 17 g packet     dicyclomine (BENTYL) 20 MG tablet     No current facility-administered medications for this visit.       Review of Systems  A complete 10 point review of systems was asked and answered in the negative unless specifically commented upon in the HPI    Objective:         Vitals:    08/31/21 1031   BP: 117/72   Pulse: 98   SpO2: 99%   Weight: (!) 156.5 kg (345 lb)     Body mass index is 54.85 kg/m .     Physical Exam    Vitals reviewed.   Constitutional: Well-developed, well-nourished, in no apparent distress.    HEENT: Normocephalic. no scleral icterus.   Neck/Lymph: Normal ROM, supple.  Cardiac:  Regular rate  Respiratory: Normal respiratory excursion   GI:  Abdomen soft, non-distended, obese  Skin:  Skin is warm and dry. No rash noted.   Peripheral Vascular: no lower extremity edema. 2+ pulses in all extremities  Musculoskeletal:  ROM intact, normal muscle bulk    Psychiatric: Normal mood and affect. Behavior is normal.  Neuro:  no tremor    Labs and Diagnostic tests:  Lab Results   Component Value Date    BILITOTAL 0.2 08/19/2021    ALT 93 08/19/2021    AST 51 08/19/2021    ALKPHOS 86 08/19/2021     Lab Results   Component Value Date    ALBUMIN 3.7 08/19/2021    PROTTOTAL 7.3 08/19/2021          Imaging:  Abd US 8/26/2021  FINDINGS:     GALLBLADDER: Cholecystectomy.     BILE DUCTS: No biliary dilatation. The common duct measures 4 mm.     LIVER: Echogenic parenchyma with smooth contour. No focal mass.     RIGHT KIDNEY: No hydronephrosis.     PANCREAS: The visualized portions.     No ascites.                                                                      IMPRESSION:  1.  Moderate to severe hepatic steatosis.  2.  Cholecystectomy.    Assessment and Plan:    Abnormal Liver Tests:    -Based on available information it does appear most likely the patient has abnormal liver tests related  to nonalcoholic fatty liver disease with nonalcoholic steatohepatitis  -She does not have overt risk factors for the development of chronic liver disease from other causes  -She has had HFE gene testing and has been negative for any mutation and iron studies have been within normal limits  -Her risk factors for development of nonalcoholic fatty liver disease would include class III obesity and possibly polycystic ovarian syndrome  -Felt appropriate to screen for autoimmune liver disease as well, although this is less likely based on clinical presentation and imaging  -We will plan to obtain completion of hepatitis serologies and autoimmune liver tests with her next blood draw  -Repeat liver tests in 3 months after implementing lifestyle modifications as per below    Non-Alcoholic Fatty Liver Disease  - I had a long discussion with the patient about nonalcoholic fatty liver disease (NAFLD).  We discussed how fat deposits in the liver, how this leads to inflammation, and how chronic inflammation (MCCALL) can ultimately lead to scarring and cirrhosis.  The long-term complications of fatty liver disease were discussed with the patient, including the increased risk of cardiovascular disease complications,the risk of developing diabetes (if not already), and variable progression to cirrhosis and end stage liver disease.    Management of NAFLD/MCCALL  - We spent time discussing an appropriate diet, exercise and weight loss plan.      - Recommend exercise regularly: 4+ times per week, with an average of about 45 minutes per day.      - It has shown that patients who exercise regularly can have improvement of insulin resistance and resolution of fatty liver disease, even if they are not able to lose weight.     - Recommend a low-carbohydrate, low-calorie diet (7651-6395 calories per day).  - An ideal weight loss plan would be to lose 7-10% of body weight over the next six months  - Recommend aggressive diabetes management: ideal  "goal Hgb A1c goal of =/< 7%. If possible addition of insulin sensitizing agents like metformin or liraglutide will be helpful.    - Management of cholesterol is also very important.    - The use of \"statins\" (HMG-CoA reductase medications) are an effective means of therapy and are not contra-indicated in those with abnormal liver tests OR those with cirrhosis.  The value of these medications in this population far outweigh the minor risks of abnormal liver tests.   - Goal LDL in those with MCCALL are < 100 mg/dL  - Consider the utility of liberalizing coffee consumption as some data that this may slow progression and reverse effects of MCCALL-related fibrosis.  - We plan to order liver tests to be checked every 3 months to assess for dynamic changes, as a potential marker of another cause of chronic liver disease.  - We will place a referral to the Weight Management clinic      Routine Health Care in Patient with Chronic Liver Disease:  - We recommend screening for hepatitis A and B, please vaccinate if not immune  - All patients with liver disease, particularly those with cholestatic liver disease, are at an increased risk for osteoporosis.  We strongly recommend screening for Vitamin D deficiency at least twice yearly with aggressive supplementation/replacement as indicated.    - We also recommend a screening DEXA scan to evaluate for osteoporosis.  If present, should treat with calcium, Vitamin D supplementation, and recommend consideration of bisphosphonate therapy.  Also recommend follow up DEXA scans to evaluate for improvement of bone density on therapy.  - All patients with liver disease should avoid the use of Non-steroidal Anti-Inflammatory (NSAID) medications as they can cause significant injury to the kidneys in this population    Follow Up:  ~ 3 months     Thank you very much for the opportunity to participate in the care of this patient.  If you have any further questions, please don't hesitate to contact " our office.    Thomas M. Leventhal, M.D.   of Medicine  Advanced & Transplant Hepatology  [facility]

## 2021-09-18 ENCOUNTER — HEALTH MAINTENANCE LETTER (OUTPATIENT)
Age: 21
End: 2021-09-18

## 2021-09-20 ENCOUNTER — OFFICE VISIT (OUTPATIENT)
Dept: FAMILY MEDICINE | Facility: CLINIC | Age: 21
End: 2021-09-20
Payer: COMMERCIAL

## 2021-09-20 VITALS
TEMPERATURE: 97.5 F | HEIGHT: 67 IN | DIASTOLIC BLOOD PRESSURE: 86 MMHG | HEART RATE: 80 BPM | SYSTOLIC BLOOD PRESSURE: 122 MMHG | WEIGHT: 293 LBS | OXYGEN SATURATION: 97 % | RESPIRATION RATE: 16 BRPM | BODY MASS INDEX: 45.99 KG/M2

## 2021-09-20 DIAGNOSIS — R74.8 ELEVATED LIVER ENZYMES: ICD-10-CM

## 2021-09-20 LAB
ALBUMIN SERPL-MCNC: 3.8 G/DL (ref 3.4–5)
ALP SERPL-CCNC: 77 U/L (ref 40–150)
ALT SERPL W P-5'-P-CCNC: 59 U/L (ref 0–50)
ANION GAP SERPL CALCULATED.3IONS-SCNC: 4 MMOL/L (ref 3–14)
AST SERPL W P-5'-P-CCNC: 35 U/L (ref 0–45)
BILIRUB DIRECT SERPL-MCNC: <0.1 MG/DL (ref 0–0.2)
BILIRUB SERPL-MCNC: 0.5 MG/DL (ref 0.2–1.3)
BUN SERPL-MCNC: 12 MG/DL (ref 7–30)
CALCIUM SERPL-MCNC: 9.6 MG/DL (ref 8.5–10.1)
CHLORIDE BLD-SCNC: 106 MMOL/L (ref 94–109)
CO2 SERPL-SCNC: 28 MMOL/L (ref 20–32)
CREAT SERPL-MCNC: 0.8 MG/DL (ref 0.52–1.04)
GFR SERPL CREATININE-BSD FRML MDRD: >90 ML/MIN/1.73M2
GLUCOSE BLD-MCNC: 92 MG/DL (ref 70–99)
HBV SURFACE AB SERPL IA-ACNC: 125.83 M[IU]/ML
POTASSIUM BLD-SCNC: 4.1 MMOL/L (ref 3.4–5.3)
PROT SERPL-MCNC: 7.4 G/DL (ref 6.8–8.8)
SODIUM SERPL-SCNC: 138 MMOL/L (ref 133–144)
TSH SERPL DL<=0.005 MIU/L-ACNC: 1.96 MU/L (ref 0.4–4)

## 2021-09-20 PROCEDURE — 82040 ASSAY OF SERUM ALBUMIN: CPT | Performed by: FAMILY MEDICINE

## 2021-09-20 PROCEDURE — 82248 BILIRUBIN DIRECT: CPT | Performed by: FAMILY MEDICINE

## 2021-09-20 PROCEDURE — 80048 BASIC METABOLIC PNL TOTAL CA: CPT | Performed by: FAMILY MEDICINE

## 2021-09-20 PROCEDURE — 86706 HEP B SURFACE ANTIBODY: CPT | Performed by: FAMILY MEDICINE

## 2021-09-20 PROCEDURE — 82784 ASSAY IGA/IGD/IGG/IGM EACH: CPT | Performed by: FAMILY MEDICINE

## 2021-09-20 PROCEDURE — 84443 ASSAY THYROID STIM HORMONE: CPT | Performed by: FAMILY MEDICINE

## 2021-09-20 PROCEDURE — 86038 ANTINUCLEAR ANTIBODIES: CPT | Performed by: FAMILY MEDICINE

## 2021-09-20 ASSESSMENT — MIFFLIN-ST. JEOR: SCORE: 2334.19

## 2021-09-20 NOTE — PROGRESS NOTES
"INTRODUCTION: Ms. Norma Collins is a 21 year old y.o. female with obesity who presents for her initial visit to the Lifestyle [facility] Program for Weight Management referred by gastroenterology at the [facility] after they discovered elevated LFTs.      Ms. Collins was seen a few months ago to be tested for hemachromatosis, but was referred to a liver doctor who referred here. She describes always \"being bigger\" her whole life. At current weight for several years. Went to [place] [facility] clinic in 2015. It did not work for her, did not like the doctor and stopped. Mom had encouraged her to lose. Has not tried any diet/lifestyle changes recently.     Social: Mom passed away 2015, Dad passed away earlier this year (due to iron in liver). 2 older sisters, twin brother, younger sister. Lives with \"lifetime friend\" Ameya. Working full time at [employer].     Patient Supplied Answers To [facility] Lifestyle Weight Management Intake Questionnaire:    Lifestyle Management Questionnaire Responses  Reasons for coming:  Lifestyle Mgmt Reason for Coming 9/19/2021   Were you referred here? Yes   Please describe your reasons for coming to this weight management program: Liver issues       History of obesity and weight loss attempts:  Lifestyle Mgmt Obesity History and Wgt Loss Attempts 9/19/2021   Have you tried other weight loss programs?  Please check all that apply: Other (please list below)   Please list any other weight loss programs that you have tried: [place] [facility]       Dietary history:  Lifestyle Mgmt Dietary History 9/19/2021   Stress makes me eat Mostly true   Feeling sad makes me eat Mostly false   Feeling lonely makes me eat Definitely false   Do you go on eating binges even though you are not hungry? No       Sleep Habits:  Lifestyle [facility] Sleep Habits 9/19/2021   Do you have a scale at home? No   Have you ever been told you have sleep apnea? No   Do you generally get up in the morning feeling " rested and ready for the day? No   Does sleepiness during the day affect your ability to function at work? No   Have you ever fallen asleep while driving? No   Have you ever fallen asleep during work? No   I find it difficult to resist eating tasty food even if I am not hungry 3 =  Neutral   If I want something, I may buy it even if it is beyond my financial budget 2 = Moderately disagree   I go to bed early so that I can feel better the next day 3 = Neutral   I try to find time for exercises that make me feel good 3 = Neutral   I would rather have $10.00 today than $15.00 in a week  4 = Moderately agree       Physical Activity History:  Lifestyle Mgmt Physical Activity History 9/19/2021   STRENUOUS EXERCISE (HEART BEATS RAPIDLY) Number of times per week 1   Do you have access to a gymnasium? No         Additional DIETARY History:   Work schedule is inconsistent  Breakfast 7 am-11:30 am: bagel, cream cheese - just started eating breakfast lately, after last appointment felt she should eat 3 meals per day  Lunch/meal 2: salad with fruit or bell pepper with meat and fruit - or ranges between a burger, leftovers, fast food  Dinner/meal 3: none  Snack: cheese its, chips, etc, sometimes fruit    Ameya does grocery shopping and they are both moving towards a more healthy way of eating    Additional PHYSICAL activity history: - Works at [employer].   Walks about 7-8K steps/day when at work and about 2-3K steps/day on off days.   Enjoys walking and knows that it can help her feel better.     Review of systems: Feels really well.      Past Medical History:  has a past medical history of Acanthosis nigricans, Adenotonsillar hypertrophy, Arthropathy of ankle and foot, Obesity, and PCOS (polycystic ovarian syndrome).    Speciality comments:  No specialty comments available.    Lab Results  TSH   Date Value Ref Range Status   09/05/2019 3.28 0.30 - 5.00 uIU/mL Final         PHYSICAL EXAM:  Blood pressure 122/86, pulse 80,  "temperature 97.5  F (36.4  C), resp. rate 16, height 1.689 m (5' 6.5\"), weight (!) 154.4 kg (340 lb 8 oz), last menstrual period 08/29/2021, SpO2 97 %, not currently breastfeeding.    No flowsheet data found.    Ms. Collins is well and in no apparent distress. Able to rise from a seated position without difficulty and ambulate with a normal gait.     HEENT: Normal  CV: Regular rate and rhythm without murmer  Lungs: Clear to Ausculation  Abdomen: Large, soft and without tenderness  Musculoskeletal: No limitations noted in hips, knees or lower legs and feet    IMPRESSION: 21 year old y.o. with obesity who is motivated to lose weight in order to improve hepatic steatosis and decrease chance of Diabetes (has history of acanthosis nigricans.        Initial goal is to achieve a 5% weight loss of 28 lbs (i.e. A body weight of 312 lbs) within the next 3-4 months.    We hope that Norma Collins can do this through sustainable lifestyle changes.    DIET ASSESSMENT/PLAN:    Norma has already started to make some changes in eating pattern, aiming for more consistent meal and snack patter (less skipping meals)    Discussed complex vs. Processed carbohydrates with goal of at least 50% of carbs being complex carbohydrate    Before next visit keep a food record for 3-5 days so we can better target some changes that are specific to your schedule     PHYSICAL ACTIVITY ASSESSMENT/PLAN:    Given that Norma enjoys walking, will focus on that.     Goal we selected will be 10,000 steps/day. She's near that on work days and has a treadmill at home.     Miscellaneous Issues:     Will check TSH    Also, discussed Covid vaccine. Norma is not interested at this time.     Follow-up:    In 2 weeks with Dietician/Nutrition; Monday October 4 at 10:00 via video    Will Dr. Freire as needed    45 minutes spent on the date of the encounter doing chart review, history and exam, documentation and further activities as noted in the note.   --Rajan " MD Tani

## 2021-09-20 NOTE — NURSING NOTE
"21 year old  Chief Complaint   Patient presents with     Lifestyle Weight Mgmt       Blood pressure 122/86, pulse 80, temperature 97.5  F (36.4  C), resp. rate 16, height 1.689 m (5' 6.5\"), weight (!) 154.4 kg (340 lb 8 oz), last menstrual period 08/29/2021, SpO2 97 %, not currently breastfeeding. Body mass index is 54.13 kg/m .  Patient Active Problem List   Diagnosis     Acanthosis nigricans     PCOS (polycystic ovarian syndrome)     Class 3 severe obesity due to excess calories without serious comorbidity with body mass index (BMI) of 50.0 to 59.9 in adult (H)     Elevated BP without diagnosis of hypertension     Adenotonsillar hypertrophy     Arthropathy of ankle and foot     Cholelithiasis     Family history of colon cancer     Family history of hemochromatosis       Wt Readings from Last 2 Encounters:   09/20/21 (!) 154.4 kg (340 lb 8 oz)   08/31/21 (!) 156.5 kg (345 lb)     BP Readings from Last 3 Encounters:   09/20/21 122/86   08/31/21 117/72   08/19/21 112/88         Current Outpatient Medications   Medication     omeprazole (PRILOSEC) 40 MG DR capsule     polyethylene glycol (MIRALAX) 17 g packet     dicyclomine (BENTYL) 20 MG tablet     No current facility-administered medications for this visit.       Social History     Tobacco Use     Smoking status: Former Smoker     Smokeless tobacco: Never Used   Substance Use Topics     Alcohol use: Yes     Comment: occ     Drug use: Never       Health Maintenance Due   Topic Date Due     ADVANCE CARE PLANNING  Never done     HEPATITIS B IMMUNIZATION (2 of 3 - 3-dose primary series) 2000     HPV IMMUNIZATION (1 - 2-dose series) Never done     COVID-19 Vaccine (1) Never done     INFLUENZA VACCINE (1) 09/01/2021       Lab Results   Component Value Date    PAP NIL 07/08/2021 September 20, 2021 9:58 AM  "

## 2021-09-20 NOTE — PATIENT INSTRUCTIONS
Initial goal is to achieve a 5% weight loss of 28 lbs (i.e. A body weight of 312 lbs) within the next 3-4 months.    We hope that Norma Collins can do this through sustainable lifestyle changes.    DIET ASSESSMENT/PLAN:    Norma has already started to make some changes in eating pattern, aiming for more consistent meal and snack patter (less skipping meals)    Discussed complex vs. Processed carbohydrates with goal of at least 50% of carbs being complex carbohydrate    Before next visit keep a food record for 3-5 days so we can better target some changes that are specific to your schedule     PHYSICAL ACTIVITY ASSESSMENT/PLAN:    Given that Norma enjoys walking, will focus on that.     Goal we selected will be 10,000 steps/day. She's near that on work days and has a treadmill at home.     Miscellaneous Issues:     Will check TSH    Also, discussed Covid vaccine. Norma is not interested at this time.     Follow-up:     In 2 weeks with Dietician/Nutrition; Monday October 4 at 10:00 via video    Will Dr. Freire as needed

## 2021-09-21 LAB
ANA SER QL IF: NEGATIVE
IGG SERPL-MCNC: 914 MG/DL (ref 610–1616)

## 2021-09-22 LAB — SMA IGG SER-ACNC: 9 UNITS

## 2021-10-04 ENCOUNTER — VIRTUAL VISIT (OUTPATIENT)
Dept: FAMILY MEDICINE | Facility: CLINIC | Age: 21
End: 2021-10-04
Payer: COMMERCIAL

## 2021-10-04 DIAGNOSIS — Z71.3 DIETARY COUNSELING AND SURVEILLANCE: Primary | ICD-10-CM

## 2021-10-04 NOTE — PATIENT INSTRUCTIONS
IMPRESSION:     21 year old with obesity motivated to lose weight in order to improve hepatic steatosis and decrease chance of diabetes    She has continued to maintain a more consistent pattern of eating, including more whole grains (switched to whole wheat bread) and decreased intake of processed foods      DIETARY ASSESSMENT/PLAN:     Talked through some other breakfast options: Oatmeal with nuts and fruit; greek yogurt with fruit and nuts; Whole wheat tortilla or whole wheat bread with eggs; english muffin with peanut butter and or fruit and hard boiled egg as well    Building a breakfast - start with a complex carbohydrate, add a protein or fat (eggs or nuts/nut butter) and fruit also if needing more to eat    Miscellaneous Issues:     Norma is walking but interested in getting a bike instead, and/or some better walking shoes    Follow-up:     Monday November 1 at 10:00 am via video    Stacy Balderas RD

## 2021-10-04 NOTE — PROGRESS NOTES
"PATIENT HISTORY:   Ms. Norma Collins returns for her nutrition visit to the lifestyle medicine weight management program. Previous measurements have been as follows:    Wt Readings from Last 5 Encounters:   09/20/21 (!) 154.4 kg (340 lb 8 oz)   08/31/21 (!) 156.5 kg (345 lb)   08/19/21 (!) 156.9 kg (346 lb)   07/08/21 (!) 152.4 kg (336 lb)   06/11/21 149.9 kg (330 lb 6.4 oz)       Initial goal is to achieve a 5% weight loss of 28 lbs (i.e. A body weight of 312 lbs) within the next 3-4 months.      Since her last visit, she reports things have been going well. She is sticking to her dietary changes. Kept a food record and shared with RD via [employer]. Was helpful keeping a food record at first, but hard to continue. Noticed that she is sometimes not having dinner if past 8 pm, or too busy. Sometimes she is struggling with variety, wanting to change some meals but unsure of finding foods that are \"good for her\". Discussed some breakfast options: Oatmeal with nuts and fruit; greek yogurt, whole wheat tortilla or whole wheat bread, english muffin with peanut butter. Dinners can be \"breakfast foods\" too, or just a light snack. No scale at home. Norma feels like a scale and regular weight checks can create negative feedback for her and she would rather have this checked less regularly.     DIETARY HISTORY:   Monday 9/20/21   breakfast: bagel with cream cheese   Lunch: 6 inch sub BMT   dinner: salad, banana     Tuesday 9/21/21   Breakfast: bagel with cream cheese, egg, sausage   Lunch: salad, banana, peach   Other: smoothies, peanut M&Ms     Wednesday 9/22/21   Breakfast: bagel with cream cheese, egg, sausage   Lunch: pepper, chicken, bananas, Broccoli   Dinner: none    PHYSICAL ACTIVITY HISTORY: Goal of 10,000 steps per day. Not going as good as she'd like, doing the treadmill or walking the neighborhood but not loving the treadmill (thinks it's off level). Looking to get a bike.      Social: Mom passed away 2015, Dad " "passed away earlier this year (due to iron in liver). 2 older sisters, twin brother, younger sister. Lives with lifetime family friend, Ameya. Working full time at [employer].     OBJECTIVE:   Estimated body mass index is 54.13 kg/m  as calculated from the following:    Height as of 9/20/21: 1.689 m (5' 6.5\").    Weight as of 9/20/21: 154.4 kg (340 lb 8 oz).      IMPRESSION:     21 year old with obesity motivated to lose weight in order to improve hepatic steatosis and decrease change of diabetes    She has continued to maintain a more consistent pattern of eating, including more whole grains (switched to whole wheat bread) and decreased intake of processed foods      DIETARY ASSESSMENT/PLAN:     Talked through some other breakfast options: Oatmeal with nuts and fruit; greek yogurt with fruit and nuts; Whole wheat tortilla or whole wheat bread with eggs; english muffin with peanut butter and or fruit and hard boiled egg as well    Building a breakfast - start with a complex carbohydrate, add a protein or fat (eggs or nuts/nut butter) and fruit also if needing more to eat    Miscellaneous Issues:     Norma is walking but interested in getting a bike instead, and/or some better walking shoes    Weight loss is primary goal however weighing self frequently is not motivational for Norma, we will focus most on energy, mood, and sleep     Follow-up:     Monday November 1 at 10:00 am via video    Patient referred by XENA Deng CNP   Total time spent with patient 19 minutes    Stacy Balderas RD     Family Medicine Video Visit Note  Norma is being evaluated via a billable video visit.             Video Visit Consent     The patient has been notified of following:     \"This video visit will be conducted via a call between you and your physician/provider. We have found that certain health care needs can be provided without the need for an in-person physical exam.  This service lets us provide the care you " "need with a video conversation.  If a prescription is necessary we can send it directly to your pharmacy.  If lab work is needed we can place an order for that and you can then stop by our lab to have the test done at a later time.    Video visits are billed at different rates depending on your insurance coverage.  Please reach out to your insurance provider with any questions.    If during the course of the call the physician/provider feels a video visit is not appropriate, you will not be charged for this service.\"    Patient has given verbal consent for Video visit? Yes    How would you like to obtain your AVS? MyChart    Patient would like the video invitation sent by: Other e-mail: My Chart    Will anyone else be joining your video visit? No       Video-Visit Details    Type of service:  Video Visit    Video Start Time: 10:01 AM  THIS is the time provider and patient connect.    Video End Time (time video stopped): 10:20 AM    Originating Location (pt. Location): Home    Distant Location (provider location):  [facility]     Mode of Communication:  Video Conference via Lars Balderas RD                        "

## 2021-10-14 ENCOUNTER — HOSPITAL ENCOUNTER (EMERGENCY)
Facility: CLINIC | Age: 21
Discharge: HOME | End: 2021-10-15
Attending: EMERGENCY MEDICINE | Admitting: EMERGENCY MEDICINE
Payer: COMMERCIAL

## 2021-10-14 DIAGNOSIS — Z20.822 SUSPECTED 2019 NOVEL CORONAVIRUS INFECTION: ICD-10-CM

## 2021-10-14 DIAGNOSIS — G43.109 MIGRAINE WITH AURA AND WITHOUT STATUS MIGRAINOSUS, NOT INTRACTABLE: ICD-10-CM

## 2021-10-14 PROCEDURE — U0005 INFEC AGEN DETEC AMPLI PROBE: HCPCS | Performed by: EMERGENCY MEDICINE

## 2021-10-14 PROCEDURE — 96372 THER/PROPH/DIAG INJ SC/IM: CPT | Performed by: EMERGENCY MEDICINE

## 2021-10-14 PROCEDURE — 99284 EMERGENCY DEPT VISIT MOD MDM: CPT | Performed by: EMERGENCY MEDICINE

## 2021-10-14 PROCEDURE — 250N000011 HC RX IP 250 OP 636: Performed by: EMERGENCY MEDICINE

## 2021-10-14 PROCEDURE — 250N000012 HC RX MED GY IP 250 OP 636 PS 637: Performed by: EMERGENCY MEDICINE

## 2021-10-14 RX ORDER — KETOROLAC TROMETHAMINE 30 MG/ML
60 INJECTION, SOLUTION INTRAMUSCULAR; INTRAVENOUS ONCE
Status: COMPLETED | OUTPATIENT
Start: 2021-10-14 | End: 2021-10-14

## 2021-10-14 RX ORDER — SUMATRIPTAN 6 MG/.5ML
6 INJECTION, SOLUTION SUBCUTANEOUS ONCE
Status: COMPLETED | OUTPATIENT
Start: 2021-10-14 | End: 2021-10-14

## 2021-10-14 RX ORDER — SUMATRIPTAN 50 MG/1
TABLET, FILM COATED ORAL
Qty: 9 TABLET | Refills: 0 | Status: SHIPPED | OUTPATIENT
Start: 2021-10-14 | End: 2021-11-19

## 2021-10-14 RX ADMIN — KETOROLAC TROMETHAMINE 60 MG: 30 INJECTION, SOLUTION INTRAMUSCULAR; INTRAVENOUS at 23:11

## 2021-10-14 RX ADMIN — SUMATRIPTAN SUCCINATE 6 MG: 6 INJECTION SUBCUTANEOUS at 23:11

## 2021-10-14 ASSESSMENT — MIFFLIN-ST. JEOR: SCORE: 2339.86

## 2021-10-15 ENCOUNTER — NURSE TRIAGE (OUTPATIENT)
Dept: FAMILY MEDICINE | Facility: CLINIC | Age: 21
End: 2021-10-15

## 2021-10-15 ENCOUNTER — HOSPITAL ENCOUNTER (EMERGENCY)
Facility: CLINIC | Age: 21
Discharge: HOME | End: 2021-10-15
Attending: NURSE PRACTITIONER | Admitting: NURSE PRACTITIONER
Payer: COMMERCIAL

## 2021-10-15 ENCOUNTER — APPOINTMENT (OUTPATIENT)
Dept: GENERAL RADIOLOGY | Facility: CLINIC | Age: 21
End: 2021-10-15
Attending: NURSE PRACTITIONER
Payer: COMMERCIAL

## 2021-10-15 VITALS
HEART RATE: 91 BPM | DIASTOLIC BLOOD PRESSURE: 67 MMHG | HEIGHT: 67 IN | OXYGEN SATURATION: 99 % | RESPIRATION RATE: 18 BRPM | BODY MASS INDEX: 45.99 KG/M2 | WEIGHT: 293 LBS | SYSTOLIC BLOOD PRESSURE: 122 MMHG | TEMPERATURE: 99.7 F

## 2021-10-15 VITALS
OXYGEN SATURATION: 97 % | BODY MASS INDEX: 53.25 KG/M2 | DIASTOLIC BLOOD PRESSURE: 93 MMHG | RESPIRATION RATE: 18 BRPM | HEART RATE: 86 BPM | SYSTOLIC BLOOD PRESSURE: 131 MMHG | TEMPERATURE: 97 F | WEIGHT: 293 LBS

## 2021-10-15 DIAGNOSIS — J06.9 VIRAL UPPER RESPIRATORY TRACT INFECTION: Primary | ICD-10-CM

## 2021-10-15 DIAGNOSIS — J20.9 ACUTE BRONCHITIS: ICD-10-CM

## 2021-10-15 LAB — SARS-COV-2 RNA RESP QL NAA+PROBE: NEGATIVE

## 2021-10-15 PROCEDURE — 99283 EMERGENCY DEPT VISIT LOW MDM: CPT | Mod: 25 | Performed by: NURSE PRACTITIONER

## 2021-10-15 PROCEDURE — 99284 EMERGENCY DEPT VISIT MOD MDM: CPT | Performed by: NURSE PRACTITIONER

## 2021-10-15 PROCEDURE — 71046 X-RAY EXAM CHEST 2 VIEWS: CPT

## 2021-10-15 RX ORDER — ALBUTEROL SULFATE 90 UG/1
1-2 AEROSOL, METERED RESPIRATORY (INHALATION) EVERY 4 HOURS PRN
Qty: 18 G | Refills: 0 | Status: SHIPPED | OUTPATIENT
Start: 2021-10-15 | End: 2022-04-01

## 2021-10-15 RX ORDER — PREDNISONE 20 MG/1
20 TABLET ORAL DAILY
Qty: 10 TABLET | Refills: 0 | Status: SHIPPED | OUTPATIENT
Start: 2021-10-15 | End: 2021-11-19

## 2021-10-15 ASSESSMENT — ENCOUNTER SYMPTOMS
CHILLS: 1
DYSURIA: 0
APPETITE CHANGE: 0
SEIZURES: 0
FACIAL SWELLING: 0
WEAKNESS: 0
HEADACHES: 0
WOUND: 0
COUGH: 1
DIZZINESS: 0
FEVER: 0
SINUS PAIN: 0
CHEST TIGHTNESS: 1
FATIGUE: 0
STRIDOR: 0
SHORTNESS OF BREATH: 0
CONFUSION: 0
SORE THROAT: 0
EYE REDNESS: 0
ACTIVITY CHANGE: 0
COLOR CHANGE: 0
DIFFICULTY URINATING: 0
ARTHRALGIAS: 0
NECK STIFFNESS: 0
WHEEZING: 0
ABDOMINAL PAIN: 0

## 2021-10-15 NOTE — TELEPHONE ENCOUNTER
Letter sent to patient via [employer]. Inhaler sent in. Recommend she come in early next week if not improving. To ER over the weekend for new or worsening symptoms.   XENA Rojo [facility]

## 2021-10-15 NOTE — ED PROVIDER NOTES
History     Chief Complaint   Patient presents with     Headache     Patient states started with migraine since Monday.     Covid Concern     Yesterday states that she is having SOB, dry mouth, cough and chest tightness. Exposed to covid a little over a week ago was tested Tuesday and it was negative but getting worse.     HPI  Norma Collins is a 21 year old female who presents for evaluation of COVID-19 symptoms which began 3 days ago, after exposure to someone with documented COVID-19 illness a little over a week ago.  She was tested for COVID-19 the day following onset of symptoms, 2 days ago, and the study was negative.  She continues to have worsening cough, chest tightness and shortness of breath, and has a frontal and posterior headache with nausea but no vomiting. Her cough is nonproductive and she has no hemoptysis or leg pain or leg swelling. She thinks she has a migraine headache as she had an aura like ring in her visual fields earlier and has not had similar headaches in the past, but has no history of migraine headaches.  She has no photophobia, neck stiffness, rash, visual deficit or motor or sensory deficit. No other acute problems, complaints or concerns.     Allergies:  Allergies   Allergen Reactions     D[place] Sprouts [Brassica Oleracea Italica]      Other Food Allergy Rash     D[place] sprouts       Problem List:    Patient Active Problem List    Diagnosis Date Noted     Family history of colon cancer 07/08/2021     Priority: Medium     Mother at age 40       Family history of hemochromatosis 07/08/2021     Priority: Medium     Father and Grandfather       Acanthosis nigricans 12/08/2020     Priority: Medium     Created by Conversion      Last Assessment & Plan:   Patient doing very well and adding exercise, and watching diet closely.  We'll now add metformin as per orders.  Side effects and precautions discussed.  Follow up in one month.       Arthropathy of ankle and foot 12/08/2020      "Priority: Medium     Created by Conversion    Replacement Utility updated for latest IMO load    Last Assessment & Plan:   With diffuse joint aches treating with progressive weight loss and lifestyle modifications.       Elevated BP without diagnosis of hypertension 10/16/2017     Priority: Medium     Cholelithiasis 10/05/2017     Priority: Medium     Adenotonsillar hypertrophy 01/04/2017     Priority: Medium     PCOS (polycystic ovarian syndrome) 02/29/2016     Priority: Medium     Last Assessment & Plan:   Her dietary changes are working very well.  Also working very well for the household.  She is exercising more, having more energy, and pants are fitting looser.  We'll plan for in body at next visit along with remeasurement of abdominal and neck circumference.  Also with her forgetting the metformin in the morning, will move it to the afternoon with lunch.       Class 3 severe obesity due to excess calories without serious comorbidity with body mass index (BMI) of 50.0 to 59.9 in adult (H) 10/30/2015     Priority: Medium     Last Assessment & Plan:   Continue lifestyle modifications.  Will breakfast a [place] protein-based with shakes or aches.  May also use chicken or steak.  Stopped carbohydrates in the morning.  Better food choices discussed.  Encouraged to watch the documentary, \"In defense of food\" before next visit.          Past Medical History:    Past Medical History:   Diagnosis Date     Acanthosis nigricans      Adenotonsillar hypertrophy      Arthropathy of ankle and foot      Obesity      PCOS (polycystic ovarian syndrome)        Past Surgical History:    Past Surgical History:   Procedure Laterality Date     CHOLECYSTECTOMY       LAPAROSCOPIC CHOLECYSTECTOMY       OTHER SURGICAL HISTORY      none     TONSILLECTOMY       TONSILLECTOMY & ADENOIDECTOMY Bilateral 2/16/2017    Procedure: BILATERAL TONSILLECTOMY AND ADENOIDECTOMY;  Surgeon: Jacob Arguello MD;  Location: [facility] OR;  " "Service:        Family History:    Family History   Problem Relation Age of Onset     Colon Cancer Mother 43     Cancer Mother         colon     Venous thrombosis Mother         cancer-related     Cerebrovascular Disease Mother      Liver Disease Father         cirrhosis of liver     Other - See Comments Father         high iron     Cirrhosis Father      Hemochromatosis Father      Hypertension Father      Hyperlipidemia Father      Myocardial Infarction Maternal Grandmother      Heart Disease Maternal Grandmother      Dementia Maternal Grandmother      Cerebrovascular Disease Paternal Grandmother      Other - See Comments Maternal Grandfather         heart attack or cancer/unsure     Bladder Cancer Maternal Grandfather      Cirrhosis Paternal Grandfather      Other - See Comments Paternal Grandfather         high iron     Diabetes Paternal Grandfather      Hemochromatosis Paternal Grandfather      Kidney Disease Paternal Grandfather      Hypertension Paternal Grandfather      Colon Cancer Maternal Great-Grandmother         70-90 years old      Uterine Cancer Maternal Great-Grandmother      Breast Cancer Maternal Great-Grandmother      Brain Cancer Maternal Aunt        Social History:  Marital Status:  Single [1]  Social History     Tobacco Use     Smoking status: Former Smoker     Smokeless tobacco: Never Used   Substance Use Topics     Alcohol use: Yes     Comment: occ     Drug use: Never        Medications:    SUMAtriptan (IMITREX) 50 MG tablet  omeprazole (PRILOSEC) 40 MG DR capsule  polyethylene glycol (MIRALAX) 17 g packet        Review of Systems  As mentioned above in the history present illness.  All other systems were reviewed and are negative.    Physical Exam   BP: (!) 144/91  Pulse: 91  Temp: 99.7  F (37.6  C)  Resp: 18  Height: 170.2 cm (5' 7\")  Weight: (!) 154.2 kg (340 lb)  SpO2: 98 %      Physical Exam  Vitals and nursing note reviewed.   Constitutional:       General: She is not in acute " distress.     Appearance: Normal appearance. She is well-developed. She is not ill-appearing, toxic-appearing or diaphoretic.      Comments: She does not appear ill or uncomfortable.   HENT:      Head: Normocephalic and atraumatic.      Right Ear: External ear normal.      Left Ear: External ear normal.      Mouth/Throat:      Mouth: Mucous membranes are moist.   Eyes:      General: No scleral icterus.     Extraocular Movements: Extraocular movements intact.      Conjunctiva/sclera: Conjunctivae normal.      Pupils: Pupils are equal, round, and reactive to light.   Neck:      Trachea: No tracheal deviation.   Cardiovascular:      Rate and Rhythm: Normal rate and regular rhythm.   Pulmonary:      Effort: Pulmonary effort is normal. No respiratory distress.      Breath sounds: No stridor.   Musculoskeletal:         General: No swelling or tenderness. Normal range of motion.      Cervical back: Normal range of motion and neck supple.      Right lower leg: No edema.      Left lower leg: No edema.   Skin:     General: Skin is warm and dry.      Coloration: Skin is not pale.      Findings: No erythema or rash.   Neurological:      General: No focal deficit present.      Mental Status: She is alert and oriented to person, place, and time.      Cranial Nerves: No cranial nerve deficit ( 2-12 appear intact) or facial asymmetry.      Motor: Motor function is intact. No weakness.      Coordination: Coordination is intact. Coordination normal.      Gait: Gait is intact. Gait normal.   Psychiatric:         Mood and Affect: Mood normal.         Behavior: Behavior normal.       ED Course        Procedures              No results found for this or any previous visit (from the past 24 hour(s)).    Medications   SUMAtriptan (IMITREX) injection 6 mg (6 mg Subcutaneous Given 10/14/21 2311)   ketorolac (TORADOL) injection 60 mg (60 mg Intramuscular Given 10/14/21 2311)     Much improved after Imitrex and Toradol, comfortable with  discharge home.    Assessments & Plan (with Medical Decision Making)   21 year old female who presents for evaluation of COVID-19 symptoms which began 3 days ago, after exposure to someone with documented COVID-19 illness a little over a week ago.  She was tested for COVID-19 the day following onset of symptoms, 2 days ago, and the study was negative.  She continues to have worsening cough, chest tightness and shortness of breath, and has a frontal and posterior headache with nausea but no vomiting.  No respiratory distress, hypoxia or fever.  Suspect COVID-19 illness.  No indication for chest x-ray, antibiotic therapy or dexamethasone at the present time.  Her headache history, signs and symptoms, and exam are consistent with migraine headache. Doubt SAH/ICH, CVA, venous sinus thrombosis, meningitis or encephalitis.  Significantly improved after migraine meds and comfortable with d/c home.  Will Rx Imitrex to use as needed for headaches refractory to NSAID. She was provided instructions for supportive care and will return as needed for worsened condition or worsening symptoms, or new problems or concerns.    I have reviewed the nursing notes.    I have reviewed the findings, diagnosis, plan and need for follow up with the patient.    Discharge Medication List as of 10/15/2021 12:00 AM      START taking these medications    Details   SUMAtriptan (IMITREX) 50 MG tablet 1 to 2 tabs by mouth for headache, may repeat in 2 hrs. If needed. Do not exceed 4 tabs in 24 hrs., Disp-9 tablet, R-0, E-Prescribe             Final diagnoses:   Suspected 2019 novel coronavirus infection   Migraine with aura and without status migrainosus, not intractable       10/14/2021   [facility] EMERGENCY DEPT     Saman Lua MD  10/16/21 5040

## 2021-10-15 NOTE — TELEPHONE ENCOUNTER
Nurse Triage SBAR    Is this a 2nd Level Triage? NO    Situation    : patient with URI.      Background    : Cough, congestion, shortness of breath and migraine. She was exposed to COVID at work a week or so ago.  Her symptoms began 2 days ago.  She went to the ED and was tested for COVID (negative) and given imitrex which helped her headache.  She has shortness of breath that is not improving.     Assessment   :  URI with negative COVID.  Shortness of breath not improving.      (See information below for more triage details.)    Recommendation   : Routing to provider for recommendation.  Can we send inhaler and provide note for work?      Protocol Recommended Disposition: Telephone advice    Reached patient/caregiver. Informed of providers recommendations and reasons to call back or seek care in the Clinic.  Patient verbalized understanding and agrees with the plan.     Reason for Disposition    Cough    Additional Information    Negative: Difficulty breathing, severe    Negative: [1] Wheezing (high pitched whistling sound) AND [2] previous asthma attacks or use of asthma medicines    Negative: Earache    Negative: Sinus pain or pressure    Negative: Sore throat    Negative: Cough, productive of sputum (phlegm)    Negative: Runny nose or congested nose    Negative: Fever    Cough, not productive (dry cough)    Negative: Severe difficulty breathing (e.g., struggling for each breath, speaks in single words)    Negative: Bluish (or gray) lips or face now    Negative: [1] Rapid onset of cough AND [2] has hives    Negative: Coughing started suddenly after medicine, an allergic food or bee sting    Negative: [1] Difficulty breathing AND [2] exposure to flames, smoke, or fumes    Negative: [1] Stridor AND [2] difficulty breathing    Negative: Sounds like a life-threatening emergency to the triager    Negative: Choked on object of food that could be caught in the throat    Negative: Chest pain is main symptom    Negative:  [1] Previous asthma attacks AND [2] this feels like asthma attack    Negative: Cough lasts > 3 weeks    Negative: Wet (productive) cough (i.e., white-yellow, yellow, green, or agnes colored sputum)    Negative: [1] Dry (non-productive) cough AND [2] within 14 days of COVID-19 Exposure    Negative: Chest pain  (Exception: MILD central chest pain, present only when coughing)    Negative: Difficulty breathing    Negative: Patient sounds very sick or weak to the triager    Negative: [1] Coughed up blood AND [2] > 1 tablespoon (15 ml) (Exception: blood-tinged sputum)    Negative: Fever > 103 F (39.4 C)    Negative: [1] Fever > 101 F (38.3 C) AND [2] age > 60    Negative: [1] Fever > 100.0 F (37.8 C) AND [2] bedridden (e.g., nursing home patient, CVA, chronic illness, recovering from surgery)    Negative: [1] Fever > 100.0 F (37.8 C) AND [2] diabetes mellitus or weak immune system (e.g., HIV positive, cancer chemo, splenectomy, organ transplant, chronic steroids)    Negative: Wheezing is present    Negative: [1] Ankle swelling AND [2] swelling is increasing    Negative: SEVERE coughing spells (e.g., whooping sound after coughing, vomiting after coughing)    Negative: [1] Continuous (nonstop) coughing interferes with work or school AND [2] no improvement using cough treatment per protocol    Negative: Fever present > 3 days (72 hours)    Negative: [1] Fever returns after gone for over 24 hours AND [2] symptoms worse or not improved    Negative: [1] Using nasal washes and pain medicine > 24 hours AND [2] sinus pain (around cheekbone or eye) persists    Negative: Earache is present    Negative: Cough has been present for > 3 weeks    Negative: [1] Nasal discharge AND [2] present > 10 days    Negative: [1] Coughed up blood-tinged sputum AND [2] more than once    Negative: [1] Patient also has allergy symptoms (e.g., itchy eyes, clear nasal discharge, postnasal drip) AND [2] they are acting up    Negative: Taking an ACE  Inhibitor medication  (e.g., benazepril/LOTENSIN, captopril/CAPOTEN, enalapril/VASOTEC, lisinopril/ZESTRIL)    Negative: Exposure to TB (Tuberculosis)    Protocols used: COUGH - ACUTE NON-PRODUCTIVE-A-AH, RESPIRATORY MULTIPLE SYMPTOMS - GUIDELINE [id number]

## 2021-10-15 NOTE — LETTER
October 15, 2021      To Whom It May Concern:      Norma Collins was seen in our Emergency Department today, 10/15/21.  I expect her condition to improve over the next few days.  She may return to work when improved.    Sincerely,        XENA Roman CNP

## 2021-10-15 NOTE — LETTER
10/15/2021    To whom it may concern:    Norma Collins was seen 10/14/2021 for an acute illness. Please excuse her for missed work while ill. She may return when her symptoms have improved. I expect her to improve in the next 3-5 days or she will return for another evaluation.           XENA Rojo [facility]

## 2021-10-15 NOTE — TELEPHONE ENCOUNTER
Reason for call:    Symptom or request:     Patient called stating that she was seen in ED on 10/14- due to HA x4 days, she reports breathing concerns, sob.    Tested neg for covid during ed visit.     Best Time:  any    Can we leave a detailed message on this number?  YES     Winnie GIRARD  Station

## 2021-10-16 NOTE — ED NOTES
Pt arrived via triage, c/o on going cough and chest heaviness.   Pt states her COVID test was negative, and her primary care MD gave her an inhaler today.  She is taking 2 puffs and has done it 3 times today with no relief.  Pt denies fever, some chills and sweats.  Pt was seen in ED yesterday for same concerns.       Skin color temp and moisture all normal.       Lungs: CTA bilat     Cardiac:   S1, S2, no MRG, slightly tachycardic  PLAN:   Will await MD assessment and orders.

## 2021-10-16 NOTE — TELEPHONE ENCOUNTER
"Patient is calling and symptoms have gotten worse, inhaler isn't working. The pain \"is unbearable at this point\".   Reviewed PCP note with patient below and for worsening symtpoms, she is to go to the Ed.  Patient verbalized understanding.    Nieves Paul RN on 10/15/2021 at 8:01 PM     "

## 2021-10-16 NOTE — ED TRIAGE NOTES
Has a cough and weakness and HA x 5 days. Was seen here last night but states she still has the sx.

## 2021-10-16 NOTE — DISCHARGE INSTRUCTIONS
Start prednisone today or tomorrow am and take two tablets daily for 5 days.  Use albuterol inhaler 2 puffs every 6 hours as needed for cough or shortness of breath  Follow up with primary in 5 days if no improvement  Return to the ed if worsening symptoms

## 2021-10-16 NOTE — ED PROVIDER NOTES
History     Chief Complaint   Patient presents with     Cough     HPI  Norma Collins is a 21 year old female who presents with concerns of cough, chest congestion, dry scratchy throat, feels like phlegm in back of throat, decreased appetite.  Pt reports on Monday she experienced a migraine that resolved yesterday when seen and reports on Wednesday the additional symptoms started that has persisted and worsened.  PT reports the cough is substantially worse.  Pt reports associative symptoms of intermittent chills and sweats (onset last night) and loose stools (onset today).  Patient admits to being seen yesterday and given medication for her migraine but reports her cough and chest congestion is worsening and she has not been able to attend work.  Patient states history of pneumonia in the past and wishes to be evaluated for possible pneumonia.  Patient does admit to cigarette use    Allergies:  Allergies   Allergen Reactions     [place] Sprouts [Brassica Oleracea Italica]      Other Food Allergy Rash     [place] sprouts       Problem List:    Patient Active Problem List    Diagnosis Date Noted     Family history of colon cancer 07/08/2021     Priority: Medium     Mother at age 40       Family history of hemochromatosis 07/08/2021     Priority: Medium     Father and Grandfather       Acanthosis nigricans 12/08/2020     Priority: Medium     Created by Conversion      Last Assessment & Plan:   Patient doing very well and adding exercise, and watching diet closely.  We'll now add metformin as per orders.  Side effects and precautions discussed.  Follow up in one month.       Arthropathy of ankle and foot 12/08/2020     Priority: Medium     Created by Conversion    Replacement Utility updated for latest IMO load    Last Assessment & Plan:   With diffuse joint aches treating with progressive weight loss and lifestyle modifications.       Elevated BP without diagnosis of hypertension 10/16/2017     Priority: Medium      "Cholelithiasis 10/05/2017     Priority: Medium     Adenotonsillar hypertrophy 01/04/2017     Priority: Medium     PCOS (polycystic ovarian syndrome) 02/29/2016     Priority: Medium     Last Assessment & Plan:   Her dietary changes are working very well.  Also working very well for the household.  She is exercising more, having more energy, and pants are fitting looser.  We'll plan for in body at next visit along with remeasurement of abdominal and neck circumference.  Also with her forgetting the metformin in the morning, will move it to the afternoon with lunch.       Class 3 severe obesity due to excess calories without serious comorbidity with body mass index (BMI) of 50.0 to 59.9 in adult (H) 10/30/2015     Priority: Medium     Last Assessment & Plan:   Continue lifestyle modifications.  Will breakfast a [place] protein-based with shakes or aches.  May also use chicken or steak.  Stopped carbohydrates in the morning.  Better food choices discussed.  Encouraged to watch the documentary, \"In defense of food\" before next visit.          Past Medical History:    Past Medical History:   Diagnosis Date     Acanthosis nigricans      Adenotonsillar hypertrophy      Arthropathy of ankle and foot      Obesity      PCOS (polycystic ovarian syndrome)        Past Surgical History:    Past Surgical History:   Procedure Laterality Date     CHOLECYSTECTOMY       LAPAROSCOPIC CHOLECYSTECTOMY       OTHER SURGICAL HISTORY      none     TONSILLECTOMY       TONSILLECTOMY & ADENOIDECTOMY Bilateral 2/16/2017    Procedure: BILATERAL TONSILLECTOMY AND ADENOIDECTOMY;  Surgeon: Jacob Arguello MD;  Location: [facility] OR;  Service:        Family History:    Family History   Problem Relation Age of Onset     Colon Cancer Mother 43     Cancer Mother         colon     Venous thrombosis Mother         cancer-related     Cerebrovascular Disease Mother      Liver Disease Father         cirrhosis of liver     Other - See Comments " Father         high iron     Cirrhosis Father      Hemochromatosis Father      Hypertension Father      Hyperlipidemia Father      Myocardial Infarction Maternal Grandmother      Heart Disease Maternal Grandmother      Dementia Maternal Grandmother      Cerebrovascular Disease Paternal Grandmother      Other - See Comments Maternal Grandfather         heart attack or cancer/unsure     Bladder Cancer Maternal Grandfather      Cirrhosis Paternal Grandfather      Other - See Comments Paternal Grandfather         high iron     Diabetes Paternal Grandfather      Hemochromatosis Paternal Grandfather      Kidney Disease Paternal Grandfather      Hypertension Paternal Grandfather      Colon Cancer Maternal Great-Grandmother         70-90 years old      Uterine Cancer Maternal Great-Grandmother      Breast Cancer Maternal Great-Grandmother      Brain Cancer Maternal Aunt        Social History:  Marital Status:  Single [1]  Social History     Tobacco Use     Smoking status: Former Smoker     Smokeless tobacco: Never Used   Substance Use Topics     Alcohol use: Yes     Comment: occ     Drug use: Never        Medications:    predniSONE (DELTASONE) 20 MG tablet  albuterol (PROAIR HFA/PROVENTIL HFA/VENTOLIN HFA) 108 (90 Base) MCG/ACT inhaler  omeprazole (PRILOSEC) 40 MG DR capsule  polyethylene glycol (MIRALAX) 17 g packet  SUMAtriptan (IMITREX) 50 MG tablet      Review of Systems   Constitutional: Positive for chills. Negative for activity change, appetite change, fatigue and fever.   HENT: Positive for congestion. Negative for ear pain, facial swelling, sinus pain and sore throat.    Eyes: Negative for redness and visual disturbance.   Respiratory: Positive for cough and chest tightness. Negative for shortness of breath, wheezing and stridor.    Cardiovascular: Negative for chest pain.   Gastrointestinal: Negative for abdominal pain.   Genitourinary: Negative for difficulty urinating and dysuria.   Musculoskeletal: Negative for  arthralgias and neck stiffness.   Skin: Negative for color change, rash and wound.   Neurological: Negative for dizziness, seizures, weakness and headaches.   Psychiatric/Behavioral: Negative for confusion and self-injury.   All other systems reviewed and are negative.    As mentioned above in the history present illness. All other systems were reviewed and are negative.    Physical Exam   BP: 119/67  Pulse: 86  Temp: 97  F (36.1  C)  Resp: 18  Weight: (!) 154.2 kg (340 lb)  SpO2: 97 %      Physical Exam  Vitals and nursing note reviewed.   Constitutional:       General: She is not in acute distress.     Appearance: She is well-developed. She is not diaphoretic.   HENT:      Head: Normocephalic and atraumatic.      Jaw: No trismus.      Right Ear: Hearing, tympanic membrane, ear canal and external ear normal.      Left Ear: Hearing, tympanic membrane, ear canal and external ear normal.      Nose: Nose normal. No mucosal edema or rhinorrhea.      Right Sinus: No maxillary sinus tenderness or frontal sinus tenderness.      Left Sinus: No maxillary sinus tenderness or frontal sinus tenderness.      Mouth/Throat:      Pharynx: Uvula midline. No oropharyngeal exudate, posterior oropharyngeal erythema or uvula swelling.      Tonsils: No tonsillar exudate or tonsillar abscesses. 0 on the right. 0 on the left.   Eyes:      General: No scleral icterus.        Right eye: No discharge.         Left eye: No discharge.      Conjunctiva/sclera: Conjunctivae normal.   Cardiovascular:      Rate and Rhythm: Normal rate and regular rhythm.      Heart sounds: Normal heart sounds.   Pulmonary:      Effort: No tachypnea, bradypnea, accessory muscle usage or respiratory distress.      Breath sounds: No stridor. Examination of the right-upper field reveals decreased breath sounds. Examination of the left-upper field reveals decreased breath sounds. Decreased breath sounds and wheezing present. No rales.   Chest:      Chest wall: No  tenderness.   Abdominal:      Tenderness: There is no abdominal tenderness.   Musculoskeletal:         General: No tenderness.   Skin:     General: Skin is warm.      Findings: No erythema or rash.   Neurological:      Mental Status: She is alert and oriented to person, place, and time.         ED Course        Procedures      Results for orders placed or performed during the hospital encounter of 10/15/21 (from the past 24 hour(s))   Chest XR,  PA & LAT    Narrative    EXAM: XR CHEST 2 VW  LOCATION: [facility]  DATE/TIME: 10/15/2021 10:37 PM    INDICATION: worsening cough, chest congestion  COMPARISON: 06/09/2021      Impression    IMPRESSION: Heart size is normal. Lungs are clear. No pneumothorax or pleural effusion.       Medications - No data to display    Assessments & Plan (with Medical Decision Making)  21-year-old female presenting to the emergency room for worsening cough and congestion with report of onset of symptoms 5 days ago and worsening over the past 24 hours.  Patient has past medical history of tobacco use and pneumonia but denies history of asthma and had a negative Covid test yesterday and is not Covid vaccinated.  On exam patient is not tachypneic, nor tachycardic, nor hypoxic.  Chest x-ray obtained to evaluate for possible pneumonia and no pneumothorax or pleural effusion and no evidence of community-acquired pneumonia.  Lung sounds are coarse in the upper airways consistent with a bronchitis.  Will initiate patient on a short burst of prednisone as she already had received a albuterol inhaler that she reports is not helping sufficiently.  Discussed side effects of prednisone and reasons to return.  Patient verbalized understanding and discharged in stable condition for     I have reviewed the nursing notes.    I have reviewed the findings, diagnosis, plan and need for follow up with the patient.    New Prescriptions    PREDNISONE (DELTASONE) 20 MG TABLET    Take 1  tablet (20 mg) by mouth daily       Final diagnoses:   Acute bronchitis       10/15/2021   [facility] EMERGENCY DEPT     Gabby Aviles, XENA CNP  10/15/21 [id number]

## 2021-10-22 DIAGNOSIS — R10.13 ABDOMINAL PAIN, EPIGASTRIC: ICD-10-CM

## 2021-10-26 RX ORDER — DICYCLOMINE HCL 20 MG
20 TABLET ORAL 4 TIMES DAILY PRN
Qty: 180 TABLET | Refills: 1 | Status: SHIPPED | OUTPATIENT
Start: 2021-10-26 | End: 2022-04-01

## 2021-10-26 NOTE — TELEPHONE ENCOUNTER
Pending Prescriptions:                       Disp   Refills    dicyclomine (BENTYL) 20 MG tablet [Pharma*60 tab*0            Sig: TAKE 1 TABLET BY MOUTH TWICE A DAY    Routing refill request to provider for review/approval because:  Drug not active on patient's medication list    Antonio Carroll, RN

## 2021-10-27 ENCOUNTER — OFFICE VISIT (OUTPATIENT)
Dept: FAMILY MEDICINE | Facility: CLINIC | Age: 21
End: 2021-10-27
Payer: COMMERCIAL

## 2021-10-27 VITALS
RESPIRATION RATE: 16 BRPM | SYSTOLIC BLOOD PRESSURE: 132 MMHG | HEIGHT: 67 IN | WEIGHT: 293 LBS | HEART RATE: 104 BPM | DIASTOLIC BLOOD PRESSURE: 86 MMHG | BODY MASS INDEX: 45.99 KG/M2 | OXYGEN SATURATION: 98 %

## 2021-10-27 DIAGNOSIS — R35.0 URINE FREQUENCY: ICD-10-CM

## 2021-10-27 DIAGNOSIS — N76.0 BACTERIAL VAGINOSIS: Primary | ICD-10-CM

## 2021-10-27 DIAGNOSIS — B96.89 BACTERIAL VAGINOSIS: Primary | ICD-10-CM

## 2021-10-27 LAB

## 2021-10-27 PROCEDURE — 99213 OFFICE O/P EST LOW 20 MIN: CPT | Performed by: FAMILY MEDICINE

## 2021-10-27 PROCEDURE — 87210 SMEAR WET MOUNT SALINE/INK: CPT | Performed by: FAMILY MEDICINE

## 2021-10-27 PROCEDURE — 81001 URINALYSIS AUTO W/SCOPE: CPT | Performed by: FAMILY MEDICINE

## 2021-10-27 PROCEDURE — 87086 URINE CULTURE/COLONY COUNT: CPT | Performed by: FAMILY MEDICINE

## 2021-10-27 RX ORDER — METRONIDAZOLE 500 MG/1
500 TABLET ORAL 2 TIMES DAILY
Qty: 14 TABLET | Refills: 0 | Status: SHIPPED | OUTPATIENT
Start: 2021-10-27 | End: 2021-11-03

## 2021-10-27 ASSESSMENT — MIFFLIN-ST. JEOR: SCORE: 2344.4

## 2021-10-27 NOTE — PATIENT INSTRUCTIONS
Patient Education     Bacterial Vaginosis    You have a vaginal infection called bacterial vaginosis (BV). Both good and bad bacteria are present in a healthy vagina. BV occurs when these bacteria get out of balance. The number of bad bacteria increase. And the number of good bacteria decrease. BV is linked with sexual activity, but it's not a sexually transmitted infection (STI).   BV may or may not cause symptoms. If symptoms do occur, they can include:     Thin, gray, milky-white, or sometimes green discharge    Unpleasant odor or  fishy  smell    Itching, burning, or pain in or around the vagina  It is not known what causes BV, but certain factors can make the problem more likely. These can include:     Douching    Spermicides    Use of antibiotics    Change in hormone levels with pregnancy, breastfeeding, or menopause    Having sex with a new partner    Having sex with more than one partner  BV will sometimes go away on its own. But treatment is often advised. This is because untreated BV can raise the risk of more serious health problems such as:     Pelvic inflammatory disease (PID)     delivery (giving birth to a baby early if you re pregnant)    HIV and some other sexually transmitted infections (STIs)    Infection after surgery on the reproductive organs  Home care  General care    BV is most often treated with medicines called antibiotics. These may be given as pills or as a vaginal cream. If antibiotics are prescribed, be sure to use them exactly as directed. And complete all of the medicine, even if your symptoms go away.    Don't douche or having sex during treatment.    If you have sex with a female partner, ask your healthcare provider if she should also be treated.  Prevention    Don't douche.    Don't have sex. If you do have sex, then take steps to lower your risk:  ? Use condoms when having sex.  ? Limit the number of sex partners you have.    Follow-up care  Follow up with your  healthcare provider, or as advised.   When to get medical advice  Call your healthcare provider right away if:     You have a fever of 100.4 F (38 C) or higher, or as directed by your provider.    Your symptoms get worse, or they don t go away within a few days of starting treatment.    You have new pain in the lower belly or pelvic region.    You have side effects that bother you or a reaction to the pills or cream you re prescribed.    You or any of your sex partners have new symptoms, such as a rash, joint pain, or sores.  [employer] last reviewed this educational content on 6/1/2020 2000-2021 [employer]. All rights reserved. This information is not intended as a substitute for professional medical care. Always follow your healthcare professional's instructions.

## 2021-10-27 NOTE — LETTER
November 2, 2021      Normarobinson Collins  [address]  [place] [address]        Dear ,    We are writing to inform you of your test results.    Urine culture came back unremarkable. Let us know if there are any questions.       Resulted Orders   UA macro with reflex to Microscopic and Culture - Clinc Collect   Result Value Ref Range    Color Urine Yellow Colorless, Straw, Light Yellow, Yellow    Appearance Urine Clear Clear    Glucose Urine Negative Negative mg/dL    Bilirubin Urine Negative Negative    Ketones Urine Negative Negative mg/dL    Specific Gravity Urine 1.015 1.003 - 1.035    Blood Urine Negative Negative    pH Urine 5.5 5.0 - 7.0    Protein Albumin Urine Negative Negative mg/dL    Urobilinogen Urine 0.2 0.2, 1.0 E.U./dL    Nitrite Urine Negative Negative    Leukocyte Esterase Urine Trace (A) Negative   Urine Microscopic   Result Value Ref Range    Bacteria Urine Few (A) None Seen /HPF    RBC Urine 0-2 0-2 /HPF /HPF    WBC Urine 0-5 0-5 /HPF /HPF    Squamous Epithelials Urine Few (A) None Seen /LPF    Narrative    Urine Culture not indicated   Wet prep - Clinic Collect   Result Value Ref Range    Trichomonas Absent Absent    Yeast Absent Absent    Clue Cells Present (A) Absent    WBCs/high power field 2+ (A) None   Urine Culture Aerobic Bacterial - lab collect   Result Value Ref Range    Culture 10,000-50,000 CFU/mL Mixture of urogenital lucy        If you have any questions or concerns, please call the clinic at the number listed above.       Sincerely,      Russel Yousif MD

## 2021-10-29 LAB — BACTERIA UR CULT: NORMAL

## 2021-11-01 ENCOUNTER — VIRTUAL VISIT (OUTPATIENT)
Dept: FAMILY MEDICINE | Facility: CLINIC | Age: 21
End: 2021-11-01
Payer: COMMERCIAL

## 2021-11-01 DIAGNOSIS — Z71.3 DIETARY COUNSELING AND SURVEILLANCE: Primary | ICD-10-CM

## 2021-11-01 NOTE — PROGRESS NOTES
"PATIENT HISTORY:   Ms. Norma Collins returns for her nutrition visit to the lifestyle medicine weight management program. Previous measurements have been as follows:    Wt Readings from Last 5 Encounters:   10/27/21 (!) 154.7 kg (341 lb)   10/15/21 (!) 154.2 kg (340 lb)   10/14/21 (!) 154.2 kg (340 lb)   09/20/21 (!) 154.4 kg (340 lb 8 oz)   08/31/21 (!) 156.5 kg (345 lb)        Initial goal is to achieve a 5% weight loss of 28 lbs (i.e. A body weight of 312 lbs) within the next 3-4 months.      Since her last visit, she reports not feeling great. Sick with bronchitis and forcing self to eat, getting back to normal pattern now. Reports not getting enough sleep lately due to using the restroom often in the night. Discussed sleep routine - doesn't sleep with tv on anymore, uses a fan for noise, generally good sleep when not waking to use the bathroom.    DIETARY HISTORY:   Breakfast: pc string cheese, hard boiled egg   Lunch: salad or soup  Dinner: \"hit or miss\", chicken and veggies, chili, tacos     Discussed portion sizes at breakfast and lunch, in which lunch can be much larger   Thinking about doing a morning and afternoon snack - sm packs of nuts, dried fruit, veggie straws, or protein bar    PHYSICAL ACTIVITY HISTORY: Goal of 10,000 steps per day. Has a treadmill at home, tries to walk for 60 minutes each time (doesn't enjoy). Would like to get a bike and use a bike  indoors in the winter.     Social: Mom passed away 2015, Dad passed away earlier this year (due to iron in liver). 2 older sisters, twin brother, younger sister. Lives with lifetime family friend, Ameya. Working full time at [employer].     OBJECTIVE:   Estimated body mass index is 53.41 kg/m  as calculated from the following:    Height as of 10/27/21: 1.702 m (5' 7\").    Weight as of 10/27/21: 154.7 kg (341 lb).      IMPRESSION:     21 year old with obesity motivated to lose weight in order to improve hepatic steatosis and decrease change of " "diabetes    She has continued to maintain a more consistent pattern of eating, including more whole grains (switched to whole wheat bread) and decreased intake of processed foods     DIETARY ASSESSMENT/PLAN:     Include 2 small snacks daily in order to reduce portion size at lunch, 1 mid morning snack and 1 mid afternoon snack. Snack ideas: easy to eat fruit (apple, banana, grapes), string cheese, unsalted nuts, protein bar.     Miscellaneous Issues:     Treadmill in the garage - goal of 3-4 times per week     [place] with YouTube for other movement options    Follow-up:     Monday December 6 at 10:00 am via video    Patient referred by XENA Deng CNP   Total time spent with patient 18 minutes    Stacy Balderas RD     Family Medicine Video Visit Note  Norma is being evaluated via a billable video visit.             Video Visit Consent     The patient has been notified of following:     \"This video visit will be conducted via a call between you and your physician/provider. We have found that certain health care needs can be provided without the need for an in-person physical exam.  This service lets us provide the care you need with a video conversation.  If a prescription is necessary we can send it directly to your pharmacy.  If lab work is needed we can place an order for that and you can then stop by our lab to have the test done at a later time.    Video visits are billed at different rates depending on your insurance coverage.  Please reach out to your insurance provider with any questions.    If during the course of the call the physician/provider feels a video visit is not appropriate, you will not be charged for this service.\"    Patient has given verbal consent for Video visit? Yes    How would you like to obtain your AVS? My Chart    Patient would like the video invitation sent by: Other e-mail: My Chart    Will anyone else be joining your video visit? No       Video-Visit " Details    Type of service:  Video Visit    Video Start Time: 10:01 AM  THIS is the time provider and patient connect.    Video End Time (time video stopped): 10:19 AM    Originating Location (pt. Location): Home    Distant Location (provider location):  [facility]     Mode of Communication:  Video Conference via Lars Balderas RD

## 2021-11-09 ENCOUNTER — MYC MEDICAL ADVICE (OUTPATIENT)
Dept: GASTROENTEROLOGY | Facility: CLINIC | Age: 21
End: 2021-11-09
Payer: COMMERCIAL

## 2021-11-09 DIAGNOSIS — R74.8 ELEVATED LIVER ENZYMES: Primary | ICD-10-CM

## 2021-11-19 ENCOUNTER — VIRTUAL VISIT (OUTPATIENT)
Dept: FAMILY MEDICINE | Facility: CLINIC | Age: 21
End: 2021-11-19
Payer: COMMERCIAL

## 2021-11-19 DIAGNOSIS — G44.211 INTRACTABLE EPISODIC TENSION-TYPE HEADACHE: ICD-10-CM

## 2021-11-19 DIAGNOSIS — Z20.828 EXPOSURE TO SARS-ASSOCIATED CORONAVIRUS: Primary | ICD-10-CM

## 2021-11-19 PROCEDURE — 99213 OFFICE O/P EST LOW 20 MIN: CPT | Mod: 95 | Performed by: NURSE PRACTITIONER

## 2021-11-19 RX ORDER — BUTALBITAL, ACETAMINOPHEN AND CAFFEINE 50; 325; 40 MG/1; MG/1; MG/1
1 TABLET ORAL EVERY 4 HOURS PRN
Qty: 20 TABLET | Refills: 1 | Status: SHIPPED | OUTPATIENT
Start: 2021-11-19 | End: 2022-04-01

## 2021-11-19 RX ORDER — PREDNISONE 20 MG/1
20 TABLET ORAL 2 TIMES DAILY
Qty: 10 TABLET | Refills: 0 | Status: SHIPPED | OUTPATIENT
Start: 2021-11-19 | End: 2021-11-24

## 2021-11-19 ASSESSMENT — ENCOUNTER SYMPTOMS: HEADACHES: 1

## 2021-11-19 NOTE — LETTER
November 19, 2021      Norma Collins  [address] [address] [address]        To Whom It May Concern:    Norma Collins  was seen on 11/19/21 for an acute illness.  Please excuse her from missed work. She is advised to quarantine until at least 5-7 days from COVID exposure and may return to work if COVID testing within that 5-7 day time period is negative. Only to return if her symptoms are improving and fever free for 24 hours without medications.        Sincerely,          XENA Deng CNP

## 2021-11-19 NOTE — PATIENT INSTRUCTIONS
"  Patient Education   After Your COVID-19 (Coronavirus) Test  You have been tested for COVID-19 (coronavirus).   If you'll have surgery in the next few days, we'll let you know ahead of time if you have the virus. Please call your surgeon's office with any questions.  For all other patients: Results are usually available in [email or web address] within 2 to 3 days.   If you do not have a [email or web address] account, you'll get a letter in the mail in about 7 to 10 days.   [facility] is often the fastest way to get test results. Please sign up if you do not already have a [email or web address] account. See the handout Getting COVID-19 Test Results in [email or web address] for help.  What if my test result is positive?  If your test is positive and you have not viewed your result in [email or web address], you'll get a phone call with your result. (A positive test means that you have the virus.)     Follow the tips under \"How do I self-isolate?\" below for 10 days (20 days if you have a weak immune system).    You don't need to be retested for COVID-19 before going back to school or work. As long as you're fever-free and feeling better, you can go back to school, work and other activities after waiting the 10 or 20 days.  What if I have questions after I get my results?  If you have questions about your results, please visit our testing website at [email or web address].   After 7 to 10 days, if you have not gotten your results:     Call [phone] ([phone]) and ask to speak with our COVID-19 results team.    If you're being treated at an infusion center: Call your infusion center directly.  What are the symptoms of COVID-19?  Cough, fever and trouble breathing are the most common signs of COVID-19.  Other symptoms can include new headaches, new muscle or body aches, new and unexplained fatigue (feeling very tired), chills, sore throat, congestion (stuffy or runny nose), diarrhea (loose poop), loss of taste or smell, belly pain, and nausea or vomiting " "(feeling sick to your stomach or throwing up).  You may already have symptoms of COVID-19, or they may show up later.  What should I do if I have symptoms?  If you're having surgery: Call your surgeon's office.  For all other patients: Stay home and away from others (self-isolate) until ...    You've had no fever--and no medicine that reduces fever--for 1 full day (24 hours), AND    Other symptoms have gotten better. For example, your cough or breathing has improved, AND    At least 10 days have passed since your symptoms first started.  How do I self-isolate?    Stay in your own room, even for meals. Use your own bathroom if you can.    Stay away from others in your home. No hugging, kissing or shaking hands. No visitors.    Don't go to work, school or anywhere else.    Clean \"high touch\" surfaces often (doorknobs, counters, handles). Use household cleaning spray or wipes. You'll find a full list of  on the EPA website: www.epa.gov/pesticide-registration/list-n-disinfectants-use-against-sars-cov-2.    Cover your mouth and nose with a mask or other face covering to avoid spreading germs.    Wash your hands and face often. Use soap and water.    Caregivers in these groups are at risk for severe illness due to COVID-19:  ? People 65 years and older  ? People who live in a nursing home or long-term care facility  ? People with chronic disease (lung, heart, cancer, diabetes, kidney, liver, immunologic)  ? People who have a weakened immune system, including those who:    Are in cancer treatment    Take medicine that weakens the immune system, such as corticosteroids    Had a bone marrow or organ transplant    Have an immune deficiency    Have poorly controlled HIV or AIDS    Are obese (body mass index of 40 or higher)    Smoke regularly    Caregivers should wear gloves while washing dishes, handling laundry and cleaning bedrooms and bathrooms.    Use caution when washing and drying laundry: Don't shake dirty " laundry and use the warmest water setting that you can.    For more tips on managing your health at home, go to www.cdc.gov/coronavirus/2019-ncov/downloads/10Things.pdf.  How can I take care of myself at home?  1. Get lots of rest. Drink extra fluids (unless a doctor has told you not to).  2. Take Tylenol (acetaminophen) for fever or pain. If you have liver or kidney problems, ask your family doctor if it's OK to take Tylenol.   Adults can take either:  ? 650 mg (two 325 mg pills) every 4 to 6 hours, or   ? 1,000 mg (two 500 mg pills) every 8 hours as needed.  ? Note: Don't take more than 3,000 mg in one day. Acetaminophen is found in many medicines (both prescribed and over-the-counter medicines). Read all labels to be sure you don't take too much.   For children, check the Tylenol bottle for the right dose. The dose is based on the child's age or weight.  3. If you have other health problems (like cancer, heart failure, an organ transplant or severe kidney disease): Call your specialty clinic if you don't feel better in the next 2 days.  4. Know when to call 911. Emergency warning signs include:  ? Trouble breathing or shortness of breath  ? Chest pain or pressure that doesn't go away  ? Feeling confused like you haven't felt before, or not being able to wake up  ? Bluish-colored lips or face  5. If your doctor prescribed a blood thinner medicine: Follow their instructions.  Where can I get more information?    [facility] - About COVID-19:   [email or web address]    CDC - If You're Sick: cdc.gov/coronavirus/2019-ncov/about/steps-when-sick.html    CDC - Ending Home Isolation: www.cdc.gov/coronavirus/2019-ncov/hcp/disposition-in-home-patients.html    CDC - Caring for Someone: www.cdc.gov/coronavirus/2019-ncov/if-you-are-sick/care-for-someone.html    [facility] - Interim Guidance for Hospital Discharge to Home: www.health.[facility].[place]/diseases/coronavirus/hcp/hospdischarge.pdf    [facility]  "clinical trials (COVID-19 research studies): [email or web address]-clinical-trials    Below are the COVID-19 hotlines at the [place] Department of Health ([facility]). Interpreters are available.  ? For health questions: Call 727-957-8941 or 1-611.115.3960 (7 a.m. to 7 p.m.)  ? For questions about schools and childcare: Call 320-963-3074 or [phone] (7 a.m. to 7 p.m.)    For informational purposes only. Not to replace the advice of your health care provider. Clinically reviewed by Infection Prevention and the [facility] COVID-19 Clinical Team. Copyright   2020 [facility]. All rights reserved. [employer] 711972 - Rev 11/11/20.       Patient Education       Coronavirus Disease 2019 (COVID-19): Caring for Yourself or Others  If you or a household member have symptoms of COVID-19, follow these guidelines for preventing spread of the virus and managing symptoms. This is regardless of your vaccination status.  If you have COVID-19 symptoms    Stay home. Call your healthcare provider and tell them you have symptoms of COVID-19. Do this before going to any hospital or clinic. Follow your provider's instructions. You may be advised to isolate yourself at home. This is called self-isolation. You may also be told to stay at least 6 feet from others to prevent the spread of COVID-19. This is called \"social distancing.\"    Stay away from work, school, and public places. Limit physical contact with family members. Limit visitors. Don't kiss anyone or share eating or drinking utensils. Clean surfaces you touch with disinfectant. This is to help prevent the virus from spreading.    If you need to cough or sneeze, do it into a tissue. Then throw the tissue into the trash. If you don't have tissues, cough or sneeze into the bend of your elbow.    Wear a cloth face mask with two or more layers of washable, breathable fabric and a nose wire while in public or when indoors with people who don't live with you. " Or you can wear a disposable paper mask with a cloth mask on top. Wear the mask so that it covers both your nose and mouth.    Don t share food or personal items with people in your household. This includes items like eating and drinking utensils, towels, and bedding.    If you need to go to a hospital or clinic, expect that the healthcare staff will wear protective equipment such as masks, gowns, gloves, and eye protection. You may be advised to wait in or enter through a separate area. This is to prevent the possible virus from spreading.    Tell the healthcare staff about recent travel. This includes local travel on public transport. Staff may need to find other people you have been in contact with.    Follow all instructions the healthcare staff give you.    If you have been diagnosed with COVID-19    Stay home and start self-isolation. Don t leave your home unless you need to get medical care. Don't go to work, school, or public areas. Don't use public transportation or taxis.    Follow all instructions from your healthcare provider. Call your healthcare provider s office before going. They can prepare and give you instructions. This will help prevent the virus from spreading.    If you need to go to a hospital or clinic, expect that the healthcare staff will wear protective equipment such as masks, gowns, gloves, and eye protection. You may be advised to wait in or enter through a separate area. This is to prevent the possible virus from spreading.    Wear a face mask with 2 or more layers and a nose wire. Use either a cloth mask with layers of tightly woven, breathable fabric or a disposable paper mask with a cloth mask on top. This is to protect other people from your germs. If you are not able to wear a mask, your caregivers should. Wear the mask so that it covers both your nose and mouth.    Stay away from other people in your home.    Stay away from pets and other animals.    Don t share food or personal  items with people in your household. This includes items like eating and drinking utensils, towels, and bedding.    If you need to cough or sneeze, do it into a tissue. Then throw the tissue into the trash. If you don't have tissues, cough or sneeze into the bend of your elbow.    Wash your hands often.    Self-care at home   Prevention  The FDA has approved several vaccines to prevent COVID-19 or reduce its severity. These vaccines also reduce how severe the illness will be if you get the virus. No vaccine is ever 100% effective in preventing any illness, but the COVID-19 vaccines work well and are safe. One vaccine has been approved for people as young as 12. Expert groups, including ACOG and CDC, advise pregnant or breastfeeding people to be vaccinated. Talk with your healthcare provider about which vaccine is best for you and your family.  Treatment  Current treatment is mainly aimed at helping your body while it fights the virus. This is known as supportive care. For serious COVID-19, you may need to stay in the hospital. Supportive care includes:    Getting rest. This helps your body fight the illness.    Staying hydrated.  Drinking liquids is the best way to prevent dehydration. Try to drink 6 to 8 glasses of liquids every day, or as advised by your provider. Also check with your provider about which fluids are best for you. Don't drink fluids that contain caffeine or alcohol.    Taking over-the-counter (OTC) pain medicine. These are used to help ease pain and reduce fever. Follow your healthcare provider's instructions for which OTC medicine to use.  If you've been treated for suspected or confirmed COVID-19 , follow all of your healthcare team's instructions. This will include when it's OK to stop self-isolation. You may also get instructions on position changes to help your breathing, such as lying on your belly (prone positioning). If you were treated at a hospital and discharged, you may be sent home  with a pulse oximeter. This is a small electronic device that you clip on your fingertip. It measures the amount of oxygen in your body. Follow your healthcare team's instructions on its use, how they will be in touch with you, and when to call them.  The FDA approved monoclonal antibody therapy for emergency investigational use in certain people who have a positive COVID-19 viral test and have mild to moderate symptoms but are not in the hospital. Your healthcare provider will advise you on whether monoclonal antibody therapy is appropriate for you. It's not approved to prevent COVID-19. It's approved for people 12 years and older who weigh about 88 pounds (40 kgs) and are at high risk for severe COVID-19 and a hospital stay. This includes people who are 65 years and older and people with certain chronic conditions. Monoclonal antibody therapy is not approved for people who:    Are in the hospital with COVID-19, or    Need oxygen therapy for COVID-19, or    Need oxygen therapy for a chronic condition and need to have oxygen flow increased because of COVID-19     If you've had confirmed COVID-19, your healthcare team may ask you to consider donating your plasma. This is called COVID-19 convalescent plasma donation. Plasma from people fully recovered from COVID-19 may contain antibodies to help fight COVID-19 in people who are currently seriously ill with the disease. Experts don't know the safety of COVID-19 convalescent plasma or how well it works. Research continues. The FDA has approved it for emergency use in certain people with serious or life-threatening COVID-19.  Home care for a sick person     Follow all instructions from healthcare staff.    Wash your hands often.    Wear protective clothing as advised.    Make sure the sick person wears a mask. If they can't wear a mask, don't stay in the same room with the person. If you must be in the same room, wear a face mask. When wearing a mask, make sure that it  covers both the nose and mouth.    Keep track of the sick person s symptoms.    Clean home surfaces often with disinfectant. This includes phones, kitchen counters, fridge door handle, bathroom surfaces, and others.    Don t let anyone share household items with the sick person. This includes eating and drinking tools, towels, sheets, or blankets.    Clean fabrics and laundry thoroughly.    Keep other people and pets away from the sick person.    When you can stop self-isolation  When you are sick with COVID-19, you should stay away from other people. This is called self-isolation.  For normally healthy children or adults with COVID-19 symptoms, CDC advises stopping self-isolation when all 3 of these are true:  1. You have had no fever for at least 24 hours. This means no fever without medicine that reduces fever, such as acetaminophen, for at least 24 hours.  2. Your symptoms such as cough or trouble breathing have improved.  3. It has been at least 10 days since your first symptoms started.  For people who have COVID-19 but no symptoms , isolation can stop 10 days after the first positive test.  If you have a weak immune system and COVID-19, or if you've had severe COVID-19,  your instructions on when to stop isolation will be somewhat different. Some conditions and treatments can cause a weak immune system. These include cancer treatment, bone marrow or organ transplants, and conditions such as HIV or other immune system disorders. You may be advised to self-isolate up to 20 days after your symptoms first started. Your healthcare provider may want to retest you for COVID-19. Follow your provider's instructions.  CDC mask guidance  Consider the CDC's guidance and your local community's instructions on face masks.       Cloth masks may help prevent people who have COVID-19 from spreading the virus to others.    Cloth masks are most likely to reduce COVID-19 spread when masks are widely used by people who are out in  the public.    Wear a mask inside your house if you live with someone who has symptoms of COVID-19 or has tested positive for COVID-19.    CDC advises all people older than 2 who are not fully vaccinated to wear a mask and stay 6 feet away from others while in public.    CDC advises people with weak immune systems, even if fully vaccinated, to continue wearing masks and to stay 6 feet away from others while in public.    CDC advises indoor masking for all teachers, staff, students, and visitors to schools, regardless of vaccination status.    Like other viruses, the virus that causes COVID-19 changes (mutates) all the time. This leads to variants that are easily spread, including the delta variant. To protect against variants, CDC advises all people over age 2, including those who are fully vaccinated, to wear a mask indoors in public settings if you are in an area of high numbers of COVID-19 cases. See the [facility]'s county data website for current transmission information in your area.     Certain people should not wear a face mask. This includes:    Children younger than 2 years old    Anyone with a health, developmental, or mental health condition that can be made worse by wearing a mask    Anyone who is unconscious or unable to remove the face covering without help     See the CDC's mask guidance.  When to call your healthcare provider  Call your healthcare provider right away if a sick person has any of these:    Trouble breathing    Pain or pressure in chest  If a sick person has any of these, call 911:    Trouble breathing that gets worse    Pain or pressure in chest that gets worse    Blue tint to lips or face    Fast or irregular heartbeat    Confusion or trouble waking    Fainting or loss of consciousness    Coughing up blood  Going home from the hospital  If you were diagnosed with COVID-19 and were recently discharged from the hospital:    Follow the instructions above for self-care and isolation.    Follow  the hospital healthcare team s specific instructions.    Ask questions if anything is unclear to you. Write down answers so you remember them.  Date last modified: 9/24/2021  [employer] last reviewed this educational content on 4/1/2020 2000-2021 [employer]. All rights reserved. This information is not intended as a substitute for professional medical care. Always follow your healthcare professional's instructions.           Patient Education     Managing Tension-type Headache Symptoms  A tension-type headache can develop slowly. Being aware of the symptoms helps you recognize a headache early. Then you can act to reduce pain and relieve tension. Methods for relieving your symptoms include self-care and medicine.     Tension-type symptoms  The earlier you recognize the symptoms of a tension-type headache, the easier it is to treat. Tension-type headaches may:     Start with fatigue, tension, or pain in the neck and shoulders    Feel like a band around the head    Be concentrated in the temple, the back of the head, behind the eyes, or in the face    Come and go, or last for days, weeks, or even longer    Involve referred pain -- this means that the area that hurts may not be where the problem starts  Self-care during a tension-type headache  When you have a tension-type headache, there are things you can do to relax, loosen muscles, and reduce the pain:     Brush your scalp lightly with a soft hairbrush.    Give yourself a massage. Knead the muscles running from your shoulders up the back of your skull. Or ask a friend to gently massage your neck and shoulders.    Use an ice pack. Wrap a thin cloth around a cold pack, a cold can of soda, or a bag of frozen vegetables. Apply this directly to the place where you feel pain (such as your neck or temples).    Use moist heat to relax your muscles. Take a warm shower or bath. Or drape a warm, moist towel around your neck and shoulders.  Relieving pain and  tension  Over-the-counter or prescription medicine can help relieve pain. Another way to reduce your pain is to use relaxation techniques to loosen tight muscles.   Medicine  Medicine used for tension-type headaches include the following:    NSAIDs (nonsteroidal anti-inflammatories), such as aspirin and ibuprofen, relieve inflammation and help block pain signals.    Acetaminophen treats pain, and some formulations contain caffeine.     Muscle relaxants can reduce painful muscle contractions.  Taking medicine safely  Be aware that:    Taking analgesics (pain relievers) or drinking too much coffee may lead to rebound headaches (frequent or severe headaches that can happen if you miss a dose of medication), so take pain medicines only as needed. If you think you have these headaches, contact your healthcare provider.    Taking too much medicine can cause sleep problems or stomach upset. Some over-the-counter headache medications may contain caffeine. These may disrupt sleep and worsen pain.  Relaxation techniques  A , class, book, or tape may help you learn these techniques. One or more of these methods may work for you:     Deep breathing. Slow, calm, deep breathing can help you relax. Breathe in for a count of 5 or more. Then slowly let the breath out.    Visualization. Imagining a peaceful, secure scene can give you a sense of control over your body and surroundings.    Progressive relaxation. This is done by tightening and then releasing muscle groups. Start at the top of your head and work your way down your body. Tighten each muscle group for 5 to 10 seconds. Then release the muscle group for the same amount of time.    Biofeedback. This is a type of training in which you learn to control certain physical functions and responses. This helps you learn to reduce muscle tension.  [employer] last reviewed this educational content on 4/1/2018 2000-2021 [employer]. All rights reserved. This  information is not intended as a substitute for professional medical care. Always follow your healthcare professional's instructions.           Patient Education     Tension Headache     A muscle tension headache is a very common cause of head pain. It s also called a stress headache. When some people are under stress, they tense the muscles of their shoulder, neck, and scalp without knowing it. If this tension lasts long enough, a headache can occur. A tension headache can be quite painful. It can last for hours or even days.  Home care  Follow these tips when caring for yourself at home:    Don t drive yourself home if you were given pain medicine for your headache. Instead, have someone else drive you home. Try to sleep when you get home. You should feel much better when you wake up.    Put heat on the back of your neck to help ease neck spasm.  How to prevent tension-type headaches    Figure out what is causing stress in your life. Learn new ways to handle your stress. Ideas include regular exercise, biofeedback, self-hypnosis, yoga, and meditation. Talk with your healthcare provider to find out more information about managing stress. Many books and digital media are also available on this subject.    Take time out at the first sign of a tension headache, if possible. Take yourself out of the stressful situation. Find a quiet, comfortable place to sit or lie down and let yourself relax. Heat and deep massage of the tight areas in the neck and shoulders may help ease muscle spasm. You may also get relief from a medicine such as acetaminophen, ibuprofen, naproxen, or a prescribed muscle relaxant.    Follow-up care  Follow up with your healthcare provider, or as advised. Talk with your provider if you have frequent headaches. He or she can figure out a treatment plan. Ask if you can have medicine to take at home the next time you get a bad headache. This may keep you from having to visit the emergency department in  the future. You may need to see a headache specialist (neurologist) if you continue to have headaches.  When to seek medical advice  Call your healthcare provider right away if any of these occur:    Your head pain gets worse during sexual intercourse or strenuous activity    Your head pain doesn t get better within 24 hours    You aren t able to keep liquids down (repeated vomiting)    Fever of 100.4 F (38 C) or higher, or as directed by your healthcare provider    Stiff neck    Extreme drowsiness, confusion, or fainting    Dizziness or dizziness with spinning sensation (vertigo)    Weakness in an arm or leg or one side of your face    You have trouble speaking    Your vision changes  Joseline last reviewed this educational content on 10/1/2019    7318-2413 [employer]. All rights reserved. This information is not intended as a substitute for professional medical care. Always follow your healthcare professional's instructions.

## 2021-11-19 NOTE — PROGRESS NOTES
"Norma is a 21 year old who is being evaluated via a billable telephone visit.      What phone number would you like to be contacted at? [phone]  How would you like to obtain your AVS? MyChart    Assessment & Plan     Exposure to SARS-associated coronavirus    - Symptomatic COVID-19 Virus (Coronavirus) by PCR Nose; Future    Intractable episodic tension-type headache    - predniSONE (DELTASONE) 20 MG tablet; Take 1 tablet (20 mg) by mouth 2 times daily for 5 days  - butalbital-acetaminophen-caffeine (ESGIC) -40 MG tablet; Take 1 tablet by mouth every 4 hours as needed for headaches             BMI:   Estimated body mass index is 53.41 kg/m  as calculated from the following:    Height as of 10/27/21: 1.702 m (5' 7\").    Weight as of 10/27/21: 154.7 kg (341 lb).       FUTURE APPOINTMENTS:       - Follow up in 3-5 days for symptoms that are not improving, sooner for new or worsening sx.     See Patient Instructions    No follow-ups on file.    XENA Deng [facility]    Direct line to schedule COVID screening/vaccination.   [phone]    Subjective   Norma is a 21 year old who presents for the following health issues.    Headache     History of Present Illness       Migraines:   Since the patient's last clinic visit, headaches are: worsened  The patient is getting headaches:  Everyday all day  She is able to do normal daily activities when she has a migraine.  The patient is taking the following rescue/relief medications:  Ibuprofen (Advil, Motrin), Naproxyn (Aleve), Tylenol and Excedrin   Patient states \"I get no relief\" from the rescue/relief medications.   The patient is taking the following medications to prevent migraines:  No medications to prevent migraines  In the past 4 weeks, the patient has gone to an Urgent Care or Emergency Room 2 times times due to headaches.    She eats 2-3 servings of fruits and vegetables daily.She consumes 1 sweetened " "beverage(s) daily.She exercises with enough effort to increase her heart rate 20 to 29 minutes per day.  She exercises with enough effort to increase her heart rate 4 days per week.   She is taking medications regularly.         Concern for COVID-19  About how many days ago did these symptoms start? 1 week ago - constant headache.   Is this your first visit for this illness? Yes  In the 14 days before your symptoms started, have you had close contact with someone with COVID-19 (Coronavirus)? Yes, I have been in contact with someone who has COVID-19/Coronavirus (confirmed by lab test).  Do you have a fever or chills? No  Are you having new or worsening difficulty breathing? No  Do you have new or worsening cough? No  Have you had any new or unexplained body aches? No    Have you experienced any of the following NEW symptoms?    Headache: YES- denies stiff neck    Sore throat: YES, feels very dry     Loss of taste or smell: No    Chest pain: No    Diarrhea: No    Rash: No  What treatments have you tried? \"Literally everything I can think of\", all OTC meds.   Who do you live with? Uncle and his son  Are you, or a household member, a healthcare worker or a ? No  Do you live in a nursing home, group home, or shelter? No  Do you have a way to get food/medications if quarantined? Yes, I have a friend or family member who can help me.            Review of Systems   Neurological: Positive for headaches.      Constitutional, HEENT, cardiovascular, pulmonary, gi and gu systems are negative, except as otherwise noted.      Objective           Vitals:  No vitals were obtained today due to virtual visit.    Physical Exam   alert, no distress and fatigued  PSYCH: Alert and oriented times 3; coherent speech, normal   rate and volume, able to articulate logical thoughts, able   to abstract reason, no tangential thoughts, no hallucinations   or delusions  Her affect is normal and pleasant  RESP: No cough, no audible " wheezing, able to talk in full sentences  Remainder of exam unable to be completed due to telephone visits                Phone call duration: 12 minutes

## 2021-11-20 ENCOUNTER — LAB (OUTPATIENT)
Dept: URGENT CARE | Facility: URGENT CARE | Age: 21
End: 2021-11-20
Attending: NURSE PRACTITIONER
Payer: COMMERCIAL

## 2021-11-20 DIAGNOSIS — Z20.828 EXPOSURE TO SARS-ASSOCIATED CORONAVIRUS: ICD-10-CM

## 2021-11-20 PROCEDURE — U0005 INFEC AGEN DETEC AMPLI PROBE: HCPCS

## 2021-11-20 PROCEDURE — U0003 INFECTIOUS AGENT DETECTION BY NUCLEIC ACID (DNA OR RNA); SEVERE ACUTE RESPIRATORY SYNDROME CORONAVIRUS 2 (SARS-COV-2) (CORONAVIRUS DISEASE [COVID-19]), AMPLIFIED PROBE TECHNIQUE, MAKING USE OF HIGH THROUGHPUT TECHNOLOGIES AS DESCRIBED BY CMS-2020-01-R: HCPCS

## 2021-11-21 LAB — SARS-COV-2 RNA RESP QL NAA+PROBE: POSITIVE

## 2021-11-22 NOTE — RESULT ENCOUNTER NOTE
Zena Green    Your COVID is positive. You will need to quarantine for 10 days from the first day of your symptoms. Let me know if you need another work note. Please let us know if you have any questions.     Take care,    XENA Rojo CNP

## 2021-12-09 ENCOUNTER — LAB (OUTPATIENT)
Dept: LAB | Facility: CLINIC | Age: 21
End: 2021-12-09
Payer: COMMERCIAL

## 2021-12-09 ENCOUNTER — VIRTUAL VISIT (OUTPATIENT)
Dept: GASTROENTEROLOGY | Facility: CLINIC | Age: 21
End: 2021-12-09
Attending: INTERNAL MEDICINE
Payer: COMMERCIAL

## 2021-12-09 DIAGNOSIS — E66.01 CLASS 3 SEVERE OBESITY DUE TO EXCESS CALORIES WITHOUT SERIOUS COMORBIDITY WITH BODY MASS INDEX (BMI) OF 50.0 TO 59.9 IN ADULT (H): Primary | ICD-10-CM

## 2021-12-09 DIAGNOSIS — R74.8 ELEVATED LIVER ENZYMES: ICD-10-CM

## 2021-12-09 DIAGNOSIS — K76.0 FATTY LIVER DISEASE, NONALCOHOLIC: ICD-10-CM

## 2021-12-09 DIAGNOSIS — E66.813 CLASS 3 SEVERE OBESITY DUE TO EXCESS CALORIES WITHOUT SERIOUS COMORBIDITY WITH BODY MASS INDEX (BMI) OF 50.0 TO 59.9 IN ADULT (H): Primary | ICD-10-CM

## 2021-12-09 LAB
ALBUMIN SERPL-MCNC: 3.5 G/DL (ref 3.4–5)
ALP SERPL-CCNC: 84 U/L (ref 40–150)
ALT SERPL W P-5'-P-CCNC: 65 U/L (ref 0–50)
ANION GAP SERPL CALCULATED.3IONS-SCNC: 6 MMOL/L (ref 3–14)
AST SERPL W P-5'-P-CCNC: 31 U/L (ref 0–45)
BILIRUB DIRECT SERPL-MCNC: <0.1 MG/DL (ref 0–0.2)
BILIRUB SERPL-MCNC: 0.4 MG/DL (ref 0.2–1.3)
BUN SERPL-MCNC: 17 MG/DL (ref 7–30)
CALCIUM SERPL-MCNC: 9.6 MG/DL (ref 8.5–10.1)
CHLORIDE BLD-SCNC: 108 MMOL/L (ref 94–109)
CO2 SERPL-SCNC: 28 MMOL/L (ref 20–32)
CREAT SERPL-MCNC: 0.86 MG/DL (ref 0.52–1.04)
ERYTHROCYTE [DISTWIDTH] IN BLOOD BY AUTOMATED COUNT: 13.3 % (ref 10–15)
GFR SERPL CREATININE-BSD FRML MDRD: >90 ML/MIN/1.73M2
GLUCOSE BLD-MCNC: 96 MG/DL (ref 70–99)
HCT VFR BLD AUTO: 37.1 % (ref 35–47)
HGB BLD-MCNC: 12.3 G/DL (ref 11.7–15.7)
INR PPP: 0.96 (ref 0.85–1.15)
MCH RBC QN AUTO: 28 PG (ref 26.5–33)
MCHC RBC AUTO-ENTMCNC: 33.2 G/DL (ref 31.5–36.5)
MCV RBC AUTO: 85 FL (ref 78–100)
PLATELET # BLD AUTO: 250 10E3/UL (ref 150–450)
POTASSIUM BLD-SCNC: 4.2 MMOL/L (ref 3.4–5.3)
PROT SERPL-MCNC: 7.3 G/DL (ref 6.8–8.8)
RBC # BLD AUTO: 4.39 10E6/UL (ref 3.8–5.2)
SODIUM SERPL-SCNC: 142 MMOL/L (ref 133–144)
WBC # BLD AUTO: 7.6 10E3/UL (ref 4–11)

## 2021-12-09 PROCEDURE — 85610 PROTHROMBIN TIME: CPT

## 2021-12-09 PROCEDURE — 82248 BILIRUBIN DIRECT: CPT

## 2021-12-09 PROCEDURE — 80053 COMPREHEN METABOLIC PANEL: CPT

## 2021-12-09 PROCEDURE — 99213 OFFICE O/P EST LOW 20 MIN: CPT | Mod: 95 | Performed by: INTERNAL MEDICINE

## 2021-12-09 PROCEDURE — 36415 COLL VENOUS BLD VENIPUNCTURE: CPT

## 2021-12-09 PROCEDURE — 85027 COMPLETE CBC AUTOMATED: CPT

## 2021-12-09 ASSESSMENT — PAIN SCALES - GENERAL: PAINLEVEL: MODERATE PAIN (5)

## 2021-12-09 NOTE — PROGRESS NOTES
"PATIENT HISTORY:   Ms. Norma Collins returns for her nutrition visit to the lifestyle medicine weight management program. Previous measurements have been as follows:    Wt Readings from Last 5 Encounters:   10/27/21 (!) 154.7 kg (341 lb)   10/15/21 (!) 154.2 kg (340 lb)   10/14/21 (!) 154.2 kg (340 lb)   09/20/21 (!) 154.4 kg (340 lb 8 oz)   08/31/21 (!) 156.5 kg (345 lb)     Initial goal is to achieve a 5% weight loss of 28 lbs (i.e. A body weight of 312 lbs) within the next 3-4 months.      Since her last visit, she reports being sick with covid, was out of work for a while and very fatigued. Now back to work one week ago. Lost taste, decreased appetite. This lead to more sporadic eating, she is now re-establishing her previous pattern.      DIETARY HISTORY:   Breakfast: pancakes, cereal  Lunch: salad, peppers, meat/chicken, sometimes summer sausage  Dinner: fruits, vegetables, cheese crackers and meat (not as hungry at dinner)  Beverages: water and propel    PHYSICAL ACTIVITY HISTORY: Goal of 10,000 steps per day. Has a treadmill at home, tries to walk for 60 minutes each time (doesn't enjoy). Would like to get a bike and use a bike  indoors in the winter.     Social: Mom passed away 2015, Dad passed away earlier this year (due to iron in liver). 2 older sisters, twin brother, younger sister. Lives with lifetime family friend, Ameya. Working full time at [employer].     OBJECTIVE:   Estimated body mass index is 53.41 kg/m  as calculated from the following:    Height as of 10/27/21: 1.702 m (5' 7\").    Weight as of 10/27/21: 154.7 kg (341 lb).      IMPRESSION:     21 year old with obesity motivated to lose weight in order to improve hepatic steatosis and decrease risk of diabetes    Less consistent pattern of eating due to ill with covid, now returning to work and previous eating and activity patterns    DIETARY ASSESSMENT/PLAN:     Plans on allowing one treat (cookie, holiday baking) per week during the " Patient Information     Patient Name MRN Sex Fantasma Hoskins [id number] Male 2016      Nursing Note by Mona Renteria at 2017  3:00 PM     Author:  Mona Renteria Service:  (none) Author Type:  (none)     Filed:  2017  4:03 PM Encounter Date:  2017 Status:  Signed     :  Mona Renteria              MnVFC Eligibility Criteria  ( 0 to 18 Years of age ):      __ Uninsured: Does not have insurance    __ [place] (MHCP) enrollee: [place] [facility], or a Prepaid Medical Assistance Program (PMAP)               __  or [place] Native      _x_ Insured: Has insurance that covers the cost of all vaccines (NOT MNVFC ELIGIBLE BECAUSE INSURANCE ALREADY COVERS VACCINES)         __ Has insurance that does not cover vaccines until a deductible has been met. (NOT MNVFC ELIGIBLE AT THIS PRIVATE CLINIC. NEEDS TO GO TO PUBLIC HEALTH.)                       __ Underinsured:         Has health insurance that does not cover one or more vaccines.         Has health insurance that caps prevention services at a certain amount.        (NOT MNVFC ELIGIBLE AT THIS PRIVATE CLINIC.  NEEDS TO GO TO PUBLIC HEALTH.)               Children that are underinsured are only able to receive MnVFC vaccines at local [facility] clinics ([facility]), [facility] ([facility]), [facility] (C), [facility] Service clinics (S), and [facility] clinics. Please let patients know that if immunizations are not covered by their insurance, they could receive a bill for immunizations given at private clinic sites.    Eligibility reviewed and immunization(s) administered by:  Anjelica Renteria LPN.................2017             "next several weeks    Feeling good about portion sizes overall. Will continue to check in about this as eating returns to normal patterns    Miscellaneous Issues:     Treadmill in basement now, goal is to walk daily after work    Follow-up:     Monday January 10 at 8:20 am via video    Patient referred by XENA Deng CNP   Total time spent with patient 11 minutes    Stacy Balderas RD    Family Medicine Video Visit Note  Norma is being evaluated via a billable video visit.             Video Visit Consent     The patient has been notified of following:     \"This video visit will be conducted via a call between you and your physician/provider. We have found that certain health care needs can be provided without the need for an in-person physical exam.  This service lets us provide the care you need with a video conversation.  If a prescription is necessary we can send it directly to your pharmacy.  If lab work is needed we can place an order for that and you can then stop by our lab to have the test done at a later time.    Video visits are billed at different rates depending on your insurance coverage.  Please reach out to your insurance provider with any questions.    If during the course of the call the physician/provider feels a video visit is not appropriate, you will not be charged for this service.\"    Patient has given verbal consent for Video visit? Yes    How would you like to obtain your AVS? Talat    Patient would like the video invitation sent by: Other e-mail: My Chart    Will anyone else be joining your video visit? No       Video-Visit Details    Type of service:  Video Visit    Video Start Time: 8:25 AM  THIS is the time provider and patient connect.    Video End Time (time video stopped): 8:36 AM    Originating Location (pt. Location): Home    Distant Location (provider location):  [facility]     Mode of Communication:  Video Conference via Lars Kumar" VALERIANO Balderas

## 2021-12-09 NOTE — LETTER
12/9/2021     RE: Norm[place] Collins  [id number] [facility] [facility] [address]    Dear Colleague,    Thank you for referring your patient, Norma Collins, to the [facility]PATOLOG[facility]. Please see a copy of my visit note below.    Norma is a 21 year old who is being evaluated via a billable video visit.      How would you like to obtain your AVS? MyChart  If the video visit is dropped, the invitation should be resent by: Text to cell phone: [phone]  Will anyone else be joining your video visit? No    Video Start Time: 930  Video-Visit Details    Type of service:  Video Visit    Video End Time:0945    Originating Location (pt. Location): Home    Distant Location (provider location):  [facility]PAT[facility]     Platform used for Video Visit: [employer]    Date of Service: December 9, 2021     Referring Provider: XENA Rojo    Subjective:            Nomra Collins is a 21 year old female presenting for evaluation of abnormal liver tests    History of Present Illness   Norma Collins is a 21 year old female with past medical history of class III obesity who presents with concerns of abnormal liver tests and imaging demonstrating hepatic steatosis.    Since last seen, unfortunately she was diagnosed with symptomatic Covid on November 20 but does report she had symptoms for weeks prior to this.  She reports that she has been incredibly fatigued since, but is noticing some slow but gradual improvement.    Notes that prior to this she had made some rather significant lifestyle modifications.  Notes that she was eating breakfast on a daily basis, was eating salads typically for lunch, and was planning attention to serving size with her evening meals.  She has purchased a treadmill and had been using that prior to getting ill.  She is also engaged with the [facility] weight management clinic, and been working closely with a dietitian, but she found great  benefit in this.  Notes that she had a discussion with her dietitian yesterday.  She would like to continue on with this conservative approach at this time, and give it approximately 6 to 12 months to see if it takes effect with appropriate weight loss and improvement in her overall health.  She does report if it does not work, she will then plan to proceed with a surgical approach.    Past Medical History:  Past Medical History:   Diagnosis Date     Acanthosis nigricans      Adenotonsillar hypertrophy      Arthropathy of ankle and foot      Obesity      PCOS (polycystic ovarian syndrome)        Surgical History:  Past Surgical History:   Procedure Laterality Date     CHOLECYSTECTOMY       LAPAROSCOPIC CHOLECYSTECTOMY       OTHER SURGICAL HISTORY      none     TONSILLECTOMY       TONSILLECTOMY & ADENOIDECTOMY Bilateral 2/16/2017    Procedure: BILATERAL TONSILLECTOMY AND ADENOIDECTOMY;  Surgeon: Jacob Arguello MD;  Location: [facility];  Service:        Social History:  Social History     Tobacco Use     Smoking status: Former Smoker     Smokeless tobacco: Never Used   Substance Use Topics     Alcohol use: Yes     Comment: occ     Drug use: Never        Family History:  Family History   Problem Relation Age of Onset     Colon Cancer Mother 43     Cancer Mother         colon     Venous thrombosis Mother         cancer-related     Cerebrovascular Disease Mother      Liver Disease Father         cirrhosis of liver     Other - See Comments Father         high iron     Cirrhosis Father      Hemochromatosis Father      Hypertension Father      Hyperlipidemia Father      Myocardial Infarction Maternal Grandmother      Heart Disease Maternal Grandmother      Dementia Maternal Grandmother      Cerebrovascular Disease Paternal Grandmother      Other - See Comments Maternal Grandfather         heart attack or cancer/unsure     Bladder Cancer Maternal Grandfather      Cirrhosis Paternal Grandfather      Other -  See Comments Paternal Grandfather         high iron     Diabetes Paternal Grandfather      Hemochromatosis Paternal Grandfather      Kidney Disease Paternal Grandfather      Hypertension Paternal Grandfather      Colon Cancer Maternal Great-Grandmother         70-90 years old      Uterine Cancer Maternal Great-Grandmother      Breast Cancer Maternal Great-Grandmother      Brain Cancer Maternal Aunt        Medications:  Current Outpatient Medications   Medication     albuterol (PROAIR HFA/PROVENTIL HFA/VENTOLIN HFA) 108 (90 Base) MCG/ACT inhaler     omeprazole (PRILOSEC) 40 MG DR capsule     polyethylene glycol (MIRALAX) 17 g packet     butalbital-acetaminophen-caffeine (ESGIC) -40 MG tablet     dicyclomine (BENTYL) 20 MG tablet     No current facility-administered medications for this visit.       Review of Systems  A complete 10 point review of systems was asked and answered in the negative unless specifically commented upon in the HPI    Objective:         There were no vitals filed for this visit.  There is no height or weight on file to calculate BMI.     Physical Exam    Vitals reviewed.   Constitutional: Well-developed, well-nourished, in no apparent distress.    HEENT: Normocephalic. no scleral icterus.   Respiratory: Normal respiratory excursion   Skin: No jaundice  Musculoskeletal:  ROM intact, normal muscle bulk    Psychiatric: Normal mood and affect. Behavior is normal.  Neuro:  no tremor    Labs and Diagnostic tests:  Lab Results   Component Value Date    BILITOTAL 0.2 08/19/2021    ALT 93 08/19/2021    AST 51 08/19/2021    ALKPHOS 86 08/19/2021     Lab Results   Component Value Date    ALBUMIN 3.7 08/19/2021    PROTTOTAL 7.3 08/19/2021          Imaging:  Abd US 8/26/2021  FINDINGS:     GALLBLADDER: Cholecystectomy.     BILE DUCTS: No biliary dilatation. The common duct measures 4 mm.     LIVER: Echogenic parenchyma with smooth contour. No focal mass.     RIGHT KIDNEY: No  hydronephrosis.     PANCREAS: The visualized portions.     No ascites.                                                                      IMPRESSION:  1.  Moderate to severe hepatic steatosis.  2.  Cholecystectomy.    Assessment and Plan:    Abnormal Liver Tests:    -Based on available information it does appear most likely the patient has abnormal liver tests related to nonalcoholic fatty liver disease with nonalcoholic steatohepatitis  -She does not have overt risk factors for the development of chronic liver disease from other causes  -Evaluation for viral and autoimmune causes of liver disease were negative during her recent evaluation  -We will repeat liver tests in approximately 6 months time    Non-Alcoholic Fatty Liver Disease  - I had a long discussion with the patient about nonalcoholic fatty liver disease (NAFLD).  We discussed how fat deposits in the liver, how this leads to inflammation, and how chronic inflammation (MCCALL) can ultimately lead to scarring and cirrhosis.  The long-term complications of fatty liver disease were discussed with the patient, including the increased risk of cardiovascular disease complications,the risk of developing diabetes (if not already), and variable progression to cirrhosis and end stage liver disease.  Management of NAFLD/MCCALL  - We spent time discussing an appropriate diet, exercise and weight loss plan.      - Recommend exercise regularly: 4+ times per week, with an average of about 45 minutes per day.      - It has shown that patients who exercise regularly can have improvement of insulin resistance and resolution of fatty liver disease, even if they are not able to lose weight.     - Recommend a low-carbohydrate, low-calorie diet (7460-9994 calories per day).  - An ideal weight loss plan would be to lose 7-10% of body weight over the next six months  - Recommend aggressive diabetes management: ideal goal Hgb A1c goal of =/< 7%. If possible addition of insulin  "sensitizing agents like metformin or liraglutide will be helpful.    - Management of cholesterol is also very important.    - The use of \"statins\" (HMG-CoA reductase medications) are an effective means of therapy and are not contra-indicated in those with abnormal liver tests OR those with cirrhosis.  The value of these medications in this population far outweigh the minor risks of abnormal liver tests.   - Goal LDL in those with MCCALL are < 100 mg/dL  - Consider the utility of liberalizing coffee consumption as some data that this may slow progression and reverse effects of MCCALL-related fibrosis.  - We plan to order liver tests to be checked every 3 months to assess for dynamic changes, as a potential marker of another cause of chronic liver disease.  -We encouraged her on her work with the weight management clinic and strongly recommend she continues on this approach  - We will have further discussions in approximately 6 months to assess if she is interested or willing in proceeding with surgical consultation    Follow Up:  ~6 months     Thank you very much for the opportunity to participate in the care of this patient.  If you have any further questions, please don't hesitate to contact our office.    Thomas M. Leventhal, M.D.   of Medicine  Advanced & Transplant Hepatology  [facility]    "

## 2021-12-09 NOTE — PROGRESS NOTES
Norma is a 21 year old who is being evaluated via a billable video visit.      How would you like to obtain your AVS? MyChart  If the video visit is dropped, the invitation should be resent by: Text to cell phone: [phone]  Will anyone else be joining your video visit? No    Video Start Time: 930  Video-Visit Details    Type of service:  Video Visit    Video End Time:0945    Originating Location (pt. Location): Home    Distant Location (provider location):  [facility] [place]     Platform used for Video Visit: ROCKETHOME    Date of Service: December 9, 2021     Referring Provider: XENA Rojo    Subjective:            Norma Collins is a 21 year old female presenting for evaluation of abnormal liver tests    History of Present Illness   Norma Collins is a 21 year old female with past medical history of class III obesity who presents with concerns of abnormal liver tests and imaging demonstrating hepatic steatosis.    Since last seen, unfortunately she was diagnosed with symptomatic Covid on November 20 but does report she had symptoms for weeks prior to this.  She reports that she has been incredibly fatigued since, but is noticing some slow but gradual improvement.    Notes that prior to this she had made some rather significant lifestyle modifications.  Notes that she was eating breakfast on a daily basis, was eating salads typically for lunch, and was planning attention to serving size with her evening meals.  She has purchased a treadmill and had been using that prior to getting ill.  She is also engaged with the [facility] weight management clinic, and been working closely with a dietitian, but she found great benefit in this.  Notes that she had a discussion with her dietitian yesterday.  She would like to continue on with this conservative approach at this time, and give it approximately 6 to 12 months to see if it takes effect with appropriate weight loss and  improvement in her overall health.  She does report if it does not work, she will then plan to proceed with a surgical approach.    Past Medical History:  Past Medical History:   Diagnosis Date     Acanthosis nigricans      Adenotonsillar hypertrophy      Arthropathy of ankle and foot      Obesity      PCOS (polycystic ovarian syndrome)        Surgical History:  Past Surgical History:   Procedure Laterality Date     CHOLECYSTECTOMY       LAPAROSCOPIC CHOLECYSTECTOMY       OTHER SURGICAL HISTORY      none     TONSILLECTOMY       TONSILLECTOMY & ADENOIDECTOMY Bilateral 2/16/2017    Procedure: BILATERAL TONSILLECTOMY AND ADENOIDECTOMY;  Surgeon: Jacob Arguello MD;  Location: [facility];  Service:        Social History:  Social History     Tobacco Use     Smoking status: Former Smoker     Smokeless tobacco: Never Used   Substance Use Topics     Alcohol use: Yes     Comment: occ     Drug use: Never        Family History:  Family History   Problem Relation Age of Onset     Colon Cancer Mother 43     Cancer Mother         colon     Venous thrombosis Mother         cancer-related     Cerebrovascular Disease Mother      Liver Disease Father         cirrhosis of liver     Other - See Comments Father         high iron     Cirrhosis Father      Hemochromatosis Father      Hypertension Father      Hyperlipidemia Father      Myocardial Infarction Maternal Grandmother      Heart Disease Maternal Grandmother      Dementia Maternal Grandmother      Cerebrovascular Disease Paternal Grandmother      Other - See Comments Maternal Grandfather         heart attack or cancer/unsure     Bladder Cancer Maternal Grandfather      Cirrhosis Paternal Grandfather      Other - See Comments Paternal Grandfather         high iron     Diabetes Paternal Grandfather      Hemochromatosis Paternal Grandfather      Kidney Disease Paternal Grandfather      Hypertension Paternal Grandfather      Colon Cancer Maternal Great-Grandmother          70-90 years old      Uterine Cancer Maternal Great-Grandmother      Breast Cancer Maternal Great-Grandmother      Brain Cancer Maternal Aunt        Medications:  Current Outpatient Medications   Medication     albuterol (PROAIR HFA/PROVENTIL HFA/VENTOLIN HFA) 108 (90 Base) MCG/ACT inhaler     omeprazole (PRILOSEC) 40 MG DR capsule     polyethylene glycol (MIRALAX) 17 g packet     butalbital-acetaminophen-caffeine (ESGIC) -40 MG tablet     dicyclomine (BENTYL) 20 MG tablet     No current facility-administered medications for this visit.       Review of Systems  A complete 10 point review of systems was asked and answered in the negative unless specifically commented upon in the HPI    Objective:         There were no vitals filed for this visit.  There is no height or weight on file to calculate BMI.     Physical Exam    Vitals reviewed.   Constitutional: Well-developed, well-nourished, in no apparent distress.    HEENT: Normocephalic. no scleral icterus.   Respiratory: Normal respiratory excursion   Skin: No jaundice  Musculoskeletal:  ROM intact, normal muscle bulk    Psychiatric: Normal mood and affect. Behavior is normal.  Neuro:  no tremor    Labs and Diagnostic tests:  Lab Results   Component Value Date    BILITOTAL 0.2 08/19/2021    ALT 93 08/19/2021    AST 51 08/19/2021    ALKPHOS 86 08/19/2021     Lab Results   Component Value Date    ALBUMIN 3.7 08/19/2021    PROTTOTAL 7.3 08/19/2021          Imaging:  Abd US 8/26/2021  FINDINGS:     GALLBLADDER: Cholecystectomy.     BILE DUCTS: No biliary dilatation. The common duct measures 4 mm.     LIVER: Echogenic parenchyma with smooth contour. No focal mass.     RIGHT KIDNEY: No hydronephrosis.     PANCREAS: The visualized portions.     No ascites.                                                                      IMPRESSION:  1.  Moderate to severe hepatic steatosis.  2.  Cholecystectomy.    Assessment and Plan:    Abnormal Liver Tests:    -Based on  "available information it does appear most likely the patient has abnormal liver tests related to nonalcoholic fatty liver disease with nonalcoholic steatohepatitis  -She does not have overt risk factors for the development of chronic liver disease from other causes  -Evaluation for viral and autoimmune causes of liver disease were negative during her recent evaluation  -We will repeat liver tests in approximately 6 months time    Non-Alcoholic Fatty Liver Disease  - I had a long discussion with the patient about nonalcoholic fatty liver disease (NAFLD).  We discussed how fat deposits in the liver, how this leads to inflammation, and how chronic inflammation (MCCALL) can ultimately lead to scarring and cirrhosis.  The long-term complications of fatty liver disease were discussed with the patient, including the increased risk of cardiovascular disease complications,the risk of developing diabetes (if not already), and variable progression to cirrhosis and end stage liver disease.  Management of NAFLD/MCCALL  - We spent time discussing an appropriate diet, exercise and weight loss plan.      - Recommend exercise regularly: 4+ times per week, with an average of about 45 minutes per day.      - It has shown that patients who exercise regularly can have improvement of insulin resistance and resolution of fatty liver disease, even if they are not able to lose weight.     - Recommend a low-carbohydrate, low-calorie diet (8702-6074 calories per day).  - An ideal weight loss plan would be to lose 7-10% of body weight over the next six months  - Recommend aggressive diabetes management: ideal goal Hgb A1c goal of =/< 7%. If possible addition of insulin sensitizing agents like metformin or liraglutide will be helpful.    - Management of cholesterol is also very important.    - The use of \"statins\" (HMG-CoA reductase medications) are an effective means of therapy and are not contra-indicated in those with abnormal liver tests OR " those with cirrhosis.  The value of these medications in this population far outweigh the minor risks of abnormal liver tests.   - Goal LDL in those with MCCALL are < 100 mg/dL  - Consider the utility of liberalizing coffee consumption as some data that this may slow progression and reverse effects of MCCALL-related fibrosis.  - We plan to order liver tests to be checked every 3 months to assess for dynamic changes, as a potential marker of another cause of chronic liver disease.  -We encouraged her on her work with the weight management clinic and strongly recommend she continues on this approach  - We will have further discussions in approximately 6 months to assess if she is interested or willing in proceeding with surgical consultation    Follow Up:  ~6 months     Thank you very much for the opportunity to participate in the care of this patient.  If you have any further questions, please don't hesitate to contact our office.    Thomas M. Leventhal, M.D.   of Medicine  Advanced & Transplant Hepatology  [facility]

## 2021-12-13 ENCOUNTER — VIRTUAL VISIT (OUTPATIENT)
Dept: FAMILY MEDICINE | Facility: CLINIC | Age: 21
End: 2021-12-13
Payer: COMMERCIAL

## 2021-12-13 DIAGNOSIS — Z71.3 DIETARY COUNSELING AND SURVEILLANCE: Primary | ICD-10-CM

## 2022-01-08 ENCOUNTER — HEALTH MAINTENANCE LETTER (OUTPATIENT)
Age: 22
End: 2022-01-08

## 2022-01-18 VITALS
DIASTOLIC BLOOD PRESSURE: 78 MMHG | WEIGHT: 293 LBS | OXYGEN SATURATION: 98 % | BODY MASS INDEX: 45.99 KG/M2 | HEIGHT: 67 IN | HEART RATE: 81 BPM | SYSTOLIC BLOOD PRESSURE: 110 MMHG

## 2022-01-18 VITALS
HEART RATE: 80 BPM | BODY MASS INDEX: 50.75 KG/M2 | OXYGEN SATURATION: 97 % | SYSTOLIC BLOOD PRESSURE: 120 MMHG | DIASTOLIC BLOOD PRESSURE: 76 MMHG | WEIGHT: 293 LBS

## 2022-02-03 ENCOUNTER — OFFICE VISIT (OUTPATIENT)
Dept: FAMILY MEDICINE | Facility: CLINIC | Age: 22
End: 2022-02-03
Payer: COMMERCIAL

## 2022-02-03 VITALS
OXYGEN SATURATION: 98 % | RESPIRATION RATE: 18 BRPM | HEART RATE: 82 BPM | BODY MASS INDEX: 45.99 KG/M2 | TEMPERATURE: 98.1 F | DIASTOLIC BLOOD PRESSURE: 74 MMHG | HEIGHT: 67 IN | WEIGHT: 293 LBS | SYSTOLIC BLOOD PRESSURE: 136 MMHG

## 2022-02-03 DIAGNOSIS — E66.01 CLASS 3 SEVERE OBESITY DUE TO EXCESS CALORIES WITHOUT SERIOUS COMORBIDITY WITH BODY MASS INDEX (BMI) OF 50.0 TO 59.9 IN ADULT (H): ICD-10-CM

## 2022-02-03 DIAGNOSIS — N39.0 COMPLICATED UTI (URINARY TRACT INFECTION): Primary | ICD-10-CM

## 2022-02-03 DIAGNOSIS — E66.813 CLASS 3 SEVERE OBESITY DUE TO EXCESS CALORIES WITHOUT SERIOUS COMORBIDITY WITH BODY MASS INDEX (BMI) OF 50.0 TO 59.9 IN ADULT (H): ICD-10-CM

## 2022-02-03 DIAGNOSIS — R10.9 FLANK PAIN: ICD-10-CM

## 2022-02-03 LAB
ALBUMIN UR-MCNC: NEGATIVE MG/DL
APPEARANCE UR: ABNORMAL
BACTERIA #/AREA URNS HPF: ABNORMAL /HPF
BILIRUB UR QL STRIP: NEGATIVE
COLOR UR AUTO: YELLOW
GLUCOSE UR STRIP-MCNC: NEGATIVE MG/DL
HGB UR QL STRIP: ABNORMAL
KETONES UR STRIP-MCNC: NEGATIVE MG/DL
LEUKOCYTE ESTERASE UR QL STRIP: ABNORMAL
NITRATE UR QL: NEGATIVE
PH UR STRIP: 6 [PH] (ref 5–7)
RBC #/AREA URNS AUTO: ABNORMAL /HPF
SP GR UR STRIP: >=1.03 (ref 1–1.03)
SQUAMOUS #/AREA URNS AUTO: ABNORMAL /LPF
UROBILINOGEN UR STRIP-ACNC: 0.2 E.U./DL
WBC #/AREA URNS AUTO: ABNORMAL /HPF

## 2022-02-03 PROCEDURE — 99213 OFFICE O/P EST LOW 20 MIN: CPT | Performed by: NURSE PRACTITIONER

## 2022-02-03 PROCEDURE — 81001 URINALYSIS AUTO W/SCOPE: CPT | Performed by: NURSE PRACTITIONER

## 2022-02-03 PROCEDURE — 87086 URINE CULTURE/COLONY COUNT: CPT | Performed by: NURSE PRACTITIONER

## 2022-02-03 RX ORDER — CIPROFLOXACIN 500 MG/1
500 TABLET, FILM COATED ORAL 2 TIMES DAILY
Qty: 20 TABLET | Refills: 0 | Status: SHIPPED | OUTPATIENT
Start: 2022-02-03 | End: 2022-02-13

## 2022-02-03 ASSESSMENT — MIFFLIN-ST. JEOR: SCORE: 2368.43

## 2022-02-03 NOTE — PATIENT INSTRUCTIONS
Patient Education     Kidney Infection (Adult Female)     An infection in one or both kidneys is called pyelonephritis. It usually happens when bacteria ) get into the kidney. Rarely it is caused when other germs such as viruses, fungi, or other disease-causing organisms get into the kidney. The bacteria or other disease-causing organisms can enter the kidneys from the bladder or blood traveling from other parts of the body. A kidney infection can become serious. It can cause severe illness, scarring of the kidneys, or kidney failure if not treated correctly.  Common causes for this problem include:    Not keeping the genital area clean and dry, which promotes the growth of bacteria    Wiping back to front. This drags bacteria from the rectum toward the urinary opening (urethra).    Wearing tight pants or underwear. This lets moisture build up in the genital area, which helps bacteria grow.    Holding urine in for long periods of time    Dehydration    Urinary tract infections    Blockages of urine draining from the kidney, such as a kidney stone  Kidney infections can cause symptoms similar to a bladder infection. Symptoms include:    Pain (or burning) when urinating    Having to urinate more often than usual    Blood in the urine (pink or red)    Belly (abdominal) pain or discomfort, usually in the lower abdomen    Pain in the side or back    Pain above the pubic bone    Fever or chills    Vomiting    Loss of appetite  Treatment is oral antibiotics. More severe cases are treated with intramuscular or IV (intravenous) antibiotics. These are started right away and may be changed once urine culture results show the infecting organisms. Treatment helps prevent a more serious kidney infection. Symptoms of kidney infections can vary based on your age.  Medicines  Medicines can help in the treatment of a bladder infection:    Take antibiotics exactly as prescribed and until they are used up, even if you feel better.  It's important to finish them to make sure the infection is gone.    Unless another medicine was prescribed, you can use over-the-counter medicines for pain, fever, or discomfort. If you have chronic liver of kidney disease, talk with your healthcare provider before using these medicines. Also talk with your provider if you've ever had a stomach ulcer or digestive bleeding, or are taking blood thinners.  Home care  The following are general care guidelines:    Stay home from work or school. Rest in bed until your fever breaks and you are feeling better, or as advised by your healthcare provider.    Drink lots of fluid unless you must restrict fluids for other medical reasons. This will force the medicine into your urinary system and flush the bacteria out of your body. Ask your healthcare provider how much you should drink.    Don't have sex until you have finished all of your medicine and your symptoms are gone.    Don't have caffeine, alcohol, or spicy foods. These foods may irritate the kidneys and bladder.    Don't take bubble baths. Sensitivity to the chemicals in bubble baths can irritate the urethra.    Make sure you wipe from front to back after using the toilet.    Wear loose cloths and cotton underwear.  Prevention  These self-care steps can help prevent future infections:    Drink plenty of fluids to prevent dehydration and flush out the bladder. Do this unless you must restrict fluids for other health reasons, or your healthcare provider told you not to.    Correct cleaning after going to the bathroom in important. Make sure you wipe from front to back after using the toilet.    Urinate more often. Don't try to hold urine in for a long time.    Don't wear tight-fitting pants and underwear.    Improve your diet to prevent constipation. Eat more fruits, vegetables, and fiber. Eat less junk and fatty foods. Constipation can make a urinary tract infection more likely. Talk with your healthcare provider if  you have trouble with bowel movements.    Urinate right after sex to flush out the bladder.  Follow-up care  Follow up with your healthcare provider, or as advised. Additional testing may be needed to make sure the infection has cleared. Close follow-up and further testing is very important to find the cause and to prevent future infections.  If a urine culture was done, you will be contacted if your treatment needs to be changed. If directed, you may call to find out the results.  If you had an X-ray, CT scan, or other diagnostic test, you will be notified of any new findings that may affect your care.  Call 911  Call 911 if any of the following occur:    Trouble breathing    Fainting or loss of consciousness    Rapid or very slow heart rate    Weakness, dizziness, or fainting    Trouble arousing or confusion  When to seek medical advice  Call your healthcare provider right away if any of these occur:    Fever 100.4 F (38 C) or higher, or as directed by your healthcare provider    Not feeling better or symptoms get worse within 1 to 2 days after starting antibiotics    Any symptom that continues after 3 days of treatment    Increasing pain in the stomach, back, side, or groin area    Repeated vomiting    Not able to take prescribed medicine due to nausea or another reason    Bloody, dark-colored, or foul smelling urine    Trouble urinating or decreased urine output    No urine for 8 hours, no tears when crying, confusion, sunken eyes, or dry mouth  [employer] last reviewed this educational content on 11/1/2019 2000-2021 [employer]. All rights reserved. This information is not intended as a substitute for professional medical care. Always follow your healthcare professional's instructions.           Patient Education     Understanding Urinary Tract Infections (UTIs)   Most UTIs are caused by bacteria, but they may also be caused by viruses or fungi. Bacteria from the bowel are the most common source  of infection. The infection may start because of any of the following:     Sexual activity.  During sex, bacteria can travel from the penis, vagina, or rectum into the urethra.     Bacteria outside the rectum getting into the urethra.  Bacteria on the skin outside the rectum may travel into the urethra. This is more common in women since the rectum and urethra are closer to each other than in men. Wiping from front to back after using the toilet and keeping the area clean can help prevent germs from getting to the urethra.    Blocked urine flow through the urinary tract. If urine sits too long, germs may start to grow out of control.  Parts of the urinary tract  The infection can occur in any part of the urinary tract.       The kidneys. These organs collect and store urine.    The ureters. These tubes carry urine from the kidneys to the bladder.    The bladder. This holds urine until you are ready to let it out.    The urethra. This tube carries urine from the bladder out of the body. It is shorter in women, so bacteria can move through it more easily. The urethra is longer in men, so a UTI is less likely to reach the bladder or kidneys in men.  [employer] last reviewed this educational content on 1/1/2020 2000-2021 [employer]. All rights reserved. This information is not intended as a substitute for professional medical care. Always follow your healthcare professional's instructions.

## 2022-02-03 NOTE — PROGRESS NOTES
"  Assessment & Plan     Complicated UTI (urinary tract infection)    - ciprofloxacin (CIPRO) 500 MG tablet; Take 1 tablet (500 mg) by mouth 2 times daily for 10 days    Flank pain    - UA macro with reflex to Microscopic and Culture - Clinc Collect  - Urine Microscopic  - Urine Culture    Class 3 severe obesity due to excess calories without serious comorbidity with body mass index (BMI) of 50.0 to 59.9 in adult (H)  Following with weight loss clinic      Review of external notes as documented elsewhere in note         BMI:   Estimated body mass index is 54.41 kg/m  as calculated from the following:    Height as of this encounter: 1.702 m (5' 7\").    Weight as of this encounter: 157.6 kg (347 lb 6.4 oz).   Weight management plan: Patient referred to endocrine and/or weight management specialty    FUTURE APPOINTMENTS:       - Follow up in 3 days for symptoms that are not improving, sooner for new or worsening sx.     See Patient Instructions    No follow-ups on file.    XENA Deng CNP  [facility]    Jason Green is a 22 year old who presents for the following health issues     HPI     ED/UC Followup:    [facility]  Date of visit: 2/1/22  Reason for visit: pyelonephritis  Current Status: Right flank pain in right side is worse. Describes as sharp and intermittent. Worse with turning, laughing, and deep breaths.  Pain is a 7/10. Still has some nausea. Started on cephalexin but makes her itchy. Constipation-from medication?       Reviewed notes/labs via care everywhere- culture was not done.     Review of Systems   Constitutional, HEENT, cardiovascular, pulmonary, gi and gu systems are negative, except as otherwise noted.      Objective    /74   Pulse 82   Temp 98.1  F (36.7  C) (Tympanic)   Resp 18   Ht 1.702 m (5' 7\")   Wt (!) 157.6 kg (347 lb 6.4 oz)   SpO2 98%   BMI 54.41 kg/m    Body mass index is 54.41 kg/m .  Physical Exam "   GENERAL: healthy, alert and no distress  SKIN: pink patches to bilateral arms, scratch luis fernando excoriations, no hives  NEURO: Normal strength and tone, mentation intact and speech normal  PSYCH: mentation appears normal, affect normal/bright    Results for orders placed or performed in visit on 02/03/22 (from the past 24 hour(s))   UA macro with reflex to Microscopic and Culture - Clinc Collect    Specimen: Urine, Clean Catch   Result Value Ref Range    Color Urine Yellow Colorless, Straw, Light Yellow, Yellow    Appearance Urine Cloudy (A) Clear    Glucose Urine Negative Negative mg/dL    Bilirubin Urine Negative Negative    Ketones Urine Negative Negative mg/dL    Specific Gravity Urine >=1.030 1.003 - 1.035    Blood Urine Trace (A) Negative    pH Urine 6.0 5.0 - 7.0    Protein Albumin Urine Negative Negative mg/dL    Urobilinogen Urine 0.2 0.2, 1.0 E.U./dL    Nitrite Urine Negative Negative    Leukocyte Esterase Urine Small (A) Negative   Urine Microscopic   Result Value Ref Range    Bacteria Urine Moderate (A) None Seen /HPF    RBC Urine 0-2 0-2 /HPF /HPF    WBC Urine 25-50 (A) 0-5 /HPF /HPF    Squamous Epithelials Urine Many (A) None Seen /LPF

## 2022-02-04 LAB — BACTERIA UR CULT: NORMAL

## 2022-02-07 ENCOUNTER — MYC MEDICAL ADVICE (OUTPATIENT)
Dept: FAMILY MEDICINE | Facility: CLINIC | Age: 22
End: 2022-02-07
Payer: COMMERCIAL

## 2022-02-07 DIAGNOSIS — R10.9 FLANK PAIN: Primary | ICD-10-CM

## 2022-02-07 DIAGNOSIS — Z11.3 SCREEN FOR STD (SEXUALLY TRANSMITTED DISEASE): ICD-10-CM

## 2022-02-07 NOTE — TELEPHONE ENCOUNTER
Routing to the Provider to review and advise.     Refer to [employer] message.     Ila Souza RN BSN

## 2022-02-07 NOTE — RESULT ENCOUNTER NOTE
Zena Green    Your urine culture is negative. The abnormal urine dipstick is likely due to contamination (not a sterile sample). You can stop the antibiotic. The pain is likely musculoskeletal. I would recommend that you do some physical therapy. If you are agreeable to this let me know and I can put a referral in for your. Please let us know if you have any questions.     Take care,    XENA Rojo CNP

## 2022-02-10 ENCOUNTER — LAB (OUTPATIENT)
Dept: LAB | Facility: CLINIC | Age: 22
End: 2022-02-10
Payer: COMMERCIAL

## 2022-02-10 DIAGNOSIS — Z11.3 SCREEN FOR STD (SEXUALLY TRANSMITTED DISEASE): ICD-10-CM

## 2022-02-10 LAB
CLUE CELLS: NORMAL
TRICHOMONAS, WET PREP: NORMAL
WBC'S/HIGH POWER FIELD, WET PREP: NORMAL
YEAST, WET PREP: NORMAL

## 2022-02-10 PROCEDURE — 87491 CHLMYD TRACH DNA AMP PROBE: CPT

## 2022-02-10 PROCEDURE — 87210 SMEAR WET MOUNT SALINE/INK: CPT

## 2022-02-10 PROCEDURE — 87591 N.GONORRHOEAE DNA AMP PROB: CPT

## 2022-02-11 LAB
C TRACH DNA SPEC QL PROBE+SIG AMP: NEGATIVE
N GONORRHOEA DNA SPEC QL NAA+PROBE: NEGATIVE

## 2022-02-11 NOTE — RESULT ENCOUNTER NOTE
Zena Green    Your chlamydia and gonorrhea screenings are both negative. The vaginal swab is unremarkable. Likely the abnormal urine test is from contaminated sample- (skin cells from outside the vagina). Please let us know if you have any questions.     Take care,    XENA Rojo CNP

## 2022-04-01 ENCOUNTER — OFFICE VISIT (OUTPATIENT)
Dept: FAMILY MEDICINE | Facility: CLINIC | Age: 22
End: 2022-04-01
Payer: COMMERCIAL

## 2022-04-01 VITALS
BODY MASS INDEX: 45.99 KG/M2 | DIASTOLIC BLOOD PRESSURE: 75 MMHG | OXYGEN SATURATION: 99 % | WEIGHT: 293 LBS | SYSTOLIC BLOOD PRESSURE: 122 MMHG | TEMPERATURE: 98.1 F | HEIGHT: 67 IN | RESPIRATION RATE: 14 BRPM | HEART RATE: 99 BPM

## 2022-04-01 DIAGNOSIS — H83.03 ACUTE LABYRINTHITIS, BILATERAL: Primary | ICD-10-CM

## 2022-04-01 PROCEDURE — 99213 OFFICE O/P EST LOW 20 MIN: CPT | Performed by: FAMILY MEDICINE

## 2022-04-01 RX ORDER — MECLIZINE HYDROCHLORIDE 25 MG/1
25 TABLET ORAL 3 TIMES DAILY PRN
Qty: 20 TABLET | Refills: 0 | Status: SHIPPED | OUTPATIENT
Start: 2022-04-01 | End: 2022-08-23

## 2022-04-01 ASSESSMENT — PAIN SCALES - GENERAL: PAINLEVEL: SEVERE PAIN (7)

## 2022-04-01 NOTE — PATIENT INSTRUCTIONS
Patient Education     Labyrinthitis    The inner ear is located behind the middle ear. It's part of the balance center of your body. When the inner ear becomes irritated or inflamed it causes a condition called labyrinthitis. It may due to a viral infection. But often the cause is not known. Labyrinthitis causes sudden dizziness and balance problems. It often causes a feeling that you or the room is spinning (vertigo). You may feel nauseated or throw up. You may also feel a loss of balance when trying to walk. Head movement from side to side or changes in body position (from lying to sitting or standing) may make symptoms worse. You may have ringing in the ear. Hearing may also be affected.   An episode of labyrinthitis may last seconds, minutes, or hours. It may never return. Or symptoms may recur off and on for a few weeks or longer. In many cases, the problem is short-term and goes away when the inner ear issue goes away.   Home care    Take medicine as prescribed to ease your symptoms. Unless another medicine was prescribed, you can try over-the-counter motion sickness pills. Note that these medicines may cause drowsiness.    If symptoms are severe, rest quietly in bed. Change positions slowly. There may be one position that feels best, such as lying on one side or lying on your back with your head slightly raised on pillows. Until you have no symptoms, you are at a higher risk of falling. Let someone help you when you get up. Get rid of home hazards such as loose electrical cords and throw rugs. Don t walk in unfamiliar areas that aren't lighted. Use night lights in bathrooms and tram.    Vestibular rehabilitation exercises are done by moving your head in certain ways to help fix problems in the inner ear. If these exercises have been prescribed, do them as you have been instructed.    Don't drive or work with dangerous machinery until 1 week has passed without symptoms. Be careful when using  stairs.    Follow-up care  Follow up with your healthcare provider, or as advised.   When to get medical advice  Call your healthcare provider if you have any of the following:     Symptoms that aren't controlled by medicine     Symptoms that get worse    Repeated vomiting that is not eased by medicine    Weakness that gets worse    Fainting    Headache or abnormal drowsiness    Trouble with vision or speech    Trouble moving your face, arms, or legs    Weakness or numbness in your face, arms, or legs    Hearing loss    Symptoms that last more than a few days  [employer] last reviewed this educational content on 3/1/2020    9000-7101 [employer]. All rights reserved. This information is not intended as a substitute for professional medical care. Always follow your healthcare professional's instructions.

## 2022-04-01 NOTE — PROGRESS NOTES
"  Assessment & Plan     Acute labyrinthitis, bilateral  Discussed likely diagnosis and treatment with supportive cares. Monitor symptoms, should resolve within a few more days. If symptoms persist/change/worsen, she should be seen. The patient indicates understanding of these issues and agrees with the plan.   - meclizine (ANTIVERT) 25 MG tablet; Take 1 tablet (25 mg) by mouth 3 times daily as needed for dizziness  6}     Offered vaccinations - she declines     No follow-ups on file.    Bree Herrmann MD  [facility] MANUEL Green is a 22 year old who presents for the following health issues     Some dizziness with movement x 2 days   Noticed it when rolling over in bed   Head feels heavy  No headache   No vision change  No discoordination    No f/c   No cold symptoms  Eating normally  Some nausea when she feels dizzy   No vomiting     Not pregnant     Two propels/day   Plus water   Doesn't think she is dehydrated   No new meds   Otherwise feeling fine   Review of Systems   Constitutional, HEENT, cardiovascular, pulmonary, gi and gu systems are negative, except as otherwise noted.      Objective    /75   Pulse 99   Temp 98.1  F (36.7  C) (Tympanic)   Resp 14   Ht 1.702 m (5' 7\")   Wt (!) 157.4 kg (347 lb)   SpO2 99%   Breastfeeding No   BMI 54.35 kg/m    Body mass index is 54.35 kg/m .  Physical Exam   GENERAL: healthy, alert and no distress  EYES: Eyes grossly normal to inspection, PERRL and conjunctivae and sclerae normal  HENT: ear canals and TM's normal, nose and mouth without ulcers or lesions  NECK: no adenopathy, no asymmetry, masses, or scars and thyroid normal to palpation  RESP: lungs clear to auscultation - no rales, rhonchi or wheezes  CV: regular rate and rhythm, normal S1 S2, no S3 or S4, no murmur, click or rub, no peripheral edema and peripheral pulses strong  MS: no gross musculoskeletal defects noted, no edema  NEURO: Normal strength and tone, mentation " intact and speech normal  PSYCH: mentation appears normal, affect normal/bright

## 2022-04-25 ENCOUNTER — OFFICE VISIT (OUTPATIENT)
Dept: FAMILY MEDICINE | Facility: CLINIC | Age: 22
End: 2022-04-25
Payer: COMMERCIAL

## 2022-04-25 VITALS
SYSTOLIC BLOOD PRESSURE: 124 MMHG | DIASTOLIC BLOOD PRESSURE: 74 MMHG | RESPIRATION RATE: 16 BRPM | WEIGHT: 293 LBS | TEMPERATURE: 97.7 F | BODY MASS INDEX: 45.99 KG/M2 | HEIGHT: 67 IN | OXYGEN SATURATION: 99 % | HEART RATE: 79 BPM

## 2022-04-25 DIAGNOSIS — G43.809 VESTIBULAR MIGRAINE: Primary | ICD-10-CM

## 2022-04-25 PROCEDURE — 99213 OFFICE O/P EST LOW 20 MIN: CPT | Performed by: NURSE PRACTITIONER

## 2022-04-25 RX ORDER — SUMATRIPTAN 50 MG/1
50 TABLET, FILM COATED ORAL
Qty: 18 TABLET | Refills: 3 | Status: SHIPPED | OUTPATIENT
Start: 2022-04-25 | End: 2022-08-23

## 2022-04-25 NOTE — PROGRESS NOTES
"  Assessment & Plan     Vestibular migraine  Trial:  - SUMAtriptan (IMITREX) 50 MG tablet; Take 1 tablet (50 mg) by mouth at onset of headache for migraine May repeat in 2 hours. Max 4 tablets/24 hours.             FUTURE APPOINTMENTS:       - Follow-up visit in case of new or worsening symptoms or if not improving. Message in a few weeks if Imitrex is helping. If not consider, ENT vs physical therapy vs Neurology    See Patient Instructions    No follow-ups on file.    [facility]    Jason Green is a 22 year old who presents for the following health issues     History of Present Illness       Migraines:   Since the patient's last clinic visit, headaches are: no change  The patient is getting headaches:  Varies  She is able to do normal daily activities when she has a migraine.  The patient is taking the following rescue/relief medications:  Ibuprofen (Advil, Motrin) and Naproxyn (Aleve)   Patient states \"I get only a small amount of relief\" from the rescue/relief medications.   The patient is taking the following medications to prevent migraines:  No medications to prevent migraines  In the past 4 weeks, the patient has gone to an Urgent Care or Emergency Room 0 times times due to headaches.    Reason for visit:  Dizziness  Symptom onset:  3-4 weeks ago  Symptoms include:  Headache, nauseous, dizziness  Symptom intensity:  Moderate  Symptom progression:  Staying the same  Had these symptoms before:  Yes  Has tried/received treatment for these symptoms:  Yes  Previous treatment was successful:  No  What makes it worse:  No  What makes it better:  No    She eats 2-3 servings of fruits and vegetables daily.She consumes 1 sweetened beverage(s) daily.She exercises with enough effort to increase her heart rate 20 to 29 minutes per day.  She exercises with enough effort to increase her heart rate 3 or less days per week.   She is taking medications " "regularly.       Dizziness  Onset/Duration: almost 1 month  Description:   Do you feel faint: no  Does it feel like the surroundings (bed, room) are moving: YES  Unsteady/off balance: no  Have you passed out or fallen: no  Intensity: moderate  Progression of Symptoms: waxing and waning  Accompanying Signs & Symptoms:  Heart palpitations or chest pain: no  Nausea, vomiting: YES- nausea  Weakness or lack of coordination in arms or legs: no  Vision or speech changes: no  Numbness or tingling: no  Ringing in ears (Tinnitus): no  Hearing Loss: no  Having associated sharp pain/headache behind the eyes with nausea and light sensitivity.  History:   Head trauma/concussion history: no  Previous similar symptoms: no  Recent bleeding history: no  Any new medications (BP?): no  No personal hx of migraine- sister has migraines  Precipitating factors:   Worse with activity: no  Worse with head movement: YES  Alleviating factors:   Does staying in a fixed position give relief: no   Therapies tried and outcome: Meclizine- helped for awhile      Review of Systems   Constitutional, HEENT, cardiovascular, pulmonary, gi and gu systems are negative, except as otherwise noted.      Objective    /74   Pulse 79   Temp 97.7  F (36.5  C) (Tympanic)   Resp 16   Ht 1.702 m (5' 7\")   Wt (!) 162.7 kg (358 lb 9.6 oz)   SpO2 99%   BMI 56.16 kg/m    Body mass index is 56.16 kg/m .  Physical Exam   GENERAL: alert and no distress  EYES: Eyes grossly normal to inspection, PERRL and conjunctivae and sclerae normal  HENT: ear canals and TM's normal, nose and mouth without ulcers or lesions  NECK: no adenopathy, no asymmetry, masses, or scars and thyroid normal to palpation  RESP: lungs clear to auscultation - no rales, rhonchi or wheezes  CV: regular rates and rhythm, normal S1 S2, no S3 or S4, no murmur, click or rub and no peripheral edema  MS: no gross musculoskeletal defects noted, no edema  NEURO: Normal strength and tone, sensory exam " grossly normal, mentation intact, speech normal, cranial nerves 2-12 intact, DTR's normal and symmetric 1+ and gait normal, negative Woodson- Hallpike bilateral  PSYCH: mentation appears normal, affect normal/bright

## 2022-08-23 ENCOUNTER — HOSPITAL ENCOUNTER (OUTPATIENT)
Dept: ULTRASOUND IMAGING | Facility: CLINIC | Age: 22
Discharge: HOME | End: 2022-08-23
Attending: STUDENT IN AN ORGANIZED HEALTH CARE EDUCATION/TRAINING PROGRAM | Admitting: STUDENT IN AN ORGANIZED HEALTH CARE EDUCATION/TRAINING PROGRAM
Payer: COMMERCIAL

## 2022-08-23 ENCOUNTER — OFFICE VISIT (OUTPATIENT)
Dept: FAMILY MEDICINE | Facility: CLINIC | Age: 22
End: 2022-08-23
Payer: COMMERCIAL

## 2022-08-23 VITALS
OXYGEN SATURATION: 97 % | DIASTOLIC BLOOD PRESSURE: 78 MMHG | BODY MASS INDEX: 45.99 KG/M2 | WEIGHT: 293 LBS | TEMPERATURE: 98.2 F | HEIGHT: 67 IN | RESPIRATION RATE: 16 BRPM | HEART RATE: 92 BPM | SYSTOLIC BLOOD PRESSURE: 116 MMHG

## 2022-08-23 DIAGNOSIS — N84.0 ABNORMAL UTERINE BLEEDING DUE TO ENDOMETRIAL POLYP: ICD-10-CM

## 2022-08-23 DIAGNOSIS — R10.9 ABDOMINAL CRAMPING: Primary | ICD-10-CM

## 2022-08-23 DIAGNOSIS — N93.9 ABNORMAL UTERINE BLEEDING DUE TO ENDOMETRIAL POLYP: ICD-10-CM

## 2022-08-23 DIAGNOSIS — N89.8 VAGINAL DISCHARGE: ICD-10-CM

## 2022-08-23 DIAGNOSIS — R10.9 ABDOMINAL CRAMPING: ICD-10-CM

## 2022-08-23 DIAGNOSIS — M94.0 COSTOCHONDRITIS: ICD-10-CM

## 2022-08-23 PROBLEM — R19.4 CHANGE IN BOWEL HABIT: Status: ACTIVE | Noted: 2022-08-23

## 2022-08-23 PROBLEM — K52.9 ILEITIS: Status: ACTIVE | Noted: 2022-08-23

## 2022-08-23 PROBLEM — K63.3 ULCERATION OF INTESTINE: Status: ACTIVE | Noted: 2019-10-30

## 2022-08-23 PROBLEM — D50.0 IRON DEFICIENCY ANEMIA DUE TO CHRONIC BLOOD LOSS: Status: ACTIVE | Noted: 2019-11-22

## 2022-08-23 LAB — RADIOLOGIST FLAGS: ABNORMAL

## 2022-08-23 PROCEDURE — 76830 TRANSVAGINAL US NON-OB: CPT

## 2022-08-23 PROCEDURE — 99214 OFFICE O/P EST MOD 30 MIN: CPT | Performed by: STUDENT IN AN ORGANIZED HEALTH CARE EDUCATION/TRAINING PROGRAM

## 2022-08-23 RX ORDER — PROCHLORPERAZINE MALEATE 10 MG
1 TABLET ORAL EVERY 12 HOURS
COMMUNITY
Start: 2021-06-09 | End: 2022-08-23

## 2022-08-23 ASSESSMENT — PAIN SCALES - GENERAL: PAINLEVEL: MODERATE PAIN (5)

## 2022-08-23 NOTE — PATIENT INSTRUCTIONS
Patient Education   Here is the plan from today's visit    1. Abdominal cramping  Miralax:   Take 1 capful 2-3 times a day for the next few weeks until you are having at least 2-3 loose stools daily. Then decrease by 1 capful daily. Continue 1 capful daily for at least 1 month to ensure you have cleared your bowel. During this time, make sure to increase your fluid intake (mostly water)! Miralax works by keeping water in the gut to allow for softer stools. It will take time for the constipation to clear up, but by taking higher doses of miralax like this, you will be able to break up the hard stool over time.   - XR Abdomen 2 Views; Future  - US Pelvic Complete with Transvaginal; Future    2. Costochondritis    3. Vaginal discharge  - US Pelvic Complete with Transvaginal; Future      Please call or return to clinic if your symptoms don't go away.    Follow up plan  Return if symptoms worsen or fail to improve.    Thank you for coming to the [facility] today.  Lab Testing:  **If you had lab testing today and your results are reassuring or normal they will be mailed to you or sent through [employer] within 7 days.   **If the lab tests need quick action we will call you with the results.  **If you are having labs done on a different day, please call [phone] to schedule at the [facility] or [phone] for other [facility] locations. Labs do not offer walk-in appointments.  The phone number we will call with results is # [phone] (home) . If this is not the best number please call our clinic and change the number.  Medication Refills:  If you need any refills please call your pharmacy and they will contact us.   If you need to  your refill at a new pharmacy, please contact the new pharmacy directly. The new pharmacy will help you get your medications transferred faster.   Scheduling:  If you have any concerns about today's visit or wish to schedule another appointment  please call our office during normal business hours [phone]   If a referral was made to an [facility] specialty provider and you do not get a call from central scheduling, please refer to directions on your visit summary or call our office during normal business hours for assistance.   If a Mammogram was ordered for you at the [facility] call [phone] to schedule or change your appointment.  If you had an XRay/CT/Ultrasound/MRI ordered the number is [phone] to schedule or change your radiology appointment.   If you had an Echo/Heart Monitor/Cardiac Cath or other cardiac testing ordered the number is [phone] to schedule or change your appointment.   Medical Concerns:  If you have urgent medical concerns please call [phone] at any time of the day.    Denise Ramirez MD

## 2022-08-23 NOTE — PROGRESS NOTES
Assessment & Plan     Abdominal cramping  Pain on the right side, more cramping in nature. Laying down on her abdomen seems to help somewhat. She has tried miralax 1 capful daily for the past few days without improvement. No vomiting, though has felt nauseated at times with the pain. Tender in RUQ and worse in RLQ today, though abdomen soft.     Differential for abdominal pain is broad, includes early appendicitis, ovarian cyst +/- intermittent torsion (less likely torsion given pain more crampy vs sharp), constipation, recurrent ulcer (reviewed [facility] documentation from 2019 indicating history of aphthous ulcer of the ileus). She is reporting vaginal discharge that is more brown in nature, which would be unusual in the context of only constipation. Has had some mildly elevated LFTs over the past few years with findings of hepatic steatosis, though with location of pain, this is unlikely to be currently contributing.     We did obtain an abdominal xr today in clinic. On my initial read, it does appear she has significant stool burden primarily in the right abdomen, consistent with the location of her pain. Will await formal radiology read as well. To treat constipation, will have her take 2-3 capfuls of miralax daily (1 capful 2-3 times daily), maintain hydration. She can also use milk of magnesia or magnesium citrate if she would like, but I do feel that an ongoing daily regimen will be most helpful to break up stool over time. Continue miralax at that dose until she is having 2-3 loose stools daily, then decrease to 1 capful daily. If continues to have loose stools after this, would stop miralax.     - XR Abdomen 2 Views; Future  - US Pelvic Complete with Transvaginal; Future    Vaginal discharge  For vaginal discharge, cause remains unclear. The nature of her pain seems unusual for ovarian cyst or torsion. She had is not having symptoms of vaginal yeast or bacterial infection (and has already tried fluconazole  "without improvement). She denies any urinary symptoms. After discussion with patient, we collectively decided to obtain a pelvic ultrasound as she is concerned about this discharge. It is entirely possible that the discharge is not indicative of any underlying condition. She is at risk for PCOS given her weight. If she continues to have issues with discharge ongoing over the next few months, would consider hormone labs to evaluate for PCOS.   - US Pelvic Complete with Transvaginal; Future    Costochondritis  Patient has tenderness over the right costosternal angles. Reassured by this finding. No concerns given age that there is underlying cardiac etiology particularly in the context of tenderness reproducible on exam. Given information on costochondritis and instructions to use naproxen BID for the next week, use heat, and rest the area.        Nicotine/Tobacco Cessation:  She reports that she has been smoking. She has never used smokeless tobacco.  Nicotine/Tobacco Cessation Plan:   Did not discuss in detail today      BMI:   Estimated body mass index is 54.5 kg/m  as calculated from the following:    Height as of this encounter: 1.702 m (5' 7\").    Weight as of this encounter: 157.9 kg (348 lb).   Weight management plan: did not discuss in detail today.       No follow-ups on file.    I spent a total of 30 minutes on the day of the visit.   Time spent doing chart review, history and exam, documentation and further activities per the note    Denise Ramirez MD  [facility]      Jason Green is a 22 year old, presenting for the following health issues:  Chief Complaint   Patient presents with     GI Problem     History of Present Illness       Reason for visit:  Stomach ace/ cramping. Lots of discharge.  Pain in my chest.  Symptom onset:  3-7 days ago  Symptoms include:  See above  Symptom intensity:  Moderate  Symptom progression:  Staying the same  Had these symptoms before:  " No  What makes it worse:  No  What makes it better:  No    She eats 2-3 servings of fruits and vegetables daily.She consumes 1 sweetened beverage(s) daily.She exercises with enough effort to increase her heart rate 30 to 60 minutes per day.  She exercises with enough effort to increase her heart rate 3 or less days per week.   She is taking medications regularly.       Concern - rt sided chest pain  Onset: 7 days   Description: upper rt chest pain   Intensity: moderate  Progression of Symptoms:  intermittent  Accompanying Signs & Symptoms: none  Previous history of similar problem: none  Precipitating factors:        Worsened by: with rt arm motion  Alleviating factors:        Improved by:   Therapies tried and outcome: Advil helped some.    In the past week,   Here for a day, then go away, then come away  Last couple days been present. No activity changes.   Sometimes feels palpitations - feels anxious when it happens, triggered by the pain.   Dull aching moreso, feels like she pulled a muscle.   No changes in activity level recently.     Concern - Lower rt quad pain abdominal  Onset: 7 days  Description: Has noted intermittent pain with some vaginal bleeding.  Intensity: moderate  Progression of Symptoms:  intermittent  Accompanying Signs & Symptoms: no fever or chills  Has had constipation issues.  Previous history of similar problem: none  Precipitating factors:        Worsened by: none  Alleviating factors:   Mother history of colon cancer and she has been screened for liver test. Dad had Hemochromatosis        Improved by:   Therapies tried and outcome: Miralax and omeprazole not helpful.     History of aphthus ulcer in the ileum in 2019 on colonoscopy  egd normal 11/2019  Has long history of abdominal issues  This feels different    Feels like crampy, like period cramps; comes and goes  Sometimes getting pain in the ruq in the side  Lower and then right side. Not up into the back at all  Try to lay on the  "stomach and that helps. Not sharp.   Has not had appendectomy, but has had cholecystectomy    Also, more discharge than normal. Typically has clear to yellow discharge after her period. This time, now brown.   Started on Wednesday at work, thought was getting period again, but not having significant output, just discharge or occasional blood when wiping.   On Saturday had discharge with blood.   Periods usually regular 8/6, prior to that was 7/8, regularly every 28-30 days or so.     Constipation - ongoing issue, either every other day or every day.   Last few days have had miralax - thinking the cramping might be constipation.   Hangs out for longer than typical cramping.     Just got a new job at work, which has been more stressfull for her.   Manager at [employer].    Cramping like this before -   Has had many stomach issues so hard to tell  Feels like this is different but hadn't had issues in a while.     Tried fluconazole, no improvement.    Has also tried:  miralax  Omeprazole  aleve    Review of Systems   Constitutional, HEENT, cardiovascular, pulmonary, gi and gu systems are negative, except as otherwise noted.      Objective    /78   Pulse 92   Temp 98.2  F (36.8  C) (Tympanic)   Resp 16   Ht 1.702 m (5' 7\")   Wt (!) 157.9 kg (348 lb)   LMP 08/06/2022   SpO2 97%   Breastfeeding No   BMI 54.50 kg/m    Body mass index is 54.5 kg/m .  Physical Exam  Constitutional:       Appearance: Normal appearance.   HENT:      Head: Normocephalic.   Eyes:      General: No scleral icterus.     Extraocular Movements: Extraocular movements intact.      Conjunctiva/sclera: Conjunctivae normal.   Cardiovascular:      Rate and Rhythm: Normal rate and regular rhythm.      Heart sounds: Normal heart sounds.   Pulmonary:      Effort: Pulmonary effort is normal.      Breath sounds: Normal breath sounds.   Chest:       Abdominal:      General: Bowel sounds are normal.      Palpations: Abdomen is soft. There is no " mass.      Tenderness: There is abdominal tenderness in the right upper quadrant and right lower quadrant. Negative signs include McBurney's sign.   Musculoskeletal:         General: Normal range of motion.      Cervical back: Normal range of motion.   Neurological:      General: No focal deficit present.      Mental Status: She is alert and oriented to person, place, and time.         Results from this visitNo results found for any visits on 08/23/22.                .  ..

## 2022-08-23 NOTE — NURSING NOTE
"Chief Complaint   Patient presents with     GI Problem       Initial /78   Pulse 92   Temp 98.2  F (36.8  C) (Tympanic)   Resp 16   Ht 1.702 m (5' 7\")   Wt (!) 157.9 kg (348 lb)   LMP 08/06/2022   SpO2 97%   Breastfeeding No   BMI 54.50 kg/m   Estimated body mass index is 54.5 kg/m  as calculated from the following:    Height as of this encounter: 1.702 m (5' 7\").    Weight as of this encounter: 157.9 kg (348 lb).    Patient presents to the clinic using     Health Maintenance that is potentially due pending provider review:          Is there anyone who you would like to be able to receive your results?   If yes have patient fill out ANAY    "

## 2022-08-24 PROBLEM — K80.20 CHOLELITHIASIS: Status: RESOLVED | Noted: 2017-10-05 | Resolved: 2022-08-24

## 2022-08-24 PROBLEM — N84.0 ABNORMAL UTERINE BLEEDING DUE TO ENDOMETRIAL POLYP: Status: ACTIVE | Noted: 2022-08-24

## 2022-08-24 PROBLEM — N93.9 ABNORMAL UTERINE BLEEDING DUE TO ENDOMETRIAL POLYP: Status: ACTIVE | Noted: 2022-08-24

## 2022-08-24 NOTE — PROGRESS NOTES
Addendum 08/24/2022 8:24 AM     Received a page from radiology to call regarding results. Patient's ultrasound showed an endometrial polyp measuring up to 2.4 cm.     Called patient to discuss these results. Roughly 95% of the time, in premenopausal patients, these endometrial polyps are benign. Because she is experiencing bleeding between periods with this, she may be a candidate for polypectomy. Patient's biggest risk factor for polyp formation is obesity. She does not have strong family history of uterine cancer (maternal great grandmother did have uterine cancer) or any strong family history of breast cancer.     Plan:  - order hysterosonography for better characterization  - priority referral to GYN for evaluation, consideration of polypectomy.     Patient reported understanding after our discussion of these findings and plan moving forward.     Denise Ramirez MD

## 2022-08-29 ENCOUNTER — OFFICE VISIT (OUTPATIENT)
Dept: OBGYN | Facility: CLINIC | Age: 22
End: 2022-08-29
Payer: COMMERCIAL

## 2022-08-29 VITALS
HEIGHT: 67 IN | HEART RATE: 98 BPM | RESPIRATION RATE: 18 BRPM | DIASTOLIC BLOOD PRESSURE: 70 MMHG | WEIGHT: 293 LBS | BODY MASS INDEX: 45.99 KG/M2 | SYSTOLIC BLOOD PRESSURE: 129 MMHG | TEMPERATURE: 98.8 F

## 2022-08-29 DIAGNOSIS — N93.9 ABNORMAL UTERINE BLEEDING (AUB): ICD-10-CM

## 2022-08-29 DIAGNOSIS — R93.5 ABNORMAL ULTRASOUND OF ENDOMETRIUM: Primary | ICD-10-CM

## 2022-08-29 LAB
HCG UR QL: NEGATIVE
INTERNAL QC OK POCT: NORMAL
POCT KIT EXPIRATION DATE: NORMAL
POCT KIT LOT NUMBER: NORMAL

## 2022-08-29 PROCEDURE — 88305 TISSUE EXAM BY PATHOLOGIST: CPT | Performed by: PATHOLOGY

## 2022-08-29 PROCEDURE — 58100 BIOPSY OF UTERUS LINING: CPT | Performed by: OBSTETRICS & GYNECOLOGY

## 2022-08-29 PROCEDURE — 99203 OFFICE O/P NEW LOW 30 MIN: CPT | Mod: 25 | Performed by: OBSTETRICS & GYNECOLOGY

## 2022-08-29 PROCEDURE — 81025 URINE PREGNANCY TEST: CPT | Performed by: OBSTETRICS & GYNECOLOGY

## 2022-08-29 RX ORDER — IBUPROFEN 200 MG
800 TABLET ORAL ONCE
Status: COMPLETED | OUTPATIENT
Start: 2022-08-29 | End: 2022-08-29

## 2022-08-29 RX ORDER — COVID-19 ANTIGEN TEST
220 KIT MISCELLANEOUS 2 TIMES DAILY WITH MEALS
COMMUNITY
End: 2023-09-12

## 2022-08-29 RX ADMIN — Medication 800 MG: at 15:38

## 2022-08-29 NOTE — PROGRESS NOTES
Chief Complaint   Patient presents with     Consult     Abnormal bleeding, uterine bleeding, endometrial polyp on us         HPI:  Norma Collins, 22 year old,    presents with complaints of an abnormal ultrasound.  She came in a couple weeks ago with some right-sided pain.  She was having a little bit more vaginal discharge than normal and there was a little bit of blood in the discharge so they ordered a pelvic ultrasound.  She normally has regular periods that are about every 28 days.  They are generally heavy but regular in timing.  She had one episode of some bleeding on Wednesday and a little bit more the following Saturday but her abnormal bleeding has stopped since then.      Past Medical History:   Diagnosis Date     Acanthosis nigricans      Adenotonsillar hypertrophy      Arthropathy of ankle and foot      Obesity      PCOS (polycystic ovarian syndrome)      Past Surgical History:   Procedure Laterality Date     CHOLECYSTECTOMY       LAPAROSCOPIC CHOLECYSTECTOMY       OTHER SURGICAL HISTORY      none     TONSILLECTOMY       TONSILLECTOMY & ADENOIDECTOMY Bilateral 2017    Procedure: BILATERAL TONSILLECTOMY AND ADENOIDECTOMY;  Surgeon: Jacob Arguello MD;  Location: [facility];  Service:      Social History     Socioeconomic History     Marital status: Single     Spouse name: Not on file     Number of children: Not on file     Years of education: Not on file     Highest education level: Not on file   Occupational History     Not on file   Tobacco Use     Smoking status: Current Every Day Smoker     Types: Vaping Device     Smokeless tobacco: Never Used   Vaping Use     Vaping Use: Every day     Substances: Nicotine, Flavoring     Devices: Disposable   Substance and Sexual Activity     Alcohol use: Yes     Comment: occ     Drug use: Never     Sexual activity: Yes     Partners: Male     Birth control/protection: Condom   Other Topics Concern     Not on file   Social History  Narrative     Not on file     Social Determinants of Health     Financial Resource Strain: Not on file   Food Insecurity: Not on file   Transportation Needs: Not on file   Physical Activity: Not on file   Stress: Not on file   Social Connections: Not on file   Intimate Partner Violence: Not on file   Housing Stability: Not on file     Family History   Problem Relation Age of Onset     Colon Cancer Mother 43     Cancer Mother         colon     Venous thrombosis Mother         cancer-related     Cerebrovascular Disease Mother      Liver Disease Father         cirrhosis of liver     Other - See Comments Father         high iron     Cirrhosis Father      Hemochromatosis Father      Hypertension Father      Hyperlipidemia Father      Myocardial Infarction Maternal Grandmother      Heart Disease Maternal Grandmother      Dementia Maternal Grandmother      Cerebrovascular Disease Paternal Grandmother      Other - See Comments Maternal Grandfather         heart attack or cancer/unsure     Bladder Cancer Maternal Grandfather      Cirrhosis Paternal Grandfather      Other - See Comments Paternal Grandfather         high iron     Diabetes Paternal Grandfather      Hemochromatosis Paternal Grandfather      Kidney Disease Paternal Grandfather      Hypertension Paternal Grandfather      Colon Cancer Maternal Great-Grandmother         70-90 years old      Uterine Cancer Maternal Great-Grandmother      Breast Cancer Maternal Great-Grandmother      Brain Cancer Maternal Aunt         Current Outpatient Medications   Medication Sig Dispense Refill     naproxen sodium 220 MG capsule Take 220 mg by mouth 2 times daily (with meals)       omeprazole (PRILOSEC) 20 MG DR capsule Take 20 mg by mouth daily       Polyethylene Glycol 3350 (MIRALAX PO)           Allergies:     Allergies   Allergen Reactions     [place] Sprouts [Brassica Oleracea Italica]      Keflex [Cephalexin] Itching     Other Food Allergy Rash     [place] sprouts     "    ROS:  See HPI      Physical Exam:  /70 (BP Location: Right arm, Patient Position: Chair, Cuff Size: Adult Large)   Pulse 98   Temp 98.8  F (37.1  C) (Tympanic)   Resp 18   Ht 1.702 m (5' 7\")   Wt (!) 157.9 kg (348 lb)   LMP 08/06/2022   BMI 54.50 kg/m       Appearance: well developed, well nourished, no acute distress  Head Exam normocephalic, no lesions or deformities  Abdomen: soft, non-tender, no masses, no organomegaly, no hernia,  Skin: no lesions  Extremities: no edema  Psych: orientated x3    Genitourinary Exam  Vulva: normal, no lesions  Urethral meatus: normal size and location, no lesions or discharge  Urethra: no tenderness or masses  Bladder: no fullness or tenderness  Vagina: no cystocele and rectocele  Cervix: normal appearance, no lesions, no discharge, no cervical motion tenderness  Uterus: normal position, mobile, non-tender, difficult to palpate  Adnexa: no palpable masses bilaterally    Procedure: Endometrial Biopsy.  Indication:  Abnormal ultrasound of endometrium       The consent was signed. The patient was premedicated with Ibuprofen.  A speculum was placed in the vagina and the cervix was visualized.  The cervix was cleaned with betadyne x3. A pipelle was inserted.  The uterus sounded to 8 cm.   Adequate sample taken and sent to pathology.    Bleeding was minimal at end of procedure. The patient tolerated the procedure well.  She was discharged with instructions to call for heavy bleeding, foul smelling discharge, fevers, chills, pain or concerns.          Assessment:  (R93.5) Abnormal ultrasound of endometrium  (primary encounter diagnosis)      (N93.9) Abnormal uterine bleeding (AUB)      Plan:    She had an ultrasound suggestive of an endometrial polyp.  Endometrial biopsy discussed and performed today.  Her pap was normal last year and a recent GC/Chlam and wet prep were normal.  We discussed hysteroscopy with polypectomy if her endometrial biopsy is benign.  We also " discussed options of hysterosonogram or hysterectomy, however, I feel hysteroscopy with removal of any polyp at that time would be more efficient.        40 minutes spent on 8/29/2022 doing chart review, review of outside records, review of test results, patient visit, documentation, separate from endometrial biopsy procedure time..        Cyndi Sauer MD on 8/29/2022 at 3:14 PM

## 2022-08-29 NOTE — PATIENT INSTRUCTIONS
You can take up to 800 mg of Ibuprofen every 8 hours for cramps.  You can add tylenol to the ibuprofen if needed.  Heat and rest can help with cramps.      Endometrial Biopsy  Endometrial biopsy is a procedure used to study the lining of the uterus. The uterus is also called the endometrium. The biopsy is usually done in your healthcare provider s office. During the biopsy, small tissue samples are taken from inside the uterus. These are then sent to a lab for study. If any problems are found, you and your healthcare provider will discuss treatment options. The biopsy usually takes only a few minutes, and you can often go back to your normal routine as soon as the procedure is over.   Reasons for the procedure  Endometrial biopsy may help pinpoint the cause of certain problems. These include:   Abnormal Pap test results  Bleeding after menopause  Bleeding linked to hormone therapy  Having certain types of cancer  Heavy or irregular menstrual periods  Prolonged bleeding  Trouble getting pregnant (fertility problems)  Damage to the uterine wall (very rare)  What are the risks?  Problems with endometrial biopsy are rare, but can include:   Bleeding  Damage to the uterine wall (very rare)  Infection  .   After the procedure  After the procedure, you may experience the following:   If you feel lightheaded or dizzy, you can rest on the table until you re ready to get dressed.  For a few hours, you may feel some mild cramping. This can usually be relieved with over-the-counter pain medicines.  You may have some bleeding for a few days. Use pads instead of tampons.  Don t douche or use any vaginal medicines unless your healthcare provider says it s OK.  Ask your healthcare provider when it s OK to have sex again.  Follow-up care  It will take about a week for the biopsy results to come back from the lab. Then you and your healthcare provider can discuss the results. These may show that no treatment is required. Or you  may be scheduled for a follow-up appointment and more tests. If your biopsy was done for fertility problems, be sure to record the day when your next period begins.   Call your healthcare provider   Call your healthcare provider if you have any of the following:   Fever of 100.4 F (38 C) or higher, or as directed by your healthcare provider  Foul-smelling or unusual vaginal discharge  Heavy bleeding (soaking more than 1 pad an hour for 2 hours)  Severe cramping or increasing pain  [email or web address] last reviewed this educational content on 5/1/2020 2000-2021 [employer]. All rights reserved. This information is not intended as a substitute for professional medical care. Always follow your healthcare professional's instructions.

## 2022-08-29 NOTE — NURSING NOTE
"Initial /70 (BP Location: Right arm, Patient Position: Chair, Cuff Size: Adult Large)   Pulse 98   Temp 98.8  F (37.1  C) (Tympanic)   Resp 18   Ht 1.702 m (5' 7\")   Wt (!) 157.9 kg (348 lb)   LMP 08/06/2022   BMI 54.50 kg/m   Estimated body mass index is 54.5 kg/m  as calculated from the following:    Height as of this encounter: 1.702 m (5' 7\").    Weight as of this encounter: 157.9 kg (348 lb). .      "

## 2022-08-31 ENCOUNTER — TELEPHONE (OUTPATIENT)
Dept: OBGYN | Facility: CLINIC | Age: 22
End: 2022-08-31

## 2022-08-31 ENCOUNTER — PREP FOR PROCEDURE (OUTPATIENT)
Dept: OBGYN | Facility: CLINIC | Age: 22
End: 2022-08-31

## 2022-08-31 DIAGNOSIS — N84.0 ENDOMETRIAL POLYP: Primary | ICD-10-CM

## 2022-08-31 LAB
PATH REPORT.COMMENTS IMP SPEC: NORMAL
PATH REPORT.COMMENTS IMP SPEC: NORMAL
PATH REPORT.FINAL DX SPEC: NORMAL
PATH REPORT.GROSS SPEC: NORMAL
PATH REPORT.MICROSCOPIC SPEC OTHER STN: NORMAL
PATH REPORT.RELEVANT HX SPEC: NORMAL
PHOTO IMAGE: NORMAL

## 2022-08-31 RX ORDER — ACETAMINOPHEN 325 MG/1
975 TABLET ORAL ONCE
Status: CANCELLED | OUTPATIENT
Start: 2022-10-14 | End: 2022-08-31

## 2022-09-06 NOTE — TELEPHONE ENCOUNTER
I called pt to follow up with her about scheduling procedure and she stated that after talking with her family they have decided to get a sec opionion before going any furthure. Her appointment is scheduled for the end of Sept. Pt stated she will call us if she decides to proceed.      Geno Livingston   Clinic Station    [facility]  [phone]

## 2022-09-23 ENCOUNTER — MYC MEDICAL ADVICE (OUTPATIENT)
Dept: FAMILY MEDICINE | Facility: CLINIC | Age: 22
End: 2022-09-23

## 2022-09-23 ENCOUNTER — VIRTUAL VISIT (OUTPATIENT)
Dept: FAMILY MEDICINE | Facility: CLINIC | Age: 22
End: 2022-09-23
Payer: COMMERCIAL

## 2022-09-23 DIAGNOSIS — E66.01 OBESITY, CLASS III, BMI 40-49.9 (MORBID OBESITY) (H): Primary | ICD-10-CM

## 2022-09-23 PROCEDURE — 99213 OFFICE O/P EST LOW 20 MIN: CPT | Mod: 95 | Performed by: NURSE PRACTITIONER

## 2022-09-23 NOTE — TELEPHONE ENCOUNTER
Pharmacy called stating med is declined.--tien, reports step med is needed/alternative or to do a prior auth. -- did not provide alternative option.     Here is want insurance gave to process Prior auth.   Pharmacy benefits with ismael.   ID: [id number]  Ph. [phone]  Pharmacy [facility].     Note from insurance needs to met step therapy or ELAYNE GIRARD  Station

## 2022-09-23 NOTE — PROGRESS NOTES
Norma is a 22 year old who is being evaluated via a billable telephone visit.      What phone number would you like to be contacted at? [phone]  How would you like to obtain your AVS? [place]    Assessment & Plan     Obesity, Class III, BMI 40-49.9 (morbid obesity) (H)    - semaglutide (OZEMPIC) 2 MG/1.5ML SOPN pen; Inject 0.25 mg Subcutaneous every 7 days for 28 days, THEN 0.5 mg every 7 days for 28 days.             FUTURE APPOINTMENTS:       - Follow-up visit in 3-4 months, message me in 2 months if you want to increase the dose. Discussed thyroid symptoms to watch for.    No follow-ups on file.    XENA Deng [facility]   Norma is a 22 year old, presenting for the following health issues:  Medication Request (Requesting either ozempic or wegovy for uterine polyps and ovarian cysts. )      HPI       Having issues with vaginal bleeding and uterine polyps- advised by OBGYN to try this for weight loss, which in turn would help lower the incidence of the polyps/cysts.    Review of Systems   Constitutional, HEENT, cardiovascular, pulmonary, gi and gu systems are negative, except as otherwise noted.      Objective           Vitals:  No vitals were obtained today due to virtual visit.    Physical Exam   alert and no distress  PSYCH: Alert and oriented times 3; coherent speech, normal   rate and volume, able to articulate logical thoughts, able   to abstract reason, no tangential thoughts, no hallucinations   or delusions  Her affect is normal and pleasant  RESP: No cough, no audible wheezing, able to talk in full sentences  Remainder of exam unable to be completed due to telephone visits                Phone call duration: 9 minutes

## 2022-09-26 ENCOUNTER — TELEPHONE (OUTPATIENT)
Dept: FAMILY MEDICINE | Facility: CLINIC | Age: 22
End: 2022-09-26

## 2022-09-26 NOTE — TELEPHONE ENCOUNTER
A new telephone encounter has been created for the PA request and forwarded  to   PA Pool for processing.     Thank you,   Dante BROWNE   [place] PA Team    Message text

## 2022-09-26 NOTE — TELEPHONE ENCOUNTER
Central Prior Authorization Team   Phone: [phone]         EVE Collins  Legal Name:   Norma Collins  Female, 22 year old, 2000  MRN:   [id number]  SB#:   #[id number]  Phone:   [phone] (M)        Weight:   (!) 157.9 kg (348 lb)  HM Due?:   Due    Allergies  [place] Sprouts [Brassica Oleracea Italica],      Keflex [Cephalexin],      Other Food Allergy    Pain Agreement:   Not on File    Pref Language:   English    PCP:   Nathaly Erazo APRN CNP  Primary Cvg:   [facility] [place]    Next Appt  With Family Practice  10/04/2022 at 2:30 PM                    Medication  Received: Today  Martha Jack RN  P [facility]           Previous Messages      Prior Auth - Medication (Ozempic), MyChart Communication    Martha Jack RN routed conversation to [facility] 1 minute ago (1:49 PM)     Mratha Jack RN 1 minute ago (1:48 PM)     Nathaly Winters APRN CNP routed conversation to [facility] Pool 31 minutes ago (1:15 PM)      Nathaly Erazo APRN CNP 31 minutes ago (1:15 PM)      AD            Please send for prior auth       Unsigned   Documentation                   Documentation      Nathaly Erazo APRN CNP routed conversation to [facility] Pool 34 minutes ago (1:15 PM)     Nathaly Erazo APRN CNP 34 minutes ago (1:15 PM)     AD       Please send for prior auth      Unsigned   Documentation      Martha Jack RN routed conversation to Nathaly Erazo APRN CNP 3 days ago     Winnie Cueva 3 days ago            Pharmacy called stating med is declined.--ozempia, reports step med is needed/alternative or to do a prior auth. -- did not provide alternative option.      Here is want insurance gave to process Prior auth.   Pharmacy benefits with ismael.   ID: [id number]  . [phone]  Pharmacy [facility].      Note from insurance needs to met step therapy or PA.                Winnie GIRARD  Station                Documentation      [facility] [address] [place] [address] [phone]  Winnie Cueva 3 days ago     Norma Collins Amanda Jean, APRN CNP 3 days ago     EVE Andersen      I just got off the the with the pharmacy. The pharmacist said they need pre-authorization from you in the insurance company before they can fill the medication.     Thank you   Norma

## 2022-09-27 NOTE — TELEPHONE ENCOUNTER
PRIOR AUTHORIZATION DENIED    Medication: semaglutide (OZEMPIC) 2 MG/1.5ML SOPN pen    Denial Date: 9/27/2022    Denial Rational:                   Appeal Information:    If you would like to appeal, please supply P/A team with a letter of medical necessity with clinical reason.

## 2022-09-27 NOTE — TELEPHONE ENCOUNTER
Central Prior Authorization Team   Phone: [phone]    PA Initiation    Medication: semaglutide (OZEMPIC) 2 MG/1.5ML SOPN pen  Insurance Company: [facility] - Phone [phone] Fax [phone]  Pharmacy Filling the Rx[id number] [place] [facility] [place] [place] - [address] [place] [address]  Filling Pharmacy Phone: [phone]  Filling Pharmacy Fax:    Start Date: 9/27/2022

## 2022-10-04 ENCOUNTER — OFFICE VISIT (OUTPATIENT)
Dept: FAMILY MEDICINE | Facility: CLINIC | Age: 22
End: 2022-10-04
Payer: COMMERCIAL

## 2022-10-04 VITALS
HEART RATE: 62 BPM | HEIGHT: 67 IN | WEIGHT: 293 LBS | TEMPERATURE: 98 F | OXYGEN SATURATION: 98 % | DIASTOLIC BLOOD PRESSURE: 66 MMHG | SYSTOLIC BLOOD PRESSURE: 128 MMHG | RESPIRATION RATE: 18 BRPM | BODY MASS INDEX: 45.99 KG/M2

## 2022-10-04 DIAGNOSIS — N93.9 ABNORMAL UTERINE BLEEDING DUE TO ENDOMETRIAL POLYP: ICD-10-CM

## 2022-10-04 DIAGNOSIS — E66.813 CLASS 3 SEVERE OBESITY DUE TO EXCESS CALORIES WITHOUT SERIOUS COMORBIDITY WITH BODY MASS INDEX (BMI) OF 50.0 TO 59.9 IN ADULT (H): ICD-10-CM

## 2022-10-04 DIAGNOSIS — E66.01 CLASS 3 SEVERE OBESITY DUE TO EXCESS CALORIES WITHOUT SERIOUS COMORBIDITY WITH BODY MASS INDEX (BMI) OF 50.0 TO 59.9 IN ADULT (H): ICD-10-CM

## 2022-10-04 DIAGNOSIS — Z83.49 FAMILY HISTORY OF HEMOCHROMATOSIS: ICD-10-CM

## 2022-10-04 DIAGNOSIS — Z01.818 PREOP GENERAL PHYSICAL EXAM: Primary | ICD-10-CM

## 2022-10-04 DIAGNOSIS — N84.0 ABNORMAL UTERINE BLEEDING DUE TO ENDOMETRIAL POLYP: ICD-10-CM

## 2022-10-04 LAB
ALBUMIN SERPL BCG-MCNC: 4.2 G/DL (ref 3.5–5.2)
ALP SERPL-CCNC: 78 U/L (ref 35–104)
ALT SERPL W P-5'-P-CCNC: 54 U/L (ref 10–35)
ANION GAP SERPL CALCULATED.3IONS-SCNC: 11 MMOL/L (ref 7–15)
AST SERPL W P-5'-P-CCNC: 38 U/L (ref 10–35)
BILIRUB SERPL-MCNC: 0.4 MG/DL
BUN SERPL-MCNC: 15.9 MG/DL (ref 6–20)
CALCIUM SERPL-MCNC: 9.3 MG/DL (ref 8.6–10)
CHLORIDE SERPL-SCNC: 101 MMOL/L (ref 98–107)
CREAT SERPL-MCNC: 0.79 MG/DL (ref 0.51–0.95)
DEPRECATED HCO3 PLAS-SCNC: 25 MMOL/L (ref 22–29)
ERYTHROCYTE [DISTWIDTH] IN BLOOD BY AUTOMATED COUNT: 13 % (ref 10–15)
FERRITIN SERPL-MCNC: 107 NG/ML (ref 6–175)
GFR SERPL CREATININE-BSD FRML MDRD: >90 ML/MIN/1.73M2
GLUCOSE SERPL-MCNC: 110 MG/DL (ref 70–99)
HCT VFR BLD AUTO: 38.6 % (ref 35–47)
HGB BLD-MCNC: 12.8 G/DL (ref 11.7–15.7)
MCH RBC QN AUTO: 28.1 PG (ref 26.5–33)
MCHC RBC AUTO-ENTMCNC: 33.2 G/DL (ref 31.5–36.5)
MCV RBC AUTO: 85 FL (ref 78–100)
PLATELET # BLD AUTO: 206 10E3/UL (ref 150–450)
POTASSIUM SERPL-SCNC: 4.4 MMOL/L (ref 3.4–5.3)
PROT SERPL-MCNC: 7.2 G/DL (ref 6.4–8.3)
RBC # BLD AUTO: 4.56 10E6/UL (ref 3.8–5.2)
SODIUM SERPL-SCNC: 137 MMOL/L (ref 136–145)
WBC # BLD AUTO: 10.1 10E3/UL (ref 4–11)

## 2022-10-04 PROCEDURE — 36415 COLL VENOUS BLD VENIPUNCTURE: CPT | Performed by: NURSE PRACTITIONER

## 2022-10-04 PROCEDURE — 85027 COMPLETE CBC AUTOMATED: CPT | Performed by: NURSE PRACTITIONER

## 2022-10-04 PROCEDURE — 80053 COMPREHEN METABOLIC PANEL: CPT | Performed by: NURSE PRACTITIONER

## 2022-10-04 PROCEDURE — 82728 ASSAY OF FERRITIN: CPT | Performed by: NURSE PRACTITIONER

## 2022-10-04 PROCEDURE — 99214 OFFICE O/P EST MOD 30 MIN: CPT | Performed by: NURSE PRACTITIONER

## 2022-10-04 ASSESSMENT — PAIN SCALES - GENERAL: PAINLEVEL: NO PAIN (0)

## 2022-10-04 NOTE — PROGRESS NOTES
[facility]  [id number]  [facility] [address]  Phone: [phone]  Primary Provider: Nathaly Erazo  Pre-op Performing Provider: NAHTALY ERAZO      PREOPERATIVE EVALUATION:  Today's date: 10/4/2022  Chief Complaint   Patient presents with     Pre-Op Exam     Flu Shot     Declined       Medication Reconciliation     Patient wasn't able to start Ozempic due to not covered by insurance     Health Maintenance     Declined all vaccines at this appt.            Norma Collins is a 22 year old female who presents for a preoperative evaluation.    Surgical Information:  Surgery/Procedure: HYSTEROSCOPY WITH POLYPECTOMY  Surgery Location: [facility]   Surgeon: HYSTEROSCOPY WITH POLYPECTOMY  Surgery Date: 10/14/202  Time of Surgery: 10:30 AM  Where patient plans to recover: At home with family  Fax number for surgical facility: Note does not need to be faxed, will be available electronically in Epic.    Type of Anesthesia Anticipated: MAC    Assessment & Plan     The proposed surgical procedure is considered INTERMEDIATE risk.    Preop general physical exam    - CBC with platelets; Future  - Comprehensive metabolic panel (BMP + Alb, Alk Phos, ALT, AST, Total. Bili, TP); Future  - CBC with platelets  - Comprehensive metabolic panel (BMP + Alb, Alk Phos, ALT, AST, Total. Bili, TP)    Abnormal uterine bleeding due to endometrial polyp      Family history of hemochromatosis    - CBC with platelets; Future  - Ferritin; Future  - CBC with platelets  - Ferritin    Class 3 severe obesity due to excess calories without serious comorbidity with body mass index (BMI) of 50.0 to 59.9 in adult (H)    - Comprehensive Weight Management; Future         Risks and Recommendations:  The patient has the following additional risks and recommendations for perioperative complications:   - Morbid obesity (BMI >40)    Medication Instructions:   - naproxen (Aleve, Naprosyn): HOLD 4 days before  surgery.     RECOMMENDATION:  APPROVAL GIVEN to proceed with proposed procedure, without further diagnostic evaluation.            Subjective     HPI related to upcoming procedure: DUB secondary to uterine polyp    Preop Questions 10/4/2022   1. Have you ever had a heart attack or stroke? No   2. Have you ever had surgery on your heart or blood vessels, such as a stent placement, a coronary artery bypass, or surgery on an artery in your head, neck, heart, or legs? No   3. Do you have chest pain with activity? No   4. Do you have a history of  heart failure? No   5. Do you currently have a cold, bronchitis or symptoms of other infection? No   6. Do you have a cough, shortness of breath, or wheezing? No   7. Do you or anyone in your family have previous history of blood clots? YES - mother   8. Do you or does anyone in your family have a serious bleeding problem such as prolonged bleeding following surgeries or cuts? No   9. Have you ever had problems with anemia or been told to take iron pills? No   10. Have you had any abnormal blood loss such as black, tarry or bloody stools, or abnormal vaginal bleeding? UNKNOWN - vaginal bleeding   11. Have you ever had a blood transfusion? No   12. Are you willing to have a blood transfusion if it is medically needed before, during, or after your surgery? Yes   13. Have you or any of your relatives ever had problems with anesthesia? No   14. Do you have sleep apnea, excessive snoring or daytime drowsiness? No   15. Do you have any artifical heart valves or other implanted medical devices like a pacemaker, defibrillator, or continuous glucose monitor? No   16. Do you have artificial joints? No   17. Are you allergic to latex? No   18. Is there any chance that you may be pregnant? No       Health Care Directive:  Patient does not have a Health Care Directive or Living Will: Discussed advance care planning with patient; however, patient declined at this time.    Preoperative Review  of :   reviewed - no record of controlled substances prescribed.     PDMP Review       Value Time User    State PDMP site checked  Yes 10/4/2022  2:16 PM Nathaly Erazo APRN CNP              Status of Chronic Conditions:  See problem list for active medical problems.  Problems all longstanding and stable, except as noted/documented.  See ROS for pertinent symptoms related to these conditions.      Review of Systems  CONSTITUTIONAL: NEGATIVE for fever, chills, change in weight  INTEGUMENTARY/SKIN: NEGATIVE for worrisome rashes, moles or lesions  EYES: NEGATIVE for vision changes or irritation  ENT/MOUTH: NEGATIVE for ear, mouth and throat problems  RESP: NEGATIVE for significant cough or SOB  CV: NEGATIVE for chest pain, palpitations or peripheral edema  GI: NEGATIVE for nausea, abdominal pain, heartburn, or change in bowel habits   female: dysmenorrhea  MUSCULOSKELETAL: NEGATIVE for significant arthralgias or myalgia  NEURO: NEGATIVE for weakness, dizziness or paresthesias  ENDOCRINE: NEGATIVE for temperature intolerance, skin/hair changes  HEME: NEGATIVE for bleeding problems  PSYCHIATRIC: NEGATIVE for changes in mood or affect    Patient Active Problem List    Diagnosis Date Noted     Abnormal uterine bleeding due to endometrial polyp 08/24/2022     Priority: Medium     Change in bowel habit 08/23/2022     Priority: Medium     Ileitis 08/23/2022     Priority: Medium     Family history of colon cancer 07/08/2021     Priority: Medium     Mother at age 40       Family history of hemochromatosis 07/08/2021     Priority: Medium     Father and Grandfather       Acanthosis nigricans 12/08/2020     Priority: Medium     Created by Conversion      Last Assessment & Plan:   Patient doing very well and adding exercise, and watching diet closely.  We'll now add metformin as per orders.  Side effects and precautions discussed.  Follow up in one month.       Arthropathy of ankle and foot 12/08/2020     Priority:  "Medium     Created by Conversion    Replacement Utility updated for latest IMO load    Last Assessment & Plan:   With diffuse joint aches treating with progressive weight loss and lifestyle modifications.       Iron deficiency anemia due to chronic blood loss 11/22/2019     Priority: Medium     Aphthous ulcer of ileum 10/30/2019     Priority: Medium     Elevated BP without diagnosis of hypertension 10/16/2017     Priority: Medium     Adenotonsillar hypertrophy 01/04/2017     Priority: Medium     PCOS (polycystic ovarian syndrome) 02/29/2016     Priority: Medium     Last Assessment & Plan:   Her dietary changes are working very well.  Also working very well for the household.  She is exercising more, having more energy, and pants are fitting looser.  We'll plan for in body at next visit along with remeasurement of abdominal and neck circumference.  Also with her forgetting the metformin in the morning, will move it to the afternoon with lunch.       Class 3 severe obesity due to excess calories without serious comorbidity with body mass index (BMI) of 50.0 to 59.9 in adult (H) 10/30/2015     Priority: Medium     Last Assessment & Plan:   Continue lifestyle modifications.  Will breakfast a [place] protein-based with shakes or aches.  May also use chicken or steak.  Stopped carbohydrates in the morning.  Better food choices discussed.  Encouraged to watch the documentary, \"In defense of food\" before next visit.       Obesity, Class III, BMI 40-49.9 (morbid obesity) (H) 10/30/2015     Priority: Medium     Last Assessment & Plan:   Formatting of this note might be different from the original.  Continue lifestyle modifications.  Will breakfast a [place] protein-based with shakes or aches.  May also use chicken or steak.  Stopped carbohydrates in the morning.  Better food choices discussed.  Encouraged to watch the documentary, \"In defense of food\" before next visit.        Past Medical History:   Diagnosis Date     Acanthosis " "nigricans      Adenotonsillar hypertrophy      Arthropathy of ankle and foot      Obesity      PCOS (polycystic ovarian syndrome)      Past Surgical History:   Procedure Laterality Date     CHOLECYSTECTOMY       LAPAROSCOPIC CHOLECYSTECTOMY       OTHER SURGICAL HISTORY      none     TONSILLECTOMY       TONSILLECTOMY & ADENOIDECTOMY Bilateral 2/16/2017    Procedure: BILATERAL TONSILLECTOMY AND ADENOIDECTOMY;  Surgeon: Jacob Arguello MD;  Location: [facility];  Service:      Current Outpatient Medications   Medication Sig Dispense Refill     naproxen sodium 220 MG capsule Take 220 mg by mouth 2 times daily (with meals)       omeprazole (PRILOSEC) 20 MG DR capsule Take 20 mg by mouth daily       Polyethylene Glycol 3350 (MIRALAX PO)        semaglutide (OZEMPIC) 2 MG/1.5ML SOPN pen Inject 0.25 mg Subcutaneous every 7 days for 28 days, THEN 0.5 mg every 7 days for 28 days. (Patient not taking: Reported on 10/4/2022) 2.25 mL 0       Allergies   Allergen Reactions     Gl[place] Sprouts [Brassica Oleracea Italica]      Keflex [Cephalexin] Itching     Other Food Allergy Rash     Glasgow sprouts        Social History     Tobacco Use     Smoking status: Current Every Day Smoker     Types: Vaping Device     Smokeless tobacco: Never Used   Substance Use Topics     Alcohol use: Yes     Comment: occ       History   Drug Use Unknown         Objective     /66   Pulse 62   Temp 98  F (36.7  C) (Tympanic)   Resp 18   Ht 1.702 m (5' 7\")   Wt (!) 161.5 kg (356 lb)   LMP 09/07/2022   SpO2 98%   Breastfeeding No   BMI 55.76 kg/m      Physical Exam    GENERAL APPEARANCE: alert and active     EYES: EOMI, PERRL     HENT: oral mucous membranes moist and oropharynx clear     NECK: no adenopathy, no asymmetry, masses, or scars and thyroid normal to palpation     RESP: lungs clear to auscultation - no rales, rhonchi or wheezes     CV: regular rates and rhythm, normal S1 S2, no S3 or S4 and no murmur, click or " rub     ABDOMEN: soft, nontender, without hepatosplenomegaly or masses and no palpable masses     MS: extremities normal- no gross deformities noted, no evidence of inflammation in joints, FROM in all extremities.     SKIN: no suspicious lesions or rashes     NEURO: Normal strength and tone, sensory exam grossly normal, mentation intact and speech normal     PSYCH: mentation appears normal. and affect normal/bright     LYMPHATICS: No cervical adenopathy    Recent Labs   Lab Test 12/09/21  0746 09/20/21  1100 08/19/21  1010   HGB 12.3  --  13.1     --  256   INR 0.96  --   --     138 138   POTASSIUM 4.2 4.1 4.0   CR 0.86 0.80 0.78        Diagnostics:  Labs pending at this time.  Results will be reviewed when available.   No EKG required, no history of coronary heart disease, significant arrhythmia, peripheral arterial disease or other structural heart disease.    Revised Cardiac Risk Index (RCRI):  The patient has the following serious cardiovascular risks for perioperative complications:   - No serious cardiac risks = 0 points     RCRI Interpretation: 0 points: Class I (very low risk - 0.4% complication rate)           Signed Electronically by: XENA Deng CNP  Copy of this evaluation report is provided to requesting physician.

## 2022-10-04 NOTE — PATIENT INSTRUCTIONS
Preparing for Your Surgery  Getting started  A nurse will call you to review your health history and instructions. They will give you an arrival time based on your scheduled surgery time. Please be ready to share:    Your doctor's clinic name and phone number    Your medical, surgical and anesthesia history    A list of allergies and sensitivities    A list of medicines, including herbal treatments and over-the-counter drugs    Whether the patient has a legal guardian (ask how to send us the papers in advance)  Please tell us if you're pregnant--or if there's any chance you might be pregnant. Some surgeries may injure a fetus (unborn baby), so they require a pregnancy test. Surgeries that are safe for a fetus don't always need a test, and you can choose whether to have one.   If you have a child who's having surgery, please ask for a copy of Preparing for Your Child's Surgery.    Preparing for surgery    Within 10 to 30 days of surgery: Have a pre-op exam (sometimes called an H&P, or History and Physical). This can be done at a clinic or pre-operative center.  ? If you're having a , you may not need this exam. Talk to your care team.    At your pre-op exam, talk to your care team about all medicines you take. If you need to stop any medicines before surgery, ask when to start taking them again.  ? We do this for your safety. Many medicines can make you bleed too much during surgery. Some change how well surgery (anesthesia) drugs work.    Call your insurance company to let them know you're having surgery. (If you don't have insurance, call [phone].)    Call your clinic if there's any change in your health. This includes signs of a cold or flu (sore throat, runny nose, cough, rash, fever). It also includes a scrape or scratch near the surgery site.    If you have questions on the day of surgery, call your hospital or surgery center.  COVID testing  You may need to be tested for COVID-19 before having  surgery. If so, we will give you instructions (or click here).  Eating and drinking guidelines  For your safety: Unless your surgeon tells you otherwise, follow the guidelines below.    Eat and drink as usual until 8 hours before surgery. After that, no food or milk.    Drink clear liquids until 2 hours before surgery. These are liquids you can see through, like water, Gatorade and Propel Water. You may also have black coffee and tea (no cream or milk).    Nothing by mouth within 2 hours of surgery. This includes gum, candy and breath mints.    If you drink alcohol: Stop drinking it the night before surgery.    If your care team tells you to take medicine on the morning of surgery, it's okay to take it with a sip of water.  Preventing infection    Shower or bathe the night before and morning of your surgery. Follow the instructions your clinic gave you. (If no instructions, use regular soap.)    Don't shave or clip hair near your surgery site. We'll remove the hair if needed.    Don't smoke or vape the morning of surgery. You may chew nicotine gum up to 2 hours before surgery. A nicotine patch is okay.  ? Note: Some surgeries require you to completely quit smoking and nicotine. Check with your surgeon.    Your care team will make every effort to keep you safe from infection. We will:  ? Clean our hands often with soap and water (or an alcohol-based hand rub).  ? Clean the skin at your surgery site with a special soap that kills germs.  ? Give you a special gown to keep you warm. (Cold raises the risk of infection.)  ? Wear special hair covers, masks, gowns and gloves during surgery.  ? Give antibiotic medicine, if prescribed. Not all surgeries need antibiotics.  What to bring on the day of surgery    Photo ID and insurance card    Copy of your health care directive, if you have one    Glasses and hearing aides (bring cases)  ? You can't wear contacts during surgery    Inhaler and eye drops, if you use them (tell us  about these when you arrive)    CPAP machine or breathing device, if you use them    A few personal items, if spending the night    If you have . . .  ? A pacemaker, ICD (cardiac defibrillator) or other implant: Bring the ID card.  ? An implanted stimulator: Bring the remote control.  ? A legal guardian: Bring a copy of the certified (court-stamped) guardianship papers.  Please remove any jewelry, including body piercings. Leave jewelry and other valuables at home.  If you're going home the day of surgery    You must have a responsible adult drive you home. They should stay with you overnight as well.    If you don't have someone to stay with you, and you aren't safe to go home alone, we may keep you overnight. Insurance often won't pay for this.  After surgery  If it's hard to control your pain or you need more pain medicine, please call your surgeon's office.  Questions?   If you have any questions for your care team, list them here: _________________________________________________________________________________________________________________________________________________________________________ ____________________________________ ____________________________________ ____________________________________  For informational purposes only. Not to replace the advice of your health care provider. Copyright   2003, 2019 [facility] [facility]. All rights reserved. Clinically reviewed by Shahrzad Medrano MD. [employer] 287476 - REV 07/22.    Preparing for Your Surgery  Getting started  A nurse will call you to review your health history and instructions. They will give you an arrival time based on your scheduled surgery time. Please be ready to share:    Your doctor's clinic name and phone number    Your medical, surgical and anesthesia history    A list of allergies and sensitivities    A list of medicines, including herbal treatments and over-the-counter drugs    Whether the patient has a legal guardian (ask how to  send us the papers in advance)  Please tell us if you're pregnant--or if there's any chance you might be pregnant. Some surgeries may injure a fetus (unborn baby), so they require a pregnancy test. Surgeries that are safe for a fetus don't always need a test, and you can choose whether to have one.   If you have a child who's having surgery, please ask for a copy of Preparing for Your Child's Surgery.    Preparing for surgery    Within 10 to 30 days of surgery: Have a pre-op exam (sometimes called an H&P, or History and Physical). This can be done at a clinic or pre-operative center.  ? If you're having a , you may not need this exam. Talk to your care team.    At your pre-op exam, talk to your care team about all medicines you take. If you need to stop any medicines before surgery, ask when to start taking them again.  ? We do this for your safety. Many medicines can make you bleed too much during surgery. Some change how well surgery (anesthesia) drugs work.    Call your insurance company to let them know you're having surgery. (If you don't have insurance, call [phone].)    Call your clinic if there's any change in your health. This includes signs of a cold or flu (sore throat, runny nose, cough, rash, fever). It also includes a scrape or scratch near the surgery site.    If you have questions on the day of surgery, call your hospital or surgery center.  COVID testing  You may need to be tested for COVID-19 before having surgery. If so, we will give you instructions (or click here).  Eating and drinking guidelines  For your safety: Unless your surgeon tells you otherwise, follow the guidelines below.    Eat and drink as usual until 8 hours before surgery. After that, no food or milk.    Drink clear liquids until 2 hours before surgery. These are liquids you can see through, like water, Gatorade and Propel Water. You may also have black coffee and tea (no cream or milk).    Nothing by mouth within 2  hours of surgery. This includes gum, candy and breath mints.    If you drink alcohol: Stop drinking it the night before surgery.    If your care team tells you to take medicine on the morning of surgery, it's okay to take it with a sip of water.  Preventing infection    Shower or bathe the night before and morning of your surgery. Follow the instructions your clinic gave you. (If no instructions, use regular soap.)    Don't shave or clip hair near your surgery site. We'll remove the hair if needed.    Don't smoke or vape the morning of surgery. You may chew nicotine gum up to 2 hours before surgery. A nicotine patch is okay.  ? Note: Some surgeries require you to completely quit smoking and nicotine. Check with your surgeon.    Your care team will make every effort to keep you safe from infection. We will:  ? Clean our hands often with soap and water (or an alcohol-based hand rub).  ? Clean the skin at your surgery site with a special soap that kills germs.  ? Give you a special gown to keep you warm. (Cold raises the risk of infection.)  ? Wear special hair covers, masks, gowns and gloves during surgery.  ? Give antibiotic medicine, if prescribed. Not all surgeries need antibiotics.  What to bring on the day of surgery    Photo ID and insurance card    Copy of your health care directive, if you have one    Glasses and hearing aides (bring cases)  ? You can't wear contacts during surgery    Inhaler and eye drops, if you use them (tell us about these when you arrive)    CPAP machine or breathing device, if you use them    A few personal items, if spending the night    If you have . . .  ? A pacemaker, ICD (cardiac defibrillator) or other implant: Bring the ID card.  ? An implanted stimulator: Bring the remote control.  ? A legal guardian: Bring a copy of the certified (court-stamped) guardianship papers.  Please remove any jewelry, including body piercings. Leave jewelry and other valuables at home.  If you're going  home the day of surgery    You must have a responsible adult drive you home. They should stay with you overnight as well.    If you don't have someone to stay with you, and you aren't safe to go home alone, we may keep you overnight. Insurance often won't pay for this.  After surgery  If it's hard to control your pain or you need more pain medicine, please call your surgeon's office.  Questions?   If you have any questions for your care team, list them here: _________________________________________________________________________________________________________________________________________________________________________ ____________________________________ ____________________________________ ____________________________________  For informational purposes only. Not to replace the advice of your health care provider. Copyright   2003, 2019 [employer]. All rights reserved. Clinically reviewed by Shahrzad Medrano MD. [employer] [id number] - REV 07/22.

## 2022-10-05 NOTE — RESULT ENCOUNTER NOTE
Zena Green    Your liver enzymes are stable, other labs all look good. Please let us know if you have any questions.     Take care,    XENA Rojo CNP

## 2022-10-14 ENCOUNTER — HOSPITAL ENCOUNTER (OUTPATIENT)
Facility: HOSPITAL | Age: 22
Discharge: HOME | End: 2022-10-14
Attending: OBSTETRICS & GYNECOLOGY | Admitting: OBSTETRICS & GYNECOLOGY
Payer: COMMERCIAL

## 2022-10-14 ENCOUNTER — ANESTHESIA EVENT (OUTPATIENT)
Dept: SURGERY | Facility: HOSPITAL | Age: 22
End: 2022-10-14
Payer: COMMERCIAL

## 2022-10-14 ENCOUNTER — ANESTHESIA (OUTPATIENT)
Dept: SURGERY | Facility: HOSPITAL | Age: 22
End: 2022-10-14
Payer: COMMERCIAL

## 2022-10-14 VITALS
TEMPERATURE: 98.2 F | RESPIRATION RATE: 20 BRPM | SYSTOLIC BLOOD PRESSURE: 135 MMHG | BODY MASS INDEX: 55.85 KG/M2 | OXYGEN SATURATION: 99 % | HEART RATE: 63 BPM | WEIGHT: 293 LBS | DIASTOLIC BLOOD PRESSURE: 67 MMHG

## 2022-10-14 DIAGNOSIS — N84.0 ABNORMAL UTERINE BLEEDING DUE TO ENDOMETRIAL POLYP: Primary | ICD-10-CM

## 2022-10-14 DIAGNOSIS — N93.9 ABNORMAL UTERINE BLEEDING DUE TO ENDOMETRIAL POLYP: Primary | ICD-10-CM

## 2022-10-14 LAB — HCG UR QL: NEGATIVE

## 2022-10-14 PROCEDURE — 258N000003 HC RX IP 258 OP 636: Performed by: ANESTHESIOLOGY

## 2022-10-14 PROCEDURE — 999N000141 HC STATISTIC PRE-PROCEDURE NURSING ASSESSMENT: Performed by: OBSTETRICS & GYNECOLOGY

## 2022-10-14 PROCEDURE — 370N000017 HC ANESTHESIA TECHNICAL FEE, PER MIN: Performed by: OBSTETRICS & GYNECOLOGY

## 2022-10-14 PROCEDURE — 250N000011 HC RX IP 250 OP 636: Performed by: NURSE ANESTHETIST, CERTIFIED REGISTERED

## 2022-10-14 PROCEDURE — 250N000011 HC RX IP 250 OP 636: Performed by: ANESTHESIOLOGY

## 2022-10-14 PROCEDURE — 88305 TISSUE EXAM BY PATHOLOGIST: CPT | Mod: TC | Performed by: OBSTETRICS & GYNECOLOGY

## 2022-10-14 PROCEDURE — 710N000012 HC RECOVERY PHASE 2, PER MINUTE: Performed by: OBSTETRICS & GYNECOLOGY

## 2022-10-14 PROCEDURE — 999N000127 HC STATISTIC PERIPHERAL IV START W US GUIDANCE

## 2022-10-14 PROCEDURE — 250N000009 HC RX 250: Performed by: OBSTETRICS & GYNECOLOGY

## 2022-10-14 PROCEDURE — 360N000076 HC SURGERY LEVEL 3, PER MIN: Performed by: OBSTETRICS & GYNECOLOGY

## 2022-10-14 PROCEDURE — 88305 TISSUE EXAM BY PATHOLOGIST: CPT | Mod: 26 | Performed by: PATHOLOGY

## 2022-10-14 PROCEDURE — 81025 URINE PREGNANCY TEST: CPT | Performed by: PHYSICIAN ASSISTANT

## 2022-10-14 PROCEDURE — 272N000001 HC OR GENERAL SUPPLY STERILE: Performed by: OBSTETRICS & GYNECOLOGY

## 2022-10-14 PROCEDURE — 250N000009 HC RX 250: Performed by: NURSE ANESTHETIST, CERTIFIED REGISTERED

## 2022-10-14 PROCEDURE — 250N000013 HC RX MED GY IP 250 OP 250 PS 637: Performed by: OBSTETRICS & GYNECOLOGY

## 2022-10-14 PROCEDURE — 710N000009 HC RECOVERY PHASE 1, LEVEL 1, PER MIN: Performed by: OBSTETRICS & GYNECOLOGY

## 2022-10-14 RX ORDER — OXYCODONE HYDROCHLORIDE 5 MG/1
5-10 TABLET ORAL EVERY 4 HOURS PRN
Qty: 10 TABLET | Refills: 0 | Status: SHIPPED | OUTPATIENT
Start: 2022-10-14 | End: 2022-11-08

## 2022-10-14 RX ORDER — GLYCOPYRROLATE 0.2 MG/ML
INJECTION, SOLUTION INTRAMUSCULAR; INTRAVENOUS PRN
Status: DISCONTINUED | OUTPATIENT
Start: 2022-10-14 | End: 2022-10-14

## 2022-10-14 RX ORDER — ACETAMINOPHEN 325 MG/1
975 TABLET ORAL ONCE
Status: COMPLETED | OUTPATIENT
Start: 2022-10-14 | End: 2022-10-14

## 2022-10-14 RX ORDER — ONDANSETRON 2 MG/ML
4 INJECTION INTRAMUSCULAR; INTRAVENOUS EVERY 30 MIN PRN
Status: DISCONTINUED | OUTPATIENT
Start: 2022-10-14 | End: 2022-10-14 | Stop reason: HOSPADM

## 2022-10-14 RX ORDER — ACETAMINOPHEN 325 MG/1
975 TABLET ORAL ONCE
Status: DISCONTINUED | OUTPATIENT
Start: 2022-10-14 | End: 2022-10-14

## 2022-10-14 RX ORDER — ONDANSETRON 4 MG/1
4 TABLET, ORALLY DISINTEGRATING ORAL EVERY 30 MIN PRN
Status: DISCONTINUED | OUTPATIENT
Start: 2022-10-14 | End: 2022-10-14 | Stop reason: HOSPADM

## 2022-10-14 RX ORDER — LIDOCAINE HYDROCHLORIDE 10 MG/ML
INJECTION, SOLUTION INFILTRATION; PERINEURAL PRN
Status: DISCONTINUED | OUTPATIENT
Start: 2022-10-14 | End: 2022-10-14

## 2022-10-14 RX ORDER — PROPOFOL 10 MG/ML
INJECTION, EMULSION INTRAVENOUS CONTINUOUS PRN
Status: DISCONTINUED | OUTPATIENT
Start: 2022-10-14 | End: 2022-10-14

## 2022-10-14 RX ORDER — SODIUM CHLORIDE, SODIUM LACTATE, POTASSIUM CHLORIDE, CALCIUM CHLORIDE 600; 310; 30; 20 MG/100ML; MG/100ML; MG/100ML; MG/100ML
INJECTION, SOLUTION INTRAVENOUS CONTINUOUS
Status: DISCONTINUED | OUTPATIENT
Start: 2022-10-14 | End: 2022-10-14 | Stop reason: HOSPADM

## 2022-10-14 RX ORDER — KETOROLAC TROMETHAMINE 30 MG/ML
15 INJECTION, SOLUTION INTRAMUSCULAR; INTRAVENOUS ONCE
Status: COMPLETED | OUTPATIENT
Start: 2022-10-14 | End: 2022-10-14

## 2022-10-14 RX ORDER — FENTANYL CITRATE 50 UG/ML
INJECTION, SOLUTION INTRAMUSCULAR; INTRAVENOUS PRN
Status: DISCONTINUED | OUTPATIENT
Start: 2022-10-14 | End: 2022-10-14

## 2022-10-14 RX ORDER — LIDOCAINE 40 MG/G
CREAM TOPICAL
Status: DISCONTINUED | OUTPATIENT
Start: 2022-10-14 | End: 2022-10-14 | Stop reason: HOSPADM

## 2022-10-14 RX ORDER — DEXAMETHASONE SODIUM PHOSPHATE 10 MG/ML
INJECTION, SOLUTION INTRAMUSCULAR; INTRAVENOUS PRN
Status: DISCONTINUED | OUTPATIENT
Start: 2022-10-14 | End: 2022-10-14

## 2022-10-14 RX ORDER — LIDOCAINE HYDROCHLORIDE 10 MG/ML
INJECTION, SOLUTION INFILTRATION; PERINEURAL PRN
Status: DISCONTINUED | OUTPATIENT
Start: 2022-10-14 | End: 2022-10-14 | Stop reason: HOSPADM

## 2022-10-14 RX ORDER — ONDANSETRON 2 MG/ML
INJECTION INTRAMUSCULAR; INTRAVENOUS PRN
Status: DISCONTINUED | OUTPATIENT
Start: 2022-10-14 | End: 2022-10-14

## 2022-10-14 RX ORDER — PROPOFOL 10 MG/ML
INJECTION, EMULSION INTRAVENOUS PRN
Status: DISCONTINUED | OUTPATIENT
Start: 2022-10-14 | End: 2022-10-14

## 2022-10-14 RX ORDER — ACETAMINOPHEN 325 MG/1
975 TABLET ORAL ONCE
Status: DISCONTINUED | OUTPATIENT
Start: 2022-10-14 | End: 2022-10-14 | Stop reason: HOSPADM

## 2022-10-14 RX ORDER — IBUPROFEN 200 MG
800 TABLET ORAL ONCE
Status: DISCONTINUED | OUTPATIENT
Start: 2022-10-14 | End: 2022-10-14 | Stop reason: HOSPADM

## 2022-10-14 RX ADMIN — PROPOFOL 200 MG: 10 INJECTION, EMULSION INTRAVENOUS at 10:53

## 2022-10-14 RX ADMIN — LIDOCAINE HYDROCHLORIDE 3 ML: 10 INJECTION, SOLUTION INFILTRATION; PERINEURAL at 10:50

## 2022-10-14 RX ADMIN — FENTANYL CITRATE 50 MCG: 50 INJECTION, SOLUTION INTRAMUSCULAR; INTRAVENOUS at 10:57

## 2022-10-14 RX ADMIN — FENTANYL CITRATE 25 MCG: 50 INJECTION, SOLUTION INTRAMUSCULAR; INTRAVENOUS at 10:56

## 2022-10-14 RX ADMIN — DEXAMETHASONE SODIUM PHOSPHATE 10 MG: 10 INJECTION, SOLUTION INTRAMUSCULAR; INTRAVENOUS at 10:53

## 2022-10-14 RX ADMIN — FENTANYL CITRATE 25 MCG: 50 INJECTION, SOLUTION INTRAMUSCULAR; INTRAVENOUS at 10:50

## 2022-10-14 RX ADMIN — GLYCOPYRROLATE 0.2 MG: 0.2 INJECTION, SOLUTION INTRAMUSCULAR; INTRAVENOUS at 10:50

## 2022-10-14 RX ADMIN — MIDAZOLAM 2 MG: 1 INJECTION INTRAMUSCULAR; INTRAVENOUS at 10:42

## 2022-10-14 RX ADMIN — SODIUM CHLORIDE, POTASSIUM CHLORIDE, SODIUM LACTATE AND CALCIUM CHLORIDE: 600; 310; 30; 20 INJECTION, SOLUTION INTRAVENOUS at 11:14

## 2022-10-14 RX ADMIN — KETOROLAC TROMETHAMINE 15 MG: 30 INJECTION, SOLUTION INTRAMUSCULAR at 11:58

## 2022-10-14 RX ADMIN — ONDANSETRON 4 MG: 2 INJECTION INTRAMUSCULAR; INTRAVENOUS at 10:53

## 2022-10-14 RX ADMIN — SODIUM CHLORIDE, POTASSIUM CHLORIDE, SODIUM LACTATE AND CALCIUM CHLORIDE 100 ML: 600; 310; 30; 20 INJECTION, SOLUTION INTRAVENOUS at 09:25

## 2022-10-14 RX ADMIN — PROPOFOL 150 MCG/KG/MIN: 10 INJECTION, EMULSION INTRAVENOUS at 10:54

## 2022-10-14 RX ADMIN — ACETAMINOPHEN 975 MG: 325 TABLET, FILM COATED ORAL at 09:25

## 2022-10-14 ASSESSMENT — ACTIVITIES OF DAILY LIVING (ADL)
ADLS_ACUITY_SCORE: 35

## 2022-10-14 ASSESSMENT — LIFESTYLE VARIABLES: TOBACCO_USE: 1

## 2022-10-14 NOTE — ANESTHESIA PREPROCEDURE EVALUATION
Anesthesia Pre-Procedure Evaluation    Patient: Norma Collins   MRN: [id number] : 2000        Procedure : Procedure(s):  HYSTEROSCOPY WITH POLYPECTOMY          Past Medical History:   Diagnosis Date     Abnormal uterine bleeding due to endometrial polyp      Acanthosis nigricans      Adenotonsillar hypertrophy      Aphthous ulcer of ileum      Arthropathy of ankle and foot      Family history of hemochromatosis      Iron deficiency anemia due to chronic blood loss      Obesity      PCOS (polycystic ovarian syndrome)       Past Surgical History:   Procedure Laterality Date     CHOLECYSTECTOMY       LAPAROSCOPIC CHOLECYSTECTOMY       OTHER SURGICAL HISTORY      none     TONSILLECTOMY       TONSILLECTOMY & ADENOIDECTOMY Bilateral 2017    Procedure: BILATERAL TONSILLECTOMY AND ADENOIDECTOMY;  Surgeon: Jacob Arguello MD;  Location: [facility];  Service:       Allergies   Allergen Reactions     Joseph Sprouts [Brassica Oleracea Italica]      Keflex [Cephalexin] Itching     Other Food Allergy Rash     Joseph sprouts      Social History     Tobacco Use     Smoking status: Every Day     Types: Vaping Device     Smokeless tobacco: Never   Substance Use Topics     Alcohol use: Yes     Comment: occ      Wt Readings from Last 1 Encounters:   10/14/22 (!) 161.8 kg (356 lb 9.6 oz)        Anesthesia Evaluation            ROS/MED HX  ENT/Pulmonary:     (+) sleep apnea, ROXANA risk factors, obese, tobacco use, Current use,     Neurologic:  - neg neurologic ROS     Cardiovascular:  - neg cardiovascular ROS     METS/Exercise Tolerance: >4 METS    Hematologic:  - neg hematologic  ROS     Musculoskeletal:   (+) arthritis,     GI/Hepatic:  - neg GI/hepatic ROS     Renal/Genitourinary:  - neg Renal ROS     Endo:     (+) Obesity,     Psychiatric/Substance Use:  - neg psychiatric ROS     Infectious Disease:  - neg infectious disease ROS     Malignancy:       Other:            Physical Exam    Airway         Mallampati: II   TM distance: > 3 FB   Neck ROM: full     Respiratory Devices and Support         Dental  no notable dental history         Cardiovascular          Rhythm and rate: regular     Pulmonary           breath sounds clear to auscultation           OUTSIDE LABS:  CBC:   Lab Results   Component Value Date    WBC 10.1 10/04/2022    WBC 7.6 12/09/2021    HGB 12.8 10/04/2022    HGB 12.3 12/09/2021    HCT 38.6 10/04/2022    HCT 37.1 12/09/2021     10/04/2022     12/09/2021     BMP:   Lab Results   Component Value Date     10/04/2022     12/09/2021    POTASSIUM 4.4 10/04/2022    POTASSIUM 4.2 12/09/2021    CHLORIDE 101 10/04/2022    CHLORIDE 108 12/09/2021    CO2 25 10/04/2022    CO2 28 12/09/2021    BUN 15.9 10/04/2022    BUN 17 12/09/2021    CR 0.79 10/04/2022    CR 0.86 12/09/2021     (H) 10/04/2022    GLC 96 12/09/2021     COAGS:   Lab Results   Component Value Date    INR 0.96 12/09/2021     POC:   Lab Results   Component Value Date    HCG Negative 10/14/2022    HCGS Negative 09/09/2019     HEPATIC:   Lab Results   Component Value Date    ALBUMIN 4.2 10/04/2022    PROTTOTAL 7.2 10/04/2022    ALT 54 (H) 10/04/2022    AST 38 (H) 10/04/2022    ALKPHOS 78 10/04/2022    BILITOTAL 0.4 10/04/2022     OTHER:   Lab Results   Component Value Date    LACT 0.7 04/11/2020    A1C 5.3 10/30/2015    LOUIS 9.3 10/04/2022    LIPASE 14 07/08/2020    TSH 1.96 09/20/2021    CRP 0.7 07/08/2020       Anesthesia Plan    ASA Status:  3   NPO Status:  NPO Appropriate    Anesthesia Type: General.     - Airway: LMA      Maintenance: TIVA.        Consents    Anesthesia Plan(s) and associated risks, benefits, and realistic alternatives discussed. Questions answered and patient/representative(s) expressed understanding.     - Discussed: Risks, Benefits and Alternatives for the PROCEDURE were discussed     - Discussed with:  Patient         Postoperative Care    Pain management: Multi-modal analgesia.    PONV prophylaxis: Dexamethasone or Solumedrol, Ondansetron (or other 5HT-3)     Comments:    Other Comments:     Toradol if ok with surgeon            Hilary Hill MD

## 2022-10-14 NOTE — OP NOTE
PROCEDURE NOTE - HYSTEROSCOPY AND DILATION AND CURETTAGE     Name: Norma Collins   : 2000   MRN: [id number]     DATE OF SERVICE:  10/14/2022     PREOPERATIVE DIAGNOSIS: uterine polyp    POSTOPERATIVE DIAGNOSIS: Dysfunctional uterine bleeding secondary to uterine polyp    PROCEDURES: Hysteroscopy, dilatation and curettage, polypectomy    SURGEON: Aston Yanes MD     ASSISTANT: OR staff    ANESTHESIA:  mac    ESTIMATED BLOOD LOSS:   [unfilled]    HISTORY OF PRESENT ILLNESS:  This is a 22 year old female with history of dysfunctional uterine bleeding thought to be secondary to uterine polyp. She had a transvaginal ultrasound prior to which showed the above.  She was given informed consent for the above procedure. The risks, benefits and alternatives were discussed with her including but not exclusive to uterine perforation, bleeding and infection. She expressed understanding and wished to proceed.     PROCEDURE:  The patient was brought to the operating room and after induction of MAC anesthesia was prepped and draped in the dorsal lithotomy position. A time out was called and the patient and the procedure were verified.  A bimanual exam was done, indicating no adnexal masses. No other abnormalities were noted.     A sterile weighted speculum was then placed. The anterior lip of the cervix was grasped with a single tooth tenaculum. Uterine cavity was then sounded to 8cm. The cervix was gently dilated using Ada dilators and the hysteroscope was introduced without difficulty. The cavity of the uterus appeared atrophic except for a posterior 2 cm polyp. The hysteroscope was removed. At this point endometrial curettings were obtained by curettage. All quadrants were sampled, and the tissue was submitted for pathologic exam. The hysteroscope was reinserted and the uterine cavity was visualized again.  At this point good hemostasis was noted and the hysteroscope was removed once again. Instruments  were removed from vagina. No active bleeding was noted. Sponge and needle counts were correct X 2. She was taken to recovery in stable condition.    Aston Yanes MD        CC: Nathaly Erazo Kevin Alexander Hallman, MD

## 2022-10-14 NOTE — H&P
HISTORY and PHYSICAL    NAME: Norma Collins   : 2000   MRN: [id number]      [facility]    ADMISSION DATE: 10/14/2022    PCP:  Nathaly Erazo     CHIEF COMPLAINT: vaginal bleeding    HPI:  Norma Collins is having vaginal bleeding probably related toa uterine polyp. Patient is a 22 year old [unfilled] Female. History is obtained through the patient and chart review. Patient reports that she has bleeding in between her periods. Patient states that these symptoms began 6 months ago. Patient reports a previous history of endometrial polyps. Patient denies a history of adenomyosis. Patient states that she has not been evaluated for this previously. Patient has not required a transfusion previously secondary to this type of bleeding. She denies lightheaded, chest pain or shortness of breath. Previous treatment for this has included: OCPs.      Patient's last menstrual period was 2022.     PAST MEDICAL HISTORY:  Past Medical History:   Diagnosis Date     Abnormal uterine bleeding due to endometrial polyp      Acanthosis nigricans      Adenotonsillar hypertrophy      Aphthous ulcer of ileum      Arthropathy of ankle and foot      Family history of hemochromatosis      Iron deficiency anemia due to chronic blood loss      Obesity      PCOS (polycystic ovarian syndrome)        PAST SURGICAL HISTORY:  Past Surgical History:   Procedure Laterality Date     CHOLECYSTECTOMY       LAPAROSCOPIC CHOLECYSTECTOMY       OTHER SURGICAL HISTORY      none     TONSILLECTOMY       TONSILLECTOMY & ADENOIDECTOMY Bilateral 2017    Procedure: BILATERAL TONSILLECTOMY AND ADENOIDECTOMY;  Surgeon: Jacob Arguello MD;  Location: [facility];  Service:        SOCIAL HISTORY:  Social History     Socioeconomic History     Marital status: Single     Spouse name: Not on file     Number of children: Not on file     Years of education: Not on file     Highest education level: Not on file   Occupational History      Not on file   Tobacco Use     Smoking status: Every Day     Types: Vaping Device     Smokeless tobacco: Never   Vaping Use     Vaping Use: Every day     Substances: Nicotine, Flavoring     Devices: Disposable   Substance and Sexual Activity     Alcohol use: Yes     Comment: occ     Drug use: Never     Sexual activity: Yes     Partners: Male     Birth control/protection: Condom   Other Topics Concern     Not on file   Social History Narrative     Not on file     Social Determinants of Health     Financial Resource Strain: Not on file   Food Insecurity: Not on file   Transportation Needs: Not on file   Physical Activity: Not on file   Stress: Not on file   Social Connections: Not on file   Intimate Partner Violence: Not on file   Housing Stability: Not on file       MEDICATIONS:  Current Facility-Administered Medications   Medication     lactated ringers infusion     lidocaine (LMX4) cream     lidocaine 1 % 0.1-1 mL     PRE OP antibiotics NOT needed for this surgical procedure     PRE OP antibiotics NOT needed for this surgical procedure     sodium chloride (PF) 0.9% PF flush 3 mL     sodium chloride (PF) 0.9% PF flush 3 mL       ALLERGIES:  Allergies   Allergen Reactions     [place] Sprouts [Brassica Oleracea Italica]      Keflex [Cephalexin] Itching     Other Food Allergy Rash     [place] sprouts       REVIEW OF SYSTEMS    Negative except what is stated in the HPI    PHYSICAL EXAM:  /71   Pulse 88   Temp 97.7  F (36.5  C) (Temporal)   Resp 18   Wt (!) 161.8 kg (356 lb 9.6 oz)   LMP 09/07/2022   SpO2 98%   BMI 55.85 kg/m     General Appearance: Alert, appropriate appearance for age. No acute distress,   HEENT : Grossly normal  Chest/Respiratory: Normal chest wall and respirations. Clear to auscultation.,   Cardiovascular: Regular rate and rhythm   Gastrointestinal: Obese  Pelvic Exam Female:  Deferred,   Musculoskeletal Exam: Back IS non-tender, no cva tenderness, moving all four extremities  Skin:  no rash or abnormalities,   Neurologic: Normal gait and speech, no tremor  Psychiatric: Alert and oriented, appropriate affect.    LABS  Lab Results   Component Value Date    HGB 12.8 10/04/2022     [unfilled]  Lab Results: personally reviewed.     IMAGING:  [unfilled]  Radiology Results: Personally reviewed impression/s    IMPRESSION:  22 year old year old female with polyp noted on US      RECOMMENDATIONS:  - Discussed options with the patient   - hgb and vitals signs are Stable  - would recommend polypectomy/dilation and curettage      Thank you for allowing us to participate in the care of this patient.  Please contact us with any questions/concerns.    Aston Yanes MD       CC: Nathaly Erazo, Aston Yanes MD

## 2022-10-14 NOTE — ANESTHESIA CARE TRANSFER NOTE
Patient: Norma Collins    Procedure: Procedure(s):  HYSTEROSCOPY WITH POLYPECTOMY       Diagnosis: Polyp of corpus uteri [N84.0]  Diagnosis Additional Information: No value filed.    Anesthesia Type:   General     Note:    Oropharynx: oropharynx clear of all foreign objects and spontaneously breathing  Level of Consciousness: awake  Oxygen Supplementation: face mask  Level of Supplemental Oxygen (L/min / FiO2): 6  Independent Airway: airway patency satisfactory and stable  Dentition: dentition unchanged  Vital Signs Stable: post-procedure vital signs reviewed and stable  Report to RN Given: handoff report given  Patient transferred to: PACU    Handoff Report: Identifed the Patient, Identified the Reponsible Provider, Reviewed the pertinent medical history, Discussed the surgical course, Reviewed Intra-OP anesthesia mangement and issues during anesthesia, Set expectations for post-procedure period and Allowed opportunity for questions and acknowledgement of understanding      Vitals:  Vitals Value Taken Time   /82 10/14/22 1128   Temp 36.8  C (98.2  F) 10/14/22 1128   Pulse 106    Resp 16 10/14/22 1128   SpO2 98%        Electronically Signed By: XENA Piper CRNA  October 14, 2022  11:34 AM

## 2022-10-14 NOTE — ANESTHESIA POSTPROCEDURE EVALUATION
Patient: Norma Collins    Procedure: Procedure(s):  HYSTEROSCOPY WITH POLYPECTOMY       Anesthesia Type:  General    Note:     Postop Pain Control: Uneventful            Sign Out: Well controlled pain   PONV: No   Neuro/Psych: Uneventful            Sign Out: Acceptable/Baseline neuro status   Airway/Respiratory: Uneventful            Sign Out: Acceptable/Baseline resp. status   CV/Hemodynamics: Uneventful            Sign Out: Acceptable CV status; No obvious hypovolemia; No obvious fluid overload   Other NRE: NONE   DID A NON-ROUTINE EVENT OCCUR? No           Last vitals:  Vitals Value Taken Time   /74 10/14/22 1215   Temp 36.8  C (98.2  F) 10/14/22 1215   Pulse     Resp 16 10/14/22 1215   SpO2 99 % 10/14/22 1158       Electronically Signed By: Hilary Hill MD  October 14, 2022  6:53 PM

## 2022-10-14 NOTE — ANESTHESIA PROCEDURE NOTES
Airway       Patient location during procedure: OR  Staff -        Anesthesiologist:  Hilary Hill MD       CRNA: Blanche Tubbs APRN CRNA       Performed By: CRNAIndications and Patient Condition       Indications for airway management: bernabe-procedural       Induction type:intravenous       Mask difficulty assessment: 0 - not attempted    Final Airway Details       Final airway type: supraglottic airway    Supraglottic Airway Details        Type: LMA       LMA size: 4    Post intubation assessment        Placement verified by: capnometry, equal breath sounds and chest rise        Number of attempts at approach: 1       Secured with: silk tape       Ease of procedure: easy       Dentition: Intact and Unchanged

## 2022-10-18 ENCOUNTER — OFFICE VISIT (OUTPATIENT)
Dept: FAMILY MEDICINE | Facility: CLINIC | Age: 22
End: 2022-10-18
Payer: COMMERCIAL

## 2022-10-18 ENCOUNTER — HOSPITAL ENCOUNTER (OUTPATIENT)
Dept: CT IMAGING | Facility: CLINIC | Age: 22
Discharge: HOME | End: 2022-10-18
Attending: NURSE PRACTITIONER | Admitting: NURSE PRACTITIONER
Payer: COMMERCIAL

## 2022-10-18 ENCOUNTER — ANCILLARY PROCEDURE (OUTPATIENT)
Dept: GENERAL RADIOLOGY | Facility: CLINIC | Age: 22
End: 2022-10-18
Attending: NURSE PRACTITIONER
Payer: COMMERCIAL

## 2022-10-18 VITALS
HEART RATE: 68 BPM | TEMPERATURE: 97.3 F | DIASTOLIC BLOOD PRESSURE: 82 MMHG | WEIGHT: 293 LBS | HEIGHT: 67 IN | BODY MASS INDEX: 45.99 KG/M2 | OXYGEN SATURATION: 97 % | RESPIRATION RATE: 16 BRPM | SYSTOLIC BLOOD PRESSURE: 130 MMHG

## 2022-10-18 DIAGNOSIS — R07.9 CHEST PAIN, UNSPECIFIED TYPE: ICD-10-CM

## 2022-10-18 DIAGNOSIS — R07.9 CHEST PAIN, UNSPECIFIED TYPE: Primary | ICD-10-CM

## 2022-10-18 PROCEDURE — 250N000011 HC RX IP 250 OP 636: Performed by: RADIOLOGY

## 2022-10-18 PROCEDURE — 71046 X-RAY EXAM CHEST 2 VIEWS: CPT | Mod: TC | Performed by: RADIOLOGY

## 2022-10-18 PROCEDURE — 71275 CT ANGIOGRAPHY CHEST: CPT

## 2022-10-18 PROCEDURE — 93000 ELECTROCARDIOGRAM COMPLETE: CPT | Performed by: NURSE PRACTITIONER

## 2022-10-18 PROCEDURE — 99214 OFFICE O/P EST MOD 30 MIN: CPT | Performed by: NURSE PRACTITIONER

## 2022-10-18 PROCEDURE — 250N000009 HC RX 250: Performed by: RADIOLOGY

## 2022-10-18 RX ORDER — IOPAMIDOL 755 MG/ML
85 INJECTION, SOLUTION INTRAVASCULAR ONCE
Status: COMPLETED | OUTPATIENT
Start: 2022-10-18 | End: 2022-10-18

## 2022-10-18 RX ORDER — ASPIRIN 81 MG
100 TABLET, DELAYED RELEASE (ENTERIC COATED) ORAL DAILY
COMMUNITY
End: 2023-01-06

## 2022-10-18 RX ADMIN — IOPAMIDOL 85 ML: 755 INJECTION, SOLUTION INTRAVENOUS at 12:49

## 2022-10-18 RX ADMIN — SODIUM CHLORIDE 100 ML: 9 INJECTION, SOLUTION INTRAVENOUS at 12:53

## 2022-10-18 ASSESSMENT — PAIN SCALES - GENERAL: PAINLEVEL: SEVERE PAIN (6)

## 2022-10-18 NOTE — PROGRESS NOTES
Assessment & Plan     Chest pain, unspecified type  No acute distress.  With acute chest pain and shortness of breath worsening after surgical procedure chest x-ray and EKG completed which were unremarkable for cause.  CT scan ordered to rule out PE with chest pain, shortness of breath and recent surgery.  Norma was sent to [place] for stat CT scan.  - XR Chest 2 Views; Future  - EKG 12-lead complete w/read - Clinics  - CT Chest Pulmonary Embolism w Contrast; Future      No follow-ups on file.    XENA Zavala CNP  [facility]    Jason Green is a 22 year old, presenting for the following health issues:  Chest Pain      HPI     Chest Pain  Onset/Duration: 1 mo or more- worse after surgery on 10/14/22  Description:   Location:middle of chest   Character: sharp and heavy  Radiation: into breasts at times   Duration: intermittent can last a few hours   Intensity: moderate  Progression of Symptoms: worsening  Accompanying Signs & Symptoms:  Shortness of breath: YES- sometimes   Sweating: No  Nausea/vomiting: YES- nausea   Lightheadedness: No  Palpitations: No  Fever/Chills: No  Cough: No           Heartburn: No  History:   Family history of heart disease: No  Tobacco use: YES - vape   Previous similar symptoms: YES  Precipitating factors:   Worse with exertion: No  Worse with deep breaths: No           Related to eating: No           Better with burping: No  Laying on stomach can help   Therapies tried and outcome: aleve, ibu, oxycodone for previous surgery    Laying on chest helps  Morning are worse- gets better through the day  Taking omeprazole daily 40 mg in the morning- doing this for years.  No breakthrough symptoms  No known recent COVID  No pain with breathing or exercise, does have some shortness of breath    Review of Systems   Constitutional, HEENT, cardiovascular, pulmonary, gi and gu systems are negative, except as otherwise noted.      Objective    /82 (Cuff  "Size: Adult Large)   Pulse 68   Temp 97.3  F (36.3  C) (Tympanic)   Resp 16   Ht 1.702 m (5' 7\")   Wt (!) 161 kg (355 lb)   LMP 10/07/2022   SpO2 97%   BMI 55.60 kg/m    Body mass index is 55.6 kg/m .  Physical Exam   GENERAL: healthy, alert and no distress  NECK: no adenopathy, no asymmetry, masses, or scars and thyroid normal to palpation  RESP: lungs clear to auscultation - no rales, rhonchi or wheezes  CV: regular rate and rhythm, normal S1 S2, no S3 or S4, no murmur  MS: tenderness to palpation right costochondral area  PSYCH: mentation appears normal, affect normal/bright    Xray - Reviewed and interpreted by me.  normal  EKG - Reviewed and interpreted by me appears normal, NSR, normal axis, normal intervals, no acute ST/T changes c/w ischemia, no LVH by voltage criteria                "

## 2022-11-01 ENCOUNTER — MYC MEDICAL ADVICE (OUTPATIENT)
Dept: FAMILY MEDICINE | Facility: CLINIC | Age: 22
End: 2022-11-01

## 2022-11-01 DIAGNOSIS — E66.01 CLASS 3 SEVERE OBESITY DUE TO EXCESS CALORIES WITHOUT SERIOUS COMORBIDITY WITH BODY MASS INDEX (BMI) OF 50.0 TO 59.9 IN ADULT (H): Primary | ICD-10-CM

## 2022-11-01 DIAGNOSIS — E66.813 CLASS 3 SEVERE OBESITY DUE TO EXCESS CALORIES WITHOUT SERIOUS COMORBIDITY WITH BODY MASS INDEX (BMI) OF 50.0 TO 59.9 IN ADULT (H): Primary | ICD-10-CM

## 2022-11-03 DIAGNOSIS — E66.813 CLASS 3 SEVERE OBESITY DUE TO EXCESS CALORIES WITHOUT SERIOUS COMORBIDITY WITH BODY MASS INDEX (BMI) OF 50.0 TO 59.9 IN ADULT (H): ICD-10-CM

## 2022-11-03 DIAGNOSIS — E66.01 CLASS 3 SEVERE OBESITY DUE TO EXCESS CALORIES WITHOUT SERIOUS COMORBIDITY WITH BODY MASS INDEX (BMI) OF 50.0 TO 59.9 IN ADULT (H): ICD-10-CM

## 2022-11-07 ENCOUNTER — MEDICAL CORRESPONDENCE (OUTPATIENT)
Dept: HEALTH INFORMATION MANAGEMENT | Facility: CLINIC | Age: 22
End: 2022-11-07

## 2022-11-07 ENCOUNTER — MYC MEDICAL ADVICE (OUTPATIENT)
Dept: FAMILY MEDICINE | Facility: CLINIC | Age: 22
End: 2022-11-07

## 2022-11-07 DIAGNOSIS — Z13.1 SCREENING FOR DIABETES MELLITUS: Primary | ICD-10-CM

## 2022-11-07 NOTE — TELEPHONE ENCOUNTER
Please see patient's MyChart message.    Wilmer ROSENTHAL [facility]

## 2022-11-08 ENCOUNTER — ANCILLARY PROCEDURE (OUTPATIENT)
Dept: GENERAL RADIOLOGY | Facility: CLINIC | Age: 22
End: 2022-11-08
Attending: NURSE PRACTITIONER
Payer: COMMERCIAL

## 2022-11-08 ENCOUNTER — OFFICE VISIT (OUTPATIENT)
Dept: FAMILY MEDICINE | Facility: CLINIC | Age: 22
End: 2022-11-08
Payer: COMMERCIAL

## 2022-11-08 ENCOUNTER — TELEPHONE (OUTPATIENT)
Dept: FAMILY MEDICINE | Facility: CLINIC | Age: 22
End: 2022-11-08

## 2022-11-08 VITALS
HEIGHT: 67 IN | SYSTOLIC BLOOD PRESSURE: 118 MMHG | WEIGHT: 293 LBS | TEMPERATURE: 98.2 F | BODY MASS INDEX: 45.99 KG/M2 | OXYGEN SATURATION: 98 % | DIASTOLIC BLOOD PRESSURE: 70 MMHG | HEART RATE: 74 BPM | RESPIRATION RATE: 16 BRPM

## 2022-11-08 DIAGNOSIS — Z13.1 SCREENING FOR DIABETES MELLITUS: ICD-10-CM

## 2022-11-08 DIAGNOSIS — M25.562 ACUTE PAIN OF LEFT KNEE: ICD-10-CM

## 2022-11-08 DIAGNOSIS — M25.562 ACUTE PAIN OF LEFT KNEE: Primary | ICD-10-CM

## 2022-11-08 LAB — HBA1C MFR BLD: 5.5 % (ref 0–5.6)

## 2022-11-08 PROCEDURE — 73562 X-RAY EXAM OF KNEE 3: CPT | Mod: TC | Performed by: RADIOLOGY

## 2022-11-08 PROCEDURE — 83036 HEMOGLOBIN GLYCOSYLATED A1C: CPT | Performed by: NURSE PRACTITIONER

## 2022-11-08 PROCEDURE — 36415 COLL VENOUS BLD VENIPUNCTURE: CPT | Performed by: NURSE PRACTITIONER

## 2022-11-08 PROCEDURE — 99214 OFFICE O/P EST MOD 30 MIN: CPT | Performed by: NURSE PRACTITIONER

## 2022-11-08 RX ORDER — MELOXICAM 15 MG/1
15 TABLET ORAL DAILY
Qty: 90 TABLET | Refills: 0 | Status: SHIPPED | OUTPATIENT
Start: 2022-11-08 | End: 2023-01-06

## 2022-11-08 ASSESSMENT — PAIN SCALES - GENERAL: PAINLEVEL: SEVERE PAIN (6)

## 2022-11-08 NOTE — NURSING NOTE
"Chief Complaint   Patient presents with     Knee Injury       Initial LMP 10/07/2022  Estimated body mass index is 55.6 kg/m  as calculated from the following:    Height as of 10/18/22: 1.702 m (5' 7\").    Weight as of 10/18/22: 161 kg (355 lb).    Patient presents to the clinic using   Declines Flu, Tdap and all other vaccines at this time.  Is there anyone who you would like to be able to receive your results?   If yes have patient fill out ANAY    "

## 2022-11-08 NOTE — PROGRESS NOTES
Assessment & Plan     Acute pain of left knee  RICE   - XR Knee Left 3 Views  - Orthopedic  Referral  - meloxicam (MOBIC) 15 MG tablet  Dispense: 90 tablet; Refill: 0  - diclofenac (VOLTAREN) 1 % topical gel  Dispense: 150 g; Refill: 1  Crutches given today  Limit weight bearing until seen by ortho   - Miscellaneous Order for DME - ONLY FOR DME    Screening for diabetes mellitus  Done today   - Hemoglobin A1c  Prevention strategies reviewed.     Consider formal referral to weight management center  Follow up with Primary Care Provider       Call or return to the clinic with any worsening of symptoms or no resolution. Patient/Parent verbalized understanding and is in agreement. Medication side effects reviewed.   Current Outpatient Medications   Medication Sig Dispense Refill     diclofenac (VOLTAREN) 1 % topical gel Apply 4 g topically 4 times daily 150 g 1     docusate sodium (COLACE) 100 MG tablet Take 100 mg by mouth daily       meloxicam (MOBIC) 15 MG tablet Take 1 tablet (15 mg) by mouth daily With food 90 tablet 0     naproxen sodium 220 MG capsule Take 220 mg by mouth 2 times daily (with meals)       omeprazole (PRILOSEC) 20 MG DR capsule Take 20 mg by mouth daily       Polyethylene Glycol 3350 (MIRALAX PO)        meclizine (ANTIVERT) 25 MG tablet meclizine 25 mg tablet       SUMAtriptan (IMITREX) 50 MG tablet sumatriptan 50 mg tablet       Chart documentation with Dragon Voice recognition Software. Although reviewed after completion, some words and grammatical errors may remain.    XENA Toro CNP  [facility]    Jason Green is a 22 year old, presenting for the following health issues:  Knee Injury      History of Present Illness       Reason for visit:  Pain in my knee and leg  Symptom onset:  1-3 days ago  Symptoms include:  Throbbing, uncomfortable  Symptom intensity:  Moderate  Symptom progression:  Staying the same  Had these symptoms before:   "No  What makes it worse:  No  What makes it better:  Not moving    She eats 2-3 servings of fruits and vegetables daily.She consumes 2 sweetened beverage(s) daily.She exercises with enough effort to increase her heart rate 30 to 60 minutes per day.  She exercises with enough effort to increase her heart rate 3 or less days per week.   She is taking medications regularly.     Friday night was going up stairs and fell up stairs.   Since then has had pain. Tried topical and oral naproxen and heat.   Felt a pop with running yesterday   Needs A1c done per Primary Care Provider for insurance to look for Type II DM  Family history of prediabetes  Wt Readings from Last 5 Encounters:   22 (!) 158.8 kg (350 lb)   10/18/22 (!) 161 kg (355 lb)   10/14/22 (!) 161.8 kg (356 lb 9.6 oz)   10/04/22 (!) 161.5 kg (356 lb)   22 (!) 157.9 kg (348 lb)   left knee pain medial hard to work   Insurance is giving her a hard time about getting weight loss   Mom  from Colon Cancer  Dad  from liver disease due to high iron hemachromatosis          Review of Systems   Constitutional, HEENT, cardiovascular, pulmonary, GI, , musculoskeletal, neuro, skin, endocrine and psych systems are negative, except as otherwise noted.      Objective    /70   Pulse 74   Temp 98.2  F (36.8  C) (Tympanic)   Resp 16   Ht 1.702 m (5' 7\")   Wt (!) 158.8 kg (350 lb)   LMP 2022   SpO2 98%   BMI 54.82 kg/m    Body mass index is 54.82 kg/m .  Physical Exam   GENERAL: healthy, alert and no distress  EYES: Eyes grossly normal to inspection, PERRL and conjunctivae and sclerae normal  HENT: ear canals and TM's normal, nose and mouth without ulcers or lesions  NECK: no adenopathy, no asymmetry, masses, or scars and thyroid normal to palpation  RESP: lungs clear to auscultation - no rales, rhonchi or wheezes  CV: regular rate and rhythm, normal S1 S2, no S3 or S4, no murmur, click or rub, no peripheral edema and peripheral pulses " strong  ABDOMEN: soft, nontender, no hepatosplenomegaly, no masses and bowel sounds normal  MS: decreased range of motion and swelling left knee. Most tender MCL   SKIN: no suspicious lesions or rashes  NEURO: Normal strength and tone, mentation intact and speech normal  PSYCH: mentation appears normal, affect normal/bright    Reviewed films today xray left knee   No acute fracture or bony abnormality

## 2022-11-08 NOTE — RESULT ENCOUNTER NOTE
Zena Green    Your diabetes screening is negative. First time I've ever been disappointed with that! Do you want a referral to the weight loss clinic? I wonder if they can write a letter of necessity?  Please let us know if you have any questions.     Take care,    XENA Rojo CNP

## 2022-11-08 NOTE — LETTER
[facility]  [address] [place] [address]  Phone: [phone]  Fax: [phone]  REPORT OF WORK ABILITY  NOTE TO EMPLOYEE: You must promptly provide a copy of this report to your  employer or worker's compensation insurer, and Qualified Rehabilitation Consultant.  Date: 11/8/2022                     Employee Name: Norma Collins         YOB: 2000  Medical Record Number: [id number]   Soc.Sec.No: [id number]  Employer: [employer]             Date of Injury: 11/4/2022  Managed Care Organization / Insurance Company Name: [employer]     Diagnosis: STRAIN LEFT KNEE   Work Related: no     MMI: NO   Permanent Partial Disability(PPD) likely: UNKNOWN    EMPLOYEE IS ABLE TO WORK: with restrictions from 11/8/2022  to 11/22/2022 -  Full shift- 10 hours a day      RESTRICTIONS IF ANY:  Crutches/ Scooter/ WC  needed for mobility to decrease weight bearing on left knee      Stand:  Occasionally (2-4 hours)  Walk: Occasionally with crutches   Kneel/[place]:  Not at all (0 hours)  Lift, carry no more than:  10 lb. or lessOccasionally (2-4 hours)  Push/Pull:  10 lb. or lessOccasionally (2-4 hours)  Ladder/Stair climb:  Not at all (0 hours)  OTHER RESTRICTIONS: None  TREATMENT PLAN/NOTES: change work position for comfort, may need to restrict work activities for comfort, continue medications as discussed and knee brace and crutches  Follow up with Ortho .  Harini Zavala MSN,FNP-BC  [facility]  [address] [place] [id number]  [phone]

## 2022-11-08 NOTE — TELEPHONE ENCOUNTER
Central Prior Authorization Team   Phone: [phone]      PRIOR AUTHORIZATION DENIED    Medication: dulaglutide (TRULICITY) 0.75 MG/0.5ML pen - EPA DENIED    Denial Date: 11/8/2022    Denial Rational:       Appeal Information:

## 2022-11-09 RX ORDER — MECLIZINE HYDROCHLORIDE 25 MG/1
TABLET ORAL
COMMUNITY
End: 2023-01-06

## 2022-11-09 RX ORDER — SUMATRIPTAN 50 MG/1
TABLET, FILM COATED ORAL
COMMUNITY
End: 2023-01-06

## 2022-11-09 NOTE — TELEPHONE ENCOUNTER
Subsequent my chart message regarding weight loss management    Martha Jack RN on 11/9/2022 at 8:35 AM

## 2022-11-11 ENCOUNTER — OFFICE VISIT (OUTPATIENT)
Dept: ORTHOPEDICS | Facility: CLINIC | Age: 22
End: 2022-11-11
Attending: NURSE PRACTITIONER
Payer: COMMERCIAL

## 2022-11-11 ENCOUNTER — TELEPHONE (OUTPATIENT)
Dept: NURSING | Facility: CLINIC | Age: 22
End: 2022-11-11

## 2022-11-11 VITALS
DIASTOLIC BLOOD PRESSURE: 68 MMHG | SYSTOLIC BLOOD PRESSURE: 112 MMHG | WEIGHT: 293 LBS | HEIGHT: 67 IN | BODY MASS INDEX: 45.99 KG/M2

## 2022-11-11 DIAGNOSIS — M25.562 ACUTE PAIN OF LEFT KNEE: ICD-10-CM

## 2022-11-11 PROCEDURE — 99244 OFF/OP CNSLTJ NEW/EST MOD 40: CPT | Performed by: FAMILY MEDICINE

## 2022-11-11 NOTE — LETTER
11/11/2022         RE: Norma Collins  [address] [facility] [id number]        Dear Colleague,    Thank you for referring your patient, Norma Collins, to the [facility] SPORTS MEDICINE [facility]. Please see a copy of my visit note below.    ASSESSMENT & PLAN    Norma was seen today for pain.    Diagnoses and all orders for this visit:    Acute pain of left knee  -     Orthopedic  Referral      This issue is acute and Worsening.    # Left Knee Injury: Norma Collins  was seen today for left knee injury. Symptoms had been going on for 1 weeks. On examination there are positive findings of inability to flex knee, tenderness to palpation over medial knee joint shandra. Imaging findings showed negative for fracture. Likely cause of patient's condition due to meniscus tear, possibly displaced given she cannot fully flex her knee. Other possible conditions contributing to symptoms include occult fracture not seen on x-ray, knee joint sprain, patella subluxation.  Counseled patient on nature of condition and treatment options.  Given this plan as below, follow-up 1 week     Image Findings: negative knee x-rays  Treatment: Activities as tolerated, crutches continue knee brace, left knee MRI ordered  Job: continue restrictions  Medications/Injections: Limited tylenol/ibuprofen for pain for 1-2 weeks, none  Follow-up: Two weeks after MRI       Cain Rae MD  [facility]    -----  Chief Complaint   Patient presents with     Left Knee - Pain       SUBJECTIVE  Norma Collins is a/an 22 year old female who is seen in consultation at the request of  Harini Zavala C.N.P. for evaluation of left knee pain.     The patient is seen by themselves.      Onset: 11/4/22, 1 week(s) ago. Patient describes injury as, twisted to turn to niece and fell going up the stairs, and caught herself. Pain gradually increased a few days later. Saw PCP 11/8/22 and xrays  "were performed. 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      REVIEW OF SYSTEMS:  Review of Systems  Constitutional, HEENT, cardiovascular, pulmonary, GI, , musculoskeletal, neuro, skin, endocrine and psych systems are negative, except as otherwise noted.    OBJECTIVE:  /68   Ht 1.702 m (5' 7\")   Wt (!) 158.8 kg (350 lb)   LMP 11/08/2022   BMI 54.82 kg/m     General: healthy, alert and in no distress  HEENT: no scleral icterus or conjunctival erythema  Skin: no suspicious lesions or rash. No jaundice.  CV: distal perfusion intact    Resp: normal respiratory effort without conversational dyspnea   Psych: normal mood and affect  Gait: using crutches  Neuro: Normal light sensory exam of left lower extremity      Ortho Exam   LEFT KNEE  Inspection:    Normal alignment; no edema, erythema, or ecchymosis present  Palpation:    Tender about the medial patellar facet. Remainder of bony and ligamentous landmarks are nontender.    Mild effusion is present    Patellofemoral crepitus is Absent  Range of Motion:     00 extension to 900 flexion  Strength:    Quadriceps 5/5, hamstrings 5/5, gastrocsoleus 5/5 and tibialis anterior 5/5    Extensor mechanism intact  Special Tests:    Positive: Krunal's    Negative: Patellar grind, MCL/valgus stress (0 & 30 deg), LCL/varus stress (0 & 30 deg), Lachman's, anterior drawer, posterior drawer      RADIOLOGY:  I independently, visualized and reviewed these images with the patient       KNEE LEFT 3 VIEWS   11/8/2022 1:37 PM      HISTORY: Acute pain of " left knee.     COMPARISON: None.                                                                      IMPRESSION: Normal bones, joint spaces and alignment. No fracture,  effusion or calcified intra-articular body.     BOB BRENNER MD     Review of external notes as documented elsewhere in note  Review of the result(s) of each unique test - left knee x-ray        Disclaimer: This note consists of symbols derived from keyboarding, dictation and/or voice recognition software. As a result, there may be errors in the script that have gone undetected. Please consider this when interpreting information found in this chart.        Again, thank you for allowing me to participate in the care of your patient.        Sincerely,        Cain Rae MD

## 2022-11-11 NOTE — PATIENT INSTRUCTIONS
# Left Knee Injury: Norma Collins  was seen today for left knee injury. Symptoms had been going on for 1 weeks. On examination there are positive findings of inability to flex knee, tenderness to palpation over medial knee joint shandra. Imaging findings showed negative for fracture. Likely cause of patient's condition due to meniscus tear, possibly displaced given she cannot fully flex her knee. Other possible conditions contributing to symptoms include occult fracture not seen on x-ray, knee joint sprain, patella subluxation.  Counseled patient on nature of condition and treatment options.  Given this plan as below, follow-up 1 week     Image Findings: negative knee x-rays  Treatment: Activities as tolerated, crutches continue knee brace  Job: continue restrictions  Medications/Injections: Limited tylenol/ibuprofen for pain for 1-2 weeks, none  Follow-up: Two weeks after MRI    MRI Scheduling Appointments  Call: [phone]  Toll-Free: [phone]  Fax: [phone]      Please call [phone]   Ask for my team if you have any questions or concerns    If you have not yet received the influenza vaccine but would like to get one, please call  [phone] or you can schedule via [employer]    It was great seeing you today!    Cain Rae MD, [facility]

## 2022-11-11 NOTE — PROGRESS NOTES
ASSESSMENT & PLAN    Norma was seen today for pain.    Diagnoses and all orders for this visit:    Acute pain of left knee  -     Orthopedic  Referral      This issue is acute and Worsening.    # Left Knee Injury: Norma Collins  was seen today for left knee injury. Symptoms had been going on for 1 weeks. On examination there are positive findings of inability to flex knee, tenderness to palpation over medial knee joint shandra. Imaging findings showed negative for fracture. Likely cause of patient's condition due to meniscus tear, possibly displaced given she cannot fully flex her knee. Other possible conditions contributing to symptoms include occult fracture not seen on x-ray, knee joint sprain, patella subluxation.  Counseled patient on nature of condition and treatment options.  Given this plan as below, follow-up 1 week     Image Findings: negative knee x-rays  Treatment: Activities as tolerated, crutches continue knee brace, left knee MRI ordered  Job: continue restrictions  Medications/Injections: Limited tylenol/ibuprofen for pain for 1-2 weeks, none  Follow-up: Two weeks after MRI       Cain Rae MD  [facility] SPORTS MEDICINE [facility]    -----  Chief Complaint   Patient presents with     Left Knee - Pain       SUBJECTIVE  Norma Collins is a/an 22 year old female who is seen in consultation at the request of  Harini Zavala C.N.P. for evaluation of left knee pain.     The patient is seen by themselves.      Onset: 11/4/22, 1 week(s) ago. Patient describes injury as, twisted to turn to niece and fell going up the stairs, and caught herself. Pain gradually increased a few days later. Saw PCP 11/8/22 and xrays were performed. 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      REVIEW OF SYSTEMS:  Review of Systems  Constitutional, HEENT, cardiovascular, pulmonary, GI, , musculoskeletal, neuro, skin, endocrine and psych systems are negative, except as otherwise noted.    OBJECTIVE:  /68   Ht 1.702 m (5' 7\")   Wt (!) 158.8 kg (350 lb)   LMP 11/08/2022   BMI 54.82 kg/m     General: healthy, alert and in no distress  HEENT: no scleral icterus or conjunctival erythema  Skin: no suspicious lesions or rash. No jaundice.  CV: distal perfusion intact    Resp: normal respiratory effort without conversational dyspnea   Psych: normal mood and affect  Gait: using crutches  Neuro: Normal light sensory exam of left lower extremity      Ortho Exam   LEFT KNEE  Inspection:    Normal alignment; no edema, erythema, or ecchymosis present  Palpation:    Tender about the medial patellar facet. Remainder of bony and ligamentous landmarks are nontender.    Mild effusion is present    Patellofemoral crepitus is Absent  Range of Motion:     00 extension to 900 flexion  Strength:    Quadriceps 5/5, hamstrings 5/5, gastrocsoleus 5/5 and tibialis anterior 5/5    Extensor mechanism intact  Special Tests:    Positive: Krunal's    Negative: Patellar grind, MCL/valgus stress (0 & 30 deg), LCL/varus stress (0 & 30 deg), Lachman's, anterior drawer, posterior drawer      RADIOLOGY:  I independently, visualized and reviewed these images with the patient       KNEE LEFT 3 VIEWS   11/8/2022 1:37 PM      HISTORY: Acute pain of left knee.     COMPARISON: None.                                                                      IMPRESSION: Normal bones, joint spaces and alignment. No fracture,  effusion or calcified intra-articular body.     BOB BRENNER MD     Review of external " notes as documented elsewhere in note  Review of the result(s) of each unique test - left knee x-ray        Disclaimer: This note consists of symbols derived from keyboarding, dictation and/or voice recognition software. As a result, there may be errors in the script that have gone undetected. Please consider this when interpreting information found in this chart.

## 2022-11-12 NOTE — TELEPHONE ENCOUNTER
Pt calling to schedule MRI of right knee.    dvised to call during normal business hours to schedule this.    Candi Peterson RN, Nurse Advisor 6:53 PM 11/11/2022

## 2022-11-14 ENCOUNTER — TELEPHONE (OUTPATIENT)
Dept: ORTHOPEDICS | Facility: CLINIC | Age: 22
End: 2022-11-14

## 2022-11-14 DIAGNOSIS — M25.562 ACUTE PAIN OF LEFT KNEE: Primary | ICD-10-CM

## 2022-11-14 DIAGNOSIS — S89.92XA INJURY OF LEFT KNEE, INITIAL ENCOUNTER: ICD-10-CM

## 2022-11-14 NOTE — TELEPHONE ENCOUNTER
Patient LVM that she has been calling trying to schedule MRI, but the schedulers tell her there is no MRI order.   She states the clinic told her there was an order.  She is looking to schedule her MRI, but is unable.   Would like a call back to discuss  [phone]    Sneha Campbell MS ATC

## 2022-11-17 ENCOUNTER — HOSPITAL ENCOUNTER (OUTPATIENT)
Dept: MRI IMAGING | Facility: CLINIC | Age: 22
Discharge: HOME | End: 2022-11-17
Attending: FAMILY MEDICINE | Admitting: FAMILY MEDICINE
Payer: COMMERCIAL

## 2022-11-17 DIAGNOSIS — M25.562 ACUTE PAIN OF LEFT KNEE: ICD-10-CM

## 2022-11-17 DIAGNOSIS — S89.92XA INJURY OF LEFT KNEE, INITIAL ENCOUNTER: ICD-10-CM

## 2022-11-17 PROCEDURE — 73721 MRI JNT OF LWR EXTRE W/O DYE: CPT | Mod: 26 | Performed by: RADIOLOGY

## 2022-11-17 PROCEDURE — 73721 MRI JNT OF LWR EXTRE W/O DYE: CPT | Mod: LT

## 2022-11-18 ENCOUNTER — TELEPHONE (OUTPATIENT)
Dept: ORTHOPEDICS | Facility: CLINIC | Age: 22
End: 2022-11-18

## 2022-11-18 DIAGNOSIS — M25.562 ACUTE PAIN OF LEFT KNEE: Primary | ICD-10-CM

## 2022-11-18 NOTE — TELEPHONE ENCOUNTER
Patient contacted via telephone regarding right knee MRI results. She reports knee hurts but can flex it a little better but still  Given findings plan to treat with home exercises, can transition out of crutches, referral to physical therapy.  Patient understands and agrees with plan.     Cain Rae MD

## 2022-11-18 NOTE — TELEPHONE ENCOUNTER
[facility] Call Center    Phone Message    May a detailed message be left on voicemail: yes     Reason for Call: Requesting Results   Name/type of test: Mri   Date of test: 11/17/2022  Was test done at a location other than [facility] (Please fill in the location if not [facility])?: No    MRI results of Left knee.  First available apt with Dr. Rae - Dec 1st. Pt does not want to wait.  Pt can be reached at [phone]      Action Taken: Other: FSOC - Dr. Rae    Travel Screening: Not Applicable

## 2022-11-18 NOTE — TELEPHONE ENCOUNTER
Results for orders placed or performed during the hospital encounter of 11/17/22   MR Knee Left w/o Contrast    Narrative    MR left knee without contrast 11/18/2022 8:10 AM    Techniques: Multiplanar multisequence imaging of the left knee was  obtained without administration of intra-articular or intravenous  contrast using routine protocol.    History: left knee injury concern for bucket handle tear; Acute pain  of left knee; Injury of left knee, initial encounter    Comparison: Radiographs 11/8/2022    Findings:    MENISCI:  Medial meniscus: Free edge fraying at the junction of the posterior  horn and posterior ligament of the medial meniscus (series 6 image  13).  Lateral meniscus: Intact.    LIGAMENTS  Cruciate ligaments: Intact.  Medial supporting structures: Intact.  Lateral supporting structures: Intact.    EXTENSOR MECHANISM  Intact.    FLUID  Small joint effusion. No substantial Baker's cyst.    OSSEOUS and ARTICULAR STRUCTURES  Bones: No fracture, contusion, or osseous lesion is seen.    Patellofemoral compartment: High-grade fissuring of cartilage over the  median ridge and lateral facet of the patella with possible  delamination. Fissuring and irregularity of cartilage of the central  trochlear groove.    Medial compartment: No hyaline cartilage disease.    Lateral compartment: No hyaline cartilage disease.    ANCILLARY FINDINGS  Mild anterior subcutaneous edema.      Impression    Impression:    1. Free edge fraying at the junction of the posterior horn and  posterior ligament of the medial meniscus.    2. Grade 3 patellofemoral chondromalacia.    MARILU LEDESMA MD (Joe)         SYSTEM ID:  [id number]

## 2022-11-19 ENCOUNTER — HEALTH MAINTENANCE LETTER (OUTPATIENT)
Age: 22
End: 2022-11-19

## 2022-11-30 ENCOUNTER — HOSPITAL ENCOUNTER (OUTPATIENT)
Dept: PHYSICAL THERAPY | Facility: CLINIC | Age: 22
Setting detail: THERAPIES SERIES
Discharge: HOME | End: 2022-11-30
Attending: FAMILY MEDICINE
Payer: COMMERCIAL

## 2022-11-30 DIAGNOSIS — M25.562 ACUTE PAIN OF LEFT KNEE: ICD-10-CM

## 2022-11-30 DIAGNOSIS — R10.9 FLANK PAIN: ICD-10-CM

## 2022-11-30 PROCEDURE — 97110 THERAPEUTIC EXERCISES: CPT | Mod: GP | Performed by: PHYSICAL THERAPIST

## 2022-11-30 PROCEDURE — 97161 PT EVAL LOW COMPLEX 20 MIN: CPT | Mod: GP | Performed by: PHYSICAL THERAPIST

## 2022-11-30 NOTE — PROGRESS NOTES
11/30/22 1400   General Information   Type of Visit Initial OP Ortho PT Evaluation   Start of Care Date 11/30/22   Referring Physician Cain Rae MD   Patient/Family Goals Statement return to regular work activities, decrease pain, play with nieces/nephews on ground   Orders Evaluate and Treat   Date of Order 11/18/22   Certification Required? Yes   Medical Diagnosis Acute pain of left knee (M25.562)   Surgical/Medical history reviewed Yes   Precautions/Limitations no known precautions/limitations   Body Part(s)   Body Part(s) Knee   Presentation and Etiology   Pertinent history of current problem (include personal factors and/or comorbidities that impact the POC) About 1 month prior, patient hurt knee while falling up the stairs. Has been continuing to do stairs to enter her home with railing, completing heel slides in bed, no longer using crutches or mobility knee scooter. Biggest concern is consistent swelling, stairs are difficult, wearing knee brace at work, however tends to not wear at home on even ground.   Impairments A. Pain;C. Swelling   Functional Limitations perform activities of daily living;perform required work activities;perform desired leisure / sports activities   Symptom Location medial aspect of L knee   How/Where did it occur With a fall   Onset date of current episode/exacerbation 11/04/22   Chronicity New   Pain rating (0-10 point scale) Best (/10);Worst (/10)   Best (/10) 0   Worst (/10) 5   Pain quality B. Dull;C. Aching   Frequency of pain/symptoms B. Intermittent   Pain/symptoms are: The same all the time   Pain/symptoms exacerbated by B. Walking;I. Bending   Pain/symptoms eased by C. Rest;G. Heat;H. Cold;J. Braces/supports   Progression of symptoms since onset: Improved   Current / Previous Interventions   Diagnostic Tests: MRI   MRI Results Results   MRI results Impression:     1. Free edge fraying at the junction of the posterior horn and  posterior ligament of the medial  "meniscus.     2. Grade 3 patellofemoral chondromalacia.   Prior Level of Function   Prior Level of Function-Mobility ind   Prior Level of Function-ADLs ind   Functional Level Prior Comment ind   Current Level of Function   Current Community Support Family/friend caregiver   Patient role/employment history A. 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 No   Have you fallen and had an injury in the past year? No   Is patient a fall risk? No   Abuse Screen (yes response referral indicated)   Feels Unsafe at Home or Work/School no   Feels Threatened by Someone no   Does Anyone Try to Keep You From Having Contact with Others or Doing Things Outside Your Home? no   Physical Signs of Abuse Present no   System Outcome Measures   Outcome Measures   (LEFS: 44/80)   Knee Objective Findings   Gait/Locomotion reciprocal pattern, good speed, pain with ascending/descending stairs   Balance/Proprioception (Single Leg Stance) poor bilterally   Step Test Height 8\" without pain   Step Test Height Control Comment poor   Palpation TTP medial knee joint line   Accessory Motion/Joint Mobility poor patellar mobility   Observation L knee brace, resting posture in standing genu recurvatum bilaterally   Anterior Drawer Test -   Posterior Drawer Test -   Varus Stress Test -   Valgus Stress Test -   Krunal's Test + on LLE   Side (if bilateral, select both right and left) Left   Knee Special Test Comments + for patellar chondromalacia   Left Knee Extension AROM WNL   Left Knee Flexion AROM WNL, pain with knee flexion in weight bearing   Left Knee Flexion Strength 4+   Left Knee Extension Strength 4+   Left Quad Set Strength 4+   Left Hamstring Flexibility WNL   Left Hip Flexor Flexibility WNL   Left " Quadricep Flexibility WNL   Left ITB Flexibility WNL   Planned Therapy Interventions   Planned Therapy Interventions balance training;gait training;joint mobilization;manual therapy;neuromuscular re-education;ROM;strengthening;stretching   Clinical Impression   Criteria for Skilled Therapeutic Interventions Met yes, treatment indicated   Influenced by the following impairments pain, decreased ROM, decreased strength   Functional limitations due to impairments walking, stairs, lifting, carrying, ADLs   Clinical Presentation Stable/Uncomplicated   Clinical Presentation Rationale clinical judgement   Clinical Decision Making (Complexity) Low complexity   Therapy Frequency 1 time/week   Predicted Duration of Therapy Intervention (days/wks) 12 weeks   Risk & Benefits of therapy have been explained Yes   Patient, Family & other staff in agreement with plan of care Yes   Education Assessment   Preferred Learning Style Listening;Reading;Demonstration;Pictures/video   Barriers to Learning No barriers   ORTHO GOALS   PT Ortho Eval Goals 1;2;3   Ortho Goal 1   Goal Identifier 1.   Goal Description Patient will tolerate stair navigation with reciprocal pattern and no knee pain.   Target Date 12/28/22   Ortho Goal 2   Goal Identifier 2.   Goal Description Patient will tolerate full 11 hour work shift without L knee pain.   Target Date 01/25/23   Ortho Goal 3   Goal Identifier 3.   Goal Description Patient will demonstrate ability to squat and get onto ground and up with knee pain <2/10.   Target Date 02/08/23   Total Evaluation Time   PT Eval, Low Complexity Minutes (54436) 20   Therapy Certification   Certification date from 11/30/22   Certification date to 02/22/23   Medical Diagnosis Acute pain of left knee (M25.562)     Thank you,    Bess Wilson, PT, DPT

## 2022-11-30 NOTE — PROGRESS NOTES
[facility]    OUTPATIENT PHYSICAL THERAPY ORTHOPEDIC EVALUATION  PLAN OF TREATMENT FOR OUTPATIENT REHABILITATION  (COMPLETE FOR INITIAL CLAIMS ONLY)  Patient's Last Name, First Name, M.I.  YOB: 2000  Norma Collins    Provider s Name:  [facility]   Medical Record No.  [id number]   Start of Care Date:  11/30/22   Onset Date:  11/04/22   Type:     _X__PT   ___OT   ___SLP Medical Diagnosis:  Acute pain of left knee (M25.562)     PT Diagnosis:      Visits from SOC:  1      _________________________________________________________________________________  Plan of Treatment/Functional Goals:  balance training, gait training, joint mobilization, manual therapy, neuromuscular re-education, ROM, strengthening, stretching           Goals  Goal Identifier: 1.  Goal Description: Patient will tolerate stair navigation with reciprocal pattern and no knee pain.  Target Date: 12/28/22    Goal Identifier: 2.  Goal Description: Patient will tolerate full 11 hour work shift without L knee pain.  Target Date: 01/25/23    Goal Identifier: 3.  Goal Description: Patient will demonstrate ability to squat and get onto ground and up with knee pain <2/10.  Target Date: 02/08/23       Therapy Frequency:  1 time/week  Predicted Duration of Therapy Intervention:  12 weeks    Bess Wilson, PT                 I CERTIFY THE NEED FOR THESE SERVICES FURNISHED UNDER        THIS PLAN OF TREATMENT AND WHILE UNDER MY CARE     (Physician co-signature of this document indicates review and certification of the therapy plan).                       Certification Date From:  11/30/22   Certification Date To:  02/22/23    Referring Provider:  Cain Rae MD    Initial Assessment        See Epic Evaluation Start of Care Date: 11/30/22

## 2022-12-06 ENCOUNTER — HOSPITAL ENCOUNTER (OUTPATIENT)
Dept: PHYSICAL THERAPY | Facility: CLINIC | Age: 22
Setting detail: THERAPIES SERIES
Discharge: HOME | End: 2022-12-06
Attending: FAMILY MEDICINE
Payer: COMMERCIAL

## 2022-12-06 PROCEDURE — 97110 THERAPEUTIC EXERCISES: CPT | Mod: GP | Performed by: PHYSICAL THERAPIST

## 2022-12-20 ENCOUNTER — HOSPITAL ENCOUNTER (OUTPATIENT)
Dept: PHYSICAL THERAPY | Facility: CLINIC | Age: 22
Setting detail: THERAPIES SERIES
Discharge: HOME | End: 2022-12-20
Attending: FAMILY MEDICINE
Payer: COMMERCIAL

## 2022-12-20 PROCEDURE — 97110 THERAPEUTIC EXERCISES: CPT | Mod: GP | Performed by: PHYSICAL THERAPIST

## 2022-12-27 ENCOUNTER — HOSPITAL ENCOUNTER (OUTPATIENT)
Dept: PHYSICAL THERAPY | Facility: CLINIC | Age: 22
Setting detail: THERAPIES SERIES
Discharge: HOME | End: 2022-12-27
Attending: FAMILY MEDICINE
Payer: COMMERCIAL

## 2022-12-27 PROCEDURE — 97112 NEUROMUSCULAR REEDUCATION: CPT | Mod: GP | Performed by: PHYSICAL THERAPIST

## 2022-12-27 PROCEDURE — 97110 THERAPEUTIC EXERCISES: CPT | Mod: GP | Performed by: PHYSICAL THERAPIST

## 2022-12-30 ENCOUNTER — HOSPITAL ENCOUNTER (EMERGENCY)
Facility: CLINIC | Age: 22
Discharge: HOME | End: 2022-12-30
Attending: PHYSICIAN ASSISTANT | Admitting: PHYSICIAN ASSISTANT
Payer: COMMERCIAL

## 2022-12-30 VITALS
TEMPERATURE: 99 F | SYSTOLIC BLOOD PRESSURE: 136 MMHG | DIASTOLIC BLOOD PRESSURE: 77 MMHG | RESPIRATION RATE: 24 BRPM | HEART RATE: 97 BPM | OXYGEN SATURATION: 97 %

## 2022-12-30 DIAGNOSIS — J06.9 VIRAL URI WITH COUGH: ICD-10-CM

## 2022-12-30 LAB
DEPRECATED S PYO AG THROAT QL EIA: NEGATIVE
FLUAV RNA SPEC QL NAA+PROBE: NEGATIVE
FLUBV RNA RESP QL NAA+PROBE: NEGATIVE
GROUP A STREP BY PCR: NOT DETECTED
RSV RNA SPEC NAA+PROBE: NEGATIVE
SARS-COV-2 RNA RESP QL NAA+PROBE: NEGATIVE

## 2022-12-30 PROCEDURE — 87651 STREP A DNA AMP PROBE: CPT | Performed by: PHYSICIAN ASSISTANT

## 2022-12-30 PROCEDURE — G0463 HOSPITAL OUTPT CLINIC VISIT: HCPCS | Mod: CS | Performed by: PHYSICIAN ASSISTANT

## 2022-12-30 PROCEDURE — 99212 OFFICE O/P EST SF 10 MIN: CPT | Mod: CS | Performed by: PHYSICIAN ASSISTANT

## 2022-12-30 PROCEDURE — 87637 SARSCOV2&INF A&B&RSV AMP PRB: CPT | Performed by: PHYSICIAN ASSISTANT

## 2022-12-30 PROCEDURE — C9803 HOPD COVID-19 SPEC COLLECT: HCPCS | Performed by: PHYSICIAN ASSISTANT

## 2022-12-30 ASSESSMENT — ACTIVITIES OF DAILY LIVING (ADL): ADLS_ACUITY_SCORE: 35

## 2022-12-31 NOTE — ED PROVIDER NOTES
History     Chief Complaint   Patient presents with     Pharyngitis     Sore throat, congestion, chest pain, and diarrhea.     HPI  Norma Collins is a 22 year old female who presents to the urgent care with concern over 2-day history of illness.  Patient complains of fever up to 101 with chills, allergies, sore throat, nasal congestion, cough, loose stools.  She has not had any dyspnea, wheezing, nausea, vomiting, abdominal pain.  She denies any close contacts with strep throat, influenza, COVID-19.  She has not been vaccinated for influenza or COVID-19.      Allergies:  Allergies   Allergen Reactions     [place] Sprouts [Brassica Oleracea Italica]      Keflex [Cephalexin] Itching     Other Food Allergy Rash     [place] sprouts     Problem List:    Patient Active Problem List    Diagnosis Date Noted     Abnormal uterine bleeding due to endometrial polyp 08/24/2022     Priority: Medium     Change in bowel habit 08/23/2022     Priority: Medium     Ileitis 08/23/2022     Priority: Medium     Family history of colon cancer 07/08/2021     Priority: Medium     Mother at age 40       Family history of hemochromatosis 07/08/2021     Priority: Medium     Father and Grandfather       Acanthosis nigricans 12/08/2020     Priority: Medium     Created by Conversion      Last Assessment & Plan:   Patient doing very well and adding exercise, and watching diet closely.  We'll now add metformin as per orders.  Side effects and precautions discussed.  Follow up in one month.       Arthropathy of ankle and foot 12/08/2020     Priority: Medium     Created by Conversion    Replacement Utility updated for latest IMO load    Last Assessment & Plan:   With diffuse joint aches treating with progressive weight loss and lifestyle modifications.       Iron deficiency anemia due to chronic blood loss 11/22/2019     Priority: Medium     Aphthous ulcer of ileum 10/30/2019     Priority: Medium     Elevated BP without diagnosis of hypertension  "10/16/2017     Priority: Medium     Adenotonsillar hypertrophy 01/04/2017     Priority: Medium     PCOS (polycystic ovarian syndrome) 02/29/2016     Priority: Medium     Last Assessment & Plan:   Her dietary changes are working very well.  Also working very well for the household.  She is exercising more, having more energy, and pants are fitting looser.  We'll plan for in body at next visit along with remeasurement of abdominal and neck circumference.  Also with her forgetting the metformin in the morning, will move it to the afternoon with lunch.       Class 3 severe obesity due to excess calories without serious comorbidity with body mass index (BMI) of 50.0 to 59.9 in adult (H) 10/30/2015     Priority: Medium     Last Assessment & Plan:   Continue lifestyle modifications.  Will breakfast a [place] protein-based with shakes or aches.  May also use chicken or steak.  Stopped carbohydrates in the morning.  Better food choices discussed.  Encouraged to watch the documentary, \"In defense of food\" before next visit.        Past Medical History:    Past Medical History:   Diagnosis Date     Abnormal uterine bleeding due to endometrial polyp      Acanthosis nigricans      Adenotonsillar hypertrophy      Aphthous ulcer of ileum      Arthropathy of ankle and foot      Family history of hemochromatosis      Iron deficiency anemia due to chronic blood loss      Obesity      PCOS (polycystic ovarian syndrome)      Past Surgical History:    Past Surgical History:   Procedure Laterality Date     CHOLECYSTECTOMY       HYSTEROSCOPY DIAGNOSTIC N/A 10/14/2022    Procedure: HYSTEROSCOPY WITH POLYPECTOMY;  Surgeon: Aston Yanes MD;  Location: [facility] OR     LAPAROSCOPIC CHOLECYSTECTOMY       OTHER SURGICAL HISTORY      none     TONSILLECTOMY       TONSILLECTOMY & ADENOIDECTOMY Bilateral 2/16/2017    Procedure: BILATERAL TONSILLECTOMY AND ADENOIDECTOMY;  Surgeon: Jacob Arguello MD;  Location: [facility]" Main OR;  Service:        Family History:    Family History   Problem Relation Age of Onset     Colon Cancer Mother 43     Cancer Mother         colon     Venous thrombosis Mother         cancer-related     Cerebrovascular Disease Mother      Liver Disease Father         cirrhosis of liver     Other - See Comments Father         high iron     Cirrhosis Father      Hemochromatosis Father      Hypertension Father      Hyperlipidemia Father      Myocardial Infarction Maternal Grandmother      Heart Disease Maternal Grandmother      Dementia Maternal Grandmother      Cerebrovascular Disease Paternal Grandmother      Other - See Comments Maternal Grandfather         heart attack or cancer/unsure     Bladder Cancer Maternal Grandfather      Cirrhosis Paternal Grandfather      Other - See Comments Paternal Grandfather         high iron     Diabetes Paternal Grandfather      Hemochromatosis Paternal Grandfather      Kidney Disease Paternal Grandfather      Hypertension Paternal Grandfather      Colon Cancer Maternal Great-Grandmother         70-90 years old      Uterine Cancer Maternal Great-Grandmother      Breast Cancer Maternal Great-Grandmother      Brain Cancer Maternal Aunt        Social History:  Marital Status:  Single [1]  Social History     Tobacco Use     Smoking status: Every Day     Types: Vaping Device     Smokeless tobacco: Never   Vaping Use     Vaping Use: Every day     Substances: Nicotine, Flavoring     Devices: Disposable   Substance Use Topics     Alcohol use: Yes     Comment: occ     Drug use: Never        Medications:    diclofenac (VOLTAREN) 1 % topical gel  docusate sodium (COLACE) 100 MG tablet  meclizine (ANTIVERT) 25 MG tablet  meloxicam (MOBIC) 15 MG tablet  naproxen sodium 220 MG capsule  omeprazole (PRILOSEC) 20 MG DR capsule  Polyethylene Glycol 3350 (MIRALAX PO)  SUMAtriptan (IMITREX) 50 MG tablet      Review of Systems  CONSTITUTIONAL:POSITIVE  for fever up to 101, chills, myalgias    INTEGUMENTARY/SKIN: NEGATIVE for worrisome rashes, moles or lesions  EYES: NEGATIVE for vision changes or irritation  ENT/MOUTH: POSITIVE for sore throat, nasal congestion and NEGATIVE for ear pain   RESP:POSITIVE for cough and NEGATIVE for SOB/dyspnea and wheezing  GI: NEGATIVE for abdominal pain, diarrhea, nausea and vomiting  Physical Exam   BP: 136/77  Pulse: 97  Temp: 99  F (37.2  C)  Resp: 24  SpO2: 97 %  Physical Exam  GENERAL APPEARANCE: healthy, alert and no distress  EYES: EOMI,  PERRL, conjunctiva clear  HENT: ear canals and TM's normal.  Nose and mouth without ulcers, erythema or lesions  NECK: supple, nontender, no lymphadenopathy  RESP: lungs clear to auscultation - no rales, rhonchi or wheezes  CV: regular rates and rhythm, normal S1 S2, no murmur noted  SKIN: no suspicious lesions or rashes  ED Course           Procedures       Critical Care time:  none        Results for orders placed or performed during the hospital encounter of 12/30/22 (from the past 24 hour(s))   Streptococcus A Rapid Scr w Reflx to PCR    Specimen: Throat; Swab   Result Value Ref Range    Group A Strep antigen Negative Negative   Symptomatic Influenza A/B & SARS-CoV2 (COVID-19) Virus PCR Multiplex Nasopharyngeal    Specimen: Nasopharyngeal; Swab   Result Value Ref Range    Influenza A PCR Negative Negative    Influenza B PCR Negative Negative    RSV PCR Negative Negative    SARS CoV2 PCR Negative Negative    Narrative    Testing was performed using the Xpert Xpress CoV2/Flu/RSV Assay on the [employer]ovo GeneXpert Instrument. This test should be ordered for the detection of SARS-CoV-2 and influenza viruses in individuals who meet clinical and/or epidemiological criteria. Test performance is unknown in asymptomatic patients. This test is for in vitro diagnostic use under the FDA EUA for laboratories certified under CLIA to perform high or moderate complexity testing. This test has not been FDA cleared or approved. A negative result  does not rule out the presence of PCR inhibitors in the specimen or target RNA in concentration below the limit of detection for the assay. If only one viral target is positive but coinfection with multiple targets is suspected, the sample should be re-tested with another FDA cleared, approved, or authorized test, if coinfection would change clinical management. This test was validated by the [facility]. These laboratories are certified under the Clinical Laboratory Improvement Amendments of 1988 (CLIA-88) as qualified to perform high complexity laboratory testing.     Medications - No data to display    Assessments & Plan (with Medical Decision Making)     I have reviewed the nursing notes.  I have reviewed the findings, diagnosis, plan and need for follow up with the patient.       Medical Decision Making  The patient presented with a problem that is acute and uncomplicated.    The patient's evaluation involved:  ordering and review of 2 test(s) (see separate area of note for details)    The patient's management involved only simple and very low risk treatment.    New Prescriptions    No medications on file     Final diagnoses:   Viral URI with cough     Influenza, COVID-19, RSV test were strep test were negative.  History and physical exam are consistent with viral URI.  I do not suspect bronchitis, pneumonia, bacterial sinusitis, pertussis, PE, bacterial sinusitis and will defer further evaluation at this time.  Patient was discharged home stable with instructions to follow-up with primary care provider if no improvement within the next 7 to 10 days.  Worrisome reasons to return to the ER/UC sooner discussed.    Disclaimer: This note consists of symbols derived from keyboarding, dictation, and/or voice recognition software. As a result, there may be errors in the script that have gone undetected.  Please consider this when interpreting information found in the chart.      12/30/2022   HOLLIE  [facility] EMERGENCY DEPT     Selam Blevins PA-C  12/2000

## 2023-02-05 ENCOUNTER — HOSPITAL ENCOUNTER (EMERGENCY)
Facility: CLINIC | Age: 23
Discharge: HOME | End: 2023-02-05
Attending: PHYSICIAN ASSISTANT | Admitting: PHYSICIAN ASSISTANT
Payer: COMMERCIAL

## 2023-02-05 VITALS
WEIGHT: 293 LBS | SYSTOLIC BLOOD PRESSURE: 160 MMHG | HEIGHT: 67 IN | TEMPERATURE: 97.3 F | OXYGEN SATURATION: 98 % | DIASTOLIC BLOOD PRESSURE: 73 MMHG | BODY MASS INDEX: 45.99 KG/M2 | HEART RATE: 80 BPM | RESPIRATION RATE: 18 BRPM

## 2023-02-05 DIAGNOSIS — N30.00 ACUTE CYSTITIS WITHOUT HEMATURIA: ICD-10-CM

## 2023-02-05 LAB
ALBUMIN UR-MCNC: NEGATIVE MG/DL
APPEARANCE UR: ABNORMAL
BACTERIA #/AREA URNS HPF: ABNORMAL /HPF
BILIRUB UR QL STRIP: NEGATIVE
COLOR UR AUTO: YELLOW
GLUCOSE UR STRIP-MCNC: NEGATIVE MG/DL
HGB UR QL STRIP: NEGATIVE
KETONES UR STRIP-MCNC: NEGATIVE MG/DL
LEUKOCYTE ESTERASE UR QL STRIP: ABNORMAL
MUCOUS THREADS #/AREA URNS LPF: PRESENT /LPF
NITRATE UR QL: NEGATIVE
PH UR STRIP: 6 [PH] (ref 5–7)
RBC URINE: 2 /HPF
SP GR UR STRIP: 1.02 (ref 1–1.03)
SQUAMOUS EPITHELIAL: 8 /HPF
UROBILINOGEN UR STRIP-MCNC: NORMAL MG/DL
WBC URINE: 11 /HPF

## 2023-02-05 PROCEDURE — 87086 URINE CULTURE/COLONY COUNT: CPT | Performed by: PHYSICIAN ASSISTANT

## 2023-02-05 PROCEDURE — G0463 HOSPITAL OUTPT CLINIC VISIT: HCPCS | Performed by: PHYSICIAN ASSISTANT

## 2023-02-05 PROCEDURE — 99213 OFFICE O/P EST LOW 20 MIN: CPT | Performed by: PHYSICIAN ASSISTANT

## 2023-02-05 PROCEDURE — 81001 URINALYSIS AUTO W/SCOPE: CPT | Performed by: PHYSICIAN ASSISTANT

## 2023-02-05 RX ORDER — SULFAMETHOXAZOLE/TRIMETHOPRIM 800-160 MG
1 TABLET ORAL 2 TIMES DAILY
Qty: 6 TABLET | Refills: 0 | Status: SHIPPED | OUTPATIENT
Start: 2023-02-05 | End: 2023-02-08

## 2023-02-05 ASSESSMENT — ENCOUNTER SYMPTOMS
DYSURIA: 0
MUSCULOSKELETAL NEGATIVE: 1
FREQUENCY: 1
CONSTITUTIONAL NEGATIVE: 1
ABDOMINAL PAIN: 0
HEMATURIA: 0
FLANK PAIN: 0
DIFFICULTY URINATING: 0
NAUSEA: 1

## 2023-02-05 ASSESSMENT — ACTIVITIES OF DAILY LIVING (ADL): ADLS_ACUITY_SCORE: 35

## 2023-02-05 NOTE — ED PROVIDER NOTES
History     Chief Complaint   Patient presents with     Dysuria     HPI  Norma Collins is a 23 year old female with a past medical history of PCOS, class III severe obesity, elevated blood pressure without a diagnosis of hypertension, iron deficiency anemia, abnormal uterine bleeding due to endometrial polyps, and aphthous ulcer of the ileum who presents with complaints of urinary frequency and urgency which began 1 week ago..  She has not been taking any medications for relief of symptoms.  She describes having nausea over the past few days which has been well controlled with omeprazole.  No vomiting.  No fever or chills, no back pain, no hematuria, no blood in stools, no abdominal pain, no groin pain or cervical pain, no rashes or joint pain. No concerns with bowel function.  No concerns for a sexually transmitted infection today.  States that she typically has urgency and frequency when having UTIs, this is her typical symptoms.  She sometimes gets yeast infections after having menstrual periods but has no symptoms of this currently.  Last menstrual period ended January 14, 2023.    Allergies:  Allergies   Allergen Reactions     [place] Sprouts [Brassica Oleracea Italica]      Keflex [Cephalexin] Itching     Other Food Allergy Rash     [place] sprouts       Problem List:    Patient Active Problem List    Diagnosis Date Noted     Abnormal uterine bleeding due to endometrial polyp 08/24/2022     Priority: Medium     Change in bowel habit 08/23/2022     Priority: Medium     Ileitis 08/23/2022     Priority: Medium     Family history of colon cancer 07/08/2021     Priority: Medium     Mother at age 40       Family history of hemochromatosis 07/08/2021     Priority: Medium     Father and Grandfather       Acanthosis nigricans 12/08/2020     Priority: Medium     Created by Conversion      Last Assessment & Plan:   Patient doing very well and adding exercise, and watching diet closely.  We'll now add metformin as  "per orders.  Side effects and precautions discussed.  Follow up in one month.       Arthropathy of ankle and foot 12/08/2020     Priority: Medium     Created by Conversion    Replacement Utility updated for latest IMO load    Last Assessment & Plan:   With diffuse joint aches treating with progressive weight loss and lifestyle modifications.       Iron deficiency anemia due to chronic blood loss 11/22/2019     Priority: Medium     Aphthous ulcer of ileum 10/30/2019     Priority: Medium     Elevated BP without diagnosis of hypertension 10/16/2017     Priority: Medium     Adenotonsillar hypertrophy 01/04/2017     Priority: Medium     PCOS (polycystic ovarian syndrome) 02/29/2016     Priority: Medium     Last Assessment & Plan:   Her dietary changes are working very well.  Also working very well for the household.  She is exercising more, having more energy, and pants are fitting looser.  We'll plan for in body at next visit along with remeasurement of abdominal and neck circumference.  Also with her forgetting the metformin in the morning, will move it to the afternoon with lunch.       Class 3 severe obesity due to excess calories without serious comorbidity with body mass index (BMI) of 50.0 to 59.9 in adult (H) 10/30/2015     Priority: Medium     Last Assessment & Plan:   Continue lifestyle modifications.  Will breakfast a [place] protein-based with shakes or aches.  May also use chicken or steak.  Stopped carbohydrates in the morning.  Better food choices discussed.  Encouraged to watch the documentary, \"In defense of food\" before next visit.          Past Medical History:    Past Medical History:   Diagnosis Date     Abnormal uterine bleeding due to endometrial polyp      Acanthosis nigricans      Adenotonsillar hypertrophy      Aphthous ulcer of ileum      Arthropathy of ankle and foot      Family history of hemochromatosis      Iron deficiency anemia due to chronic blood loss      Obesity      PCOS (polycystic " ovarian syndrome)        Past Surgical History:    Past Surgical History:   Procedure Laterality Date     CHOLECYSTECTOMY       HYSTEROSCOPY DIAGNOSTIC N/A 10/14/2022    Procedure: HYSTEROSCOPY WITH POLYPECTOMY;  Surgeon: Aston Yanes MD;  [place] [facility] [place]     LAPAROSCOPIC CHOLECYSTECTOMY       OTHER SURGICAL HISTORY      none     TONSILLECTOMY       TONSILLECTOMY & ADENOIDECTOMY Bilateral 2/16/2017    Procedure: BILATERAL TONSILLECTOMY AND ADENOIDECTOMY;  Surgeon: Jacob Arguello MD;  Location: [facility];  Service:        Family History:    Family History   Problem Relation Age of Onset     Colon Cancer Mother 43     Cancer Mother         colon     Venous thrombosis Mother         cancer-related     Cerebrovascular Disease Mother      Liver Disease Father         cirrhosis of liver     Other - See Comments Father         high iron     Cirrhosis Father      Hemochromatosis Father      Hypertension Father      Hyperlipidemia Father      Myocardial Infarction Maternal Grandmother      Heart Disease Maternal Grandmother      Dementia Maternal Grandmother      Cerebrovascular Disease Paternal Grandmother      Other - See Comments Maternal Grandfather         heart attack or cancer/unsure     Bladder Cancer Maternal Grandfather      Cirrhosis Paternal Grandfather      Other - See Comments Paternal Grandfather         high iron     Diabetes Paternal Grandfather      Hemochromatosis Paternal Grandfather      Kidney Disease Paternal Grandfather      Hypertension Paternal Grandfather      Colon Cancer Maternal Great-Grandmother         70-90 years old      Uterine Cancer Maternal Great-Grandmother      Breast Cancer Maternal Great-Grandmother      Brain Cancer Maternal Aunt        Social History:  Marital Status:  Single [1]  Social History     Tobacco Use     Smoking status: Every Day     Types: Vaping Device     Smokeless tobacco: Never   Vaping Use     Vaping Use: Every day      "Substances: Nicotine, Flavoring     Devices: Disposable   Substance Use Topics     Alcohol use: Yes     Comment: occ     Drug use: Never        Medications:    sulfamethoxazole-trimethoprim (BACTRIM DS) 800-160 MG tablet  naproxen sodium 220 MG capsule  omeprazole (PRILOSEC) 20 MG DR capsule  Polyethylene Glycol 3350 (MIRALAX PO)          Review of Systems   Constitutional: Negative.    Gastrointestinal: Positive for nausea. Negative for abdominal pain.   Genitourinary: Positive for frequency and urgency. Negative for decreased urine volume, difficulty urinating, dyspareunia, dysuria, flank pain, genital sores, hematuria, menstrual problem, pelvic pain, vaginal bleeding, vaginal discharge and vaginal pain.   Musculoskeletal: Negative.        Physical Exam   BP: (!) 160/73  Pulse: 80  Temp: 97.3  F (36.3  C)  Resp: 18  Height: 170.2 cm (5' 7\")  Weight: (!) 158.8 kg (350 lb)  SpO2: 98 %      Physical Exam  Constitutional:       General: She is not in acute distress.     Appearance: Normal appearance. She is not ill-appearing, toxic-appearing or diaphoretic.   HENT:      Head: Normocephalic and atraumatic.      Right Ear: No middle ear effusion. No mastoid tenderness. Tympanic membrane is not injected, perforated, erythematous, retracted or bulging.      Left Ear:  No middle ear effusion. No mastoid tenderness. Tympanic membrane is not injected, perforated, erythematous, retracted or bulging.   Cardiovascular:      Rate and Rhythm: Normal rate and regular rhythm.      Pulses: Normal pulses.      Heart sounds: Normal heart sounds. No murmur heard.  Pulmonary:      Effort: Pulmonary effort is normal. No respiratory distress.      Breath sounds: Normal breath sounds. No stridor. No wheezing or rhonchi.   Abdominal:      General: Abdomen is flat. Bowel sounds are normal. There is no distension.      Palpations: Abdomen is soft.      Tenderness: There is abdominal tenderness in the suprapubic area. There is no right CVA " tenderness, left CVA tenderness, guarding or rebound.   Musculoskeletal:      Cervical back: Neck supple. No rigidity. No muscular tenderness.   Lymphadenopathy:      Cervical: No cervical adenopathy.      Right cervical: No superficial, deep or posterior cervical adenopathy.     Left cervical: No superficial, deep or posterior cervical adenopathy.   Neurological:      Mental Status: She is alert and oriented to person, place, and time.      Sensory: No sensory deficit.         ED Course                 Procedures             Results for orders placed or performed during the hospital encounter of 02/05/23 (from the past 24 hour(s))   UA with Microscopic reflex to Culture    Specimen: Urine, Midstream   Result Value Ref Range    Color Urine Yellow Colorless, Straw, Light Yellow, Yellow    Appearance Urine Slightly Cloudy (A) Clear    Glucose Urine Negative Negative mg/dL    Bilirubin Urine Negative Negative    Ketones Urine Negative Negative mg/dL    Specific Gravity Urine 1.025 1.003 - 1.035    Blood Urine Negative Negative    pH Urine 6.0 5.0 - 7.0    Protein Albumin Urine Negative Negative mg/dL    Urobilinogen Urine Normal Normal, 2.0 mg/dL    Nitrite Urine Negative Negative    Leukocyte Esterase Urine Small (A) Negative    Bacteria Urine Few (A) None Seen /HPF    Mucus Urine Present (A) None Seen /LPF    RBC Urine 2 <=2 /HPF    WBC Urine 11 (H) <=5 /HPF    Squamous Epithelials Urine 8 (H) <=1 /HPF    Narrative    Urine Culture ordered based on laboratory criteria       Medications - No data to display    Assessments & Plan (with Medical Decision Making)     The patient has symptoms consistent with acute cystitis today.  Low concern for pyelonephritis given the patient has no fevers, body aches, or flank pain today.  Starting Bactrim.    Urinalysis showed small leukocyte esterase, bacteria in the urine and elevated WBCs, concerning for a UTI., UC results are pending.     Recommended the following cares and  precautions. Rest/hydrate.  Push fluids, avoid caffeine and alcohol. Follow up in 2-3 days with PCP if symptoms persist.     Avoid having a full bladder. Avoid feminine hygiene sprays and douches. Avoid bubble baths/bath oils, and food and beverages that may irritate the bladder.  Antibiotics are still necessary to treat acute bacterial cystitis even if taking cranberry juice or tablets.        Take all of the antibiotic, even if you begin to feel better. Drink cranberry juice.   Use OTC AZO for urinary relief for temporary relief of UTI symptoms for 2 days. This will discolor your urine.     Void after sexual activity.  Wipe front to back after using the bathroom.  Stay adequately hydrated (8-10, 8-ounce glasses of water daily).     Return precautions discussed including worrisome signs and symptoms that would warrant urgent medical evaluation.        I have reviewed the nursing notes.    I have reviewed the findings, diagnosis, plan and need for follow up with the patient.    New Prescriptions    SULFAMETHOXAZOLE-TRIMETHOPRIM (BACTRIM DS) 800-160 MG TABLET    Take 1 tablet by mouth 2 times daily for 3 days       Final diagnoses:   Acute cystitis without hematuria       2/5/2023   [facility] EMERGENCY DEPT     Fahad Gonzalez PA-C  02/05/23 [id number]

## 2023-02-05 NOTE — DISCHARGE INSTRUCTIONS
Recommended the following cares and precautions. Rest/hydrate.  Push fluids, avoid caffeine and alcohol. Follow up in 2-3 days with PCP if symptoms persist.     Avoid having a full bladder. Avoid feminine hygiene sprays and douches. Avoid bubble baths/bath oils, and food and beverages that may irritate the bladder.  Antibiotics are still necessary to treat acute bacterial cystitis even if taking cranberry juice or tablets.        Take all of the antibiotic, even if you begin to feel better. Drink cranberry juice.   Use OTC AZO for urinary relief for temporary relief of UTI symptoms for 2 days. This will discolor your urine.     Void after sexual activity.  Wipe front to back after using the bathroom.  Stay adequately hydrated (8-10, 8-ounce glasses of water daily).     If the treatment isn't working, your symptoms will stay the same, get worse, or you will develop new symptoms. Follow-up urgently if you develop a fever (higher than 100.5 degrees), chills, body aches, flank pain, lower stomach pain, nausea/ vomiting, and blood in the urine. You should also follow-up, after taking medicine for 3 days if you still have a burning feeling when you urinate.

## 2023-02-07 ENCOUNTER — TELEPHONE (OUTPATIENT)
Dept: FAMILY MEDICINE | Facility: CLINIC | Age: 23
End: 2023-02-07
Payer: COMMERCIAL

## 2023-02-07 LAB — BACTERIA UR CULT: NORMAL

## 2023-02-07 NOTE — TELEPHONE ENCOUNTER
S-(situation): Patient calling requesting clinic appt or PCP to advise.     B-(background): Seen in  02/05/23 for cystitis. History yeast infections.    A-(assessment): Patient will complete her Bactrim DS today. She still has all of the same symptoms as when she was seen: constant urge to urinate, waking up multiple times during the night to void. Low back pain which was present when she was seen. No fever. Abdominal cramping. Vaginal discharge but she says it has been different for a while now since dealing with endometrial polyp.   02/05/23:  Culture >100,000 CFU/mL Mixture of urogenital lucy           R-(recommendations): Patient has already increased her fluid intake and is taking cranberry vitamins.  Advised patient F/U in clinic if not better. Offered next available appt for 02/09/23 or Urgent care. Patient will see how she feels tomorrow and decide.   Martha ARANDA RN

## 2023-02-09 ENCOUNTER — APPOINTMENT (OUTPATIENT)
Dept: ULTRASOUND IMAGING | Facility: CLINIC | Age: 23
End: 2023-02-09
Attending: EMERGENCY MEDICINE
Payer: COMMERCIAL

## 2023-02-09 ENCOUNTER — HOSPITAL ENCOUNTER (EMERGENCY)
Facility: CLINIC | Age: 23
Discharge: HOME | End: 2023-02-09
Attending: PHYSICIAN ASSISTANT | Admitting: EMERGENCY MEDICINE
Payer: COMMERCIAL

## 2023-02-09 ENCOUNTER — APPOINTMENT (OUTPATIENT)
Dept: CT IMAGING | Facility: CLINIC | Age: 23
End: 2023-02-09
Attending: EMERGENCY MEDICINE
Payer: COMMERCIAL

## 2023-02-09 VITALS
HEART RATE: 72 BPM | DIASTOLIC BLOOD PRESSURE: 76 MMHG | SYSTOLIC BLOOD PRESSURE: 118 MMHG | TEMPERATURE: 99 F | OXYGEN SATURATION: 99 % | RESPIRATION RATE: 18 BRPM

## 2023-02-09 DIAGNOSIS — R30.0 DYSURIA: ICD-10-CM

## 2023-02-09 DIAGNOSIS — R10.2 PELVIC PAIN IN FEMALE: ICD-10-CM

## 2023-02-09 LAB
ALBUMIN SERPL BCG-MCNC: 4.6 G/DL (ref 3.5–5.2)
ALBUMIN UR-MCNC: NEGATIVE MG/DL
ALP SERPL-CCNC: 86 U/L (ref 35–104)
ALT SERPL W P-5'-P-CCNC: 49 U/L (ref 10–35)
ANION GAP SERPL CALCULATED.3IONS-SCNC: 12 MMOL/L (ref 7–15)
APPEARANCE UR: CLEAR
AST SERPL W P-5'-P-CCNC: 36 U/L (ref 10–35)
BACTERIA #/AREA URNS HPF: ABNORMAL /HPF
BASOPHILS # BLD AUTO: 0 10E3/UL (ref 0–0.2)
BASOPHILS NFR BLD AUTO: 0 %
BILIRUB SERPL-MCNC: 0.3 MG/DL
BILIRUB UR QL STRIP: NEGATIVE
BUN SERPL-MCNC: 13.7 MG/DL (ref 6–20)
CALCIUM SERPL-MCNC: 9.7 MG/DL (ref 8.6–10)
CHLORIDE SERPL-SCNC: 100 MMOL/L (ref 98–107)
CLUE CELLS: NORMAL
COLOR UR AUTO: YELLOW
CREAT SERPL-MCNC: 0.87 MG/DL (ref 0.51–0.95)
CRP SERPL-MCNC: 9.39 MG/L
DEPRECATED HCO3 PLAS-SCNC: 22 MMOL/L (ref 22–29)
EOSINOPHIL # BLD AUTO: 0.1 10E3/UL (ref 0–0.7)
EOSINOPHIL NFR BLD AUTO: 1 %
ERYTHROCYTE [DISTWIDTH] IN BLOOD BY AUTOMATED COUNT: 13 % (ref 10–15)
GFR SERPL CREATININE-BSD FRML MDRD: >90 ML/MIN/1.73M2
GLUCOSE SERPL-MCNC: 94 MG/DL (ref 70–99)
GLUCOSE UR STRIP-MCNC: NEGATIVE MG/DL
HCG UR QL: NEGATIVE
HCT VFR BLD AUTO: 40.2 % (ref 35–47)
HGB BLD-MCNC: 13 G/DL (ref 11.7–15.7)
HGB UR QL STRIP: NEGATIVE
IMM GRANULOCYTES # BLD: 0 10E3/UL
IMM GRANULOCYTES NFR BLD: 0 %
KETONES UR STRIP-MCNC: NEGATIVE MG/DL
LEUKOCYTE ESTERASE UR QL STRIP: NEGATIVE
LYMPHOCYTES # BLD AUTO: 2.6 10E3/UL (ref 0.8–5.3)
LYMPHOCYTES NFR BLD AUTO: 25 %
MCH RBC QN AUTO: 27.5 PG (ref 26.5–33)
MCHC RBC AUTO-ENTMCNC: 32.3 G/DL (ref 31.5–36.5)
MCV RBC AUTO: 85 FL (ref 78–100)
MONOCYTES # BLD AUTO: 0.6 10E3/UL (ref 0–1.3)
MONOCYTES NFR BLD AUTO: 6 %
MUCOUS THREADS #/AREA URNS LPF: PRESENT /LPF
NEUTROPHILS # BLD AUTO: 7.2 10E3/UL (ref 1.6–8.3)
NEUTROPHILS NFR BLD AUTO: 68 %
NITRATE UR QL: NEGATIVE
NRBC # BLD AUTO: 0 10E3/UL
NRBC BLD AUTO-RTO: 0 /100
PH UR STRIP: 5.5 [PH] (ref 5–7)
PLATELET # BLD AUTO: 258 10E3/UL (ref 150–450)
POTASSIUM SERPL-SCNC: 3.7 MMOL/L (ref 3.4–5.3)
PROT SERPL-MCNC: 7.8 G/DL (ref 6.4–8.3)
RBC # BLD AUTO: 4.72 10E6/UL (ref 3.8–5.2)
RBC URINE: 1 /HPF
SODIUM SERPL-SCNC: 134 MMOL/L (ref 136–145)
SP GR UR STRIP: 1.03 (ref 1–1.03)
SQUAMOUS EPITHELIAL: 7 /HPF
TRICHOMONAS, WET PREP: NORMAL
UROBILINOGEN UR STRIP-MCNC: NORMAL MG/DL
WBC # BLD AUTO: 10.6 10E3/UL (ref 4–11)
WBC URINE: 3 /HPF
WBC'S/HIGH POWER FIELD, WET PREP: NORMAL
YEAST, WET PREP: NORMAL

## 2023-02-09 PROCEDURE — 96360 HYDRATION IV INFUSION INIT: CPT | Performed by: EMERGENCY MEDICINE

## 2023-02-09 PROCEDURE — 81001 URINALYSIS AUTO W/SCOPE: CPT | Performed by: PHYSICIAN ASSISTANT

## 2023-02-09 PROCEDURE — 80053 COMPREHEN METABOLIC PANEL: CPT | Performed by: EMERGENCY MEDICINE

## 2023-02-09 PROCEDURE — 258N000003 HC RX IP 258 OP 636: Performed by: EMERGENCY MEDICINE

## 2023-02-09 PROCEDURE — 96361 HYDRATE IV INFUSION ADD-ON: CPT | Performed by: EMERGENCY MEDICINE

## 2023-02-09 PROCEDURE — 74177 CT ABD & PELVIS W/CONTRAST: CPT

## 2023-02-09 PROCEDURE — 85025 COMPLETE CBC W/AUTO DIFF WBC: CPT | Performed by: EMERGENCY MEDICINE

## 2023-02-09 PROCEDURE — 99285 EMERGENCY DEPT VISIT HI MDM: CPT | Mod: 25 | Performed by: EMERGENCY MEDICINE

## 2023-02-09 PROCEDURE — 87210 SMEAR WET MOUNT SALINE/INK: CPT | Performed by: PHYSICIAN ASSISTANT

## 2023-02-09 PROCEDURE — 36415 COLL VENOUS BLD VENIPUNCTURE: CPT | Performed by: EMERGENCY MEDICINE

## 2023-02-09 PROCEDURE — 76830 TRANSVAGINAL US NON-OB: CPT

## 2023-02-09 PROCEDURE — 250N000011 HC RX IP 250 OP 636: Performed by: EMERGENCY MEDICINE

## 2023-02-09 PROCEDURE — 250N000009 HC RX 250: Performed by: EMERGENCY MEDICINE

## 2023-02-09 PROCEDURE — 86140 C-REACTIVE PROTEIN: CPT | Performed by: EMERGENCY MEDICINE

## 2023-02-09 PROCEDURE — 99284 EMERGENCY DEPT VISIT MOD MDM: CPT | Performed by: EMERGENCY MEDICINE

## 2023-02-09 PROCEDURE — 87086 URINE CULTURE/COLONY COUNT: CPT | Performed by: PHYSICIAN ASSISTANT

## 2023-02-09 PROCEDURE — 81025 URINE PREGNANCY TEST: CPT | Performed by: PHYSICIAN ASSISTANT

## 2023-02-09 PROCEDURE — 250N000013 HC RX MED GY IP 250 OP 250 PS 637: Performed by: EMERGENCY MEDICINE

## 2023-02-09 RX ORDER — IOPAMIDOL 755 MG/ML
100 INJECTION, SOLUTION INTRAVASCULAR ONCE
Status: COMPLETED | OUTPATIENT
Start: 2023-02-09 | End: 2023-02-09

## 2023-02-09 RX ORDER — ACETAMINOPHEN 325 MG/1
650 TABLET ORAL EVERY 4 HOURS PRN
Status: DISCONTINUED | OUTPATIENT
Start: 2023-02-09 | End: 2023-02-10 | Stop reason: HOSPADM

## 2023-02-09 RX ADMIN — SODIUM CHLORIDE 74 ML: 9 INJECTION, SOLUTION INTRAVENOUS at 19:57

## 2023-02-09 RX ADMIN — SODIUM CHLORIDE 500 ML: 9 INJECTION, SOLUTION INTRAVENOUS at 19:29

## 2023-02-09 RX ADMIN — ACETAMINOPHEN 650 MG: 325 TABLET, FILM COATED ORAL at 19:20

## 2023-02-09 RX ADMIN — IOPAMIDOL 100 ML: 755 INJECTION, SOLUTION INTRAVENOUS at 19:57

## 2023-02-09 ASSESSMENT — ENCOUNTER SYMPTOMS
FLANK PAIN: 0
RESPIRATORY NEGATIVE: 1
NEUROLOGICAL NEGATIVE: 1
PSYCHIATRIC NEGATIVE: 1
NAUSEA: 1
HEMATOLOGIC/LYMPHATIC NEGATIVE: 1
HEMATURIA: 0
MUSCULOSKELETAL NEGATIVE: 1
ALLERGIC/IMMUNOLOGIC NEGATIVE: 1
VOMITING: 0
ABDOMINAL PAIN: 1
CHILLS: 0
DYSURIA: 1
EYES NEGATIVE: 1
DIARRHEA: 0
FEVER: 0
ENDOCRINE NEGATIVE: 1
CONSTITUTIONAL NEGATIVE: 1
CARDIOVASCULAR NEGATIVE: 1
BACK PAIN: 0
FREQUENCY: 1

## 2023-02-09 ASSESSMENT — ACTIVITIES OF DAILY LIVING (ADL)
ADLS_ACUITY_SCORE: 35

## 2023-02-09 NOTE — ED TRIAGE NOTES
Pt reports finished bactrim medication course without resolution in symptoms. PT states still having cramping in the left side as well.

## 2023-02-10 NOTE — ED PROVIDER NOTES
History     Chief Complaint   Patient presents with     Dysuria     HPI  Norma Collins is a 23 year old female with history of abnormal uterine bleeding due to endometrial polyp, polycystic ovarian syndrome, obesity, cholecystectomy who presents today with urinary symptoms. Symptoms of dysuria, urgency, frequency, burning, suprapubic pain and pressure and nausea have been going on for 1 week(s).  Hematuria no. No flank pain, back pain, vomiting, diarrhea, constipation, or genital sores. Patient states she was seen and treated earlier in the week for UTI with 3 days of bactrim with no improvement of symptoms. She states now pain constant in right lower abdomen that burns and radiates across pelvis at times. This patient does have a history of urinary tract infections, PCOS. She is not currently sexually active and no concerns for sexually transmitted diseases.  She states some vaginal discharge.    Problem list, Medication list, Allergies, and Medical/Social/Surgical histories reviewed in [facility] and updated as appropriate.    Allergies:  Allergies   Allergen Reactions     [place] Sprouts [Brassica Oleracea Italica]      Keflex [Cephalexin] Itching     Other Food Allergy Rash     [place] sprouts       Problem List:    Patient Active Problem List    Diagnosis Date Noted     Abnormal uterine bleeding due to endometrial polyp 08/24/2022     Priority: Medium     Change in bowel habit 08/23/2022     Priority: Medium     Ileitis 08/23/2022     Priority: Medium     Family history of colon cancer 07/08/2021     Priority: Medium     Mother at age 40       Family history of hemochromatosis 07/08/2021     Priority: Medium     Father and Grandfather       Acanthosis nigricans 12/08/2020     Priority: Medium     Created by Conversion      Last Assessment & Plan:   Patient doing very well and adding exercise, and watching diet closely.  We'll now add metformin as per orders.  Side effects and precautions discussed.  Follow up in  "one month.       Arthropathy of ankle and foot 12/08/2020     Priority: Medium     Created by Conversion    Replacement Utility updated for latest IMO load    Last Assessment & Plan:   With diffuse joint aches treating with progressive weight loss and lifestyle modifications.       Iron deficiency anemia due to chronic blood loss 11/22/2019     Priority: Medium     Aphthous ulcer of ileum 10/30/2019     Priority: Medium     Elevated BP without diagnosis of hypertension 10/16/2017     Priority: Medium     Adenotonsillar hypertrophy 01/04/2017     Priority: Medium     PCOS (polycystic ovarian syndrome) 02/29/2016     Priority: Medium     Last Assessment & Plan:   Her dietary changes are working very well.  Also working very well for the household.  She is exercising more, having more energy, and pants are fitting looser.  We'll plan for in body at next visit along with remeasurement of abdominal and neck circumference.  Also with her forgetting the metformin in the morning, will move it to the afternoon with lunch.       Class 3 severe obesity due to excess calories without serious comorbidity with body mass index (BMI) of 50.0 to 59.9 in adult (H) 10/30/2015     Priority: Medium     Last Assessment & Plan:   Continue lifestyle modifications.  Will breakfast a [place] protein-based with shakes or aches.  May also use chicken or steak.  Stopped carbohydrates in the morning.  Better food choices discussed.  Encouraged to watch the documentary, \"In defense of food\" before next visit.          Past Medical History:    Past Medical History:   Diagnosis Date     Abnormal uterine bleeding due to endometrial polyp      Acanthosis nigricans      Adenotonsillar hypertrophy      Aphthous ulcer of ileum      Arthropathy of ankle and foot      Family history of hemochromatosis      Iron deficiency anemia due to chronic blood loss      Obesity      PCOS (polycystic ovarian syndrome)        Past Surgical History:    Past Surgical " History:   Procedure Laterality Date     CHOLECYSTECTOMY       HYSTEROSCOPY DIAGNOSTIC N/A 10/14/2022    Procedure: HYSTEROSCOPY WITH POLYPECTOMY;  Surgeon: Aston Yanes MD;  Location: [facility] OR     LAPAROSCOPIC CHOLECYSTECTOMY       OTHER SURGICAL HISTORY      none     TONSILLECTOMY       TONSILLECTOMY & ADENOIDECTOMY Bilateral 2/16/2017    Procedure: BILATERAL TONSILLECTOMY AND ADENOIDECTOMY;  Surgeon: Jacob Arguello MD;  Location: [facility];  Service:        Family History:    Family History   Problem Relation Age of Onset     Colon Cancer Mother 43     Cancer Mother         colon     Venous thrombosis Mother         cancer-related     Cerebrovascular Disease Mother      Liver Disease Father         cirrhosis of liver     Other - See Comments Father         high iron     Cirrhosis Father      Hemochromatosis Father      Hypertension Father      Hyperlipidemia Father      Myocardial Infarction Maternal Grandmother      Heart Disease Maternal Grandmother      Dementia Maternal Grandmother      Cerebrovascular Disease Paternal Grandmother      Other - See Comments Maternal Grandfather         heart attack or cancer/unsure     Bladder Cancer Maternal Grandfather      Cirrhosis Paternal Grandfather      Other - See Comments Paternal Grandfather         high iron     Diabetes Paternal Grandfather      Hemochromatosis Paternal Grandfather      Kidney Disease Paternal Grandfather      Hypertension Paternal Grandfather      Colon Cancer Maternal Great-Grandmother         70-90 years old      Uterine Cancer Maternal Great-Grandmother      Breast Cancer Maternal Great-Grandmother      Brain Cancer Maternal Aunt        Social History:  Marital Status:  Single [1]  Social History     Tobacco Use     Smoking status: Every Day     Types: Vaping Device     Smokeless tobacco: Never   Vaping Use     Vaping Use: Every day     Substances: Nicotine, Flavoring     Devices: Disposable   Substance  Use Topics     Alcohol use: Yes     Comment: occ     Drug use: Never        Medications:    naproxen sodium 220 MG capsule  omeprazole (PRILOSEC) 20 MG DR capsule  Polyethylene Glycol 3350 (MIRALAX PO)        Review of Systems   Constitutional: Negative for chills and fever.   HENT: Negative.    Respiratory: Negative.    Cardiovascular: Negative.    Gastrointestinal: Positive for nausea. Negative for diarrhea and vomiting.   Genitourinary: Positive for dysuria, frequency, pelvic pain, urgency and vaginal discharge. Negative for flank pain, genital sores and hematuria.   Musculoskeletal: Negative for back pain.   Skin: Negative.    Neurological: Negative.    All other systems reviewed and are negative.      Physical Exam   BP: 139/81  Pulse: 97  Temp: 99  F (37.2  C)  Resp: 18  SpO2: 100 %      Physical Exam     /81   Pulse 97   Temp 99  F (37.2  C) (Tympanic)   Resp 18   SpO2 100%     GENERAL APPEARANCE: healthy, alert and no distress  RESP: lungs clear to auscultation - no rales, rhonchi or wheezes  CV: regular rates and rhythm, normal S1 S2, no murmur noted  ABDOMEN:  soft, positive right lower quadrant and suprapubic tenderness with deep palpation, no HSM or masses and bowel sounds normal  BACK: No CVA tenderness  GU_female: external genitalia normal, cervix normal in appearance , uterus normal size, shape, and consistency, no adenexal tenderness or masses noted, no cervical motion tenderness, vaginal discharge: yellowish/green  SKIN: no suspicious lesions or rashes    No results found for this or any previous visit (from the past 24 hour(s)).        ED Course                 Procedures             Critical Care time:  none               Results for orders placed or performed during the hospital encounter of 02/09/23 (from the past 24 hour(s))   UA with Microscopic reflex to Culture    Specimen: Urine, Clean Catch   Result Value Ref Range    Color Urine Yellow Colorless, Straw, Light Yellow, Yellow     Appearance Urine Clear Clear    Glucose Urine Negative Negative mg/dL    Bilirubin Urine Negative Negative    Ketones Urine Negative Negative mg/dL    Specific Gravity Urine 1.030 1.003 - 1.035    Blood Urine Negative Negative    pH Urine 5.5 5.0 - 7.0    Protein Albumin Urine Negative Negative mg/dL    Urobilinogen Urine Normal Normal, 2.0 mg/dL    Nitrite Urine Negative Negative    Leukocyte Esterase Urine Negative Negative    Bacteria Urine Few (A) None Seen /HPF    Mucus Urine Present (A) None Seen /LPF    RBC Urine 1 <=2 /HPF    WBC Urine 3 <=5 /HPF    Squamous Epithelials Urine 7 (H) <=1 /HPF    Narrative    Urine Culture not indicated   HCG qualitative urine (UPT)   Result Value Ref Range    hCG Urine Qualitative Negative Negative   Wet prep    Specimen: Vagina; Swab   Result Value Ref Range    Trichomonas Absent Absent    Yeast Absent Absent    Clue Cells Absent Absent    WBCs/high power field None None       Medications - No data to display    Assessments & Plan (with Medical Decision Making)     I have reviewed the nursing notes.    I have reviewed the findings, diagnosis, plan and need for follow up with the patient.  Norma Collins is a 23 year old female with history of abnormal uterine bleeding due to endometrial polyp, polycystic ovarian syndrome, obesity, cholecystectomy who presents today with urinary symptoms. Symptoms of dysuria, urgency, frequency, burning, suprapubic pain and pressure and nausea have been going on for 1 week(s).  Hematuria no. No flank pain, back pain, vomiting, diarrhea, constipation, or genital sores. Patient states she was seen and treated earlier in the week for UTI with 3 days of bactrim with no improvement of symptoms. She states now pain constant in right lower abdomen that burns and radiates across pelvis at times. This patient does have a history of urinary tract infections, PCOS. She is not currently sexually active and no concerns for sexually transmitted diseases.   She states some vaginal discharge.    Patient was initially seen and worked up in the urgent care with negative wet prep, negative pregnancy test, and no significant abnormal findings on urine.  Urine culture was sent and currently pending.  Due to patient's worsening pelvic pain recommend that she be seen in the emergency department for further evaluation and management.  Patient is in agreement with this plan.  Discussed patient case with the ED provider and patient was transferred to the ED in stable condition for further work-up and evaluation of right lower quadrant and pelvic pain.  I did offer STD testing today but patient declined stating she is not currently sexually active and has no concerns for this.      New Prescriptions    No medications on file       Final diagnoses:   Dysuria   Pelvic pain in female       2/9/2023   [facility] EMERGENCY DEPT

## 2023-02-10 NOTE — DISCHARGE INSTRUCTIONS
1) Your evaluation today did not reveal the exact cause of your pain and discomfort.  Imaging was reassuring including CT and ultrasound.  Urinalysis did not show any evidence for infection.    2) We discussed follow-up care with your primary care provider if your pain and discomfort persist.  We also reviewed the pain management plan for home.    3) Although you appear stable for discharge to home at this time if symptoms worsen or you develop new symptoms of concerns you should return to be reevaluated

## 2023-02-10 NOTE — ED PROVIDER NOTES
History     Chief Complaint   Patient presents with     Dysuria     HPI  Norma Collins is a 23 year old female who presents for evaluation with report of urinary urgency frequency burning and suprapubic pain and discomfort that been ongoing for about a week.  Patient has multiple medical diagnoses, history of abnormal uterine bleeding due to endometrial polyp and history of PCOS, and prior history of cholecystectomy.  She was seen in urgent care and referred to the emergency department for further diagnostic work-up and care.    On examination patient reports since her evaluation 3 days ago when she was treated for presumed UTI she has had persistent urinary frequency and left lower quadrant abdominal pain.  Patient confirmed that she had a cholecystectomy and endometrial polyp removed.  She has had no abnormal spotting or bleeding or discharge and no history of STDs.  She reports she is not sexually active and reports no concerns for STD.  No prior history of kidney stones.  She reports pain radiates from the left lower quadrant around the flank and left upper abdomen.  She reports no nausea or vomiting.  Due to persistent symptoms she presents for further care.    Allergies:  Allergies   Allergen Reactions     Blair Sprouts [Brassica Oleracea Italica]      Keflex [Cephalexin] Itching     Other Food Allergy Rash     Blair sprouts       Problem List:    Patient Active Problem List    Diagnosis Date Noted     Abnormal uterine bleeding due to endometrial polyp 08/24/2022     Priority: Medium     Change in bowel habit 08/23/2022     Priority: Medium     Ileitis 08/23/2022     Priority: Medium     Family history of colon cancer 07/08/2021     Priority: Medium     Mother at age 40       Family history of hemochromatosis 07/08/2021     Priority: Medium     Father and Grandfather       Acanthosis nigricans 12/08/2020     Priority: Medium     Created by Conversion      Last Assessment & Plan:   Patient doing very  "well and adding exercise, and watching diet closely.  We'll now add metformin as per orders.  Side effects and precautions discussed.  Follow up in one month.       Arthropathy of ankle and foot 12/08/2020     Priority: Medium     Created by Conversion    Replacement Utility updated for latest IMO load    Last Assessment & Plan:   With diffuse joint aches treating with progressive weight loss and lifestyle modifications.       Iron deficiency anemia due to chronic blood loss 11/22/2019     Priority: Medium     Aphthous ulcer of ileum 10/30/2019     Priority: Medium     Elevated BP without diagnosis of hypertension 10/16/2017     Priority: Medium     Adenotonsillar hypertrophy 01/04/2017     Priority: Medium     PCOS (polycystic ovarian syndrome) 02/29/2016     Priority: Medium     Last Assessment & Plan:   Her dietary changes are working very well.  Also working very well for the household.  She is exercising more, having more energy, and pants are fitting looser.  We'll plan for in body at next visit along with remeasurement of abdominal and neck circumference.  Also with her forgetting the metformin in the morning, will move it to the afternoon with lunch.       Class 3 severe obesity due to excess calories without serious comorbidity with body mass index (BMI) of 50.0 to 59.9 in adult (H) 10/30/2015     Priority: Medium     Last Assessment & Plan:   Continue lifestyle modifications.  Will breakfast a [place]ez protein-based with shakes or aches.  May also use chicken or steak.  Stopped carbohydrates in the morning.  Better food choices discussed.  Encouraged to watch the documentary, \"In defense of food\" before next visit.          Past Medical History:    Past Medical History:   Diagnosis Date     Abnormal uterine bleeding due to endometrial polyp      Acanthosis nigricans      Adenotonsillar hypertrophy      Aphthous ulcer of ileum      Arthropathy of ankle and foot      Family history of hemochromatosis      Iron " deficiency anemia due to chronic blood loss      Obesity      PCOS (polycystic ovarian syndrome)        Past Surgical History:    Past Surgical History:   Procedure Laterality Date     CHOLECYSTECTOMY       HYSTEROSCOPY DIAGNOSTIC N/A 10/14/2022    Procedure: HYSTEROSCOPY WITH POLYPECTOMY;  Surgeon: Aston Yanes MD;  [place] [facility] [place]     LAPAROSCOPIC CHOLECYSTECTOMY       OTHER SURGICAL HISTORY      none     TONSILLECTOMY       TONSILLECTOMY & ADENOIDECTOMY Bilateral 2/16/2017    Procedure: BILATERAL TONSILLECTOMY AND ADENOIDECTOMY;  Surgeon: Jacob Arguello MD;  Location: [facility];  Service:        Family History:    Family History   Problem Relation Age of Onset     Colon Cancer Mother 43     Cancer Mother         colon     Venous thrombosis Mother         cancer-related     Cerebrovascular Disease Mother      Liver Disease Father         cirrhosis of liver     Other - See Comments Father         high iron     Cirrhosis Father      Hemochromatosis Father      Hypertension Father      Hyperlipidemia Father      Myocardial Infarction Maternal Grandmother      Heart Disease Maternal Grandmother      Dementia Maternal Grandmother      Cerebrovascular Disease Paternal Grandmother      Other - See Comments Maternal Grandfather         heart attack or cancer/unsure     Bladder Cancer Maternal Grandfather      Cirrhosis Paternal Grandfather      Other - See Comments Paternal Grandfather         high iron     Diabetes Paternal Grandfather      Hemochromatosis Paternal Grandfather      Kidney Disease Paternal Grandfather      Hypertension Paternal Grandfather      Colon Cancer Maternal Great-Grandmother         70-90 years old      Uterine Cancer Maternal Great-Grandmother      Breast Cancer Maternal Great-Grandmother      Brain Cancer Maternal Aunt        Social History:  Marital Status:  Single [1]  Social History     Tobacco Use     Smoking status: Every Day     Types: Vaping  Device     Smokeless tobacco: Never   Vaping Use     Vaping Use: Every day     Substances: Nicotine, Flavoring     Devices: Disposable   Substance Use Topics     Alcohol use: Yes     Comment: occ     Drug use: Never        Medications:    naproxen sodium 220 MG capsule  omeprazole (PRILOSEC) 20 MG DR capsule  Polyethylene Glycol 3350 (MIRALAX PO)          Review of Systems   Constitutional: Negative.    HENT: Negative.    Eyes: Negative.    Respiratory: Negative.    Cardiovascular: Negative.    Gastrointestinal: Positive for abdominal pain.   Endocrine: Negative.    Genitourinary: Positive for frequency.   Musculoskeletal: Negative.    Skin: Negative.    Allergic/Immunologic: Negative.    Neurological: Negative.    Hematological: Negative.    Psychiatric/Behavioral: Negative.    All other systems reviewed and are negative.      Physical Exam   BP: 139/81  Pulse: 97  Temp: 99  F (37.2  C)  Resp: 18  SpO2: 100 %      Physical Exam  Constitutional:       General: She is not in acute distress.     Appearance: She is not ill-appearing, toxic-appearing or diaphoretic.   HENT:      Head: Normocephalic and atraumatic.      Nose: Nose normal.   Eyes:      Extraocular Movements: Extraocular movements intact.      Pupils: Pupils are equal, round, and reactive to light.   Cardiovascular:      Rate and Rhythm: Normal rate and regular rhythm.   Pulmonary:      Effort: Pulmonary effort is normal. No respiratory distress.      Breath sounds: No stridor. No wheezing, rhonchi or rales.   Chest:      Chest wall: No tenderness.   Abdominal:      Tenderness: There is abdominal tenderness.       Musculoskeletal:         General: No swelling, tenderness, deformity or signs of injury.      Cervical back: Normal range of motion.      Right lower leg: No edema.      Left lower leg: No edema.   Skin:     Coloration: Skin is not jaundiced or pale.      Findings: No bruising, erythema, lesion or rash.   Neurological:      General: No focal  deficit present.      Mental Status: She is alert and oriented to person, place, and time.      Cranial Nerves: No cranial nerve deficit.      Sensory: No sensory deficit.      Motor: No weakness.      Coordination: Coordination normal.      Gait: Gait normal.      Deep Tendon Reflexes: Reflexes normal.   Psychiatric:         Mood and Affect: Mood normal.         Behavior: Behavior normal.         Thought Content: Thought content normal.         Judgment: Judgment normal.         ED Course                 Procedures              Critical Care time:  none             ED medications:  Medications   acetaminophen (TYLENOL) tablet 650 mg (650 mg Oral Given 2/9/23 1920)   0.9% sodium chloride BOLUS (0 mLs Intravenous Stopped 2/9/23 2101)   iopamidol (ISOVUE-370) solution 100 mL (100 mLs Intravenous Given 2/9/23 1957)   sodium chloride 0.9 % bag 500mL for CT scan flush use (74 mLs Intravenous Given 2/9/23 1957)       ED Vitals:  Vitals:    02/09/23 1930 02/2000 02/09/23 2100 02/09/23 2200   BP: 119/84 136/85 116/69 122/77   Pulse: 83  76 78   Resp:  16 16    Temp:       TempSrc:       SpO2:  99% 98% 98%     ED labs and imaging:  Results for orders placed or performed during the hospital encounter of 02/09/23 (from the past 24 hour(s))   UA with Microscopic reflex to Culture    Specimen: Urine, Clean Catch   Result Value Ref Range    Color Urine Yellow Colorless, Straw, Light Yellow, Yellow    Appearance Urine Clear Clear    Glucose Urine Negative Negative mg/dL    Bilirubin Urine Negative Negative    Ketones Urine Negative Negative mg/dL    Specific Gravity Urine 1.030 1.003 - 1.035    Blood Urine Negative Negative    pH Urine 5.5 5.0 - 7.0    Protein Albumin Urine Negative Negative mg/dL    Urobilinogen Urine Normal Normal, 2.0 mg/dL    Nitrite Urine Negative Negative    Leukocyte Esterase Urine Negative Negative    Bacteria Urine Few (A) None Seen /HPF    Mucus Urine Present (A) None Seen /LPF    RBC Urine 1 <=2  /HPF    WBC Urine 3 <=5 /HPF    Squamous Epithelials Urine 7 (H) <=1 /HPF    Narrative    Urine Culture not indicated   HCG qualitative urine (UPT)   Result Value Ref Range    hCG Urine Qualitative Negative Negative   Wet prep    Specimen: Vagina; Swab   Result Value Ref Range    Trichomonas Absent Absent    Yeast Absent Absent    Clue Cells Absent Absent    WBCs/high power field None None   CBC with platelets differential    Narrative    The following orders were created for panel order CBC with platelets differential.  Procedure                               Abnormality         Status                     ---------                               -----------         ------                     CBC with platelets and d...[[id number]]                      Final result                 Please view results for these tests on the individual orders.   Comprehensive metabolic panel   Result Value Ref Range    Sodium 134 (L) 136 - 145 mmol/L    Potassium 3.7 3.4 - 5.3 mmol/L    Chloride 100 98 - 107 mmol/L    Carbon Dioxide (CO2) 22 22 - 29 mmol/L    Anion Gap 12 7 - 15 mmol/L    Urea Nitrogen 13.7 6.0 - 20.0 mg/dL    Creatinine 0.87 0.51 - 0.95 mg/dL    Calcium 9.7 8.6 - 10.0 mg/dL    Glucose 94 70 - 99 mg/dL    Alkaline Phosphatase 86 35 - 104 U/L    AST 36 (H) 10 - 35 U/L    ALT 49 (H) 10 - 35 U/L    Protein Total 7.8 6.4 - 8.3 g/dL    Albumin 4.6 3.5 - 5.2 g/dL    Bilirubin Total 0.3 <=1.2 mg/dL    GFR Estimate >90 >60 mL/min/1.73m2   CRP Inflammation   Result Value Ref Range    CRP Inflammation 9.39 (H) <5.00 mg/L   CBC with platelets and differential   Result Value Ref Range    WBC Count 10.6 4.0 - 11.0 10e3/uL    RBC Count 4.72 3.80 - 5.20 10e6/uL    Hemoglobin 13.0 11.7 - 15.7 g/dL    Hematocrit 40.2 35.0 - 47.0 %    MCV 85 78 - 100 fL    MCH 27.5 26.5 - 33.0 pg    MCHC 32.3 31.5 - 36.5 g/dL    RDW 13.0 10.0 - 15.0 %    Platelet Count 258 150 - 450 10e3/uL    % Neutrophils 68 %    % Lymphocytes 25 %    % Monocytes 6 %     % Eosinophils 1 %    % Basophils 0 %    % Immature Granulocytes 0 %    NRBCs per 100 WBC 0 <1 /100    Absolute Neutrophils 7.2 1.6 - 8.3 10e3/uL    Absolute Lymphocytes 2.6 0.8 - 5.3 10e3/uL    Absolute Monocytes 0.6 0.0 - 1.3 10e3/uL    Absolute Eosinophils 0.1 0.0 - 0.7 10e3/uL    Absolute Basophils 0.0 0.0 - 0.2 10e3/uL    Absolute Immature Granulocytes 0.0 <=0.4 10e3/uL    Absolute NRBCs 0.0 10e3/uL   CT Abdomen Pelvis w Contrast    Narrative    EXAM: CT ABDOMEN PELVIS W CONTRAST  LOCATION: [facility]  DATE/TIME: 2/9/2023 8:03 PM    INDICATION: Urinary frequency. Left lower quadrant abdominal pain.  COMPARISON: Pelvic ultrasound 08/23/2022. CT abdomen pelvis 02/01/2022.  TECHNIQUE: CT scan of the abdomen and pelvis was performed following injection of IV contrast. Multiplanar reformats were obtained. Dose reduction techniques were used.  CONTRAST: 100mL Isovue 370    FINDINGS:   LOWER CHEST: Normal.    HEPATOBILIARY: Status post cholecystectomy. No biliary ductal dilation. No focal liver lesions.    PANCREAS: Normal.    SPLEEN: Normal.    ADRENAL GLANDS: Normal.    KIDNEYS/BLADDER: No urinary calculi or hydronephrosis. Symmetric nephrograms, without evidence of pyelonephritis. Normal bladder.    BOWEL: No obstruction or inflammation. Normal appendix.    LYMPH NODES: Normal.    VASCULATURE: Unremarkable.    PELVIC ORGANS: Normal.    MUSCULOSKELETAL: Normal.      Impression    IMPRESSION:     1.  No acute abnormality or specific cause of abdominal pain is identified.    2.  No urinary calculi or hydronephrosis.   US Pelvis Cmplt w Transvag & Doppler LmtPel Duplex Limited    Narrative    EXAM: US PELVIS COMPLETE W TRANSVAGINAL AND DOPPLER LIMITED  LOCATION: [facility]  DATE/TIME: 2/9/2023 11:24 PM    INDICATION: 1 week history of left lower quadrant abdominal pain. Hx of PCOS. Evaluate for cyst torsion vs ovarian torsion vs other acute   process  COMPARISON: None.  TECHNIQUE: Transabdominal scans were performed. Endovaginal ultrasound was performed to better visualize the adnexa. Color flow with spectral Doppler and waveform analysis performed.    FINDINGS:    UTERUS: 8.0 x 3.9 x 5.2 cm. Normal in size and position with no masses.    ENDOMETRIUM: 14 mm. Normal smooth endometrium.    RIGHT OVARY: 4.1 x 2.3 x 2.0 cm. Normal with arterial and venous duplex flow identified.    LEFT OVARY: 3.5 x 2.1 x 2.8 cm. Normal with arterial and venous duplex flow identified.    No significant free fluid.      Impression    IMPRESSION:    1.  Normal pelvic ultrasound.                 Assessments & Plan (with Medial Decision Making)   Assessment Summary and Clinical Impression: 23 old female who presented with report of dysuria and pelvic pain.  She has a history of abnormal uterine bleeding due to endometrial polyp s/p removal and history of PCOS.  She presented with a week of dysuria including frequency, burning, urgency and suprapubic pressure. Patient was seen in urgent care and had an initial unrevealing work-up including negative wet prep, negative urine pregnancy and normal urinalysis.  She was referred to the emergency department for further work-up and care.  On examination patient is afebrile, normotensive.  We discussed and reviewed possible causes for her pain and reviewed options for imaging.  We elected to proceed with CT imaging given competing diagnosis of left lower quadrant abdominal pain and urinary frequency in light of recent treatment and therapy in the last 3 to 3 days.  Work-up and imaging did not reveal any acute findings to explain patient's pain and symptoms reported.  She was discharged with symptoms of uncertain cause with outpatient follow-up and low threshold to return for re-evalution.    ED course and Plan:  Reviewed the medical record.  Reviewed ED visit on 2/5/2023.  We discussed reviewed possible causes for her symptoms.  Pelvic  and bimanual exam was deferred.  Blood work and imaging was obtained with report of persistent symptoms in light of recent treatment of the last 3 to 5 days.  Urine culture from 2/5/2023 grew mixed urogenital lucy.  Work-up revealed no acute abnormality in the abdomen and no obstructing stone.  Patient had a mildly elevated CRP at 6 but a normal white count.  Electrolytes are within normal limits and  minimally elevated liver enzymes.  Patient's CT did not reveal any acute abdominal catastrophe or process and no obstructing stone was noted.  I reevaluate the patient after imaging and blood work.  We discussed the role of additional imaging such as pelvic ultrasound to evaluate for  Ovarian or  process.  Patient elected to proceed with pelvic ultrasound due to persistent pain.    Comprehensive pelvic ultrasound revealed normal pelvic ultrasound.  See details outlined in  the radiology report.  With reassuring imaging during her course she is discharged with pain and discomfort of uncertain cause with outpatient follow-up.  Reviewed pain management plan for home. Patient expressed comfort understanding and agreement with plan of care.      Disclaimer: This note consists of symbols derived from keyboarding, dictation and/or voice recognition software. As a result, there may be errors in the script that have gone undetected. Please consider this when interpreting information found in this chart.  I have reviewed the nursing notes.    I have reviewed the findings, diagnosis, plan and need for follow up with the patient.       Medical Decision Making  The patient's presentation is strongly suggestive of an acute illness with systemic symptoms.    The patient's evaluation involved:  ordering and/or review of 3+ test(s) in this encounter (diagnostic imaging and labs)    The patient's management involved prescription drug management (including medications given in the ED).        New Prescriptions    No medications on  file       Final diagnoses:   Dysuria   Pelvic pain in female       2/9/2023   [facility] EMERGENCY DEPT     Karsten Gallo MD  02/10/23 0143

## 2023-02-11 LAB — BACTERIA UR CULT: NORMAL

## 2023-02-13 ENCOUNTER — HOSPITAL ENCOUNTER (EMERGENCY)
Facility: CLINIC | Age: 23
Discharge: HOME | End: 2023-02-13
Attending: EMERGENCY MEDICINE | Admitting: EMERGENCY MEDICINE
Payer: COMMERCIAL

## 2023-02-13 ENCOUNTER — OFFICE VISIT (OUTPATIENT)
Dept: FAMILY MEDICINE | Facility: CLINIC | Age: 23
End: 2023-02-13
Payer: COMMERCIAL

## 2023-02-13 VITALS
OXYGEN SATURATION: 96 % | HEIGHT: 67 IN | SYSTOLIC BLOOD PRESSURE: 126 MMHG | RESPIRATION RATE: 16 BRPM | DIASTOLIC BLOOD PRESSURE: 80 MMHG | BODY MASS INDEX: 45.99 KG/M2 | HEART RATE: 80 BPM | WEIGHT: 293 LBS | TEMPERATURE: 98.6 F

## 2023-02-13 VITALS
HEIGHT: 67 IN | HEART RATE: 80 BPM | RESPIRATION RATE: 18 BRPM | TEMPERATURE: 98.6 F | DIASTOLIC BLOOD PRESSURE: 66 MMHG | SYSTOLIC BLOOD PRESSURE: 110 MMHG | WEIGHT: 293 LBS | BODY MASS INDEX: 45.99 KG/M2 | OXYGEN SATURATION: 96 %

## 2023-02-13 DIAGNOSIS — F41.1 GAD (GENERALIZED ANXIETY DISORDER): ICD-10-CM

## 2023-02-13 DIAGNOSIS — R12 HEARTBURN: Primary | ICD-10-CM

## 2023-02-13 DIAGNOSIS — R10.12 ABDOMINAL PAIN, LEFT UPPER QUADRANT: ICD-10-CM

## 2023-02-13 LAB
ALBUMIN SERPL BCG-MCNC: 4.3 G/DL (ref 3.5–5.2)
ALP SERPL-CCNC: 74 U/L (ref 35–104)
ALT SERPL W P-5'-P-CCNC: 38 U/L (ref 10–35)
ANION GAP SERPL CALCULATED.3IONS-SCNC: 11 MMOL/L (ref 7–15)
AST SERPL W P-5'-P-CCNC: 26 U/L (ref 10–35)
BASOPHILS # BLD AUTO: 0 10E3/UL (ref 0–0.2)
BASOPHILS NFR BLD AUTO: 0 %
BILIRUB SERPL-MCNC: 0.2 MG/DL
BUN SERPL-MCNC: 13.1 MG/DL (ref 6–20)
CALCIUM SERPL-MCNC: 9.4 MG/DL (ref 8.6–10)
CHLORIDE SERPL-SCNC: 103 MMOL/L (ref 98–107)
CREAT SERPL-MCNC: 0.91 MG/DL (ref 0.51–0.95)
DEPRECATED HCO3 PLAS-SCNC: 25 MMOL/L (ref 22–29)
EOSINOPHIL # BLD AUTO: 0.1 10E3/UL (ref 0–0.7)
EOSINOPHIL NFR BLD AUTO: 1 %
ERYTHROCYTE [DISTWIDTH] IN BLOOD BY AUTOMATED COUNT: 12.9 % (ref 10–15)
GFR SERPL CREATININE-BSD FRML MDRD: 90 ML/MIN/1.73M2
GLUCOSE SERPL-MCNC: 107 MG/DL (ref 70–99)
HCT VFR BLD AUTO: 38 % (ref 35–47)
HGB BLD-MCNC: 12.5 G/DL (ref 11.7–15.7)
HOLD SPECIMEN: NORMAL
IMM GRANULOCYTES # BLD: 0.1 10E3/UL
IMM GRANULOCYTES NFR BLD: 1 %
LIPASE SERPL-CCNC: 18 U/L (ref 13–60)
LYMPHOCYTES # BLD AUTO: 2.2 10E3/UL (ref 0.8–5.3)
LYMPHOCYTES NFR BLD AUTO: 20 %
MCH RBC QN AUTO: 27.6 PG (ref 26.5–33)
MCHC RBC AUTO-ENTMCNC: 32.9 G/DL (ref 31.5–36.5)
MCV RBC AUTO: 84 FL (ref 78–100)
MONOCYTES # BLD AUTO: 0.6 10E3/UL (ref 0–1.3)
MONOCYTES NFR BLD AUTO: 5 %
NEUTROPHILS # BLD AUTO: 7.8 10E3/UL (ref 1.6–8.3)
NEUTROPHILS NFR BLD AUTO: 73 %
NRBC # BLD AUTO: 0 10E3/UL
NRBC BLD AUTO-RTO: 0 /100
PLATELET # BLD AUTO: 245 10E3/UL (ref 150–450)
POTASSIUM SERPL-SCNC: 3.7 MMOL/L (ref 3.4–5.3)
PROT SERPL-MCNC: 7 G/DL (ref 6.4–8.3)
RBC # BLD AUTO: 4.53 10E6/UL (ref 3.8–5.2)
SODIUM SERPL-SCNC: 139 MMOL/L (ref 136–145)
TROPONIN T SERPL HS-MCNC: <6 NG/L
WBC # BLD AUTO: 10.7 10E3/UL (ref 4–11)

## 2023-02-13 PROCEDURE — 96375 TX/PRO/DX INJ NEW DRUG ADDON: CPT | Performed by: EMERGENCY MEDICINE

## 2023-02-13 PROCEDURE — 93010 ELECTROCARDIOGRAM REPORT: CPT | Performed by: EMERGENCY MEDICINE

## 2023-02-13 PROCEDURE — 99214 OFFICE O/P EST MOD 30 MIN: CPT | Performed by: STUDENT IN AN ORGANIZED HEALTH CARE EDUCATION/TRAINING PROGRAM

## 2023-02-13 PROCEDURE — 250N000011 HC RX IP 250 OP 636: Performed by: EMERGENCY MEDICINE

## 2023-02-13 PROCEDURE — C9113 INJ PANTOPRAZOLE SODIUM, VIA: HCPCS | Performed by: EMERGENCY MEDICINE

## 2023-02-13 PROCEDURE — 84484 ASSAY OF TROPONIN QUANT: CPT | Performed by: EMERGENCY MEDICINE

## 2023-02-13 PROCEDURE — 250N000013 HC RX MED GY IP 250 OP 250 PS 637: Performed by: EMERGENCY MEDICINE

## 2023-02-13 PROCEDURE — 96374 THER/PROPH/DIAG INJ IV PUSH: CPT | Performed by: EMERGENCY MEDICINE

## 2023-02-13 PROCEDURE — 85025 COMPLETE CBC W/AUTO DIFF WBC: CPT | Performed by: EMERGENCY MEDICINE

## 2023-02-13 PROCEDURE — 99284 EMERGENCY DEPT VISIT MOD MDM: CPT | Mod: 25 | Performed by: EMERGENCY MEDICINE

## 2023-02-13 PROCEDURE — 83690 ASSAY OF LIPASE: CPT | Performed by: EMERGENCY MEDICINE

## 2023-02-13 PROCEDURE — 36415 COLL VENOUS BLD VENIPUNCTURE: CPT | Performed by: EMERGENCY MEDICINE

## 2023-02-13 PROCEDURE — 80053 COMPREHEN METABOLIC PANEL: CPT | Performed by: EMERGENCY MEDICINE

## 2023-02-13 PROCEDURE — 93005 ELECTROCARDIOGRAM TRACING: CPT | Performed by: EMERGENCY MEDICINE

## 2023-02-13 RX ORDER — DROPERIDOL 2.5 MG/ML
2.5 INJECTION, SOLUTION INTRAMUSCULAR; INTRAVENOUS ONCE
Status: COMPLETED | OUTPATIENT
Start: 2023-02-13 | End: 2023-02-13

## 2023-02-13 RX ORDER — ONDANSETRON 2 MG/ML
4 INJECTION INTRAMUSCULAR; INTRAVENOUS
Status: COMPLETED | OUTPATIENT
Start: 2023-02-13 | End: 2023-02-13

## 2023-02-13 RX ORDER — SUCRALFATE 1 G/1
1 TABLET ORAL 4 TIMES DAILY PRN
Qty: 56 TABLET | Refills: 0 | Status: SHIPPED | OUTPATIENT
Start: 2023-02-13 | End: 2023-02-27

## 2023-02-13 RX ORDER — BUPROPION HYDROCHLORIDE 150 MG/1
150 TABLET ORAL EVERY MORNING
Qty: 90 TABLET | Refills: 3 | Status: SHIPPED | OUTPATIENT
Start: 2023-02-13 | End: 2023-04-27

## 2023-02-13 RX ORDER — HYDROXYZINE PAMOATE 25 MG/1
25-50 CAPSULE ORAL 3 TIMES DAILY PRN
Qty: 45 CAPSULE | Refills: 0 | Status: SHIPPED | OUTPATIENT
Start: 2023-02-13 | End: 2023-03-28

## 2023-02-13 RX ORDER — SUCRALFATE ORAL 1 G/10ML
1 SUSPENSION ORAL ONCE
Status: COMPLETED | OUTPATIENT
Start: 2023-02-13 | End: 2023-02-13

## 2023-02-13 RX ADMIN — SUCRALFATE 1 G: 1 SUSPENSION ORAL at 01:55

## 2023-02-13 RX ADMIN — ONDANSETRON 4 MG: 2 INJECTION INTRAMUSCULAR; INTRAVENOUS at 01:19

## 2023-02-13 RX ADMIN — PANTOPRAZOLE SODIUM 40 MG: 40 INJECTION, POWDER, FOR SOLUTION INTRAVENOUS at 01:55

## 2023-02-13 RX ADMIN — DROPERIDOL 2.5 MG: 2.5 INJECTION, SOLUTION INTRAMUSCULAR; INTRAVENOUS at 01:55

## 2023-02-13 ASSESSMENT — ENCOUNTER SYMPTOMS
CHEST TIGHTNESS: 0
FEVER: 1
MYALGIAS: 0
WOUND: 0
HEMATURIA: 0
LIGHT-HEADEDNESS: 0
COUGH: 0
APPETITE CHANGE: 1
VOMITING: 1
NAUSEA: 1
BACK PAIN: 0
DYSURIA: 0
CHILLS: 0
ABDOMINAL PAIN: 1
FATIGUE: 1
SHORTNESS OF BREATH: 0
HEADACHES: 0
FLANK PAIN: 0
DIARRHEA: 1

## 2023-02-13 ASSESSMENT — ANXIETY QUESTIONNAIRES
2. NOT BEING ABLE TO STOP OR CONTROL WORRYING: NEARLY EVERY DAY
1. FEELING NERVOUS, ANXIOUS, OR ON EDGE: MORE THAN HALF THE DAYS
3. WORRYING TOO MUCH ABOUT DIFFERENT THINGS: SEVERAL DAYS
GAD7 TOTAL SCORE: 15
5. BEING SO RESTLESS THAT IT IS HARD TO SIT STILL: MORE THAN HALF THE DAYS
7. FEELING AFRAID AS IF SOMETHING AWFUL MIGHT HAPPEN: NEARLY EVERY DAY
6. BECOMING EASILY ANNOYED OR IRRITABLE: MORE THAN HALF THE DAYS
GAD7 TOTAL SCORE: 15

## 2023-02-13 ASSESSMENT — PAIN SCALES - GENERAL: PAINLEVEL: SEVERE PAIN (6)

## 2023-02-13 ASSESSMENT — ACTIVITIES OF DAILY LIVING (ADL): ADLS_ACUITY_SCORE: 35

## 2023-02-13 ASSESSMENT — PATIENT HEALTH QUESTIONNAIRE - PHQ9
SUM OF ALL RESPONSES TO PHQ QUESTIONS 1-9: 8
5. POOR APPETITE OR OVEREATING: MORE THAN HALF THE DAYS

## 2023-02-13 NOTE — Clinical Note
Norma Collins was seen and treated in our emergency department on 2/13/2023.  She may return to work on 02/14/2023.       If you have any questions or concerns, please don't hesitate to call.      James Akhtar MD

## 2023-02-13 NOTE — ED TRIAGE NOTES
Pt presents with LLQ abdominal that radiates to umbilicus x 1 week was seen in ER on 2/9. N/V started on 2/9 and CP epigastric that started on 2/11. Pt reports mid SOB and fevers, max tpr 101. Sating 97% on RA and current tpr 98.6. Denied CP radiating to arms, jaw or back or cardiac hx. Has hx of abd surgeries.      Triage Assessment       Row Name 02/13/23 0038       Triage Assessment (Adult)    Airway WDL WDL       Respiratory WDL    Respiratory WDL X  SOB       Skin Circulation/Temperature WDL    Skin Circulation/Temperature WDL WDL       Cardiac WDL    Cardiac WDL X;chest pain       Chest Pain Assessment    Chest Pain Location epigastric    Character aching    Chest Pain Intervention cardiac biomarkers drawn;cardiac monitor placed;12-lead ECG obtained       Peripheral/Neurovascular WDL    Peripheral Neurovascular WDL WDL       Cognitive/Neuro/Behavioral WDL    Cognitive/Neuro/Behavioral WDL WDL

## 2023-02-13 NOTE — PROGRESS NOTES
Assessment & Plan     Heartburn  Patient is a very pleasant 23-year-old female with a past medical history of PCOS, ileitis with aphthous ulcer of the ileum in 2019, endometrial polyp s/p removal this past fall who presents today for ongoing symptoms for the past week.  Patient started having symptoms of constantly needing to urinate, as well as some abdominal discomfort on 2/5/2023, was seen in the emergency department and diagnosed with a UTI, however the urine culture came back normal urogenital lucy.  Nonetheless, she finished the antibiotics.  Since then, though the pain has not improved, the urinary symptoms have.  She has been seen multiple other times in the emergency department for ongoing abdominal pain as well as vomiting.  Most recent time was earlier today.  She was given a prescription for omeprazole and Carafate thinking that her left upper quadrant abdominal pain and vomiting was may be more related to heartburn/GERD.  She had a significant work-up including a normal lipase, normal CT scan of the abdomen, normal pelvic ultrasound without concerns for torsion.  Exam today is largely reassuring.    Today, we discussed multiple possible etiologies of her symptoms including pancreatitis (less likely given normal lipase), constipation (he has been having more diarrhea, significant stool burden not noted on CT scan), stomach or small intestine ulcer, H. pylori infection, acute viral gastroenteritis, or other etiology.  Unfortunately, she has started taking the omeprazole, so we are unable to test for H. pylori infection at this time.  However, we discussed trialing omeprazole for a few weeks, and if this is not significantly improving symptoms, then to plan to have her off of this so that we can obtain an H. pylori test.  She did say that the medications were just started within the past day, and she is already starting to feel some improvement.  We will plan for about 3 months of omeprazole, followed  "by discontinuation and monitoring of symptom recurrence.    - omeprazole (PRILOSEC) 20 MG DR capsule  Dispense: 90 capsule; Refill: 0    LC (generalized anxiety disorder)  In addition to the above, patient has been having some worsening anxiety.  I did have her complete the PHQ-9 and LC-7 today based on her symptoms prior to the onset of her current problem, and she does have signs of anxiety.  Diagnosis is a little unclear, as she does state there have been times in her past when she has spent a couple $100, particularly on cloths, and potentially has had times in the past when she has needed significantly less sleep, so bipolar disorder remains on the differential.  Because of this, an SSRI was not initiated today, but we did discuss use of Wellbutrin, which may potentially also help with her tobacco use.  In addition, she is given an as needed Vistaril prescription that I am hopeful she will not need long-term.  We will follow-up in 1 to 2 months for recheck on mental health.  - hydrOXYzine (VISTARIL) 25 MG capsule  Dispense: 45 capsule; Refill: 0  - buPROPion (WELLBUTRIN XL) 150 MG 24 hr tablet  Dispense: 90 tablet; Refill: 3    I spent a total of 37 minutes on the day of the visit.   Time spent doing chart review, history and exam, documentation and further activities per the note     MED REC REQUIRED  Post Medication Reconciliation Status:  Discharge medications reconciled and changed, see notes/orders    BMI:   Estimated body mass index is 54.82 kg/m  as calculated from the following:    Height as of this encounter: 1.702 m (5' 7\").    Weight as of this encounter: 158.8 kg (350 lb).   Weight management plan: did not discuss today      Return in about 1 month (around 3/13/2023) for anxiety.    Denise Ramirez MD  [facility]    Jason Green is a 23 year old, presenting for the following health issues:  ER F/U      HPI     ED/UC Followup:     Facility:  [facility] " "[place]  Date of visit: 02/05/2023, 02/09/2023, 02/13/2023  Reason for visit: abdominal pain  Current Status: mild improvement    With sister, constantly having to pee.   Checked out for UTI, came back positive, but cx negative  Stomach pains  Went back - worse, still nothing. A bunch of tests.  F/u with PCP  Friday - woke up and was vomiting, was yellow  Diarrhea at the same time.   Saturday a little better.   Last night had vomited overnight.   Showered, got ready, into hospital. Chris blood and sent home.   EKG  Having some chest pains - think it was just from puking. EKG was fine.     2/5/23  Anything like this before - no    Stomach issues in the past, but no issues like this in forever. Had a polyp removed 2 months ago.   October uterine polyp.    Right now abdominal pain jsut more uncomfortable than pain. Normally left side or center.   Most of concerns in chest neck area. Felt like bubble stuck there. trie to burp to get it to come up but it just sits there.     BM:  Diarrhea. Then feel constipated at the same time.   Just got omeprazole/carafate   prilosec seeming to help a little.     Surgeries:   Tonsils, gallbladder, upper and lower endoscopies -     Trouble sleeping  Anxiety, mind would race  Would get up/put on tv, or other things     PHQ 2/13/2023   PHQ-9 Total Score 8   Q9: Thoughts of better off dead/self-harm past 2 weeks Not at all     LC-7 SCORE 2/13/2023   Total Score 15       Review of Systems   Constitutional, HEENT, cardiovascular, pulmonary, GI, , musculoskeletal, neuro, skin, endocrine and psych systems are negative, except as otherwise noted.      Objective    /80 (BP Location: Right arm, Patient Position: Sitting, Cuff Size: Adult Large)   Pulse 80   Temp 98.6  F (37  C) (Tympanic)   Resp 16   Ht 1.702 m (5' 7\")   Wt (!) 158.8 kg (350 lb)   LMP 01/14/2023   SpO2 96%   BMI 54.82 kg/m    Body mass index is 54.82 kg/m .  Physical Exam  Constitutional:       Appearance: Normal " appearance.   HENT:      Head: Normocephalic.   Eyes:      General: No scleral icterus.     Extraocular Movements: Extraocular movements intact.      Conjunctiva/sclera: Conjunctivae normal.   Cardiovascular:      Rate and Rhythm: Normal rate and regular rhythm.      Heart sounds: Normal heart sounds.   Pulmonary:      Effort: Pulmonary effort is normal.      Breath sounds: Normal breath sounds.   Abdominal:      General: Abdomen is flat.      Palpations: Abdomen is soft.      Tenderness: There is abdominal tenderness (LUQ and epigastrium, mild in bilateral lower quadrants).   Musculoskeletal:         General: Normal range of motion.      Cervical back: Normal range of motion.   Neurological:      General: No focal deficit present.      Mental Status: She is alert and oriented to person, place, and time.          Results from last visit:  Admission on 02/13/2023, Discharged on 02/13/2023   Component Date Value Ref Range Status     Troponin T, High Sensitivity 02/13/2023 <6  <=14 ng/L Final    Either a High Sensitivity Troponin T baseline (0 hours) value = 100 ng/mL, or an increase in High Sensitivity Troponin T = 7 ng/mL at 2 hours compared to 0 hours (2-0 hours), suggests myocardial injury, and urgent clinical attention is required.    If the 2-0 hours increase is<7 ng/mL, a High Sensitivity Troponin T result above gender-specific reference ranges warrants further evaluation.   Recommendations for further evaluation include correlation with clinical decision-making tool (e.g., HEART), a 3rd High Sensitivity Troponin T test 2 hours after the 2nd (a 20% change from baseline would represent concern), admission for observation, close PCC/cardiology follow-up, or urgent outpatient provocative testing.     Sodium 02/13/2023 139  136 - 145 mmol/L Final     Potassium 02/13/2023 3.7  3.4 - 5.3 mmol/L Final     Chloride 02/13/2023 103  98 - 107 mmol/L Final     Carbon Dioxide (CO2) 02/13/2023 25  22 - 29 mmol/L Final      Anion Gap 02/13/2023 11  7 - 15 mmol/L Final     Urea Nitrogen 02/13/2023 13.1  6.0 - 20.0 mg/dL Final     Creatinine 02/13/2023 0.91  0.51 - 0.95 mg/dL Final     Calcium 02/13/2023 9.4  8.6 - 10.0 mg/dL Final     Glucose 02/13/2023 107 (H)  70 - 99 mg/dL Final     Alkaline Phosphatase 02/13/2023 74  35 - 104 U/L Final     AST 02/13/2023 26  10 - 35 U/L Final     ALT 02/13/2023 38 (H)  10 - 35 U/L Final     Protein Total 02/13/2023 7.0  6.4 - 8.3 g/dL Final     Albumin 02/13/2023 4.3  3.5 - 5.2 g/dL Final     Bilirubin Total 02/13/2023 0.2  <=1.2 mg/dL Final     GFR Estimate 02/13/2023 90  >60 mL/min/1.73m2 Final    eGFR calculated using 2021 CKD-EPI equation.     Hold Specimen 02/13/2023 JIC   Final     WBC Count 02/13/2023 10.7  4.0 - 11.0 10e3/uL Final     RBC Count 02/13/2023 4.53  3.80 - 5.20 10e6/uL Final     Hemoglobin 02/13/2023 12.5  11.7 - 15.7 g/dL Final     Hematocrit 02/13/2023 38.0  35.0 - 47.0 % Final     MCV 02/13/2023 84  78 - 100 fL Final     MCH 02/13/2023 27.6  26.5 - 33.0 pg Final     MCHC 02/13/2023 32.9  31.5 - 36.5 g/dL Final     RDW 02/13/2023 12.9  10.0 - 15.0 % Final     Platelet Count 02/13/2023 245  150 - 450 10e3/uL Final     % Neutrophils 02/13/2023 73  % Final     % Lymphocytes 02/13/2023 20  % Final     % Monocytes 02/13/2023 5  % Final     % Eosinophils 02/13/2023 1  % Final     % Basophils 02/13/2023 0  % Final     % Immature Granulocytes 02/13/2023 1  % Final     NRBCs per 100 WBC 02/13/2023 0  <1 /100 Final     Absolute Neutrophils 02/13/2023 7.8  1.6 - 8.3 10e3/uL Final     Absolute Lymphocytes 02/13/2023 2.2  0.8 - 5.3 10e3/uL Final     Absolute Monocytes 02/13/2023 0.6  0.0 - 1.3 10e3/uL Final     Absolute Eosinophils 02/13/2023 0.1  0.0 - 0.7 10e3/uL Final     Absolute Basophils 02/13/2023 0.0  0.0 - 0.2 10e3/uL Final     Absolute Immature Granulocytes 02/13/2023 0.1  <=0.4 10e3/uL Final     Absolute NRBCs 02/13/2023 0.0  10e3/uL Final     Lipase 02/13/2023 18  13 - 60 U/L  Final

## 2023-02-13 NOTE — ED PROVIDER NOTES
History     Chief Complaint   Patient presents with     Chest Pain     Abdominal Pain     HPI  Norma Collins is a 23 year old female with a history of ileitis, obesity, and recent UTI presenting for evaluation of upper abdominal pain with nausea and vomiting.  Patient was seen February 5 and treated for UTI with Bactrim.  Return here February 9 with abdominal pain and had a work-up including blood work, CT, and pelvic ultrasound without clear cause for her symptoms.  She developed nausea and vomiting on the 10th reporting vomiting 4 times through the day and one episode of loose stool.  Saturday was still nauseated but did not vomit or have any loose stools.  She was wondering if she might be constipated so did take a dose of MiraLAX and Sunday had another episode of loose stool.  Has been more nauseated today and reports vomiting twice today.  She reports now epigastric and left upper quadrant abdominal pain with some discomfort rating up into her chest.  No difficulty breathing.  Pain does not radiate into her back.  Denies any further urinary symptoms.  Patient reports a temperature up to 99 today but reports a temperature of 101 Saturday.  Patient is frustrated regarding her ongoing symptoms without clear cause prompting her to come back for evaluation.    ==================================================================    CHART REVIEW:    CT abd 2/9/23:  IMPRESSION:      1.  No acute abnormality or specific cause of abdominal pain is identified.     2.  No urinary calculi or hydronephrosis.      Pelvic US 2/9/23:  IMPRESSION:    1.  Normal pelvic ultrasound.    Urine Culture  Order: [id number]   Collected 2/9/2023  5:54 PM      Status: Final result      Visible to patient: Yes (seen)     Specimen Information: Urine, Clean Catch    2 Result Notes  Culture 50,000-100,000 CFU/mL Mixture of urogenital lucy               Urine Culture  Order: [id number]   Collected 2/5/2023 11:04 AM      Status: Final result       Visible to patient: Yes (seen)     Specimen Information: Urine, Midstream    2 Result Notes  Culture >100,000 CFU/mL Mixture of urogenital lucy          Multiple morphotypes present with no predominant organism.  Growth consistent with probable contamination during collection.  Suggest repeat specimen if clinically indicated.             END CHART REVIEW  ==================================================================      Allergies:  Allergies   Allergen Reactions     [place] Sprouts [Brassica Oleracea Italica]      Keflex [Cephalexin] Itching     Other Food Allergy Rash     [place] sprouts       Problem List:    Patient Active Problem List    Diagnosis Date Noted     Abnormal uterine bleeding due to endometrial polyp 08/24/2022     Priority: Medium     Change in bowel habit 08/23/2022     Priority: Medium     Ileitis 08/23/2022     Priority: Medium     Family history of colon cancer 07/08/2021     Priority: Medium     Mother at age 40       Family history of hemochromatosis 07/08/2021     Priority: Medium     Father and Grandfather       Acanthosis nigricans 12/08/2020     Priority: Medium     Created by Conversion      Last Assessment & Plan:   Patient doing very well and adding exercise, and watching diet closely.  We'll now add metformin as per orders.  Side effects and precautions discussed.  Follow up in one month.       Arthropathy of ankle and foot 12/08/2020     Priority: Medium     Created by Conversion    Replacement Utility updated for latest IMO load    Last Assessment & Plan:   With diffuse joint aches treating with progressive weight loss and lifestyle modifications.       Iron deficiency anemia due to chronic blood loss 11/22/2019     Priority: Medium     Aphthous ulcer of ileum 10/30/2019     Priority: Medium     Elevated BP without diagnosis of hypertension 10/16/2017     Priority: Medium     Adenotonsillar hypertrophy 01/04/2017     Priority: Medium     PCOS (polycystic ovarian  "syndrome) 02/29/2016     Priority: Medium     Last Assessment & Plan:   Her dietary changes are working very well.  Also working very well for the household.  She is exercising more, having more energy, and pants are fitting looser.  We'll plan for in body at next visit along with remeasurement of abdominal and neck circumference.  Also with her forgetting the metformin in the morning, will move it to the afternoon with lunch.       Class 3 severe obesity due to excess calories without serious comorbidity with body mass index (BMI) of 50.0 to 59.9 in adult (H) 10/30/2015     Priority: Medium     Last Assessment & Plan:   Continue lifestyle modifications.  Will breakfast a [place] protein-based with shakes or aches.  May also use chicken or steak.  Stopped carbohydrates in the morning.  Better food choices discussed.  Encouraged to watch the documentary, \"In defense of food\" before next visit.          Past Medical History:    Past Medical History:   Diagnosis Date     Abnormal uterine bleeding due to endometrial polyp      Acanthosis nigricans      Adenotonsillar hypertrophy      Aphthous ulcer of ileum      Arthropathy of ankle and foot      Family history of hemochromatosis      Iron deficiency anemia due to chronic blood loss      Obesity      PCOS (polycystic ovarian syndrome)        Past Surgical History:    Past Surgical History:   Procedure Laterality Date     CHOLECYSTECTOMY       HYSTEROSCOPY DIAGNOSTIC N/A 10/14/2022    Procedure: HYSTEROSCOPY WITH POLYPECTOMY;  Surgeon: Aston Yanes MD;  Location: [facility]     LAPAROSCOPIC CHOLECYSTECTOMY       OTHER SURGICAL HISTORY      none     TONSILLECTOMY       TONSILLECTOMY & ADENOIDECTOMY Bilateral 2/16/2017    Procedure: BILATERAL TONSILLECTOMY AND ADENOIDECTOMY;  Surgeon: Jacob Arguello MD;  Location: [facility];  Service:        Family History:    Family History   Problem Relation Age of Onset     Colon Cancer Mother 43     " Cancer Mother         colon     Venous thrombosis Mother         cancer-related     Cerebrovascular Disease Mother      Liver Disease Father         cirrhosis of liver     Other - See Comments Father         high iron     Cirrhosis Father      Hemochromatosis Father      Hypertension Father      Hyperlipidemia Father      Myocardial Infarction Maternal Grandmother      Heart Disease Maternal Grandmother      Dementia Maternal Grandmother      Cerebrovascular Disease Paternal Grandmother      Other - See Comments Maternal Grandfather         heart attack or cancer/unsure     Bladder Cancer Maternal Grandfather      Cirrhosis Paternal Grandfather      Other - See Comments Paternal Grandfather         high iron     Diabetes Paternal Grandfather      Hemochromatosis Paternal Grandfather      Kidney Disease Paternal Grandfather      Hypertension Paternal Grandfather      Colon Cancer Maternal Great-Grandmother         70-90 years old      Uterine Cancer Maternal Great-Grandmother      Breast Cancer Maternal Great-Grandmother      Brain Cancer Maternal Aunt        Social History:  Marital Status:  Single [1]  Social History     Tobacco Use     Smoking status: Every Day     Types: Vaping Device     Smokeless tobacco: Never   Vaping Use     Vaping Use: Every day     Substances: Nicotine, Flavoring     Devices: Disposable   Substance Use Topics     Alcohol use: Yes     Comment: occ     Drug use: Never        Medications:    naproxen sodium 220 MG capsule  omeprazole (PRILOSEC) 20 MG DR capsule  Polyethylene Glycol 3350 (MIRALAX PO)          Review of Systems   Constitutional: Positive for appetite change (Decreased), fatigue and fever. Negative for chills.   HENT: Negative for congestion.    Respiratory: Negative for cough, chest tightness and shortness of breath.    Cardiovascular: Positive for chest pain (Radiating up from the left upper abdomen).   Gastrointestinal: Positive for abdominal pain, diarrhea (1 loose stool  "Sunday), nausea and vomiting (Twice in the last 24 hours).   Genitourinary: Negative for decreased urine volume, dysuria, flank pain, hematuria and urgency.   Musculoskeletal: Negative for back pain and myalgias.   Skin: Negative for rash and wound.   Neurological: Negative for light-headedness and headaches.   All other systems reviewed and are negative.      Physical Exam   BP: (!) 147/89  Pulse: 100  Temp: 98.6  F (37  C)  Resp: 18  Height: 170.2 cm (5' 7\")  Weight: (!) 158.8 kg (350 lb)  SpO2: 94 %      Physical Exam  Vitals and nursing note reviewed.   Constitutional:       Appearance: She is not diaphoretic.      Comments: Elevated BMI.  Awake and alert, able to speak in full sentences and provide a full history.  Appears frustrated but not in pain or distress   HENT:      Head: Atraumatic.   Cardiovascular:      Rate and Rhythm: Normal rate and regular rhythm.   Pulmonary:      Effort: Pulmonary effort is normal.   Abdominal:      General: Abdomen is protuberant. Bowel sounds are increased.      Palpations: Abdomen is soft.      Tenderness: There is abdominal tenderness (Minimal) in the epigastric area and left upper quadrant.   Musculoskeletal:         General: Normal range of motion.      Cervical back: Normal range of motion.   Skin:     General: Skin is warm and dry.      Capillary Refill: Capillary refill takes less than 2 seconds.   Neurological:      Mental Status: She is alert and oriented to person, place, and time.   Psychiatric:         Mood and Affect: Mood normal.         ED Course                 Procedures              EKG Interpretation:      Interpreted by James Akhtar MD  Time reviewed: 0100  Symptoms at time of EKG: chest and abdominal pain   Rhythm: normal sinus   Rate: normal  Axis: normal  Ectopy: none  Conduction: normal  ST Segments/ T Waves: No ST-T wave changes  Q Waves: none  Comparison to prior: Similar to previous EKG 10/18/2022    Clinical Impression: normal " EKG                   Results for orders placed or performed during the hospital encounter of 02/13/23 (from the past 24 hour(s))   [place] Draw *Canceled*    Narrative    The following orders were created for panel order [place] Draw.  Procedure                               Abnormality         Status                     ---------                               -----------         ------                       Please view results for these tests on the individual orders.   [place] Draw    Narrative    The following orders were created for panel order [place] Draw.  Procedure                               Abnormality         Status                     ---------                               -----------         ------                     Extra Red Top Tube[[id number]]                               Final result                 Please view results for these tests on the individual orders.   CBC with Platelets & Differential    Narrative    The following orders were created for panel order CBC with Platelets & Differential.  Procedure                               Abnormality         Status                     ---------                               -----------         ------                     CBC with platelets and d...[[id number]]                      Final result                 Please view results for these tests on the individual orders.   Troponin T, High Sensitivity   Result Value Ref Range    Troponin T, High Sensitivity <6 <=14 ng/L   Comprehensive metabolic panel   Result Value Ref Range    Sodium 139 136 - 145 mmol/L    Potassium 3.7 3.4 - 5.3 mmol/L    Chloride 103 98 - 107 mmol/L    Carbon Dioxide (CO2) 25 22 - 29 mmol/L    Anion Gap 11 7 - 15 mmol/L    Urea Nitrogen 13.1 6.0 - 20.0 mg/dL    Creatinine 0.91 0.51 - 0.95 mg/dL    Calcium 9.4 8.6 - 10.0 mg/dL    Glucose 107 (H) 70 - 99 mg/dL    Alkaline Phosphatase 74 35 - 104 U/L    AST 26 10 - 35 U/L    ALT 38 (H) 10 - 35 U/L    Protein Total 7.0 6.4 - 8.3  g/dL    Albumin 4.3 3.5 - 5.2 g/dL    Bilirubin Total 0.2 <=1.2 mg/dL    GFR Estimate 90 >60 mL/min/1.73m2   Extra Red Top Tube   Result Value Ref Range    Hold Specimen JIC    CBC with platelets and differential   Result Value Ref Range    WBC Count 10.7 4.0 - 11.0 10e3/uL    RBC Count 4.53 3.80 - 5.20 10e6/uL    Hemoglobin 12.5 11.7 - 15.7 g/dL    Hematocrit 38.0 35.0 - 47.0 %    MCV 84 78 - 100 fL    MCH 27.6 26.5 - 33.0 pg    MCHC 32.9 31.5 - 36.5 g/dL    RDW 12.9 10.0 - 15.0 %    Platelet Count 245 150 - 450 10e3/uL    % Neutrophils 73 %    % Lymphocytes 20 %    % Monocytes 5 %    % Eosinophils 1 %    % Basophils 0 %    % Immature Granulocytes 1 %    NRBCs per 100 WBC 0 <1 /100    Absolute Neutrophils 7.8 1.6 - 8.3 10e3/uL    Absolute Lymphocytes 2.2 0.8 - 5.3 10e3/uL    Absolute Monocytes 0.6 0.0 - 1.3 10e3/uL    Absolute Eosinophils 0.1 0.0 - 0.7 10e3/uL    Absolute Basophils 0.0 0.0 - 0.2 10e3/uL    Absolute Immature Granulocytes 0.1 <=0.4 10e3/uL    Absolute NRBCs 0.0 10e3/uL   Lipase   Result Value Ref Range    Lipase 18 13 - 60 U/L       Medications   ondansetron (ZOFRAN) injection 4 mg (4 mg Intravenous Given 2/13/23 0119)   pantoprazole (PROTONIX) IV push injection 40 mg (40 mg Intravenous Given 2/13/23 0155)   sucralfate (CARAFATE) suspension 1 g (1 g Oral Given 2/13/23 0155)   droperidol (INAPSINE) injection 2.5 mg (2.5 mg Intravenous Given 2/13/23 0155)     2:50 AM Patient re-assessed: Patient feeling much better.  Pain nearly resolved.  Patient like to go home.  Requesting a work note.      Assessments & Plan (with Medical Decision Making)  23-year-old female with a history of ileitis, obesity, and recent UTI presenting for evaluation of upper abdominal pain with nausea and vomiting.  Nontoxic-appearing in the ED with normal vital signs.  Has some upper abdominal tenderness but nonperitoneal.  Was recently seen here with CT and ultrasound without clear cause for her pain.  Screening labs  reassuringly normal.  Symptoms suggestive of gastritis or reflux.  Treated with medications as above with dramatic improvement in her symptoms.  Exact cause of her symptoms unclear but recommended treatment for gastritis with omeprazole and Carafate.  Recommended close follow-up with primary care if symptoms persist.     I have reviewed the nursing notes.    I have reviewed the findings, diagnosis, plan and need for follow up with the patient.       New Prescriptions    No medications on file       Final diagnoses:   Abdominal pain, left upper quadrant       2/13/2023   [facility] EMERGENCY DEPT     Akhtar, James Quijano MD  02/13/23 [id number]

## 2023-03-28 ENCOUNTER — OFFICE VISIT (OUTPATIENT)
Dept: FAMILY MEDICINE | Facility: CLINIC | Age: 23
End: 2023-03-28
Payer: COMMERCIAL

## 2023-03-28 VITALS
SYSTOLIC BLOOD PRESSURE: 122 MMHG | TEMPERATURE: 97.8 F | HEART RATE: 76 BPM | DIASTOLIC BLOOD PRESSURE: 80 MMHG | WEIGHT: 293 LBS | HEIGHT: 67 IN | OXYGEN SATURATION: 97 % | RESPIRATION RATE: 18 BRPM | BODY MASS INDEX: 45.99 KG/M2

## 2023-03-28 DIAGNOSIS — R14.0 GASSINESS: ICD-10-CM

## 2023-03-28 DIAGNOSIS — F41.1 GAD (GENERALIZED ANXIETY DISORDER): Primary | ICD-10-CM

## 2023-03-28 DIAGNOSIS — R12 HEARTBURN: ICD-10-CM

## 2023-03-28 DIAGNOSIS — R10.84 ABDOMINAL PAIN, GENERALIZED: ICD-10-CM

## 2023-03-28 PROCEDURE — 99214 OFFICE O/P EST MOD 30 MIN: CPT | Performed by: STUDENT IN AN ORGANIZED HEALTH CARE EDUCATION/TRAINING PROGRAM

## 2023-03-28 RX ORDER — SUCRALFATE 1 G/1
1 TABLET ORAL 4 TIMES DAILY
Qty: 60 TABLET | Refills: 1 | Status: SHIPPED | OUTPATIENT
Start: 2023-03-28 | End: 2023-04-06

## 2023-03-28 RX ORDER — SUCRALFATE 1 G/1
1 TABLET ORAL 4 TIMES DAILY
COMMUNITY
End: 2023-03-28

## 2023-03-28 RX ORDER — HYDROXYZINE PAMOATE 25 MG/1
25-50 CAPSULE ORAL 3 TIMES DAILY PRN
Qty: 45 CAPSULE | Refills: 0 | Status: SHIPPED | OUTPATIENT
Start: 2023-03-28 | End: 2023-09-20

## 2023-03-28 ASSESSMENT — ANXIETY QUESTIONNAIRES
GAD7 TOTAL SCORE: 10
8. IF YOU CHECKED OFF ANY PROBLEMS, HOW DIFFICULT HAVE THESE MADE IT FOR YOU TO DO YOUR WORK, TAKE CARE OF THINGS AT HOME, OR GET ALONG WITH OTHER PEOPLE?: SOMEWHAT DIFFICULT
5. BEING SO RESTLESS THAT IT IS HARD TO SIT STILL: NOT AT ALL
2. NOT BEING ABLE TO STOP OR CONTROL WORRYING: MORE THAN HALF THE DAYS
GAD7 TOTAL SCORE: 10
3. WORRYING TOO MUCH ABOUT DIFFERENT THINGS: MORE THAN HALF THE DAYS
7. FEELING AFRAID AS IF SOMETHING AWFUL MIGHT HAPPEN: MORE THAN HALF THE DAYS
IF YOU CHECKED OFF ANY PROBLEMS ON THIS QUESTIONNAIRE, HOW DIFFICULT HAVE THESE PROBLEMS MADE IT FOR YOU TO DO YOUR WORK, TAKE CARE OF THINGS AT HOME, OR GET ALONG WITH OTHER PEOPLE: SOMEWHAT DIFFICULT
4. TROUBLE RELAXING: NOT AT ALL
GAD7 TOTAL SCORE: 10
1. FEELING NERVOUS, ANXIOUS, OR ON EDGE: MORE THAN HALF THE DAYS
7. FEELING AFRAID AS IF SOMETHING AWFUL MIGHT HAPPEN: MORE THAN HALF THE DAYS
6. BECOMING EASILY ANNOYED OR IRRITABLE: MORE THAN HALF THE DAYS

## 2023-03-28 ASSESSMENT — PATIENT HEALTH QUESTIONNAIRE - PHQ9
SUM OF ALL RESPONSES TO PHQ QUESTIONS 1-9: 6
10. IF YOU CHECKED OFF ANY PROBLEMS, HOW DIFFICULT HAVE THESE PROBLEMS MADE IT FOR YOU TO DO YOUR WORK, TAKE CARE OF THINGS AT HOME, OR GET ALONG WITH OTHER PEOPLE: SOMEWHAT DIFFICULT
SUM OF ALL RESPONSES TO PHQ QUESTIONS 1-9: 6

## 2023-03-28 ASSESSMENT — PAIN SCALES - GENERAL: PAINLEVEL: NO PAIN (0)

## 2023-03-28 NOTE — PROGRESS NOTES
Assessment & Plan     LC (generalized anxiety disorder)  Feeling improved taking hydroxyzine nightly. Has not taken wellbutrin except once. Discussed today that hydroxyzine should not be used daily if we can avoid it as it loses it's effectiveness over time. Encouraged her to take the Wellbutrin (can take it at night) and follow up in 1 month only if symptoms remain uncontrolled. If she is doing well, not needing hydroxyzine on top of the wellbutrin, follow up in 1year.   - hydrOXYzine (VISTARIL) 25 MG capsule  Dispense: 45 capsule; Refill: 0  - PRIMARY CARE FOLLOW-UP SCHEDULING    Heartburn  Refill today. Seems to be helping.   - sucralfate (CARAFATE) 1 GM tablet  Dispense: 60 tablet; Refill: 1    Gassiness  Abdominal pain, generalized  Occasional random pains in various places, much improved from before. Still uses omeprazole occasionally, too. If she has any time frame where she is not taking omeprazole for at least 2 weeks, plan to obtain stool kit to test for h. Pylori infection. Could also try OTC simethicone for gassiness/cramping.   - Helicobacter pylori Antigen Stool  - Helicobacter pylori Antigen Stool      I spent a total of 22 minutes on the day of the visit.   Time spent by me doing chart review, history and exam, documentation and further activities per the note    Denise Ramirez MD  [facility]    Jason Green is a 23 year old, presenting for the following health issues:  RECHECK (Abdominal pain follow up) and Anxiety  No flowsheet data found.  History of Present Illness       Reason for visit:  Stomach pain follow up from previous appointment    She eats 2-3 servings of fruits and vegetables daily.She consumes 2 sweetened beverage(s) daily.She exercises with enough effort to increase her heart rate 60 or more minutes per day.  She exercises with enough effort to increase her heart rate 3 or less days per week.   She is taking medications regularly.    Today's  "PHQ-9         PHQ-9 Total Score: 6    PHQ-9 Q9 Thoughts of better off dead/self-harm past 2 weeks :   Not at all    How difficult have these problems made it for you to do your work, take care of things at home, or get along with other people: Somewhat difficult  Today's LC-7 Score: 10     Taking hydrox every night before bed.   sleeping more  Has only taken wellbutrin once.     Took a few days for it to work, but it starting working.     abd cramping sometimes, more in the middle of stomach.   Still getting pain here and there..   Pain moves.   miralax daily.   omerazole here and there.  Only take it when nauseated.   abd pain flare yesterday.     Review of Systems   Constitutional, HEENT, cardiovascular, pulmonary, GI, , musculoskeletal, neuro, skin, endocrine and psych systems are negative, except as otherwise noted.      Objective    /80 (BP Location: Right arm, Patient Position: Sitting, Cuff Size: Adult Large)   Pulse 76   Temp 97.8  F (36.6  C) (Tympanic)   Resp 18   Ht 1.702 m (5' 7\")   Wt (!) 162.8 kg (359 lb)   LMP 03/17/2023   SpO2 97%   BMI 56.23 kg/m    Body mass index is 56.23 kg/m .  Physical Exam  Constitutional:       Appearance: Normal appearance.   HENT:      Head: Normocephalic.   Eyes:      General: No scleral icterus.     Extraocular Movements: Extraocular movements intact.      Conjunctiva/sclera: Conjunctivae normal.   Cardiovascular:      Rate and Rhythm: Normal rate and regular rhythm.      Heart sounds: Normal heart sounds.   Pulmonary:      Effort: Pulmonary effort is normal.      Breath sounds: Normal breath sounds.   Abdominal:      General: Abdomen is flat. Bowel sounds are normal.      Tenderness: There is no abdominal tenderness.   Musculoskeletal:         General: Normal range of motion.      Cervical back: Normal range of motion.   Neurological:      General: No focal deficit present.      Mental Status: She is alert and oriented to person, place, and time.      "

## 2023-03-28 NOTE — PATIENT INSTRUCTIONS
Simethicone (Gas-X) might be helpful for the cramping/bloating/gassiness feelings.     Miralax 2  capfuls daily. Plus 1-2 tablets of Senna (senakot) - which causes cramping. Do this until you are having 2-3 bowel movements daily, then back down on one dose at a time.

## 2023-04-06 ENCOUNTER — TELEPHONE (OUTPATIENT)
Dept: FAMILY MEDICINE | Facility: CLINIC | Age: 23
End: 2023-04-06
Payer: COMMERCIAL

## 2023-04-06 DIAGNOSIS — R12 HEARTBURN: ICD-10-CM

## 2023-04-06 RX ORDER — SUCRALFATE 1 G/1
1 TABLET ORAL 4 TIMES DAILY
Qty: 360 TABLET | Refills: 0 | Status: SHIPPED | OUTPATIENT
Start: 2023-04-06 | End: 2023-07-10

## 2023-04-06 NOTE — TELEPHONE ENCOUNTER
Received Fax from Pharmacy. Patient is requesting a 90-day supply of Sucralfate 1gm tablet. QTY:360 SIG: Take 1 tablet (1G) by mouth 4 times daily.    Medication was filled with a QTY:60 on 3/28      Preferred Pharmacy:  [place] [address] [place] [address] [address]  Phone: [phone] Fax: [phone]      Could we send this information to you in [facility] or would you prefer to receive a phone call?:   Patient would prefer a phone call   Okay to leave a detailed message?: Yes at Home number on file [phone] (home)

## 2023-04-15 ENCOUNTER — HEALTH MAINTENANCE LETTER (OUTPATIENT)
Age: 23
End: 2023-04-15

## 2023-04-26 ENCOUNTER — MYC MEDICAL ADVICE (OUTPATIENT)
Dept: FAMILY MEDICINE | Facility: CLINIC | Age: 23
End: 2023-04-26
Payer: COMMERCIAL

## 2023-04-26 DIAGNOSIS — F41.1 GAD (GENERALIZED ANXIETY DISORDER): Primary | ICD-10-CM

## 2023-04-27 RX ORDER — BUPROPION HYDROCHLORIDE 150 MG/1
150 TABLET ORAL EVERY MORNING
Qty: 90 TABLET | Refills: 0 | Status: SHIPPED | OUTPATIENT
Start: 2023-04-27 | End: 2023-05-23

## 2023-05-18 NOTE — PROGRESS NOTES
DISCHARGE  Reason for Discharge: Patient has failed to schedule further appointments.    Equipment Issued: HEP    Discharge Plan: Patient to continue home program.    Referring Provider:  Cain Rae

## 2023-05-23 ENCOUNTER — OFFICE VISIT (OUTPATIENT)
Dept: FAMILY MEDICINE | Facility: CLINIC | Age: 23
End: 2023-05-23
Payer: COMMERCIAL

## 2023-05-23 VITALS
TEMPERATURE: 97.8 F | OXYGEN SATURATION: 100 % | HEIGHT: 67 IN | BODY MASS INDEX: 45.99 KG/M2 | DIASTOLIC BLOOD PRESSURE: 80 MMHG | WEIGHT: 293 LBS | HEART RATE: 71 BPM | RESPIRATION RATE: 16 BRPM | SYSTOLIC BLOOD PRESSURE: 118 MMHG

## 2023-05-23 DIAGNOSIS — F19.982 DRUG-INDUCED INSOMNIA (H): ICD-10-CM

## 2023-05-23 DIAGNOSIS — F41.1 GAD (GENERALIZED ANXIETY DISORDER): Primary | ICD-10-CM

## 2023-05-23 PROCEDURE — 87338 HPYLORI STOOL AG IA: CPT | Performed by: STUDENT IN AN ORGANIZED HEALTH CARE EDUCATION/TRAINING PROGRAM

## 2023-05-23 PROCEDURE — 99214 OFFICE O/P EST MOD 30 MIN: CPT | Performed by: STUDENT IN AN ORGANIZED HEALTH CARE EDUCATION/TRAINING PROGRAM

## 2023-05-23 RX ORDER — TRAZODONE HYDROCHLORIDE 50 MG/1
50 TABLET, FILM COATED ORAL AT BEDTIME
Qty: 90 TABLET | Refills: 3 | Status: SHIPPED | OUTPATIENT
Start: 2023-05-23 | End: 2023-09-12

## 2023-05-23 RX ORDER — BUPROPION HYDROCHLORIDE 150 MG/1
150 TABLET ORAL EVERY MORNING
Qty: 90 TABLET | Refills: 3 | Status: SHIPPED | OUTPATIENT
Start: 2023-05-23 | End: 2024-02-27

## 2023-05-23 ASSESSMENT — PATIENT HEALTH QUESTIONNAIRE - PHQ9
10. IF YOU CHECKED OFF ANY PROBLEMS, HOW DIFFICULT HAVE THESE PROBLEMS MADE IT FOR YOU TO DO YOUR WORK, TAKE CARE OF THINGS AT HOME, OR GET ALONG WITH OTHER PEOPLE: NOT DIFFICULT AT ALL
SUM OF ALL RESPONSES TO PHQ QUESTIONS 1-9: 4
SUM OF ALL RESPONSES TO PHQ QUESTIONS 1-9: 4

## 2023-05-23 ASSESSMENT — ANXIETY QUESTIONNAIRES
6. BECOMING EASILY ANNOYED OR IRRITABLE: SEVERAL DAYS
GAD7 TOTAL SCORE: 10
1. FEELING NERVOUS, ANXIOUS, OR ON EDGE: MORE THAN HALF THE DAYS
GAD7 TOTAL SCORE: 10
7. FEELING AFRAID AS IF SOMETHING AWFUL MIGHT HAPPEN: MORE THAN HALF THE DAYS
GAD7 TOTAL SCORE: 10
7. FEELING AFRAID AS IF SOMETHING AWFUL MIGHT HAPPEN: MORE THAN HALF THE DAYS
IF YOU CHECKED OFF ANY PROBLEMS ON THIS QUESTIONNAIRE, HOW DIFFICULT HAVE THESE PROBLEMS MADE IT FOR YOU TO DO YOUR WORK, TAKE CARE OF THINGS AT HOME, OR GET ALONG WITH OTHER PEOPLE: SOMEWHAT DIFFICULT
2. NOT BEING ABLE TO STOP OR CONTROL WORRYING: MORE THAN HALF THE DAYS
3. WORRYING TOO MUCH ABOUT DIFFERENT THINGS: SEVERAL DAYS
5. BEING SO RESTLESS THAT IT IS HARD TO SIT STILL: SEVERAL DAYS
4. TROUBLE RELAXING: SEVERAL DAYS
8. IF YOU CHECKED OFF ANY PROBLEMS, HOW DIFFICULT HAVE THESE MADE IT FOR YOU TO DO YOUR WORK, TAKE CARE OF THINGS AT HOME, OR GET ALONG WITH OTHER PEOPLE?: SOMEWHAT DIFFICULT

## 2023-05-23 ASSESSMENT — ENCOUNTER SYMPTOMS: NERVOUS/ANXIOUS: 1

## 2023-05-23 ASSESSMENT — PAIN SCALES - GENERAL: PAINLEVEL: NO PAIN (0)

## 2023-05-23 NOTE — NURSING NOTE
"Chief Complaint   Patient presents with     Depression     Anxiety     /80   Pulse 71   Temp 97.8  F (36.6  C) (Tympanic)   Resp 16   Ht 1.702 m (5' 7\")   Wt (!) 160.6 kg (354 lb)   LMP 05/19/2023   SpO2 100%   BMI 55.44 kg/m   Estimated body mass index is 55.44 kg/m  as calculated from the following:    Height as of this encounter: 1.702 m (5' 7\").    Weight as of this encounter: 160.6 kg (354 lb).  Patient presents to the clinic using No DME      Health Maintenance that is potentially due pending provider review:    Health Maintenance Due   Topic Date Due     ADVANCE CARE PLANNING  Never done     HEPATITIS B IMMUNIZATION (2 of 3 - 3-dose series) 2000     COVID-19 Vaccine (1) Never done     Pneumococcal Vaccine: Pediatrics (0 to 5 Years) and At-Risk Patients (6 to 64 Years) (1 - PCV) Never done     HPV IMMUNIZATION (1 - 2-dose series) Never done     DTAP/TDAP/TD IMMUNIZATION (2 - Td or Tdap) 08/23/2012     YEARLY PREVENTIVE VISIT  12/08/2021     INFLUENZA VACCINE (1) 09/01/2022     CHLAMYDIA SCREENING  02/10/2023        n/a        "

## 2023-05-23 NOTE — PROGRESS NOTES
Assessment & Plan     LC (generalized anxiety disorder)  Pleasant 23 year old. Question of bipolar disorder due to manic/hypomanic type history. Opting to avoid ssri because of this. Started on wellbutrin, helping with anxiety, slight waning of effects. Now also with insomnia. Discussed increased wellbutrin vs addition of trazodone vs gabapentin vs mirtazapine vs buspar. Opted for trazodone for antiinsomnia and anxiolytic effects. Will monitor for side effects. Follow up in 1 month.   - traZODone (DESYREL) 50 MG tablet  Dispense: 90 tablet; Refill: 3  - buPROPion (WELLBUTRIN XL) 150 MG 24 hr tablet  Dispense: 90 tablet; Refill: 3  - PRIMARY CARE FOLLOW-UP SCHEDULING    Drug-induced insomnia (H)  - traZODone (DESYREL) 50 MG tablet  Dispense: 90 tablet; Refill: 3    Denise Ramirez MD  [facility]    Jason Green is a 23 year old, presenting for the following health issues:  Chief Complaint   Patient presents with     Depression     Anxiety         5/23/2023    12:49 PM   Additional Questions   Roomed by Tomasa   Accompanied by Self         5/23/2023    12:49 PM   Patient Reported Additional Medications   Patient reports taking the following new medications Feels wellbutrin isn't working as well as the beginning     Anxiety    History of Present Illness       Reason for visit:  Follow up from last visit    She eats 2-3 servings of fruits and vegetables daily.She consumes 2 sweetened beverage(s) daily.She exercises with enough effort to increase her heart rate 20 to 29 minutes per day.  She exercises with enough effort to increase her heart rate 4 days per week.   She is taking medications regularly.    Today's PHQ-9         PHQ-9 Total Score: 4    PHQ-9 Q9 Thoughts of better off dead/self-harm past 2 weeks :   Not at all    How difficult have these problems made it for you to do your work, take care of things at home, or get along with other people: Not difficult at all  Today's  "LC-7 Score: 10         2/13/2023    12:58 PM 3/28/2023     9:21 AM 5/23/2023    12:43 PM   PHQ   PHQ-9 Total Score 8 6 4   Q9: Thoughts of better off dead/self-harm past 2 weeks Not at all Not at all Not at all         2/13/2023    12:58 PM 3/28/2023     9:22 AM 5/23/2023    12:44 PM   LC-7 SCORE   Total Score  10 (moderate anxiety) 10 (moderate anxiety)   Total Score 15 10 10         Depression and Anxiety Follow-Up    How are you doing with your depression since your last visit? No change - wondering if body is use to this dose - symptoms seemed better when first starting Wellbutrin    How are you doing with your anxiety since your last visit?  No change    Are you having other symptoms that might be associated with depression or anxiety? Yes:  issues with fall asleep and staying sleep    Have you had a significant life event? No     Do you have any concerns with your use of alcohol or other drugs? No    Some more insomnia. Had a few anxiety attacks, but was able to breath through them. Mostly just the issues with sleep is what's bothering her the most. Waking up throughout the night.     Helping but not working quite as well as in the beginning.     Triglycerides just below 200 on work related screening.       Social History     Tobacco Use     Smoking status: Every Day     Types: Vaping Device     Smokeless tobacco: Never   Vaping Use     Vaping status: Every Day     Substances: Nicotine, Flavoring     Devices: Disposable   Substance Use Topics     Alcohol use: Yes     Comment: occ     Drug use: Never       Review of Systems   Psychiatric/Behavioral: The patient is nervous/anxious.       Constitutional, HEENT, cardiovascular, pulmonary, GI, , musculoskeletal, neuro, skin, endocrine and psych systems are negative, except as otherwise noted.      Objective    /80   Pulse 71   Temp 97.8  F (36.6  C) (Tympanic)   Resp 16   Ht 1.702 m (5' 7\")   Wt (!) 160.6 kg (354 lb)   LMP 05/19/2023   SpO2 100%   " BMI 55.44 kg/m    Body mass index is 55.44 kg/m .  Physical Exam  Constitutional:       Appearance: Normal appearance.   HENT:      Head: Normocephalic.   Eyes:      General: No scleral icterus.     Extraocular Movements: Extraocular movements intact.      Conjunctiva/sclera: Conjunctivae normal.   Cardiovascular:      Rate and Rhythm: Normal rate.   Pulmonary:      Effort: Pulmonary effort is normal.   Neurological:      General: No focal deficit present.      Mental Status: She is alert and oriented to person, place, and time.   Psychiatric:         Mood and Affect: Mood is anxious.         Results from this visitNo results found for any visits on 05/23/23.

## 2023-05-24 LAB — H PYLORI AG STL QL IA: NEGATIVE

## 2023-06-03 ENCOUNTER — HOSPITAL ENCOUNTER (EMERGENCY)
Facility: CLINIC | Age: 23
Discharge: HOME | End: 2023-06-03
Attending: FAMILY MEDICINE | Admitting: FAMILY MEDICINE
Payer: COMMERCIAL

## 2023-06-03 VITALS
DIASTOLIC BLOOD PRESSURE: 72 MMHG | TEMPERATURE: 98.4 F | SYSTOLIC BLOOD PRESSURE: 125 MMHG | OXYGEN SATURATION: 96 % | HEART RATE: 87 BPM | WEIGHT: 293 LBS | RESPIRATION RATE: 12 BRPM | HEIGHT: 67 IN | BODY MASS INDEX: 45.99 KG/M2

## 2023-06-03 DIAGNOSIS — R10.13 EPIGASTRIC PAIN: ICD-10-CM

## 2023-06-03 DIAGNOSIS — R07.9 CHEST PAIN, UNSPECIFIED TYPE: ICD-10-CM

## 2023-06-03 DIAGNOSIS — K21.00 GASTROESOPHAGEAL REFLUX DISEASE WITH ESOPHAGITIS WITHOUT HEMORRHAGE: ICD-10-CM

## 2023-06-03 LAB
ALBUMIN SERPL BCG-MCNC: 4.5 G/DL (ref 3.5–5.2)
ALP SERPL-CCNC: 89 U/L (ref 35–104)
ALT SERPL W P-5'-P-CCNC: 38 U/L (ref 10–35)
ANION GAP SERPL CALCULATED.3IONS-SCNC: 8 MMOL/L (ref 7–15)
AST SERPL W P-5'-P-CCNC: 23 U/L (ref 10–35)
BASOPHILS # BLD AUTO: 0 10E3/UL (ref 0–0.2)
BASOPHILS NFR BLD AUTO: 0 %
BILIRUB SERPL-MCNC: 0.3 MG/DL
BUN SERPL-MCNC: 12.9 MG/DL (ref 6–20)
CALCIUM SERPL-MCNC: 9.7 MG/DL (ref 8.6–10)
CHLORIDE SERPL-SCNC: 101 MMOL/L (ref 98–107)
CREAT SERPL-MCNC: 0.85 MG/DL (ref 0.51–0.95)
DEPRECATED HCO3 PLAS-SCNC: 27 MMOL/L (ref 22–29)
EOSINOPHIL # BLD AUTO: 0.1 10E3/UL (ref 0–0.7)
EOSINOPHIL NFR BLD AUTO: 1 %
ERYTHROCYTE [DISTWIDTH] IN BLOOD BY AUTOMATED COUNT: 13.2 % (ref 10–15)
GFR SERPL CREATININE-BSD FRML MDRD: >90 ML/MIN/1.73M2
GLUCOSE SERPL-MCNC: 111 MG/DL (ref 70–99)
HCT VFR BLD AUTO: 39.1 % (ref 35–47)
HGB BLD-MCNC: 13.1 G/DL (ref 11.7–15.7)
IMM GRANULOCYTES # BLD: 0 10E3/UL
IMM GRANULOCYTES NFR BLD: 0 %
LIPASE SERPL-CCNC: 17 U/L (ref 13–60)
LYMPHOCYTES # BLD AUTO: 1.3 10E3/UL (ref 0.8–5.3)
LYMPHOCYTES NFR BLD AUTO: 13 %
MCH RBC QN AUTO: 27.9 PG (ref 26.5–33)
MCHC RBC AUTO-ENTMCNC: 33.5 G/DL (ref 31.5–36.5)
MCV RBC AUTO: 83 FL (ref 78–100)
MONOCYTES # BLD AUTO: 0.4 10E3/UL (ref 0–1.3)
MONOCYTES NFR BLD AUTO: 4 %
NEUTROPHILS # BLD AUTO: 8.2 10E3/UL (ref 1.6–8.3)
NEUTROPHILS NFR BLD AUTO: 82 %
NRBC # BLD AUTO: 0 10E3/UL
NRBC BLD AUTO-RTO: 0 /100
PLATELET # BLD AUTO: 273 10E3/UL (ref 150–450)
POTASSIUM SERPL-SCNC: 4.3 MMOL/L (ref 3.4–5.3)
PROT SERPL-MCNC: 7.2 G/DL (ref 6.4–8.3)
RBC # BLD AUTO: 4.7 10E6/UL (ref 3.8–5.2)
SODIUM SERPL-SCNC: 136 MMOL/L (ref 136–145)
TROPONIN T SERPL HS-MCNC: <6 NG/L
WBC # BLD AUTO: 10 10E3/UL (ref 4–11)

## 2023-06-03 PROCEDURE — 80053 COMPREHEN METABOLIC PANEL: CPT | Performed by: FAMILY MEDICINE

## 2023-06-03 PROCEDURE — 93010 ELECTROCARDIOGRAM REPORT: CPT | Performed by: FAMILY MEDICINE

## 2023-06-03 PROCEDURE — 36415 COLL VENOUS BLD VENIPUNCTURE: CPT | Performed by: FAMILY MEDICINE

## 2023-06-03 PROCEDURE — 250N000013 HC RX MED GY IP 250 OP 250 PS 637: Performed by: FAMILY MEDICINE

## 2023-06-03 PROCEDURE — 93005 ELECTROCARDIOGRAM TRACING: CPT | Performed by: FAMILY MEDICINE

## 2023-06-03 PROCEDURE — 83690 ASSAY OF LIPASE: CPT | Performed by: FAMILY MEDICINE

## 2023-06-03 PROCEDURE — 99284 EMERGENCY DEPT VISIT MOD MDM: CPT | Performed by: FAMILY MEDICINE

## 2023-06-03 PROCEDURE — 85025 COMPLETE CBC W/AUTO DIFF WBC: CPT | Performed by: FAMILY MEDICINE

## 2023-06-03 PROCEDURE — 84484 ASSAY OF TROPONIN QUANT: CPT | Performed by: FAMILY MEDICINE

## 2023-06-03 PROCEDURE — 250N000011 HC RX IP 250 OP 636: Performed by: FAMILY MEDICINE

## 2023-06-03 PROCEDURE — 99284 EMERGENCY DEPT VISIT MOD MDM: CPT | Mod: 25 | Performed by: FAMILY MEDICINE

## 2023-06-03 RX ORDER — ONDANSETRON 4 MG/1
4 TABLET, ORALLY DISINTEGRATING ORAL ONCE
Status: COMPLETED | OUTPATIENT
Start: 2023-06-03 | End: 2023-06-03

## 2023-06-03 RX ORDER — ONDANSETRON 4 MG/1
4-8 TABLET, ORALLY DISINTEGRATING ORAL EVERY 8 HOURS PRN
Qty: 12 TABLET | Refills: 0 | Status: SHIPPED | OUTPATIENT
Start: 2023-06-03 | End: 2023-09-12

## 2023-06-03 RX ORDER — ONDANSETRON 4 MG/1
4-8 TABLET, ORALLY DISINTEGRATING ORAL EVERY 8 HOURS PRN
Qty: 12 TABLET | Refills: 0 | Status: SHIPPED | OUTPATIENT
Start: 2023-06-03 | End: 2023-06-03

## 2023-06-03 RX ORDER — SUCRALFATE ORAL 1 G/10ML
1 SUSPENSION ORAL ONCE
Status: COMPLETED | OUTPATIENT
Start: 2023-06-03 | End: 2023-06-03

## 2023-06-03 RX ADMIN — SUCRALFATE 1 G: 1 SUSPENSION ORAL at 09:49

## 2023-06-03 RX ADMIN — ONDANSETRON 4 MG: 4 TABLET, ORALLY DISINTEGRATING ORAL at 09:49

## 2023-06-03 ASSESSMENT — ACTIVITIES OF DAILY LIVING (ADL)
ADLS_ACUITY_SCORE: 35
ADLS_ACUITY_SCORE: 35

## 2023-06-03 NOTE — DISCHARGE INSTRUCTIONS
RETURN TO THE EMERGENCY ROOM FOR THE FOLLOWING:    Severely worsened pain, severely worsened breathing, fever greater than 101, vomiting and dehydration, fainting, or at anytime for any concern.    FOLLOW UP:    Consider follow-up with gastroenterology.  Referral order is placed at the time of your discharge.  Expect a phone call within the next 2-3 business days to help with scheduling.    TREATMENT RECOMMENDATIONS:    Prilosec 20 mg OTC twice daily x 30 days.  Maalox/Mylanta/Tums for acute pain.  Avoid caffeine, smoking, chewing tobacco, ibuprofen/advil/aleve, alcohol, eating within 5 hours of bed.    Zofran as needed for nausea.    NURSE ADVICE LINE:  [phone] or [phone]

## 2023-06-03 NOTE — ED PROVIDER NOTES
HPI   Patient is a 23-year-old female presenting with epigastric discomfort, midline chest discomfort, and nausea with vomiting.  She has multiple medical problems which are reviewed in her chart.  She had a colonoscopy and upper endoscopy performed in 2019, see those notes for details.  An ulcer in the ileum was documented, the patient is currently unaware of this and denies any follow-up regarding it.  She had gastroenterology follow-up for fatty infiltration of the liver, see that note for details.  She takes omeprazole 20 mg once a day.  She does not use NSAIDs on a regular basis.  She does not smoke or chew tobacco.  No alcohol.  No drugs of abuse.  No marijuana.  She is morbidly obese.    The patient describes central chest pressure along with epigastric discomfort starting on Thursday, 2 days ago.  Her discomfort seems to come and go without obvious cause.  On Thursday she woke up at night with her pain.  She has been nauseous throughout and has vomited 4 times.  No hematemesis or coffee-ground emesis.  She denies diarrhea or constipation.  She denies abdominal bloating or discomfort other than epigastric pain above.  No back or flank pain.  No trauma or injury.  No shortness of breath, diaphoresis.  No neck, jaw, arm symptoms.  She has had similar symptoms multiple times in the past though not to the severity.        Allergies:  Allergies   Allergen Reactions     [place] Sprouts [Brassica Oleracea]      Keflex [Cephalexin] Itching     Other Food Allergy Rash     [place] sprouts     Problem List:    Patient Active Problem List    Diagnosis Date Noted     Abnormal uterine bleeding due to endometrial polyp 08/24/2022     Priority: Medium     Change in bowel habit 08/23/2022     Priority: Medium     Ileitis 08/23/2022     Priority: Medium     Family history of colon cancer 07/08/2021     Priority: Medium     Mother at age 40       Family history of hemochromatosis 07/08/2021     Priority: Medium     Father  "and Grandfather       Acanthosis nigricans 12/08/2020     Priority: Medium     Created by Conversion      Last Assessment & Plan:   Patient doing very well and adding exercise, and watching diet closely.  We'll now add metformin as per orders.  Side effects and precautions discussed.  Follow up in one month.       Arthropathy of ankle and foot 12/08/2020     Priority: Medium     Created by Conversion    Replacement Utility updated for latest IMO load    Last Assessment & Plan:   With diffuse joint aches treating with progressive weight loss and lifestyle modifications.       Iron deficiency anemia due to chronic blood loss 11/22/2019     Priority: Medium     Aphthous ulcer of ileum 10/30/2019     Priority: Medium     Elevated BP without diagnosis of hypertension 10/16/2017     Priority: Medium     Adenotonsillar hypertrophy 01/04/2017     Priority: Medium     PCOS (polycystic ovarian syndrome) 02/29/2016     Priority: Medium     Last Assessment & Plan:   Her dietary changes are working very well.  Also working very well for the household.  She is exercising more, having more energy, and pants are fitting looser.  We'll plan for in body at next visit along with remeasurement of abdominal and neck circumference.  Also with her forgetting the metformin in the morning, will move it to the afternoon with lunch.       Class 3 severe obesity due to excess calories without serious comorbidity with body mass index (BMI) of 50.0 to 59.9 in adult (H) 10/30/2015     Priority: Medium     Last Assessment & Plan:   Continue lifestyle modifications.  Will breakfast a [place] protein-based with shakes or aches.  May also use chicken or steak.  Stopped carbohydrates in the morning.  Better food choices discussed.  Encouraged to watch the documentary, \"In defense of food\" before next visit.        Past Medical History:    Past Medical History:   Diagnosis Date     Abnormal uterine bleeding due to endometrial polyp      Acanthosis " nigricans      Adenotonsillar hypertrophy      Aphthous ulcer of ileum      Arthropathy of ankle and foot      Family history of hemochromatosis      Iron deficiency anemia due to chronic blood loss      Obesity      PCOS (polycystic ovarian syndrome)      Past Surgical History:    Past Surgical History:   Procedure Laterality Date     CHOLECYSTECTOMY       HYSTEROSCOPY DIAGNOSTIC N/A 10/14/2022    Procedure: HYSTEROSCOPY WITH POLYPECTOMY;  Surgeon: Aston Yanes MD;  [place] [facility] [place]     LAPAROSCOPIC CHOLECYSTECTOMY       OTHER SURGICAL HISTORY      none     TONSILLECTOMY       TONSILLECTOMY & ADENOIDECTOMY Bilateral 2/16/2017    Procedure: BILATERAL TONSILLECTOMY AND ADENOIDECTOMY;  Surgeon: Jacob Arguello MD;  Location: [facility];  Service:      Family History:    Family History   Problem Relation Age of Onset     Colon Cancer Mother 43     Cancer Mother         colon     Venous thrombosis Mother         cancer-related     Cerebrovascular Disease Mother      Liver Disease Father         cirrhosis of liver     Other - See Comments Father         high iron     Cirrhosis Father      Hemochromatosis Father      Hypertension Father      Hyperlipidemia Father      Myocardial Infarction Maternal Grandmother      Heart Disease Maternal Grandmother      Dementia Maternal Grandmother      Cerebrovascular Disease Paternal Grandmother      Other - See Comments Maternal Grandfather         heart attack or cancer/unsure     Bladder Cancer Maternal Grandfather      Cirrhosis Paternal Grandfather      Other - See Comments Paternal Grandfather         high iron     Diabetes Paternal Grandfather      Hemochromatosis Paternal Grandfather      Kidney Disease Paternal Grandfather      Hypertension Paternal Grandfather      Colon Cancer Maternal Great-Grandmother         70-90 years old      Uterine Cancer Maternal Great-Grandmother      Breast Cancer Maternal Great-Grandmother      Brain  "Cancer Maternal Aunt      Social History:  Marital Status:  Single [1]  Social History     Tobacco Use     Smoking status: Every Day     Types: Vaping Device     Smokeless tobacco: Never   Vaping Use     Vaping status: Every Day     Substances: Nicotine, Flavoring     Devices: Disposable   Substance Use Topics     Alcohol use: Yes     Comment: occ     Drug use: Never      Medications:    omeprazole (PRILOSEC) 20 MG DR capsule  ondansetron (ZOFRAN ODT) 4 MG ODT tab  buPROPion (WELLBUTRIN XL) 150 MG 24 hr tablet  hydrOXYzine (VISTARIL) 25 MG capsule  naproxen sodium 220 MG capsule  sucralfate (CARAFATE) 1 GM tablet  traZODone (DESYREL) 50 MG tablet      Review of Systems   All other systems reviewed and are negative.      PE   BP: 130/82  Pulse: 85  Temp: 98.4  F (36.9  C)  Resp: 20  Height: 170.2 cm (5' 7\")  Weight: (!) 161.5 kg (356 lb)  SpO2: 98 %  Physical Exam  Vitals reviewed.   Constitutional:       General: She is not in acute distress.     Appearance: She is well-developed. She is obese.   HENT:      Head: Normocephalic and atraumatic.      Right Ear: External ear normal.      Left Ear: External ear normal.      Nose: Nose normal.      Mouth/Throat:      Mouth: Mucous membranes are moist.      Pharynx: Oropharynx is clear.   Eyes:      Extraocular Movements: Extraocular movements intact.      Conjunctiva/sclera: Conjunctivae normal.      Pupils: Pupils are equal, round, and reactive to light.   Cardiovascular:      Rate and Rhythm: Normal rate and regular rhythm.      Heart sounds: Normal heart sounds.   Pulmonary:      Effort: Pulmonary effort is normal.      Breath sounds: Normal breath sounds.   Chest:      Comments: No midline chest wall tenderness.  No pain with range of motion.  Abdominal:      Comments: Mild epigastric tenderness.  Soft throughout.  No distention though difficult examination because of her obesity.   Musculoskeletal:         General: Normal range of motion.      Cervical back: Normal " range of motion.   Skin:     General: Skin is warm and dry.   Neurological:      Mental Status: She is alert and oriented to person, place, and time.   Psychiatric:         Behavior: Behavior normal.         ED COURSE and MDM   0927.  Patient has symptoms and signs as described above.  EKG documented below and unremarkable.  CBC, comprehensive panel, lipase pending.  Troponin added given her multiple medical problems and risk factors despite her age.  Zofran and Carafate ordered.    EKG  (0849)   Interpretation performed by me.  Rate: 78     Rhythm: sinus     Axis: nl  Intervals: VT (12-2) 142, QRS (<12) 92, QTc (>5) 399  P wave: nl     QRS complex: nl   ST segment / T-wave: T wave down in V1 only, otherwise unremarkable.  Conclusion: Unremarkable EKG.    1107.  Patient mildly improved with medication given.  Low concern for heart related pathology causing symptoms.  She has epigastric pain with nausea and vomiting.  This is likely stomach/GI related.  Gastritis is a high concern.  Use omeprazole 20 mg twice a day.  Zofran as needed for nausea.    Electronic medical chart reviewed, including medical problems, medications, medical allergies, social history.  Recent hospitalizations and surgical procedures reviewed.  Recent clinic visits and consultations reviewed.  Recent labs and test results reviewed.  Nursing notes reviewed.    The patient, their parent if applicable, and/or their medical decision maker(s) and I have reviewed all of the available historical information, applicable PMH, physical exam findings, and objective diagnostic data gathered during this ED visit.  We then discussed all work-up options and then together agreed upon the course taken during this visit.  The ultimate disposition and plan was a cooperative decision made between myself and the patient, their parent if applicable, and/or their legal decision maker(s).  The risks and benefits of all decisions made during this visit were discussed to  the best of my abilities given the circumstances, and all parties are understanding of the pertinent ramifications of these decisions.      LABS  Labs Ordered and Resulted from Time of ED Arrival to Time of ED Departure   COMPREHENSIVE METABOLIC PANEL - Abnormal       Result Value    Sodium 136      Potassium 4.3      Chloride 101      Carbon Dioxide (CO2) 27      Anion Gap 8      Urea Nitrogen 12.9      Creatinine 0.85      Calcium 9.7      Glucose 111 (*)     Alkaline Phosphatase 89      AST 23      ALT 38 (*)     Protein Total 7.2      Albumin 4.5      Bilirubin Total 0.3      GFR Estimate >90     LIPASE - Normal    Lipase 17     TROPONIN T, HIGH SENSITIVITY - Normal    Troponin T, High Sensitivity <6     CBC WITH PLATELETS AND DIFFERENTIAL    WBC Count 10.0      RBC Count 4.70      Hemoglobin 13.1      Hematocrit 39.1      MCV 83      MCH 27.9      MCHC 33.5      RDW 13.2      Platelet Count 273      % Neutrophils 82      % Lymphocytes 13      % Monocytes 4      % Eosinophils 1      % Basophils 0      % Immature Granulocytes 0      NRBCs per 100 WBC 0      Absolute Neutrophils 8.2      Absolute Lymphocytes 1.3      Absolute Monocytes 0.4      Absolute Eosinophils 0.1      Absolute Basophils 0.0      Absolute Immature Granulocytes 0.0      Absolute NRBCs 0.0         IMAGING  Images reviewed by me.  Radiology report also reviewed.  No orders to display       Procedures    Medications   sucralfate (CARAFATE) suspension 1 g (1 g Oral $Given 6/3/23 0949)   ondansetron (ZOFRAN ODT) ODT tab 4 mg (4 mg Oral $Given 6/3/23 0949)         IMPRESSION       ICD-10-CM    1. Chest pain, unspecified type  R07.9       2. Epigastric pain  R10.13       3. Gastroesophageal reflux disease with esophagitis without hemorrhage  K21.00                Medication List      Started    omeprazole 20 MG DR capsule  Commonly known as: priLOSEC  20 mg, Oral, 2 TIMES DAILY     ondansetron 4 MG ODT tab  Commonly known as: ZOFRAN ODT  4-8 mg,  Oral, EVERY 8 HOURS PRN                        Samy Addison MD  06/03/23 6043

## 2023-06-03 NOTE — ED TRIAGE NOTES
"Pt presents to ED with CP for past 3 days, described as \"elephant sitting on my chest\". Pt also c/o N/V-took omeprazole last night. Pt has had similar pain in the past and was told it was a \"pulled muscle\".      Triage Assessment     Row Name 06/03/23 0840       Triage Assessment (Adult)    Airway WDL WDL       Respiratory WDL    Respiratory WDL WDL       Skin Circulation/Temperature WDL    Skin Circulation/Temperature WDL WDL       Cardiac WDL    Cardiac WDL X;chest pain       Chest Pain Assessment    Chest Pain Location midsternal;epigastric    Character pressure    Precipitating Factors nothing;unknown    Alleviating Factors nothing    Associated Signs/Symptoms nausea    Chest Pain Intervention cardiac monitor placed;12-lead ECG obtained;activity minimized;cardiac monitoring continued;cardiac biomarkers drawn       Peripheral/Neurovascular WDL    Peripheral Neurovascular WDL WDL       Cognitive/Neuro/Behavioral WDL    Cognitive/Neuro/Behavioral WDL WDL              "

## 2023-06-09 ENCOUNTER — OFFICE VISIT (OUTPATIENT)
Dept: FAMILY MEDICINE | Facility: CLINIC | Age: 23
End: 2023-06-09
Payer: COMMERCIAL

## 2023-06-09 ENCOUNTER — HOSPITAL ENCOUNTER (OUTPATIENT)
Dept: CT IMAGING | Facility: CLINIC | Age: 23
Discharge: HOME | End: 2023-06-09
Attending: FAMILY MEDICINE | Admitting: FAMILY MEDICINE
Payer: COMMERCIAL

## 2023-06-09 VITALS
HEIGHT: 67 IN | TEMPERATURE: 98.2 F | OXYGEN SATURATION: 97 % | RESPIRATION RATE: 20 BRPM | SYSTOLIC BLOOD PRESSURE: 134 MMHG | DIASTOLIC BLOOD PRESSURE: 92 MMHG | HEART RATE: 99 BPM | BODY MASS INDEX: 45.99 KG/M2 | WEIGHT: 293 LBS

## 2023-06-09 DIAGNOSIS — R31.9 HEMATURIA, UNSPECIFIED TYPE: ICD-10-CM

## 2023-06-09 DIAGNOSIS — R10.9 FLANK PAIN: ICD-10-CM

## 2023-06-09 DIAGNOSIS — R35.0 FREQUENT URINATION: Primary | ICD-10-CM

## 2023-06-09 DIAGNOSIS — R31.9 URINARY TRACT INFECTION WITH HEMATURIA, SITE UNSPECIFIED: ICD-10-CM

## 2023-06-09 DIAGNOSIS — N39.0 URINARY TRACT INFECTION WITH HEMATURIA, SITE UNSPECIFIED: ICD-10-CM

## 2023-06-09 LAB
ALBUMIN UR-MCNC: NEGATIVE MG/DL
APPEARANCE UR: CLEAR
BACTERIA #/AREA URNS HPF: ABNORMAL /HPF
BILIRUB UR QL STRIP: NEGATIVE
CLUE CELLS: ABNORMAL
COLOR UR AUTO: YELLOW
GLUCOSE UR STRIP-MCNC: NEGATIVE MG/DL
HGB UR QL STRIP: ABNORMAL
KETONES UR STRIP-MCNC: NEGATIVE MG/DL
LEUKOCYTE ESTERASE UR QL STRIP: ABNORMAL
NITRATE UR QL: NEGATIVE
PH UR STRIP: 7.5 [PH] (ref 5–7)
RBC #/AREA URNS AUTO: ABNORMAL /HPF
SP GR UR STRIP: 1.02 (ref 1–1.03)
SQUAMOUS #/AREA URNS AUTO: ABNORMAL /LPF
TRICHOMONAS, WET PREP: ABNORMAL
UROBILINOGEN UR STRIP-ACNC: 0.2 E.U./DL
WBC #/AREA URNS AUTO: ABNORMAL /HPF
WBC'S/HIGH POWER FIELD, WET PREP: ABNORMAL
YEAST, WET PREP: ABNORMAL

## 2023-06-09 PROCEDURE — 81001 URINALYSIS AUTO W/SCOPE: CPT | Performed by: FAMILY MEDICINE

## 2023-06-09 PROCEDURE — 87086 URINE CULTURE/COLONY COUNT: CPT | Performed by: FAMILY MEDICINE

## 2023-06-09 PROCEDURE — 99214 OFFICE O/P EST MOD 30 MIN: CPT | Performed by: FAMILY MEDICINE

## 2023-06-09 PROCEDURE — 74176 CT ABD & PELVIS W/O CONTRAST: CPT

## 2023-06-09 PROCEDURE — 87210 SMEAR WET MOUNT SALINE/INK: CPT | Performed by: FAMILY MEDICINE

## 2023-06-09 RX ORDER — SULFAMETHOXAZOLE/TRIMETHOPRIM 800-160 MG
1 TABLET ORAL 2 TIMES DAILY
Qty: 6 TABLET | Refills: 0 | Status: SHIPPED | OUTPATIENT
Start: 2023-06-09 | End: 2023-06-09

## 2023-06-09 RX ORDER — SULFAMETHOXAZOLE/TRIMETHOPRIM 800-160 MG
1 TABLET ORAL 2 TIMES DAILY
Qty: 28 TABLET | Refills: 0 | Status: SHIPPED | OUTPATIENT
Start: 2023-06-09 | End: 2023-06-23

## 2023-06-09 ASSESSMENT — PAIN SCALES - GENERAL: PAINLEVEL: MODERATE PAIN (5)

## 2023-06-09 NOTE — PROGRESS NOTES
Assessment & Plan     Frequent urination  Negative wet prep  - Wet prep - Clinic Collect  - UA Macroscopic with reflex to Microscopic and Culture  - Urine Microscopic Exam  - Urine Culture    Urinary tract infection with hematuria, site unspecified  - sulfamethoxazole-trimethoprim (BACTRIM DS) 800-160 MG tablet  Dispense: 28 tablet; Refill: 0    Hematuria, unspecified type  - CT Abdomen Pelvis w/o Contrast    Flank pain  Recurring right-sided flank pain hematuria and concern for infection recommended obtaining CT scan to rule out nephrolithiasis  - CT Abdomen Pelvis w/o Contrast        40  minutes spent by me on the date of the encounter doing chart review, patient visit and documentation       RHEA URIBE DO  [facility]    Jason Green is a 23 year old, presenting for the following health issues:  UTI        6/9/2023     2:36 PM   Additional Questions   Roomed by Estrella CHARLES CMA     History of Present Illness       Reason for visit:  I belive i might have a vaginal infection  Symptom onset:  3-7 days ago  Symptoms include:  Urinating more frequently, lower back pain, right flank pain    She eats 2-3 servings of fruits and vegetables daily.She consumes 1 sweetened beverage(s) daily.She exercises with enough effort to increase her heart rate 30 to 60 minutes per day.  She exercises with enough effort to increase her heart rate 3 or less days per week.   She is taking medications regularly.     -Frequent urination and dysuria.  Recurrent right flank pain that has not been getting better for the last week.    No change in her bowels, does feel may be slightly more constipated.  No blood in stool or urine that she is seeing    No fevers or other abdominal pain.  She is not sexually active, denies chance of being pregnant.  Last period was May 19.    Review of Systems   Constitutional, HEENT, cardiovascular, pulmonary, gi and gu systems are negative, except as otherwise noted.     "  Objective    BP (!) 134/92 (BP Location: Right arm, Patient Position: Sitting, Cuff Size: Adult Large)   Pulse 99   Temp 98.2  F (36.8  C) (Tympanic)   Resp 20   Ht 1.702 m (5' 7\")   Wt (!) 158.5 kg (349 lb 6.4 oz)   LMP 05/19/2023   SpO2 97%   BMI 54.72 kg/m    Body mass index is 54.72 kg/m .  Physical Exam  Constitutional:       Appearance: Normal appearance.   Cardiovascular:      Rate and Rhythm: Normal rate.      Pulses: Normal pulses.   Pulmonary:      Effort: Pulmonary effort is normal.   Abdominal:      Palpations: Abdomen is soft.      Tenderness: There is no abdominal tenderness. There is no right CVA tenderness or left CVA tenderness.   Neurological:      Mental Status: She is alert.   Psychiatric:         Thought Content: Thought content normal.         Judgment: Judgment normal.                    "

## 2023-06-11 LAB — BACTERIA UR CULT: NORMAL

## 2023-06-19 ENCOUNTER — TRANSFERRED RECORDS (OUTPATIENT)
Dept: HEALTH INFORMATION MANAGEMENT | Facility: CLINIC | Age: 23
End: 2023-06-19
Payer: COMMERCIAL

## 2023-06-23 ENCOUNTER — MYC MEDICAL ADVICE (OUTPATIENT)
Dept: FAMILY MEDICINE | Facility: CLINIC | Age: 23
End: 2023-06-23

## 2023-06-23 ENCOUNTER — OFFICE VISIT (OUTPATIENT)
Dept: FAMILY MEDICINE | Facility: CLINIC | Age: 23
End: 2023-06-23
Payer: COMMERCIAL

## 2023-06-23 ENCOUNTER — TELEPHONE (OUTPATIENT)
Dept: GASTROENTEROLOGY | Facility: CLINIC | Age: 23
End: 2023-06-23

## 2023-06-23 VITALS
HEART RATE: 82 BPM | WEIGHT: 293 LBS | DIASTOLIC BLOOD PRESSURE: 78 MMHG | TEMPERATURE: 97.7 F | RESPIRATION RATE: 18 BRPM | SYSTOLIC BLOOD PRESSURE: 118 MMHG | BODY MASS INDEX: 45.99 KG/M2 | OXYGEN SATURATION: 97 % | HEIGHT: 67 IN

## 2023-06-23 DIAGNOSIS — Z11.3 SCREENING FOR STDS (SEXUALLY TRANSMITTED DISEASES): ICD-10-CM

## 2023-06-23 DIAGNOSIS — R11.0 NAUSEA: Primary | ICD-10-CM

## 2023-06-23 DIAGNOSIS — R10.816 EPIGASTRIC ABDOMINAL TENDERNESS WITHOUT REBOUND TENDERNESS: ICD-10-CM

## 2023-06-23 DIAGNOSIS — R10.9 CHRONIC ABDOMINAL PAIN: ICD-10-CM

## 2023-06-23 DIAGNOSIS — G89.29 CHRONIC ABDOMINAL PAIN: ICD-10-CM

## 2023-06-23 PROCEDURE — 87491 CHLMYD TRACH DNA AMP PROBE: CPT | Performed by: STUDENT IN AN ORGANIZED HEALTH CARE EDUCATION/TRAINING PROGRAM

## 2023-06-23 PROCEDURE — 87591 N.GONORRHOEAE DNA AMP PROB: CPT | Performed by: STUDENT IN AN ORGANIZED HEALTH CARE EDUCATION/TRAINING PROGRAM

## 2023-06-23 PROCEDURE — 99214 OFFICE O/P EST MOD 30 MIN: CPT | Performed by: STUDENT IN AN ORGANIZED HEALTH CARE EDUCATION/TRAINING PROGRAM

## 2023-06-23 RX ORDER — PROCHLORPERAZINE MALEATE 10 MG
10 TABLET ORAL EVERY 6 HOURS PRN
Qty: 45 TABLET | Refills: 0 | Status: SHIPPED | OUTPATIENT
Start: 2023-06-23 | End: 2023-08-01

## 2023-06-23 RX ORDER — FAMOTIDINE 20 MG/1
20 TABLET, FILM COATED ORAL 2 TIMES DAILY
Qty: 90 TABLET | Refills: 1 | Status: SHIPPED | OUTPATIENT
Start: 2023-06-23 | End: 2023-08-02

## 2023-06-23 ASSESSMENT — ANXIETY QUESTIONNAIRES
8. IF YOU CHECKED OFF ANY PROBLEMS, HOW DIFFICULT HAVE THESE MADE IT FOR YOU TO DO YOUR WORK, TAKE CARE OF THINGS AT HOME, OR GET ALONG WITH OTHER PEOPLE?: SOMEWHAT DIFFICULT
GAD7 TOTAL SCORE: 5
GAD7 TOTAL SCORE: 5
IF YOU CHECKED OFF ANY PROBLEMS ON THIS QUESTIONNAIRE, HOW DIFFICULT HAVE THESE PROBLEMS MADE IT FOR YOU TO DO YOUR WORK, TAKE CARE OF THINGS AT HOME, OR GET ALONG WITH OTHER PEOPLE: SOMEWHAT DIFFICULT
7. FEELING AFRAID AS IF SOMETHING AWFUL MIGHT HAPPEN: SEVERAL DAYS
1. FEELING NERVOUS, ANXIOUS, OR ON EDGE: SEVERAL DAYS
4. TROUBLE RELAXING: SEVERAL DAYS
2. NOT BEING ABLE TO STOP OR CONTROL WORRYING: SEVERAL DAYS
3. WORRYING TOO MUCH ABOUT DIFFERENT THINGS: SEVERAL DAYS
7. FEELING AFRAID AS IF SOMETHING AWFUL MIGHT HAPPEN: SEVERAL DAYS
6. BECOMING EASILY ANNOYED OR IRRITABLE: NOT AT ALL
GAD7 TOTAL SCORE: 5
5. BEING SO RESTLESS THAT IT IS HARD TO SIT STILL: NOT AT ALL

## 2023-06-23 ASSESSMENT — PATIENT HEALTH QUESTIONNAIRE - PHQ9
10. IF YOU CHECKED OFF ANY PROBLEMS, HOW DIFFICULT HAVE THESE PROBLEMS MADE IT FOR YOU TO DO YOUR WORK, TAKE CARE OF THINGS AT HOME, OR GET ALONG WITH OTHER PEOPLE: SOMEWHAT DIFFICULT
SUM OF ALL RESPONSES TO PHQ QUESTIONS 1-9: 4
SUM OF ALL RESPONSES TO PHQ QUESTIONS 1-9: 4

## 2023-06-23 ASSESSMENT — PAIN SCALES - GENERAL: PAINLEVEL: NO PAIN (0)

## 2023-06-23 NOTE — TELEPHONE ENCOUNTER
Left message with [facility] requested last 3 years of records. Writers fax number along with callback number left.    Records received and sent to HIM to scan into patient chart.

## 2023-06-23 NOTE — PROGRESS NOTES
Assessment & Plan     Patient is a pleasant 23-year-old with past medical history of GERD, nonspecific abdominal pain without clear cause who presents today for follow-up on this.  Recently seen in the emergency department and in clinic as well for similar symptoms.  Had a CT scan that showed left ovarian cyst not previously seen on imaging.  Also with hepatic steatosis.  Currently taking omeprazole twice daily, sucralfate as needed. Still with epigastric abdominal tenderness. Has previously had scopes completed with a history of ileal aphthous ulcer. Has not had follow up recently with GI. Also, anxiety has been worse with this recent recurrence/flare of pain.     For ongoing abdominal pain and nausea, didn't feel significant' improved on zofran so will trial compazine and pepcid in addition to the omeprazole and carafate to really try to see if symptoms are more related to reflux, as her pain is usually worse at night/in the morning after waking up. GI referral placed today as well. Consider Crohn's vs UC vs celiac as possible contributor to pain.     If GI cause ultimately not found, consider GYN as primary cause.     Nausea  - Adult GI  Referral - Consult Only  - prochlorperazine (COMPAZINE) 10 MG tablet  Dispense: 45 tablet; Refill: 0  - famotidine (PEPCID) 20 MG tablet  Dispense: 90 tablet; Refill: 1    Chronic abdominal pain  - Adult GI  Referral - Consult Only  - prochlorperazine (COMPAZINE) 10 MG tablet  Dispense: 45 tablet; Refill: 0    Epigastric abdominal tenderness without rebound tenderness  - famotidine (PEPCID) 20 MG tablet  Dispense: 90 tablet; Refill: 1    Screening for STDs (sexually transmitted diseases)  We discussed screen for gonorrhea/chlamydia today to rule out PID or infection as contributing factor for bilateral lower quadrant pain. She obtained self swab today.   - NEISSERIA GONORRHOEA PCR  - CHLAMYDIA TRACHOMATIS PCR  - CHLAMYDIA TRACHOMATIS PCR  - NEISSERIA  GONORRHOEA PCR      I spent a total of 37 minutes on the day of the visit.   Time spent by me doing chart review, history and exam, documentation and further activities per the note     Denise Ramirez MD  [facility]    Jason Green is a 23 year old, presenting for the following health issues:  Chief Complaint   Patient presents with     Abdominal Pain     Nausea     Anxiety     Results     CT scan 06/09/2023 6/23/2023     8:47 AM   Additional Questions   Roomed by Trinity   Accompanied by Self     History of Present Illness       Mental Health Follow-up:  Patient presents to follow-up on Anxiety.    Patient's anxiety since last visit has been:  Worse    Any significant life events: health concerns        Reason for visit:  Been very nauseous and not feeling good  Symptom onset:  3-4 weeks ago  Symptom progression:  Worsening    She eats 2-3 servings of fruits and vegetables daily.She consumes 2 sweetened beverage(s) daily.She exercises with enough effort to increase her heart rate 20 to 29 minutes per day.  She exercises with enough effort to increase her heart rate 3 or less days per week.   She is taking medications regularly.    Today's PHQ-9         PHQ-9 Total Score: 4    PHQ-9 Q9 Thoughts of better off dead/self-harm past 2 weeks :   Not at all    How difficult have these problems made it for you to do your work, take care of things at home, or get along with other people: Somewhat difficult  Today's LC-7 Score: 5     Has been having increased abdominal discomfort.  Seen on 6/3/2023 for chest pain/epigastric pain.  Prescribed Zofran and sucralfate, increase omeprazole to twice daily.    Seen outpatient on 6/9/2023, had CT scan of the abdomen completed which showed left ovarian cyst.      Regular menses   Last one was 6/16/23.   Has been regular for quite some time.   Clotting with menses.       Review of Systems   Constitutional, HEENT, cardiovascular,  "pulmonary, GI, , musculoskeletal, neuro, skin, endocrine and psych systems are negative, except as otherwise noted.      Objective    /78 (BP Location: Right arm, Patient Position: Sitting, Cuff Size: Adult Large)   Pulse 82   Temp 97.7  F (36.5  C) (Tympanic)   Resp 18   Ht 1.702 m (5' 7\")   Wt (!) 158.3 kg (349 lb)   LMP 06/16/2023   SpO2 97%   BMI 54.66 kg/m    Body mass index is 54.66 kg/m .  Physical Exam  Constitutional:       Appearance: Normal appearance.   HENT:      Head: Normocephalic.   Eyes:      General: No scleral icterus.     Extraocular Movements: Extraocular movements intact.      Conjunctiva/sclera: Conjunctivae normal.   Cardiovascular:      Rate and Rhythm: Normal rate and regular rhythm.      Heart sounds: Normal heart sounds.   Pulmonary:      Effort: Pulmonary effort is normal.      Breath sounds: Normal breath sounds.   Abdominal:      General: Abdomen is flat. Bowel sounds are normal.      Palpations: Abdomen is soft.      Tenderness: There is abdominal tenderness (epigastric, bilateral lower quadrants).   Musculoskeletal:         General: Normal range of motion.      Cervical back: Normal range of motion.   Neurological:      General: No focal deficit present.      Mental Status: She is alert and oriented to person, place, and time.         Results from this visitNo results found for any visits on 06/23/23.              "

## 2023-06-24 LAB
C TRACH DNA SPEC QL NAA+PROBE: NEGATIVE
N GONORRHOEA DNA SPEC QL NAA+PROBE: NEGATIVE

## 2023-06-26 ENCOUNTER — MYC MEDICAL ADVICE (OUTPATIENT)
Dept: FAMILY MEDICINE | Facility: CLINIC | Age: 23
End: 2023-06-26
Payer: COMMERCIAL

## 2023-06-30 ENCOUNTER — TRANSFERRED RECORDS (OUTPATIENT)
Dept: HEALTH INFORMATION MANAGEMENT | Facility: CLINIC | Age: 23
End: 2023-06-30
Payer: COMMERCIAL

## 2023-07-10 DIAGNOSIS — R12 HEARTBURN: ICD-10-CM

## 2023-07-11 RX ORDER — SUCRALFATE 1 G/1
1 TABLET ORAL 4 TIMES DAILY
Qty: 360 TABLET | Refills: 0 | Status: SHIPPED | OUTPATIENT
Start: 2023-07-11 | End: 2024-02-27

## 2023-07-31 DIAGNOSIS — R10.816 EPIGASTRIC ABDOMINAL TENDERNESS WITHOUT REBOUND TENDERNESS: ICD-10-CM

## 2023-07-31 DIAGNOSIS — R11.0 NAUSEA: ICD-10-CM

## 2023-07-31 RX ORDER — FAMOTIDINE 20 MG/1
20 TABLET, FILM COATED ORAL 2 TIMES DAILY
Qty: 90 TABLET | Refills: 1 | OUTPATIENT
Start: 2023-07-31

## 2023-07-31 NOTE — TELEPHONE ENCOUNTER
Date of last refill:  amotidine (PEPCID) 20 MG tablet 90 tablet 1 6/23/2023  --   Sig - Route: Take 1 tablet (20 mg) by mouth 2 times daily - Oral   Sent to pharmacy as: Famotidine 20 MG Oral Tablet (PEPCID)   Class: E-Prescribe   Order: [id number]   E-Prescribing Status: Receipt confirmed by pharmacy (6/23/2023  9:52 AM CDT)

## 2023-08-01 ENCOUNTER — MYC REFILL (OUTPATIENT)
Dept: FAMILY MEDICINE | Facility: CLINIC | Age: 23
End: 2023-08-01
Payer: COMMERCIAL

## 2023-08-01 DIAGNOSIS — R11.0 NAUSEA: ICD-10-CM

## 2023-08-01 DIAGNOSIS — R10.9 CHRONIC ABDOMINAL PAIN: ICD-10-CM

## 2023-08-01 DIAGNOSIS — G89.29 CHRONIC ABDOMINAL PAIN: ICD-10-CM

## 2023-08-02 DIAGNOSIS — R10.816 EPIGASTRIC ABDOMINAL TENDERNESS WITHOUT REBOUND TENDERNESS: ICD-10-CM

## 2023-08-02 DIAGNOSIS — R11.0 NAUSEA: ICD-10-CM

## 2023-08-02 RX ORDER — FAMOTIDINE 20 MG/1
20 TABLET, FILM COATED ORAL 2 TIMES DAILY
Qty: 180 TABLET | Refills: 0 | Status: SHIPPED | OUTPATIENT
Start: 2023-08-02 | End: 2023-10-27

## 2023-08-02 RX ORDER — PROCHLORPERAZINE MALEATE 10 MG
10 TABLET ORAL EVERY 6 HOURS PRN
Qty: 45 TABLET | Refills: 0 | Status: SHIPPED | OUTPATIENT
Start: 2023-08-02 | End: 2024-02-27

## 2023-08-24 ENCOUNTER — APPOINTMENT (OUTPATIENT)
Dept: GENERAL RADIOLOGY | Facility: CLINIC | Age: 23
End: 2023-08-24
Attending: EMERGENCY MEDICINE
Payer: COMMERCIAL

## 2023-08-24 ENCOUNTER — HOSPITAL ENCOUNTER (EMERGENCY)
Facility: CLINIC | Age: 23
Discharge: HOME | End: 2023-08-24
Attending: EMERGENCY MEDICINE | Admitting: EMERGENCY MEDICINE
Payer: COMMERCIAL

## 2023-08-24 VITALS
SYSTOLIC BLOOD PRESSURE: 104 MMHG | BODY MASS INDEX: 45.99 KG/M2 | HEIGHT: 67 IN | HEART RATE: 86 BPM | DIASTOLIC BLOOD PRESSURE: 84 MMHG | RESPIRATION RATE: 10 BRPM | TEMPERATURE: 97.8 F | WEIGHT: 293 LBS | OXYGEN SATURATION: 98 %

## 2023-08-24 DIAGNOSIS — R07.9 CHEST PAIN, UNSPECIFIED TYPE: ICD-10-CM

## 2023-08-24 LAB
ANION GAP SERPL CALCULATED.3IONS-SCNC: 12 MMOL/L (ref 7–15)
BASOPHILS # BLD AUTO: 0.1 10E3/UL (ref 0–0.2)
BASOPHILS NFR BLD AUTO: 0 %
BUN SERPL-MCNC: 16.1 MG/DL (ref 6–20)
CALCIUM SERPL-MCNC: 10 MG/DL (ref 8.6–10)
CHLORIDE SERPL-SCNC: 102 MMOL/L (ref 98–107)
CREAT SERPL-MCNC: 1.01 MG/DL (ref 0.51–0.95)
DEPRECATED HCO3 PLAS-SCNC: 26 MMOL/L (ref 22–29)
EOSINOPHIL # BLD AUTO: 0.1 10E3/UL (ref 0–0.7)
EOSINOPHIL NFR BLD AUTO: 1 %
ERYTHROCYTE [DISTWIDTH] IN BLOOD BY AUTOMATED COUNT: 13.1 % (ref 10–15)
GFR SERPL CREATININE-BSD FRML MDRD: 80 ML/MIN/1.73M2
GLUCOSE SERPL-MCNC: 96 MG/DL (ref 70–99)
HCT VFR BLD AUTO: 38.8 % (ref 35–47)
HGB BLD-MCNC: 13 G/DL (ref 11.7–15.7)
IMM GRANULOCYTES # BLD: 0.1 10E3/UL
IMM GRANULOCYTES NFR BLD: 0 %
LYMPHOCYTES # BLD AUTO: 2.6 10E3/UL (ref 0.8–5.3)
LYMPHOCYTES NFR BLD AUTO: 20 %
MCH RBC QN AUTO: 27.9 PG (ref 26.5–33)
MCHC RBC AUTO-ENTMCNC: 33.5 G/DL (ref 31.5–36.5)
MCV RBC AUTO: 83 FL (ref 78–100)
MONOCYTES # BLD AUTO: 0.9 10E3/UL (ref 0–1.3)
MONOCYTES NFR BLD AUTO: 7 %
NEUTROPHILS # BLD AUTO: 9.4 10E3/UL (ref 1.6–8.3)
NEUTROPHILS NFR BLD AUTO: 72 %
NRBC # BLD AUTO: 0 10E3/UL
NRBC BLD AUTO-RTO: 0 /100
PLATELET # BLD AUTO: 299 10E3/UL (ref 150–450)
POTASSIUM SERPL-SCNC: 4.3 MMOL/L (ref 3.4–5.3)
RBC # BLD AUTO: 4.66 10E6/UL (ref 3.8–5.2)
SODIUM SERPL-SCNC: 140 MMOL/L (ref 136–145)
TROPONIN T SERPL HS-MCNC: <6 NG/L
WBC # BLD AUTO: 13.1 10E3/UL (ref 4–11)

## 2023-08-24 PROCEDURE — 84484 ASSAY OF TROPONIN QUANT: CPT | Performed by: EMERGENCY MEDICINE

## 2023-08-24 PROCEDURE — 36415 COLL VENOUS BLD VENIPUNCTURE: CPT | Performed by: EMERGENCY MEDICINE

## 2023-08-24 PROCEDURE — 93010 ELECTROCARDIOGRAM REPORT: CPT | Performed by: EMERGENCY MEDICINE

## 2023-08-24 PROCEDURE — 71046 X-RAY EXAM CHEST 2 VIEWS: CPT

## 2023-08-24 PROCEDURE — 80048 BASIC METABOLIC PNL TOTAL CA: CPT | Performed by: EMERGENCY MEDICINE

## 2023-08-24 PROCEDURE — 93005 ELECTROCARDIOGRAM TRACING: CPT

## 2023-08-24 PROCEDURE — 99284 EMERGENCY DEPT VISIT MOD MDM: CPT | Mod: 25 | Performed by: EMERGENCY MEDICINE

## 2023-08-24 PROCEDURE — 85025 COMPLETE CBC W/AUTO DIFF WBC: CPT | Performed by: EMERGENCY MEDICINE

## 2023-08-24 PROCEDURE — 99285 EMERGENCY DEPT VISIT HI MDM: CPT | Mod: 25

## 2023-08-24 ASSESSMENT — ACTIVITIES OF DAILY LIVING (ADL): ADLS_ACUITY_SCORE: 35

## 2023-08-24 NOTE — ED TRIAGE NOTES
"Pt c/o chest pain starting 3 days ago, pt has had this before, has been taking her PPI medications and carafate. Pt also states she was as work and felt tired and her face felt \"tight\" had to leave work. C/o nausea with this.      Triage Assessment       Row Name 08/24/23 8667       Triage Assessment (Adult)    Airway WDL WDL       Respiratory WDL    Respiratory WDL WDL       Skin Circulation/Temperature WDL    Skin Circulation/Temperature WDL WDL       Cardiac WDL    Cardiac WDL X;chest pain       Chest Pain Assessment    Chest Pain Location midsternal    Character aching;spasm    Precipitating Factors nothing;unknown       Peripheral/Neurovascular WDL    Peripheral Neurovascular WDL WDL       Cognitive/Neuro/Behavioral WDL    Cognitive/Neuro/Behavioral WDL WDL                    "

## 2023-08-25 NOTE — ED PROVIDER NOTES
History     Chief Complaint   Patient presents with    Chest Pain     HPI  Norma Collins is a 23 year old female with history notable for morbid obesity, PCOS, elevated blood pressure without diagnosis of hypertension, with 6 days of right-sided chest wall pain.  She has had pain like this before several months ago but it went away.  Pain is in the right side of her chest.  Not exertional.  No fevers or chills.  No cough or dyspnea.  No abdominal pain.      The patient's PMHx, Surgical Hx, Allergies, and Medications were all reviewed with the patient.    Allergies:  Allergies   Allergen Reactions    [place] Sprouts [Brassica Oleracea]     Keflex [Cephalexin] Itching    Other Food Allergy Rash     [place] sprouts       Problem List:    Patient Active Problem List    Diagnosis Date Noted    Abnormal uterine bleeding due to endometrial polyp 08/24/2022     Priority: Medium    Change in bowel habit 08/23/2022     Priority: Medium    Ileitis 08/23/2022     Priority: Medium    Family history of colon cancer 07/08/2021     Priority: Medium     Mother at age 40      Family history of hemochromatosis 07/08/2021     Priority: Medium     Father and Grandfather      Acanthosis nigricans 12/08/2020     Priority: Medium     Created by Conversion      Last Assessment & Plan:   Patient doing very well and adding exercise, and watching diet closely.  We'll now add metformin as per orders.  Side effects and precautions discussed.  Follow up in one month.      Arthropathy of ankle and foot 12/08/2020     Priority: Medium     Created by Conversion    Replacement Utility updated for latest IMO load    Last Assessment & Plan:   With diffuse joint aches treating with progressive weight loss and lifestyle modifications.      Iron deficiency anemia due to chronic blood loss 11/22/2019     Priority: Medium    Aphthous ulcer of ileum 10/30/2019     Priority: Medium    Elevated BP without diagnosis of hypertension 10/16/2017     Priority:  "Medium    Adenotonsillar hypertrophy 01/04/2017     Priority: Medium    PCOS (polycystic ovarian syndrome) 02/29/2016     Priority: Medium     Last Assessment & Plan:   Her dietary changes are working very well.  Also working very well for the household.  She is exercising more, having more energy, and pants are fitting looser.  We'll plan for in body at next visit along with remeasurement of abdominal and neck circumference.  Also with her forgetting the metformin in the morning, will move it to the afternoon with lunch.      Class 3 severe obesity due to excess calories without serious comorbidity with body mass index (BMI) of 50.0 to 59.9 in adult (H) 10/30/2015     Priority: Medium     Last Assessment & Plan:   Continue lifestyle modifications.  Will breakfast a [place] protein-based with shakes or aches.  May also use chicken or steak.  Stopped carbohydrates in the morning.  Better food choices discussed.  Encouraged to watch the documentary, \"In defense of food\" before next visit.          Past Medical History:    Past Medical History:   Diagnosis Date    Abnormal uterine bleeding due to endometrial polyp     Acanthosis nigricans     Adenotonsillar hypertrophy     Aphthous ulcer of ileum     Arthropathy of ankle and foot     Family history of hemochromatosis     Iron deficiency anemia due to chronic blood loss     Obesity     PCOS (polycystic ovarian syndrome)        Past Surgical History:    Past Surgical History:   Procedure Laterality Date    CHOLECYSTECTOMY      HYSTEROSCOPY DIAGNOSTIC N/A 10/14/2022    Procedure: HYSTEROSCOPY WITH POLYPECTOMY;  Surgeon: Aston Yanes MD;  Location: [facility] [place]    LAPAROSCOPIC CHOLECYSTECTOMY      OTHER SURGICAL HISTORY      none    TONSILLECTOMY      TONSILLECTOMY & ADENOIDECTOMY Bilateral 2/16/2017    Procedure: BILATERAL TONSILLECTOMY AND ADENOIDECTOMY;  Surgeon: Jacob Arguello MD;  Location: [facility];  Service:        Family History:  "   Family History   Problem Relation Age of Onset    Colon Cancer Mother 43    Cancer Mother         colon    Venous thrombosis Mother         cancer-related    Cerebrovascular Disease Mother     Liver Disease Father         cirrhosis of liver    Other - See Comments Father         high iron    Cirrhosis Father     Hemochromatosis Father     Hypertension Father     Hyperlipidemia Father     Myocardial Infarction Maternal Grandmother     Heart Disease Maternal Grandmother     Dementia Maternal Grandmother     Cerebrovascular Disease Paternal Grandmother     Other - See Comments Maternal Grandfather         heart attack or cancer/unsure    Bladder Cancer Maternal Grandfather     Cirrhosis Paternal Grandfather     Other - See Comments Paternal Grandfather         high iron    Diabetes Paternal Grandfather     Hemochromatosis Paternal Grandfather     Kidney Disease Paternal Grandfather     Hypertension Paternal Grandfather     Colon Cancer Maternal Great-Grandmother         70-90 years old     Uterine Cancer Maternal Great-Grandmother     Breast Cancer Maternal Great-Grandmother     Brain Cancer Maternal Aunt        Social History:  Marital Status:  Single [1]  Social History     Tobacco Use    Smoking status: Every Day     Types: Vaping Device    Smokeless tobacco: Never   Vaping Use    Vaping Use: Every day    Substances: Nicotine, Flavoring    Devices: Disposable   Substance Use Topics    Alcohol use: Yes     Comment: occ    Drug use: Never        Medications:    buPROPion (WELLBUTRIN XL) 150 MG 24 hr tablet  famotidine (PEPCID) 20 MG tablet  hydrOXYzine (VISTARIL) 25 MG capsule  naproxen sodium 220 MG capsule  omeprazole (PRILOSEC) 20 MG DR capsule  ondansetron (ZOFRAN ODT) 4 MG ODT tab  prochlorperazine (COMPAZINE) 10 MG tablet  sucralfate (CARAFATE) 1 GM tablet  traZODone (DESYREL) 50 MG tablet          Review of Systems  Pertinent positives and negatives mentioned in HPI    Physical Exam   BP: (!) 131/92  Pulse:  "96  Temp: 97.8  F (36.6  C)  Resp: 21  Height: 170.2 cm (5' 7\")  Weight: (!) 158.8 kg (350 lb)  SpO2: 98 %    Physical Exam  GEN: Awake, alert, and cooperative. No acute distress.  HENT: MMM.   EYES: EOM intact. Conjunctiva clear.   NECK: No JVD.   CV : Regular rate and rhythm.  PULM: Normal effort. No wheezes, rales, or rhonchi bilaterally.  CHEST: tenderness of right sternal border which reproduces her pain. No exanthem or ecchymosis.   ABD: Soft, obese, non-tender, non-distended. No rebound or guarding.   NEURO: Normal speech. Following commands. CN II-XII grossly intact. Answering questions and interacting appropriately.   EXT: No gross deformity. Warm and well perfused.  INT: Warm. No diaphoresis. Normal color.        ED Course        Procedures         EKG: Interpreted by myself. Sinus rhythm with rate of 87 bpm.  Normal axis.  Normal R wave progression.  Normal intervals.  No ectopy.  No ST segment ovation's or depressions.  Nonpathologic Q-wave in lead III which was present on prior EKG.  Impression sinus rhythm with no acute changes no evidence of acute ischemia.    Critical Care time:  none               Results for orders placed or performed during the hospital encounter of 08/24/23 (from the past 24 hour(s))   CBC with platelets differential    Narrative    The following orders were created for panel order CBC with platelets differential.  Procedure                               Abnormality         Status                     ---------                               -----------         ------                     CBC with platelets and d...[[id number]]  Abnormal            Final result                 Please view results for these tests on the individual orders.   Basic metabolic panel   Result Value Ref Range    Sodium 140 136 - 145 mmol/L    Potassium 4.3 3.4 - 5.3 mmol/L    Chloride 102 98 - 107 mmol/L    Carbon Dioxide (CO2) 26 22 - 29 mmol/L    Anion Gap 12 7 - 15 mmol/L    Urea Nitrogen 16.1 6.0 - 20.0 " mg/dL    Creatinine 1.01 (H) 0.51 - 0.95 mg/dL    Calcium 10.0 8.6 - 10.0 mg/dL    Glucose 96 70 - 99 mg/dL    GFR Estimate 80 >60 mL/min/1.73m2   Troponin T, High Sensitivity   Result Value Ref Range    Troponin T, High Sensitivity <6 <=14 ng/L   CBC with platelets and differential   Result Value Ref Range    WBC Count 13.1 (H) 4.0 - 11.0 10e3/uL    RBC Count 4.66 3.80 - 5.20 10e6/uL    Hemoglobin 13.0 11.7 - 15.7 g/dL    Hematocrit 38.8 35.0 - 47.0 %    MCV 83 78 - 100 fL    MCH 27.9 26.5 - 33.0 pg    MCHC 33.5 31.5 - 36.5 g/dL    RDW 13.1 10.0 - 15.0 %    Platelet Count 299 150 - 450 10e3/uL    % Neutrophils 72 %    % Lymphocytes 20 %    % Monocytes 7 %    % Eosinophils 1 %    % Basophils 0 %    % Immature Granulocytes 0 %    NRBCs per 100 WBC 0 <1 /100    Absolute Neutrophils 9.4 (H) 1.6 - 8.3 10e3/uL    Absolute Lymphocytes 2.6 0.8 - 5.3 10e3/uL    Absolute Monocytes 0.9 0.0 - 1.3 10e3/uL    Absolute Eosinophils 0.1 0.0 - 0.7 10e3/uL    Absolute Basophils 0.1 0.0 - 0.2 10e3/uL    Absolute Immature Granulocytes 0.1 <=0.4 10e3/uL    Absolute NRBCs 0.0 10e3/uL   Chest XR,  PA & LAT    Narrative    EXAM: XR CHEST 2 VIEWS  LOCATION: [facility]  DATE: 8/24/2023    INDICATION: right sided chest pain x 6 days  COMPARISON: 10/18/2022      Impression    IMPRESSION: Heart size and pulmonary vascularity normal. The lungs are clear. Prominent overlying soft tissue.       Medications - No data to display    Assessments & Plan (with Medical Decision Making)   23 year old female with past medical history of obesity wtih Right-sided chest wall pain x6 days.  No dyspnea.  No fevers or cough.  EKG without evidence acute ischemia.  High sensitive troponin T below level of detection.  CBC with mild leukocytosis of 13.1 but no fever.  No urinary symptoms.  Metabolic panel grossly normal, creatinine 1.01.  On exam lungs are clear.  Chest x-ray without acute infiltrate, effusion or pneumothorax.  Images  reviewed personally as report from radiology which is noted above.  No pleuritic chest pain.  PERC negative.  Not on oral contraception's.  No tobacco use.  No clinical signs of DVT.  Chest wall pain is reproducible to palpation.  Concern for costochondritis.  Recommend she take 600 mg ibuprofen 3 times per day for the next week and follows up in clinic.  ED return precautions discussed.    I have reviewed the nursing notes.         Discharge Medication List as of 8/24/2023  8:30 PM          Final diagnoses:   Chest pain, unspecified type     Juan Diego Gant MD    8/24/2023   [facility] EMERGENCY DEPT    Disclaimer: This note consists of words and symbols derived from keyboarding and dictation using voice recognition software.  As a result, there may be errors that have gone undetected.  Please consider this when interpreting information found in this note.               Juan Diego Gant MD  08/26/23 1300

## 2023-08-25 NOTE — DISCHARGE INSTRUCTIONS
I suspect that your pain is in your chest wall.  Could be costochondritis as we discussed.  Recommend that you take 600 mg of ibuprofen 3 times a day for the next 7 days.    I would like you to follow with your primary care provider in next 1 to 2 weeks.    Please return to the emergency department if you develop severe and persistent chest pain, difficulty breathing, dizziness, leg swelling or if you are coughing up blood as these can be signs of a medical emergency.

## 2023-09-06 ENCOUNTER — OFFICE VISIT (OUTPATIENT)
Dept: URGENT CARE | Facility: URGENT CARE | Age: 23
End: 2023-09-06
Payer: COMMERCIAL

## 2023-09-06 VITALS
HEART RATE: 82 BPM | TEMPERATURE: 97.8 F | SYSTOLIC BLOOD PRESSURE: 145 MMHG | OXYGEN SATURATION: 98 % | BODY MASS INDEX: 54.82 KG/M2 | RESPIRATION RATE: 18 BRPM | DIASTOLIC BLOOD PRESSURE: 87 MMHG | WEIGHT: 293 LBS

## 2023-09-06 DIAGNOSIS — N76.0 BV (BACTERIAL VAGINOSIS): ICD-10-CM

## 2023-09-06 DIAGNOSIS — N94.9 VAGINAL DISCOMFORT: Primary | ICD-10-CM

## 2023-09-06 DIAGNOSIS — R10.9 FLANK PAIN: ICD-10-CM

## 2023-09-06 DIAGNOSIS — B96.89 BV (BACTERIAL VAGINOSIS): ICD-10-CM

## 2023-09-06 LAB
ALBUMIN UR-MCNC: NEGATIVE MG/DL
APPEARANCE UR: CLEAR
BACTERIA #/AREA URNS HPF: ABNORMAL /HPF
BILIRUB UR QL STRIP: NEGATIVE
CLUE CELLS: PRESENT
COLOR UR AUTO: YELLOW
GLUCOSE UR STRIP-MCNC: NEGATIVE MG/DL
HGB UR QL STRIP: NEGATIVE
KETONES UR STRIP-MCNC: NEGATIVE MG/DL
LEUKOCYTE ESTERASE UR QL STRIP: ABNORMAL
NITRATE UR QL: NEGATIVE
PH UR STRIP: 5.5 [PH] (ref 5–7)
RBC #/AREA URNS AUTO: ABNORMAL /HPF
SP GR UR STRIP: 1.02 (ref 1–1.03)
SQUAMOUS #/AREA URNS AUTO: ABNORMAL /LPF
TRICHOMONAS, WET PREP: ABNORMAL
UROBILINOGEN UR STRIP-ACNC: 0.2 E.U./DL
WBC #/AREA URNS AUTO: ABNORMAL /HPF
WBC CLUMPS #/AREA URNS HPF: PRESENT /HPF
WBC'S/HIGH POWER FIELD, WET PREP: ABNORMAL
YEAST, WET PREP: ABNORMAL

## 2023-09-06 PROCEDURE — 87210 SMEAR WET MOUNT SALINE/INK: CPT | Performed by: NURSE PRACTITIONER

## 2023-09-06 PROCEDURE — 81001 URINALYSIS AUTO W/SCOPE: CPT | Performed by: NURSE PRACTITIONER

## 2023-09-06 PROCEDURE — 99214 OFFICE O/P EST MOD 30 MIN: CPT | Performed by: NURSE PRACTITIONER

## 2023-09-06 PROCEDURE — 87086 URINE CULTURE/COLONY COUNT: CPT | Performed by: NURSE PRACTITIONER

## 2023-09-06 RX ORDER — METRONIDAZOLE 500 MG/1
500 TABLET ORAL 2 TIMES DAILY
Qty: 14 TABLET | Refills: 0 | Status: SHIPPED | OUTPATIENT
Start: 2023-09-06 | End: 2023-09-13

## 2023-09-06 NOTE — PROGRESS NOTES
Assessment & Plan     1. Vaginal discomfort    - UA Macroscopic with reflex to Microscopic and Culture - Clinic Collect  - Wet prep - Clinic Collect  - UA Microscopic with Reflex to Culture  - Urine Culture    2. Flank pain    - UA Macroscopic with reflex to Microscopic and Culture - Clinic Collect  - Wet prep - Clinic Collect  - UA Microscopic with Reflex to Culture  - Urine Culture    3. BV (bacterial vaginosis)  Home care reviewed. Patient verbalized understanding; will monitor symptoms closely. Reviewed s/e to medications.   Follow up with primary care in 1 week if symptoms not improving.     Handout given from epic and reviewed.    - metroNIDAZOLE (FLAGYL) 500 MG tablet; Take 1 tablet (500 mg) by mouth 2 times daily for 7 days  Dispense: 14 tablet; Refill: 0    We will hold off at this time and prescribing treatment for urinary tract infection as urine not indicative of true UTI.  Pending urine culture if indicated may prescribe antibiotic as appropriate.  Will increase fluid intake.  Patient verbalized understanding was in agreement with this plan.    XENA Pickard [facility]    Jason Green is a 23 year old female who presents to clinic today for the following health issues:  Chief Complaint   Patient presents with    Vaginal Problem    Flank Pain     Pt has frequency and urgency with vaginal discomfort, hx of bladder and yeast infections     HPI    UTI    Onset of symptoms was 2week(s).  Course of illness is waxing and waning  Severity moderate  Current and associated symptoms dysuria, frequency, burning, and suprapubic pain and pressure  Treatment and measures tried Increase fluid intake  Predisposing factors include none  Patient denies rigors, flank pain, temperature > 101 degrees F., vomiting, taking Coumadin, GFR less than 30 within the last year, and dyspareunia        Review of Systems  Constitutional, HEENT, cardiovascular, pulmonary, gi and gu  systems are negative, except as otherwise noted.      Objective    BP (!) 145/87   Pulse 82   Temp 97.8  F (36.6  C) (Tympanic)   Resp 18   Wt (!) 158.8 kg (350 lb)   SpO2 98%   BMI 54.82 kg/m    Physical Exam   GENERAL: healthy, alert and no distress  NECK: no adenopathy, no asymmetry, masses, or scars and thyroid normal to palpation  RESP: lungs clear to auscultation - no rales, rhonchi or wheezes  CV: regular rate and rhythm, normal S1 S2, no S3 or S4, no murmur, click or rub, no peripheral edema and peripheral pulses strong  ABDOMEN: soft, nontender, no hepatosplenomegaly, no masses and bowel sounds normal  MS: no gross musculoskeletal defects noted, no edema  BACK: no CVA tenderness, no paralumbar tenderness    Results for orders placed or performed in visit on 09/06/23   UA Macroscopic with reflex to Microscopic and Culture - Clinic Collect     Status: Abnormal    Specimen: Urine, Clean Catch   Result Value Ref Range    Color Urine Yellow Colorless, Straw, Light Yellow, Yellow    Appearance Urine Clear Clear    Glucose Urine Negative Negative mg/dL    Bilirubin Urine Negative Negative    Ketones Urine Negative Negative mg/dL    Specific Gravity Urine 1.025 1.003 - 1.035    Blood Urine Negative Negative    pH Urine 5.5 5.0 - 7.0    Protein Albumin Urine Negative Negative mg/dL    Urobilinogen Urine 0.2 0.2, 1.0 E.U./dL    Nitrite Urine Negative Negative    Leukocyte Esterase Urine Small (A) Negative   UA Microscopic with Reflex to Culture     Status: Abnormal   Result Value Ref Range    Bacteria Urine Moderate (A) None Seen /HPF    RBC Urine 0-2 0-2 /HPF /HPF    WBC Urine 10-25 (A) 0-5 /HPF /HPF    Squamous Epithelials Urine Moderate (A) None Seen /LPF    WBC Clumps Urine Present (A) None Seen /HPF   Wet prep - Clinic Collect     Status: Abnormal    Specimen: Vagina; Swab   Result Value Ref Range    Trichomonas Absent Absent    Yeast Absent Absent    Clue Cells Present (A) Absent    WBCs/high power field  3+ (A) None

## 2023-09-07 NOTE — RESULT ENCOUNTER NOTE
[facility] Emergency Dept discharge antibiotic (if prescribed): Metronidazole RX on 9/6/23  No changes in treatment per [facility] ED Lab Result Urine culture protocol.

## 2023-09-08 LAB — BACTERIA UR CULT: NORMAL

## 2023-09-12 ENCOUNTER — HOSPITAL ENCOUNTER (EMERGENCY)
Facility: CLINIC | Age: 23
Discharge: HOME | End: 2023-09-12
Attending: FAMILY MEDICINE | Admitting: FAMILY MEDICINE
Payer: COMMERCIAL

## 2023-09-12 ENCOUNTER — MYC MEDICAL ADVICE (OUTPATIENT)
Dept: FAMILY MEDICINE | Facility: CLINIC | Age: 23
End: 2023-09-12

## 2023-09-12 ENCOUNTER — APPOINTMENT (OUTPATIENT)
Dept: CT IMAGING | Facility: CLINIC | Age: 23
End: 2023-09-12
Attending: FAMILY MEDICINE
Payer: COMMERCIAL

## 2023-09-12 ENCOUNTER — OFFICE VISIT (OUTPATIENT)
Dept: FAMILY MEDICINE | Facility: CLINIC | Age: 23
End: 2023-09-12
Payer: COMMERCIAL

## 2023-09-12 VITALS
DIASTOLIC BLOOD PRESSURE: 89 MMHG | BODY MASS INDEX: 45.99 KG/M2 | TEMPERATURE: 98.2 F | SYSTOLIC BLOOD PRESSURE: 145 MMHG | HEART RATE: 98 BPM | RESPIRATION RATE: 18 BRPM | HEIGHT: 67 IN | OXYGEN SATURATION: 98 % | WEIGHT: 293 LBS

## 2023-09-12 VITALS
WEIGHT: 293 LBS | OXYGEN SATURATION: 100 % | SYSTOLIC BLOOD PRESSURE: 120 MMHG | DIASTOLIC BLOOD PRESSURE: 78 MMHG | HEART RATE: 70 BPM | HEIGHT: 67 IN | TEMPERATURE: 97.7 F | RESPIRATION RATE: 18 BRPM | BODY MASS INDEX: 45.99 KG/M2

## 2023-09-12 DIAGNOSIS — E66.01 CLASS 3 SEVERE OBESITY DUE TO EXCESS CALORIES WITHOUT SERIOUS COMORBIDITY WITH BODY MASS INDEX (BMI) OF 50.0 TO 59.9 IN ADULT (H): ICD-10-CM

## 2023-09-12 DIAGNOSIS — R10.11 RUQ ABDOMINAL PAIN: ICD-10-CM

## 2023-09-12 DIAGNOSIS — R10.31 RLQ ABDOMINAL PAIN: Primary | ICD-10-CM

## 2023-09-12 DIAGNOSIS — R19.7 DIARRHEA, UNSPECIFIED TYPE: ICD-10-CM

## 2023-09-12 DIAGNOSIS — R10.31 RLQ ABDOMINAL PAIN: ICD-10-CM

## 2023-09-12 DIAGNOSIS — E66.813 CLASS 3 SEVERE OBESITY DUE TO EXCESS CALORIES WITHOUT SERIOUS COMORBIDITY WITH BODY MASS INDEX (BMI) OF 50.0 TO 59.9 IN ADULT (H): ICD-10-CM

## 2023-09-12 DIAGNOSIS — M94.0 COSTOCHONDRITIS: Primary | ICD-10-CM

## 2023-09-12 LAB
ALBUMIN SERPL BCG-MCNC: 4.3 G/DL (ref 3.5–5.2)
ALBUMIN UR-MCNC: NEGATIVE MG/DL
ALP SERPL-CCNC: 89 U/L (ref 35–104)
ALT SERPL W P-5'-P-CCNC: 48 U/L (ref 0–50)
ANION GAP SERPL CALCULATED.3IONS-SCNC: 8 MMOL/L (ref 7–15)
APPEARANCE UR: CLEAR
AST SERPL W P-5'-P-CCNC: 34 U/L (ref 0–45)
BACTERIA #/AREA URNS HPF: ABNORMAL /HPF
BASOPHILS # BLD AUTO: 0 10E3/UL (ref 0–0.2)
BASOPHILS NFR BLD AUTO: 0 %
BILIRUB DIRECT SERPL-MCNC: <0.2 MG/DL (ref 0–0.3)
BILIRUB SERPL-MCNC: 0.2 MG/DL
BILIRUB UR QL STRIP: NEGATIVE
BUN SERPL-MCNC: 14.4 MG/DL (ref 6–20)
CALCIUM SERPL-MCNC: 9.8 MG/DL (ref 8.6–10)
CHLORIDE SERPL-SCNC: 104 MMOL/L (ref 98–107)
COLOR UR AUTO: YELLOW
CREAT SERPL-MCNC: 0.84 MG/DL (ref 0.51–0.95)
DEPRECATED HCO3 PLAS-SCNC: 26 MMOL/L (ref 22–29)
EGFRCR SERPLBLD CKD-EPI 2021: >90 ML/MIN/1.73M2
EOSINOPHIL # BLD AUTO: 0.2 10E3/UL (ref 0–0.7)
EOSINOPHIL NFR BLD AUTO: 2 %
ERYTHROCYTE [DISTWIDTH] IN BLOOD BY AUTOMATED COUNT: 13.2 % (ref 10–15)
GLUCOSE SERPL-MCNC: 96 MG/DL (ref 70–99)
GLUCOSE UR STRIP-MCNC: NEGATIVE MG/DL
HCT VFR BLD AUTO: 40.7 % (ref 35–47)
HGB BLD-MCNC: 13.3 G/DL (ref 11.7–15.7)
HGB UR QL STRIP: ABNORMAL
IMM GRANULOCYTES # BLD: 0 10E3/UL
IMM GRANULOCYTES NFR BLD: 0 %
KETONES UR STRIP-MCNC: NEGATIVE MG/DL
LEUKOCYTE ESTERASE UR QL STRIP: ABNORMAL
LYMPHOCYTES # BLD AUTO: 2 10E3/UL (ref 0.8–5.3)
LYMPHOCYTES NFR BLD AUTO: 20 %
MCH RBC QN AUTO: 27.7 PG (ref 26.5–33)
MCHC RBC AUTO-ENTMCNC: 32.7 G/DL (ref 31.5–36.5)
MCV RBC AUTO: 85 FL (ref 78–100)
MONOCYTES # BLD AUTO: 0.6 10E3/UL (ref 0–1.3)
MONOCYTES NFR BLD AUTO: 6 %
MUCOUS THREADS #/AREA URNS LPF: PRESENT /LPF
NEUTROPHILS # BLD AUTO: 6.9 10E3/UL (ref 1.6–8.3)
NEUTROPHILS NFR BLD AUTO: 71 %
NITRATE UR QL: NEGATIVE
PH UR STRIP: 7 [PH] (ref 5–7)
PLATELET # BLD AUTO: 260 10E3/UL (ref 150–450)
POTASSIUM SERPL-SCNC: 4.8 MMOL/L (ref 3.4–5.3)
PROT SERPL-MCNC: 7 G/DL (ref 6.4–8.3)
RBC # BLD AUTO: 4.8 10E6/UL (ref 3.8–5.2)
RBC #/AREA URNS AUTO: ABNORMAL /HPF
SODIUM SERPL-SCNC: 138 MMOL/L (ref 136–145)
SP GR UR STRIP: 1.02 (ref 1–1.03)
SQUAMOUS #/AREA URNS AUTO: ABNORMAL /LPF
UROBILINOGEN UR STRIP-ACNC: 0.2 E.U./DL
WBC # BLD AUTO: 9.7 10E3/UL (ref 4–11)
WBC #/AREA URNS AUTO: ABNORMAL /HPF

## 2023-09-12 PROCEDURE — 99283 EMERGENCY DEPT VISIT LOW MDM: CPT | Performed by: FAMILY MEDICINE

## 2023-09-12 PROCEDURE — 36415 COLL VENOUS BLD VENIPUNCTURE: CPT | Performed by: FAMILY MEDICINE

## 2023-09-12 PROCEDURE — 85025 COMPLETE CBC W/AUTO DIFF WBC: CPT | Performed by: STUDENT IN AN ORGANIZED HEALTH CARE EDUCATION/TRAINING PROGRAM

## 2023-09-12 PROCEDURE — 81001 URINALYSIS AUTO W/SCOPE: CPT | Performed by: STUDENT IN AN ORGANIZED HEALTH CARE EDUCATION/TRAINING PROGRAM

## 2023-09-12 PROCEDURE — 87086 URINE CULTURE/COLONY COUNT: CPT | Performed by: STUDENT IN AN ORGANIZED HEALTH CARE EDUCATION/TRAINING PROGRAM

## 2023-09-12 PROCEDURE — 82248 BILIRUBIN DIRECT: CPT | Performed by: FAMILY MEDICINE

## 2023-09-12 PROCEDURE — 36415 COLL VENOUS BLD VENIPUNCTURE: CPT | Performed by: STUDENT IN AN ORGANIZED HEALTH CARE EDUCATION/TRAINING PROGRAM

## 2023-09-12 PROCEDURE — 80053 COMPREHEN METABOLIC PANEL: CPT | Performed by: STUDENT IN AN ORGANIZED HEALTH CARE EDUCATION/TRAINING PROGRAM

## 2023-09-12 PROCEDURE — 99214 OFFICE O/P EST MOD 30 MIN: CPT | Performed by: STUDENT IN AN ORGANIZED HEALTH CARE EDUCATION/TRAINING PROGRAM

## 2023-09-12 PROCEDURE — 99284 EMERGENCY DEPT VISIT MOD MDM: CPT | Mod: 25 | Performed by: FAMILY MEDICINE

## 2023-09-12 PROCEDURE — 74176 CT ABD & PELVIS W/O CONTRAST: CPT

## 2023-09-12 RX ORDER — IBUPROFEN 200 MG
400 TABLET ORAL EVERY 4 HOURS PRN
COMMUNITY
End: 2024-02-27

## 2023-09-12 RX ORDER — CYCLOBENZAPRINE HCL 10 MG
10 TABLET ORAL 3 TIMES DAILY PRN
Qty: 45 TABLET | Refills: 1 | Status: ON HOLD | OUTPATIENT
Start: 2023-09-12 | End: 2024-01-04

## 2023-09-12 ASSESSMENT — PAIN SCALES - GENERAL: PAINLEVEL: MODERATE PAIN (4)

## 2023-09-12 ASSESSMENT — ACTIVITIES OF DAILY LIVING (ADL): ADLS_ACUITY_SCORE: 33

## 2023-09-12 NOTE — ED TRIAGE NOTES
Pt presents with right sided abdominal pain radiating to the back for one week. Intermittent diarrhea for 2 days. Pt was seen in the urgent care in [place] one week ago and diagnosed with bacterial vaginosis. Has been taking metronidazole for one week.      Triage Assessment       Row Name 09/12/23 1928       Triage Assessment (Adult)    Airway WDL WDL       Respiratory WDL    Respiratory WDL WDL       Skin Circulation/Temperature WDL    Skin Circulation/Temperature WDL WDL       Cardiac WDL    Cardiac WDL WDL       Peripheral/Neurovascular WDL    Peripheral Neurovascular WDL WDL       Cognitive/Neuro/Behavioral WDL    Cognitive/Neuro/Behavioral WDL WDL

## 2023-09-12 NOTE — PATIENT INSTRUCTIONS
What Is OMT (Osteopathic Manipulative Treatment)?    As part of their education, DOs (doctor of osteopathic medicine) receive special training in the musculoskeletal system (your nerves, bones and muscles).     OMT involves using the hands to diagnose, treat and prevent illness or injury.  Using OMT, your osteopathic physician will move your muscles and joints using techniques including stretching, gentle pressure and resistance.     OMT can help people of all ages and backgrounds. In addition to muscle or joint pain, it can be used to treat a variety of conditions such as asthma, sinus pain, migraines and carpal tunnel syndrome.     For more information, please visit [email or web address]    How to Schedule OMT :  There are limited providers in the area who perform OMT. Call the following clinics and get scheduled with one of the listed physicians for OMT consult.    [place]  [facility]  [phone]  Joe Stevens, DO [place]  [phone]  Family Medicine  Oseas Romana,     Sports Medicine  Joe Stevens,     [place]  [facility]  [phone]  Yury Wallace,     [place]  [facility]  [phone]  Yury Wallace,     [place]  [facility]  [phone]  Yury Wallace,     [facility]  [phone]  Imani Mora, DO  Shiv Roblero, DO  Carli Carroll, DO  Terri Galeano, DO  Eli Alvarado, DO  Miguel Smith, DO  Kenneth Jack, DO  Bree Walls, DO      *If you are not able to get in with a DO physician, some of our local physical therapists are well trained in manual manipulation, very similar to OMT. Please contact our clinic for a physical therapy referral prior to attempting to schedule with them.*    [facility]  [phone]  Mansoor Sears

## 2023-09-12 NOTE — NURSING NOTE
"Chief Complaint   Patient presents with    ER F/U    Chest Pain    Abdominal Pain       Initial /78   Pulse 70   Temp 97.7  F (36.5  C) (Tympanic)   Resp 18   Ht 1.702 m (5' 7\")   Wt (!) 159.2 kg (351 lb)   LMP 09/12/2023   SpO2 100%   BMI 54.97 kg/m   Estimated body mass index is 54.97 kg/m  as calculated from the following:    Height as of this encounter: 1.702 m (5' 7\").    Weight as of this encounter: 159.2 kg (351 lb).    Patient presents to the clinic using No DME    Is there anyone who you would like to be able to receive your results? No  If yes have patient fill out ANAY      "

## 2023-09-12 NOTE — PROGRESS NOTES
Assessment & Plan     Patient is a pleasant 23-year-old who presents today for follow-up from recent emergency department and urgent care visits.    Costochondritis  Regarding her emergency department visit, patient was seen for chest pain, right-sided sternal.  At that time, suspected to have costochondritis given tenderness to palpation.  Today, continues to have tenderness to palpation.  She is describing that the pain seems to be getting worse over time.  Not associated with any shortness of breath.  Given the tenderness on exam, also with what feels like displaced rib, this is likely the main cause of her symptoms.  May take time for this to improve, she is given a referral to physical therapy for consideration of manual therapy.  In addition, could try Flexeril to help with intercostal muscle relaxation to see if that improves some of her symptoms as well.  - cyclobenzaprine (FLEXERIL) 10 MG tablet  Dispense: 45 tablet; Refill: 1  - Physical Therapy Referral    RLQ abdominal pain  For her urgent care visit, patient was seen for right lower quadrant abdominal pain.  She had a UA completed, wet prep and was treated with Flagyl for BV.  No growth on urine culture.  Continues to have some pain in the right groin and suprapubic region today.  Seems to actually be getting a little bit worse.  She has no concerns for sexually transmitted infections, though PID was considered.  She is nearly done with the antibiotics.  We opted to repeat the UA today, obtain kidney function testing.  Given the location, considered appendicitis, though WBCs is normal, and she is not having any fevers or nausea or vomiting.  Also considered ovarian cyst/ovarian torsion as potential cause.  Work-up was largely negative, and we opted to pursue pelvic ultrasound for further evaluation.  If no obvious causes found, could consider CT scan of the abdomen.    Of note, when reviewing her UA results, she is currently menstruating, which is  "likely the cause of blood.  No obvious sign of infection today.  - UA Macroscopic with reflex to Microscopic and Culture - Lab Collect  - Basic metabolic panel  - CBC with platelets and differential  - UA Macroscopic with reflex to Microscopic and Culture - Lab Collect  - Basic metabolic panel  - CBC with platelets and differential  - UA Microscopic with Reflex to Culture    Class 3 severe obesity due to excess calories without serious comorbidity with body mass index (BMI) of 50.0 to 59.9 in adult (H)  HCC coding.  Patient has had stable weight for the last year.  Did not discuss this in great detail today.  This does complicate work-up regarding her abdominal pain as above.    I spent a total of 37 minutes on the day of the visit.   Time spent by me doing chart review, history and exam, documentation and further activities per the note     MED REC REQUIRED  Post Medication Reconciliation Status:  Discharge medications reconciled and changed, see notes/orders  Nicotine/Tobacco Cessation:  She reports that she has been smoking vaping device. She has never used smokeless tobacco.  Nicotine/Tobacco Cessation Plan:   Did not discuss today    BMI:   Estimated body mass index is 54.97 kg/m  as calculated from the following:    Height as of this encounter: 1.702 m (5' 7\").    Weight as of this encounter: 159.2 kg (351 lb).     Denise Ramirez MD  [facility]    Jason Green is a 23 year old, presenting for the following health issues:  ER F/U, Chest Pain, and Abdominal Pain        9/12/2023     9:54 AM   Additional Questions   Roomed by Julissa BROWNE   Accompanied by self       HPI     ED/UC Followup:    Facility:  [facility] [place]  Date of visit: 08/24/2023  Reason for visit: Chest Pain  Current Status: Same    Concern - rt lower abdominal pain  Onset: one week  Description: lower rt abdomina pain that goes into her back  Intensity: 4/10   worse when standing 7/10  Progression of Symptoms:  " "worsening  Accompanying Signs & Symptoms: ? Sensation of not emptying her bladder  Previous history of similar problem: was seen 09/06 for BV  Precipitating factors:        Worsened by: abdominal pain worse when standing  Alleviating factors:        Improved by: sitting  Therapies tried and outcome: ibuprofen not helpful    Comes and goes.   Dealing with it for a while.   Gets to the point where it messes with anxiety.   Knows that it is not a heart attack. But it \"hurts like one\"   The sternum on the right side. Soemtimes shooting over to the left chest.   This is happening a couple times during the day. Notices it more when first waking up.   No SOB. No cough. Just there.   First noticed this about a year ago.   Comes and goes. Used to be 1 day on, gone for a day or two, etc.   The more it comes back the longer it stays.   No movements or anything that seems to make it work.   Soemtimes the apron, reachign it back will feel a \"crack\" in front.   Feels like a pressure of release, but then a few minutes later the pain is back.     Abdominal pain:   Constantly need to pee feeling  Came in here to UC. Ended up being BV  Been getting it lower on the right side not going away, just stays, going into back. Also getting some faint pains on the left side upper abdomen.   BM consistent. Last night did miralax and stool softener.. has also had diarrhea. But also solids.   Currently menstruating.     Review of Systems   Constitutional, HEENT, cardiovascular, pulmonary, GI, , musculoskeletal, neuro, skin, endocrine and psych systems are negative, except as otherwise noted.      Objective    /78   Pulse 70   Temp 97.7  F (36.5  C) (Tympanic)   Resp 18   Ht 1.702 m (5' 7\")   Wt (!) 159.2 kg (351 lb)   LMP 09/12/2023   SpO2 100%   BMI 54.97 kg/m    Body mass index is 54.97 kg/m .  Physical Exam  Constitutional:       Appearance: Normal appearance.   HENT:      Head: Normocephalic.   Eyes:      General: No scleral " icterus.     Extraocular Movements: Extraocular movements intact.      Conjunctiva/sclera: Conjunctivae normal.   Cardiovascular:      Rate and Rhythm: Normal rate.   Pulmonary:      Effort: Pulmonary effort is normal.   Chest:       Abdominal:      General: Bowel sounds are normal.      Tenderness: There is abdominal tenderness. There is guarding and rebound (RLQ).   Neurological:      General: No focal deficit present.      Mental Status: She is alert and oriented to person, place, and time.         Results from this visit  Results for orders placed or performed in visit on 09/12/23   UA Macroscopic with reflex to Microscopic and Culture - Lab Collect     Status: Abnormal    Specimen: Urine, Clean Catch   Result Value Ref Range    Color Urine Yellow Colorless, Straw, Light Yellow, Yellow    Appearance Urine Clear Clear    Glucose Urine Negative Negative mg/dL    Bilirubin Urine Negative Negative    Ketones Urine Negative Negative mg/dL    Specific Gravity Urine 1.025 1.003 - 1.035    Blood Urine Large (A) Negative    pH Urine 7.0 5.0 - 7.0    Protein Albumin Urine Negative Negative mg/dL    Urobilinogen Urine 0.2 0.2, 1.0 E.U./dL    Nitrite Urine Negative Negative    Leukocyte Esterase Urine Trace (A) Negative   Basic metabolic panel     Status: Normal   Result Value Ref Range    Sodium 138 136 - 145 mmol/L    Potassium 4.8 3.4 - 5.3 mmol/L    Chloride 104 98 - 107 mmol/L    Carbon Dioxide (CO2) 26 22 - 29 mmol/L    Anion Gap 8 7 - 15 mmol/L    Urea Nitrogen 14.4 6.0 - 20.0 mg/dL    Creatinine 0.84 0.51 - 0.95 mg/dL    Calcium 9.8 8.6 - 10.0 mg/dL    Glucose 96 70 - 99 mg/dL    GFR Estimate >90 >60 mL/min/1.73m2   CBC with platelets and differential     Status: None   Result Value Ref Range    WBC Count 9.7 4.0 - 11.0 10e3/uL    RBC Count 4.80 3.80 - 5.20 10e6/uL    Hemoglobin 13.3 11.7 - 15.7 g/dL    Hematocrit 40.7 35.0 - 47.0 %    MCV 85 78 - 100 fL    MCH 27.7 26.5 - 33.0 pg    MCHC 32.7 31.5 - 36.5 g/dL     RDW 13.2 10.0 - 15.0 %    Platelet Count 260 150 - 450 10e3/uL    % Neutrophils 71 %    % Lymphocytes 20 %    % Monocytes 6 %    % Eosinophils 2 %    % Basophils 0 %    % Immature Granulocytes 0 %    Absolute Neutrophils 6.9 1.6 - 8.3 10e3/uL    Absolute Lymphocytes 2.0 0.8 - 5.3 10e3/uL    Absolute Monocytes 0.6 0.0 - 1.3 10e3/uL    Absolute Eosinophils 0.2 0.0 - 0.7 10e3/uL    Absolute Basophils 0.0 0.0 - 0.2 10e3/uL    Absolute Immature Granulocytes 0.0 <=0.4 10e3/uL   UA Microscopic with Reflex to Culture     Status: Abnormal   Result Value Ref Range    Bacteria Urine Few (A) None Seen /HPF    RBC Urine 10-25 (A) 0-2 /HPF /HPF    WBC Urine 5-10 (A) 0-5 /HPF /HPF    Squamous Epithelials Urine Few (A) None Seen /LPF    Mucus Urine Present (A) None Seen /LPF    Narrative    Urine Culture not indicated   CBC with platelets and differential     Status: None    Narrative    The following orders were created for panel order CBC with platelets and differential.  Procedure                               Abnormality         Status                     ---------                               -----------         ------                     CBC with platelets and d...[[id number]]                      Final result                 Please view results for these tests on the individual orders.

## 2023-09-13 NOTE — ED PROVIDER NOTES
"  HPI   Patient is a 23-year-old female presenting with right upper quadrant abdominal pain.  She was seen 6 days ago and was told she has bacterial vaginosis.  She had a wet prep showing white blood cells only.  Her urine analysis at that time was abnormal but the culture was positive for 10-50,000 colonies of mixed lucy.  She was given Flagyl and has been taking it by mouth as directed.  Known history of obesity and cholecystectomy.  She takes Prilosec on a regular basis for reflux.  She has polycystic ovarian syndrome, denying history of pain related to ovarian cyst rupture.  She vapes daily.  Occasional alcohol, none recently.  No drugs of abuse.  No marijuana.    She has had right-sided abdominal pain over the past week.  When she was at the clinic she told them that the pain was in the pelvis and right lower quadrant but this has evolved and is in the right upper quadrant now.  She has had diarrhea a few times a day since starting the oral Flagyl.  No hematochezia or melena.  No nausea or vomiting.  No fever.  No dysuria, urgency, or frequency of urination though she says, \"it feels like when I tried to pee it does not come out completely.\"  No vaginal discharge or irritation of the ordinary.  She is currently menstruating.  Last sexual intercourse was about 2 years ago.  No recent trauma or injury.  No overuse or new activities.  No skin rash.  No chest symptoms, cough, shortness of breath.      Allergies:  Allergies   Allergen Reactions    [place] Sprouts [Brassica Oleracea]     Keflex [Cephalexin] Itching    Other Food Allergy Rash     [place] sprouts     Problem List:    Patient Active Problem List    Diagnosis Date Noted    Abnormal uterine bleeding due to endometrial polyp 08/24/2022     Priority: Medium    Change in bowel habit 08/23/2022     Priority: Medium    Ileitis 08/23/2022     Priority: Medium    Family history of colon cancer 07/08/2021     Priority: Medium     Mother at age 40      Family " "history of hemochromatosis 07/08/2021     Priority: Medium     Father and Grandfather      Acanthosis nigricans 12/08/2020     Priority: Medium     Created by Conversion      Last Assessment & Plan:   Patient doing very well and adding exercise, and watching diet closely.  We'll now add metformin as per orders.  Side effects and precautions discussed.  Follow up in one month.      Arthropathy of ankle and foot 12/08/2020     Priority: Medium     Created by Conversion    Replacement Utility updated for latest IMO load    Last Assessment & Plan:   With diffuse joint aches treating with progressive weight loss and lifestyle modifications.      Iron deficiency anemia due to chronic blood loss 11/22/2019     Priority: Medium    Aphthous ulcer of ileum 10/30/2019     Priority: Medium    Elevated BP without diagnosis of hypertension 10/16/2017     Priority: Medium    Adenotonsillar hypertrophy 01/04/2017     Priority: Medium    PCOS (polycystic ovarian syndrome) 02/29/2016     Priority: Medium     Last Assessment & Plan:   Her dietary changes are working very well.  Also working very well for the household.  She is exercising more, having more energy, and pants are fitting looser.  We'll plan for in body at next visit along with remeasurement of abdominal and neck circumference.  Also with her forgetting the metformin in the morning, will move it to the afternoon with lunch.      Class 3 severe obesity due to excess calories without serious comorbidity with body mass index (BMI) of 50.0 to 59.9 in adult (H) 10/30/2015     Priority: Medium     Last Assessment & Plan:   Continue lifestyle modifications.  Will breakfast a [place] protein-based with shakes or aches.  May also use chicken or steak.  Stopped carbohydrates in the morning.  Better food choices discussed.  Encouraged to watch the documentary, \"In defense of food\" before next visit.        Past Medical History:    Past Medical History:   Diagnosis Date    Abnormal " uterine bleeding due to endometrial polyp     Acanthosis nigricans     Adenotonsillar hypertrophy     Aphthous ulcer of ileum     Arthropathy of ankle and foot     Family history of hemochromatosis     Iron deficiency anemia due to chronic blood loss     Obesity     PCOS (polycystic ovarian syndrome)      Past Surgical History:    Past Surgical History:   Procedure Laterality Date    CHOLECYSTECTOMY      HYSTEROSCOPY DIAGNOSTIC N/A 10/14/2022    Procedure: HYSTEROSCOPY WITH POLYPECTOMY;  Surgeon: Aston Yanes MD;  Location: [facility]    LAPAROSCOPIC CHOLECYSTECTOMY      OTHER SURGICAL HISTORY      none    TONSILLECTOMY      TONSILLECTOMY & ADENOIDECTOMY Bilateral 2/16/2017    Procedure: BILATERAL TONSILLECTOMY AND ADENOIDECTOMY;  Surgeon: Jacob Arugello MD;  Location: [facility];  Service:      Family History:    Family History   Problem Relation Age of Onset    Colon Cancer Mother 43    Cancer Mother         colon    Venous thrombosis Mother         cancer-related    Cerebrovascular Disease Mother     Liver Disease Father         cirrhosis of liver    Other - See Comments Father         high iron    Cirrhosis Father     Hemochromatosis Father     Hypertension Father     Hyperlipidemia Father     Myocardial Infarction Maternal Grandmother     Heart Disease Maternal Grandmother     Dementia Maternal Grandmother     Cerebrovascular Disease Paternal Grandmother     Other - See Comments Maternal Grandfather         heart attack or cancer/unsure    Bladder Cancer Maternal Grandfather     Cirrhosis Paternal Grandfather     Other - See Comments Paternal Grandfather         high iron    Diabetes Paternal Grandfather     Hemochromatosis Paternal Grandfather     Kidney Disease Paternal Grandfather     Hypertension Paternal Grandfather     Colon Cancer Maternal Great-Grandmother         70-90 years old     Uterine Cancer Maternal Great-Grandmother     Breast Cancer Maternal  "Great-Grandmother     Brain Cancer Maternal Aunt      Social History:  Marital Status:  Single [1]  Social History     Tobacco Use    Smoking status: Every Day     Types: Vaping Device    Smokeless tobacco: Never   Vaping Use    Vaping Use: Every day    Substances: Nicotine, Flavoring    Devices: Disposable   Substance Use Topics    Alcohol use: Yes     Comment: occ    Drug use: Never      Medications:    buPROPion (WELLBUTRIN XL) 150 MG 24 hr tablet  cyclobenzaprine (FLEXERIL) 10 MG tablet  famotidine (PEPCID) 20 MG tablet  hydrOXYzine (VISTARIL) 25 MG capsule  ibuprofen (ADVIL/MOTRIN) 200 MG tablet  metroNIDAZOLE (FLAGYL) 500 MG tablet  omeprazole (PRILOSEC) 20 MG DR capsule  prochlorperazine (COMPAZINE) 10 MG tablet  sucralfate (CARAFATE) 1 GM tablet      Review of Systems   All other systems reviewed and are negative.      PE   BP: (!) 145/89  Pulse: 98  Temp: 98.2  F (36.8  C)  Resp: 18  Height: 170.2 cm (5' 7\")  Weight: (!) 159.7 kg (352 lb)  SpO2: 98 %  Physical Exam  Vitals reviewed.   Constitutional:       General: She is not in acute distress.     Appearance: She is well-developed. She is obese.   HENT:      Head: Normocephalic and atraumatic.      Right Ear: External ear normal.      Left Ear: External ear normal.      Nose: Nose normal.      Mouth/Throat:      Mouth: Mucous membranes are moist.      Pharynx: Oropharynx is clear.   Eyes:      Extraocular Movements: Extraocular movements intact.      Conjunctiva/sclera: Conjunctivae normal.      Pupils: Pupils are equal, round, and reactive to light.   Cardiovascular:      Rate and Rhythm: Normal rate and regular rhythm.   Pulmonary:      Effort: Pulmonary effort is normal.   Abdominal:      Comments: Tender in the right upper quadrant.  Difficult examination secondary to her obesity.   Musculoskeletal:         General: Normal range of motion.      Cervical back: Normal range of motion.   Skin:     General: Skin is warm and dry.   Neurological:      Mental " Status: She is alert and oriented to person, place, and time.   Psychiatric:         Behavior: Behavior normal.         ED COURSE and MDM   1927.  Patient has symptoms and signs as described above.  Phlebotomy for hepatic panel.  She had blood drawn this morning which was completely unremarkable, CBC and basic metabolic panel.  Urine analysis today is abnormal but similar to recent, as above.  She is without urinary symptoms for the most part, as above.  CT pending.    2025.  Urine analysis this morning is abnormal but low concern for infection as the recent urine was similar and culture negative.  She is without urinary symptoms to suggest infection.  Hopefully the urine analysis today was cultured as there was a reflex option on the order.  I did place a urine culture order now to help, if possible.  No antibiotics empirically.  CT imaging unremarkable for acute pathology.  Left ovarian cystic structure discussed.  No further evaluation here in the ED.  Patient would like to be discharged home and I think this is reasonable.  No medication changes.    Electronic medical chart reviewed, including medical problems, medications, medical allergies, social history.  Recent hospitalizations and surgical procedures reviewed.  Recent clinic visits and consultations reviewed.  Recent labs and test results reviewed.  Nursing notes reviewed.    The patient, their parent if applicable, and/or their medical decision maker(s) and I have reviewed all of the available historical information, applicable PMH, physical exam findings, and objective diagnostic data gathered during this ED visit.  We then discussed all work-up options and then together agreed upon the course taken during this visit.  The ultimate disposition and plan was a cooperative decision made between myself and the patient, their parent if applicable, and/or their legal decision maker(s).  The risks and benefits of all decisions made during this visit were  discussed to the best of my abilities given the circumstances, and all parties are understanding of the pertinent ramifications of these decisions.      LABS  Labs Ordered and Resulted from Time of ED Arrival to Time of ED Departure   HEPATIC FUNCTION PANEL - Normal       Result Value    Protein Total 7.0      Albumin 4.3      Bilirubin Total 0.2      Alkaline Phosphatase 89      AST 34      ALT 48      Bilirubin Direct <0.20     URINE CULTURE       IMAGING  Images reviewed by me.  Radiology report also reviewed.  Abd/pelvis CT - no contrast - Stone Protocol   Final Result   IMPRESSION:    1.  No urinary tract calculus nor hydronephrosis.   2.  5 cm left ovarian hypodense cyst previously measured 4.5 cm on 06/09/2023 CT. No findings to suggest torsion. This could be followed up with ultrasound in 8 weeks.   3.  Diffuse hepatic steatosis.      REFERENCE:   Management of Incidental Adnexal Findings on CT and MRI: A White Paper of the ACR Incidental Findings Committee. J Am Ross Radiol 2020; 17(2):248-254.      Premenopausal or <50 if status unkown:      Equal to or <5 cm: No further imaging.   >5 cm on CT: Ultrasound.                      Procedures    Medications - No data to display      IMPRESSION       ICD-10-CM    1. RUQ abdominal pain  R10.11       2. Diarrhea, unspecified type  R19.7                Medication List      There are no discharge medications for this visit.                     Samy Addison MD  09/12/23 2027

## 2023-09-13 NOTE — DISCHARGE INSTRUCTIONS
RETURN TO THE EMERGENCY ROOM FOR THE FOLLOWING:    Severely worsened pain, concern for dehydration, fainting, fever greater than 101, or at anytime for any concern.    FOLLOW UP:    With your primary clinic if not improving.  Urine analysis was abnormal and culture is pending.  You were without urinary symptoms to suggest infection, no empiric antibiotics at this time.  Recent culture with urine that looked similar was unremarkable/negative for infection.    TREATMENT RECOMMENDATIONS:    None new.  No changes.    NURSE ADVICE LINE:  [phone] or [phone]

## 2023-09-14 ENCOUNTER — MYC MEDICAL ADVICE (OUTPATIENT)
Dept: FAMILY MEDICINE | Facility: CLINIC | Age: 23
End: 2023-09-14
Payer: COMMERCIAL

## 2023-09-14 ENCOUNTER — HOSPITAL ENCOUNTER (OUTPATIENT)
Dept: ULTRASOUND IMAGING | Facility: CLINIC | Age: 23
Discharge: HOME | End: 2023-09-14
Attending: STUDENT IN AN ORGANIZED HEALTH CARE EDUCATION/TRAINING PROGRAM | Admitting: STUDENT IN AN ORGANIZED HEALTH CARE EDUCATION/TRAINING PROGRAM
Payer: COMMERCIAL

## 2023-09-14 ENCOUNTER — TELEPHONE (OUTPATIENT)
Dept: FAMILY MEDICINE | Facility: CLINIC | Age: 23
End: 2023-09-14
Payer: COMMERCIAL

## 2023-09-14 DIAGNOSIS — N83.202 LEFT OVARIAN CYST: ICD-10-CM

## 2023-09-14 DIAGNOSIS — R10.31 RLQ ABDOMINAL PAIN: Primary | ICD-10-CM

## 2023-09-14 DIAGNOSIS — R10.31 RLQ ABDOMINAL PAIN: ICD-10-CM

## 2023-09-14 PROCEDURE — 76830 TRANSVAGINAL US NON-OB: CPT

## 2023-09-14 NOTE — TELEPHONE ENCOUNTER
Radiologist called with abnormal results, unable to see left ovary due to pt obesity. Uncertain if venous blood flow. Results will be available shortly.    Blanche Taylor RN

## 2023-09-14 NOTE — TELEPHONE ENCOUNTER
Based on history, less concerned regarding left ovary. Will await read result for more information.     Denise Ramirez MD

## 2023-09-15 LAB — BACTERIA UR CULT: NORMAL

## 2023-09-16 ENCOUNTER — APPOINTMENT (OUTPATIENT)
Dept: CT IMAGING | Facility: CLINIC | Age: 23
End: 2023-09-16
Attending: PHYSICIAN ASSISTANT
Payer: COMMERCIAL

## 2023-09-16 ENCOUNTER — HOSPITAL ENCOUNTER (EMERGENCY)
Facility: CLINIC | Age: 23
Discharge: HOME | End: 2023-09-16
Admitting: PHYSICIAN ASSISTANT
Payer: COMMERCIAL

## 2023-09-16 VITALS
WEIGHT: 293 LBS | BODY MASS INDEX: 45.99 KG/M2 | TEMPERATURE: 97 F | HEART RATE: 85 BPM | OXYGEN SATURATION: 99 % | SYSTOLIC BLOOD PRESSURE: 122 MMHG | HEIGHT: 67 IN | RESPIRATION RATE: 18 BRPM | DIASTOLIC BLOOD PRESSURE: 62 MMHG

## 2023-09-16 DIAGNOSIS — R10.9 RIGHT FLANK PAIN: ICD-10-CM

## 2023-09-16 DIAGNOSIS — R09.1 PLEURISY: ICD-10-CM

## 2023-09-16 DIAGNOSIS — R07.2 SUBSTERNAL CHEST PAIN: ICD-10-CM

## 2023-09-16 LAB
ALBUMIN SERPL BCG-MCNC: 4.7 G/DL (ref 3.5–5.2)
ALBUMIN UR-MCNC: NEGATIVE MG/DL
ALP SERPL-CCNC: 87 U/L (ref 35–104)
ALT SERPL W P-5'-P-CCNC: 46 U/L (ref 0–50)
ANION GAP SERPL CALCULATED.3IONS-SCNC: 10 MMOL/L (ref 7–15)
APPEARANCE UR: CLEAR
AST SERPL W P-5'-P-CCNC: 35 U/L (ref 0–45)
ATRIAL RATE - MUSE: 90 BPM
BACTERIA #/AREA URNS HPF: ABNORMAL /HPF
BASOPHILS # BLD AUTO: 0.1 10E3/UL (ref 0–0.2)
BASOPHILS NFR BLD AUTO: 0 %
BILIRUB SERPL-MCNC: 0.2 MG/DL
BILIRUB UR QL STRIP: NEGATIVE
BUN SERPL-MCNC: 13.4 MG/DL (ref 6–20)
CALCIUM SERPL-MCNC: 9.9 MG/DL (ref 8.6–10)
CHLORIDE SERPL-SCNC: 101 MMOL/L (ref 98–107)
COLOR UR AUTO: ABNORMAL
CREAT SERPL-MCNC: 0.93 MG/DL (ref 0.51–0.95)
DEPRECATED HCO3 PLAS-SCNC: 26 MMOL/L (ref 22–29)
DIASTOLIC BLOOD PRESSURE - MUSE: NORMAL MMHG
EGFRCR SERPLBLD CKD-EPI 2021: 88 ML/MIN/1.73M2
EOSINOPHIL # BLD AUTO: 0.1 10E3/UL (ref 0–0.7)
EOSINOPHIL NFR BLD AUTO: 1 %
ERYTHROCYTE [DISTWIDTH] IN BLOOD BY AUTOMATED COUNT: 13.2 % (ref 10–15)
GLUCOSE SERPL-MCNC: 93 MG/DL (ref 70–99)
GLUCOSE UR STRIP-MCNC: NEGATIVE MG/DL
HCG SERPL QL: NEGATIVE
HCT VFR BLD AUTO: 41.8 % (ref 35–47)
HGB BLD-MCNC: 13.7 G/DL (ref 11.7–15.7)
HGB UR QL STRIP: ABNORMAL
IMM GRANULOCYTES # BLD: 0.1 10E3/UL
IMM GRANULOCYTES NFR BLD: 1 %
INTERPRETATION ECG - MUSE: NORMAL
KETONES UR STRIP-MCNC: NEGATIVE MG/DL
LEUKOCYTE ESTERASE UR QL STRIP: NEGATIVE
LIPASE SERPL-CCNC: 24 U/L (ref 13–60)
LYMPHOCYTES # BLD AUTO: 2 10E3/UL (ref 0.8–5.3)
LYMPHOCYTES NFR BLD AUTO: 16 %
MCH RBC QN AUTO: 27.8 PG (ref 26.5–33)
MCHC RBC AUTO-ENTMCNC: 32.8 G/DL (ref 31.5–36.5)
MCV RBC AUTO: 85 FL (ref 78–100)
MONOCYTES # BLD AUTO: 0.7 10E3/UL (ref 0–1.3)
MONOCYTES NFR BLD AUTO: 5 %
MUCOUS THREADS #/AREA URNS LPF: PRESENT /LPF
NEUTROPHILS # BLD AUTO: 9.9 10E3/UL (ref 1.6–8.3)
NEUTROPHILS NFR BLD AUTO: 77 %
NITRATE UR QL: NEGATIVE
NRBC # BLD AUTO: 0 10E3/UL
NRBC BLD AUTO-RTO: 0 /100
P AXIS - MUSE: 56 DEGREES
PH UR STRIP: 6.5 [PH] (ref 5–7)
PLATELET # BLD AUTO: 299 10E3/UL (ref 150–450)
POTASSIUM SERPL-SCNC: 4.4 MMOL/L (ref 3.4–5.3)
PR INTERVAL - MUSE: 138 MS
PROT SERPL-MCNC: 7.5 G/DL (ref 6.4–8.3)
QRS DURATION - MUSE: 80 MS
QT - MUSE: 358 MS
QTC - MUSE: 437 MS
R AXIS - MUSE: 81 DEGREES
RBC # BLD AUTO: 4.93 10E6/UL (ref 3.8–5.2)
RBC URINE: 6 /HPF
SODIUM SERPL-SCNC: 137 MMOL/L (ref 136–145)
SP GR UR STRIP: 1.02 (ref 1–1.03)
SQUAMOUS EPITHELIAL: 5 /HPF
SYSTOLIC BLOOD PRESSURE - MUSE: NORMAL MMHG
T AXIS - MUSE: 64 DEGREES
TROPONIN T SERPL HS-MCNC: <6 NG/L
UROBILINOGEN UR STRIP-MCNC: <2 MG/DL
VENTRICULAR RATE- MUSE: 90 BPM
WBC # BLD AUTO: 12.8 10E3/UL (ref 4–11)
WBC URINE: 4 /HPF

## 2023-09-16 PROCEDURE — 84484 ASSAY OF TROPONIN QUANT: CPT | Performed by: PHYSICIAN ASSISTANT

## 2023-09-16 PROCEDURE — 99285 EMERGENCY DEPT VISIT HI MDM: CPT | Mod: 25

## 2023-09-16 PROCEDURE — 74176 CT ABD & PELVIS W/O CONTRAST: CPT

## 2023-09-16 PROCEDURE — 81001 URINALYSIS AUTO W/SCOPE: CPT | Performed by: EMERGENCY MEDICINE

## 2023-09-16 PROCEDURE — 87086 URINE CULTURE/COLONY COUNT: CPT | Performed by: PHYSICIAN ASSISTANT

## 2023-09-16 PROCEDURE — 93005 ELECTROCARDIOGRAM TRACING: CPT | Performed by: EMERGENCY MEDICINE

## 2023-09-16 PROCEDURE — 80053 COMPREHEN METABOLIC PANEL: CPT | Performed by: EMERGENCY MEDICINE

## 2023-09-16 PROCEDURE — 36415 COLL VENOUS BLD VENIPUNCTURE: CPT | Performed by: EMERGENCY MEDICINE

## 2023-09-16 PROCEDURE — 85025 COMPLETE CBC W/AUTO DIFF WBC: CPT | Performed by: EMERGENCY MEDICINE

## 2023-09-16 PROCEDURE — 84703 CHORIONIC GONADOTROPIN ASSAY: CPT | Performed by: EMERGENCY MEDICINE

## 2023-09-16 PROCEDURE — 83690 ASSAY OF LIPASE: CPT | Performed by: EMERGENCY MEDICINE

## 2023-09-16 ASSESSMENT — ENCOUNTER SYMPTOMS: ABDOMINAL PAIN: 1

## 2023-09-16 ASSESSMENT — ACTIVITIES OF DAILY LIVING (ADL): ADLS_ACUITY_SCORE: 33

## 2023-09-16 NOTE — ED PROVIDER NOTES
EMERGENCY DEPARTMENT ENCOUNTER      NAME: Norma Collins  AGE: 23 year old female  YOB: 2000  MRN: [id number]  EVALUATION DATE & TIME: 9/16/2023  4:41 PM    PCP: Nathaly Erazo    ED PROVIDER: Los Romero PA-C      Chief Complaint   Patient presents with    Chest Pain    Flank Pain         FINAL IMPRESSION:  1. Right flank pain    2. Substernal chest pain    3. Pleurisy      ED COURSE & MEDICAL DECISION MAKING:    Pertinent Labs & Imaging studies reviewed. (See chart for details)  5:00 PM I met the patient and performed my initial interview and exam.  6:18 PM Patient rechecked and updated on workup results. We discussed the plan for discharge and the patient is agreeable. Reviewed supportive cares, symptomatic treatment, outpatient follow up, and reasons to return to the Emergency Department. Patient to be discharged by ED RN.     23 year old female presents to the Emergency Department for evaluation of right sided flank pain, substernal chest pain.     ED Course as of 09/16/23 2113   Sat Sep 16, 2023   1715 Patient is a 23-year-old female, presents emergency department for evaluation of right-sided back and flank pain.  She reports she has had the pain over a week.  She reportedly also additionally has some mid substernal chest pain which she has had for months.  Reportedly was diagnosed with costochondritis.  She reports that she has had ongoing pain, this makes her nervous.  On review of her chart, she has been seen in the emergency department multiple times for this chest pain, as well as the abdominal pain.  Most recently was seen on 12 September, CT showed no urinary tract calculus nor hydronephrosis, 5 cm left ovarian cyst, no findings to suggest torsion, she had a repeat ultrasound of the pelvis that did not show any acute abnormalities, showing left ovary is mildly enlarged, venous blood flow was demonstrated but no arterial blood flow.  Otherwise no acute abnormalities.  She  presents emergency department for ongoing abdominal pain, concern over this.  She does have a history of PCOS.  On examination here, she has right-sided flank tenderness, without rebound or guarding.  No fever, cough, cold, chills.  No pain with urination per her report.  Differential at this point is exceedingly broad given both chest and abdominal pain including possible nephrolithiasis, less likely be intra-abdominal infection such as pancreatitis, or appendicitis.  Patient with previous cholecystectomy, unlikely to be hepatic in etiology.  Additionally, will obtain troponin here to ensure that there is no cardiac abnormalities, patient reportedly had previous work-up for chest pain, negative troponin, and chest x-ray.  Unlikely that this is ACS at this point, however will reassess.   1751 Troponin here unremarkable.  Patient with a mild leukocytosis.  Urinalysis here shows a little bit of bacteria, however otherwise no significant abnormalities.   1755 I have independently reviewed the CT abdomen pelvis, I do not appreciate any perforation, or free air.  Pending final radiology read.   1806 Abd/pelvis CT no contrast - Stone Protocol  CT abdomen pelvis shows no significant findings in the kidneys or renal collecting systems.  Mild diffuse hepatic steatosis prior similar.  Focal 4 cm cyst within the left ovary, characterizes simple cyst.   2113 She was seen and updated ABG results.  No evidence of any intracardiac, or ACS at this point.  Mildly elevated leukocytosis, however no focal signs of infection.  Urinalysis not overly consistent with urinary tract infection.  CT imaging here does not show any acute intra-abdominal pathology, and her CMP and lipase are completely normal.  Unclear etiology for the patient's right-sided flank pain, however certainly not consistent with torsion, pyelonephritis, nephrolithiasis, or acute cystitis.  Additionally, no evidence of any atypical ACS here. Perc negative. However  follow-up with her primary care doctor, continue with ibuprofen and Tylenol at home for pain control.  She is agreeable with this plan.       Medical Decision Making    History:  Supplemental history from: Documented in chart, if applicable  External Record(s) reviewed: Documented in chart, if applicable.    Work Up:  Chart documentation includes differential considered and any EKGs or imaging independently interpreted by provider, where specified.  In additional to work up documented, I considered the following work up: Documented in chart, if applicable.    External consultation:  Discussion of management with another provider: Documented in chart, if applicable    Complicating factors:  Care impacted by chronic illness: N/A  Care affected by social determinants of health: N/A    Disposition considerations: Discharge. No recommendations on prescription strength medication(s). See documentation for any additional details.    At the conclusion of the encounter I discussed the results of all of the tests and the disposition. The questions were answered. The patient or family acknowledged understanding and was agreeable with the care plan.       MEDICATIONS GIVEN IN THE EMERGENCY:  Medications - No data to display    NEW PRESCRIPTIONS STARTED AT TODAY'S ER VISIT  Discharge Medication List as of 9/16/2023  6:35 PM          ================================================================    HPI    Patient information was obtained from: Patient    Use of : N/A       Norma Collins is a 23 year old female with a pertinent history of s/p cholecystectomy (date N/A)  who presents to this ED for evaluation of chest pain and abdominal/ flank pain.    Per chart review, the patient has been seen multiple times for chest pain and abdominal pain. She was recently seen at [facility] ED on 8/24/2023 (~ 1 month ago) for evaluation of right sided chest pain. She reported she had pain like this before several months go  but it had went away. Noted to have right sternal border tenderness with reproduction of her pain on exam. No exanthem or ecchymosis noted. EKG was ordered, showed no acute evidence of ischemia. CBC with mild leukocytosis of 13.1 but no fever. CMP labs ordered, were grossly normal with noted creatinine of 1.01. No clinical signs of DVT were found. Patient was discharged home with recommendation for 600 mg ibuprofen 3 times a day for the next week and clinic follow up.    On 9/12/23  (~4 days ago) patient was seen for RUQ abdominal pain. She had been seen 6 days ago for bacterial vaginosis and has been taking her flagyl as directed.  On exam patient was noted to have RUQ tenderness, however there was difficulty examining her secondary to her obesity. CT abdomen was unremarkable for any acute pathology. Her UA labs showed no signs of infection.    Patient reports persistent RUQ abdominal, right flank pain and substernal chest pain for the past 2 weeks. Her chest pain is reproducible. She took ibuprofen and flexeril without relief. She had a previous chest xray done which was negative.    No pertinent negatives. She also has had CT abdominal imaging before and has history of cholecystectomy. No other complaints at this time.      REVIEW OF SYSTEMS   Review of Systems   Cardiovascular:  Positive for chest pain (substernal).   Gastrointestinal:  Positive for abdominal pain (RUQ/ right flank).   All other systems reviewed and are negative.       PAST MEDICAL HISTORY:  Past Medical History:   Diagnosis Date    Abnormal uterine bleeding due to endometrial polyp     Acanthosis nigricans     Adenotonsillar hypertrophy     Aphthous ulcer of ileum     Arthropathy of ankle and foot     Family history of hemochromatosis     Iron deficiency anemia due to chronic blood loss     Obesity     PCOS (polycystic ovarian syndrome)        PAST SURGICAL HISTORY:  Past Surgical History:   Procedure Laterality Date    CHOLECYSTECTOMY       HYSTEROSCOPY DIAGNOSTIC N/A 10/14/2022    Procedure: HYSTEROSCOPY WITH POLYPECTOMY;  Surgeon: Aston Yanes MD;  [place] [facility] [place]    LAPAROSCOPIC CHOLECYSTECTOMY      OTHER SURGICAL HISTORY      none    TONSILLECTOMY      TONSILLECTOMY & ADENOIDECTOMY Bilateral 2/16/2017    Procedure: BILATERAL TONSILLECTOMY AND ADENOIDECTOMY;  Surgeon: Jacob Arguello MD;  Location: [facility];  Service:            CURRENT MEDICATIONS:    buPROPion (WELLBUTRIN XL) 150 MG 24 hr tablet  cyclobenzaprine (FLEXERIL) 10 MG tablet  famotidine (PEPCID) 20 MG tablet  hydrOXYzine (VISTARIL) 25 MG capsule  ibuprofen (ADVIL/MOTRIN) 200 MG tablet  omeprazole (PRILOSEC) 20 MG DR capsule  prochlorperazine (COMPAZINE) 10 MG tablet  sucralfate (CARAFATE) 1 GM tablet         ALLERGIES:  Allergies   Allergen Reactions    [place] Sprouts [Brassica Oleracea]     Keflex [Cephalexin] Itching    Other Food Allergy Rash     [place] sprouts       FAMILY HISTORY:  Family History   Problem Relation Age of Onset    Colon Cancer Mother 43    Cancer Mother         colon    Venous thrombosis Mother         cancer-related    Cerebrovascular Disease Mother     Liver Disease Father         cirrhosis of liver    Other - See Comments Father         high iron    Cirrhosis Father     Hemochromatosis Father     Hypertension Father     Hyperlipidemia Father     Myocardial Infarction Maternal Grandmother     Heart Disease Maternal Grandmother     Dementia Maternal Grandmother     Cerebrovascular Disease Paternal Grandmother     Other - See Comments Maternal Grandfather         heart attack or cancer/unsure    Bladder Cancer Maternal Grandfather     Cirrhosis Paternal Grandfather     Other - See Comments Paternal Grandfather         high iron    Diabetes Paternal Grandfather     Hemochromatosis Paternal Grandfather     Kidney Disease Paternal Grandfather     Hypertension Paternal Grandfather     Colon Cancer Maternal  "Great-Grandmother         70-90 years old     Uterine Cancer Maternal Great-Grandmother     Breast Cancer Maternal Great-Grandmother     Brain Cancer Maternal Aunt        SOCIAL HISTORY:   Social History     Socioeconomic History    Marital status: Single   Tobacco Use    Smoking status: Every Day     Types: Vaping Device    Smokeless tobacco: Never   Vaping Use    Vaping Use: Every day    Substances: Nicotine, Flavoring    Devices: Disposable   Substance and Sexual Activity    Alcohol use: Yes     Comment: occ    Drug use: Never    Sexual activity: Yes     Partners: Male     Birth control/protection: Condom       VITALS:  /62   Pulse 85   Temp 97  F (36.1  C) (Temporal)   Resp 18   Ht 1.702 m (5' 7\")   Wt (!) 158.8 kg (350 lb)   LMP 09/12/2023 (Exact Date)   SpO2 99%   BMI 54.82 kg/m      PHYSICAL EXAM    Physical Exam  Vitals and nursing note reviewed.   Constitutional:       General: She is not in acute distress.     Appearance: Normal appearance. She is normal weight. She is not toxic-appearing or diaphoretic.   HENT:      Right Ear: External ear normal.      Left Ear: External ear normal.   Eyes:      Conjunctiva/sclera: Conjunctivae normal.   Cardiovascular:      Rate and Rhythm: Normal rate and regular rhythm.      Pulses: Normal pulses.   Pulmonary:      Effort: No respiratory distress.      Breath sounds: No wheezing or rales.   Abdominal:      General: Abdomen is flat.      Palpations: Abdomen is soft.      Tenderness: There is abdominal tenderness. There is right CVA tenderness. There is no left CVA tenderness, guarding or rebound.      Comments: Diffuse tenderness in the right flank area, as well as the right-sided lower abdomen.  No rebound or guarding.   Musculoskeletal:         General: Tenderness present.      Comments: There is full chest wall tenderness over the right side of the sternum, and the mid chest.  No crepitus or deformity.  No other tenderness over the ribs.  Motion of the " shoulder is normal.   Skin:     Findings: No erythema or rash.   Neurological:      Mental Status: She is alert. Mental status is at baseline.         LAB:  All pertinent labs reviewed and interpreted.  Labs Ordered and Resulted from Time of ED Arrival to Time of ED Departure   ROUTINE UA WITH MICROSCOPIC REFLEX TO CULTURE - Abnormal       Result Value    Color Urine Light Yellow      Appearance Urine Clear      Glucose Urine Negative      Bilirubin Urine Negative      Ketones Urine Negative      Specific Gravity Urine 1.020      Blood Urine 1.0 mg/dL (*)     pH Urine 6.5      Protein Albumin Urine Negative      Urobilinogen Urine <2.0      Nitrite Urine Negative      Leukocyte Esterase Urine Negative      Bacteria Urine Few (*)     Mucus Urine Present (*)     RBC Urine 6 (*)     WBC Urine 4      Squamous Epithelials Urine 5 (*)    CBC WITH PLATELETS AND DIFFERENTIAL - Abnormal    WBC Count 12.8 (*)     RBC Count 4.93      Hemoglobin 13.7      Hematocrit 41.8      MCV 85      MCH 27.8      MCHC 32.8      RDW 13.2      Platelet Count 299      % Neutrophils 77      % Lymphocytes 16      % Monocytes 5      % Eosinophils 1      % Basophils 0      % Immature Granulocytes 1      NRBCs per 100 WBC 0      Absolute Neutrophils 9.9 (*)     Absolute Lymphocytes 2.0      Absolute Monocytes 0.7      Absolute Eosinophils 0.1      Absolute Basophils 0.1      Absolute Immature Granulocytes 0.1      Absolute NRBCs 0.0     COMPREHENSIVE METABOLIC PANEL - Normal    Sodium 137      Potassium 4.4      Chloride 101      Carbon Dioxide (CO2) 26      Anion Gap 10      Urea Nitrogen 13.4      Creatinine 0.93      Calcium 9.9      Glucose 93      Alkaline Phosphatase 87      AST 35      ALT 46      Protein Total 7.5      Albumin 4.7      Bilirubin Total 0.2      GFR Estimate 88     LIPASE - Normal    Lipase 24     HCG QUALITATIVE PREGNANCY - Normal    hCG Serum Qualitative Negative     TROPONIN T, HIGH SENSITIVITY - Normal    Troponin T,  High Sensitivity <6     URINE CULTURE       RADIOLOGY:  Reviewed all pertinent imaging. Please see official radiology report.  Abd/pelvis CT no contrast - Stone Protocol   Final Result   IMPRESSION:    1.  No significant findings in the kidneys or renal collecting systems.      2.  Mild diffuse hepatic steatosis similar to previous.      3.  Focal 4 cm cyst left ovary recently characterized as a simple cyst. Yearly follow-up suggested with ultrasound.             EKG:    Performed at: 1602    Impression: Sinus rhythm    Rate: 90  Rhythm: sinus   Axis: 56 81 64  CO Interval: 138  QRS Interval: 80  QTc Interval: 437  ST Changes: No ST elevation or depression  Comparison: Compared to previous from 04/11/2020, normal sinus rhythm has replaced sinus rhythm with sinus arrhythmia.    I have reviewed and interpreted the EKG(s) documented above along with Dr. Gutiérrez, ED MD.    PROCEDURES:   None.       IAdy , am serving as a scribe to document services personally performed by Los Romero PA-C, based on my observation and the provider's statements to me. I, Los Romero PA-C, attest that Ady Wilkins  is acting in a scribe capacity, has observed my performance of the services and has documented them in accordance with my direction.    Los Romero PA-C  Emergency Medicine  [facility] EMERGENCY ROOM  [address] [id number] [phone]  Dept: [phone]       Los Romero PA-C  09/16/23 2476

## 2023-09-16 NOTE — DISCHARGE INSTRUCTIONS
You were seen here in the emergency department for evaluation of right-sided flank pain, as well as substernal chest pain.  The substernal chest pain I suspect is likely ongoing from your rib discomfort from previous.  Continue with ibuprofen and Tylenol, this will resolve.  Your CT scans and your cardiac work-up here are negative, no evidence of any acute abnormalities.  No evidence of any acute infections.  Likely discussed, there is small amount of bacteria in your urine, however I do not think this is representative of urinary tract infection.  We will call you with any acute results should something change.  Continue with ibuprofen and Tylenol, as well as your other medications.  Follow-up with your primary care doctor.    For pain or fever you may use:  -Tylenol 650 mg every 6 hours.  Max 4000 mg in 24 hours  Do not use thismedication with alcohol as it can cause liver problems.  -Ibuprofen 600 mg every 6 hours.  Max 3500 mg in 24 hours  Do not take this medication if you have a history of a GI bleed or have kidney problems.  You may use both of these medications at the same time or you can alternate them every 3 hours.  For example, Tylenol at 6 AM, ibuprofen at 9 AM, Tylenol at noon, etc.

## 2023-09-16 NOTE — ED TRIAGE NOTES
"The patient presents to the ED with right sided back/flank pain that has been present for over a week. The patient also reports persistent middle chest pain for \"months\" for which she was seen in the ER at [facility] and was diagnosed with a dislocated rib. She reports the pain is persistent and \"makes her nervous\" because of the location of the pain. She denies any dysuria, hematuria or urinary retention. Denies fever.         "

## 2023-09-18 ENCOUNTER — PATIENT OUTREACH (OUTPATIENT)
Dept: CARE COORDINATION | Facility: CLINIC | Age: 23
End: 2023-09-18
Payer: COMMERCIAL

## 2023-09-18 LAB — BACTERIA UR CULT: NORMAL

## 2023-09-18 NOTE — PROGRESS NOTES
Clinic Care Coordination Contact  Community Health Worker Initial Outreach    CHW Initial Information Gathering:  Referral Source: ED Follow-Up  CHW Additional Questions  MyChart active?: Yes    Patient accepts CC: No, patient declined at this time. Patient will be sent Care Coordination introduction letter for future reference.     Joelle Jalloh  Community Health Worker  [facility] Care Resource [facility]    
regular

## 2023-09-19 ENCOUNTER — THERAPY VISIT (OUTPATIENT)
Dept: PHYSICAL THERAPY | Facility: CLINIC | Age: 23
End: 2023-09-19
Attending: STUDENT IN AN ORGANIZED HEALTH CARE EDUCATION/TRAINING PROGRAM
Payer: COMMERCIAL

## 2023-09-19 DIAGNOSIS — M94.0 COSTOCHONDRITIS: ICD-10-CM

## 2023-09-19 PROCEDURE — 97140 MANUAL THERAPY 1/> REGIONS: CPT | Mod: GP | Performed by: PHYSICAL THERAPIST

## 2023-09-19 PROCEDURE — 97110 THERAPEUTIC EXERCISES: CPT | Mod: GP | Performed by: PHYSICAL THERAPIST

## 2023-09-19 PROCEDURE — 97161 PT EVAL LOW COMPLEX 20 MIN: CPT | Mod: GP | Performed by: PHYSICAL THERAPIST

## 2023-09-19 NOTE — PROGRESS NOTES
PHYSICAL THERAPY EVALUATION  Type of Visit: Evaluation    See electronic medical record for Abuse and Falls Screening details.    Subjective       Presenting condition or subjective complaint: Pain in my chest  Date of onset: 23    Relevant medical history: Bladder or bowel problems; Chest pain; Smoking   Dates & types of surgery:      Prior diagnostic imaging/testing results: CT scan     Prior therapy history for the same diagnosis, illness or injury: No        Living Environment  Social support:     Type of home: House   Stairs to enter the home: Yes 14 Is there a railing: Yes   Ramp: No   Stairs inside the home: Yes 1 Is there a railing: No   Help at home:    Equipment owned:       Employment: Yes [employer]  Hobbies/Interests: Going on walks    Patient goals for therapy: Be without pain    Pain assessment: Pain present  Location: sternum and sometimes R flank/Ratin/10     Objective   CERVICAL SPINE EVALUATION  PAIN: Pain Level at Rest: 4/10  Pain Level with Use: 9/10  Pain Location: sternum into R flank   Pain Quality: Stabbing and tight in chest  Pain Frequency: constant  Pain is Worst: daytime  Pain is Exacerbated By: stretching arms behind her back, it's always there  Pain is Relieved By: heat, NSAIDs, and otc medications  POSTURE: Standing Posture: Rounded shoulders, Forward head, Thoracic kyphosis increased  ROM: AROM WNL  FLEXIBILITY: WNL   SPECIAL TESTS:  Tender SC bilaterally, tender   PALPATION:  bilateral SC joint, bilateral ribs 1-5 on sternum, R lateral ribs with mobilization, posterior R ribs 3-8 with PA   SPINAL SEGMENTAL CONCLUSIONS:  poor thoracic mobility      Assessment & Plan   CLINICAL IMPRESSIONS  Medical Diagnosis: Costochondritis (M94.0)    Treatment Diagnosis: rib pain   Impression/Assessment: Patient is a 23 year old female with sternal and rib pain complaints.  The following significant findings have been identified: Pain, Decreased ROM/flexibility, Decreased strength,  Impaired muscle performance, Decreased activity tolerance, and Impaired posture. These impairments interfere with their ability to perform self care tasks, work tasks, recreational activities, and driving  as compared to previous level of function.     Clinical Decision Making (Complexity):  Clinical Presentation: Stable/Uncomplicated  Clinical Presentation Rationale: based on medical and personal factors listed in PT evaluation  Clinical Decision Making (Complexity): Low complexity    PLAN OF CARE  Treatment Interventions:  Interventions: Manual Therapy, Neuromuscular Re-education, Therapeutic Activity, Therapeutic Exercise, Self-Care/Home Management    Long Term Goals     PT Goal 1  Goal Identifier: 1.  Goal Description: Patient will report <3/10 pain on a day to day basis to improve QOL.  Target Date: 10/31/23  PT Goal 2  Goal Identifier: 2.  Goal Description: Patient will report 50% improvement in breathing tolerance in order to enjoy walking activities.  Target Date: 11/14/23  PT Goal 3  Goal Identifier: 3.  Goal Description: Patient will be ind with HEP in order to risk return of symptoms.  Target Date: 11/14/23      Frequency of Treatment: 1x/week  Duration of Treatment: 8 weeks    Education Assessment:        Risks and benefits of evaluation/treatment have been explained.   Patient/Family/caregiver agrees with Plan of Care.     Evaluation Time:     PT Eval, Low Complexity Minutes (08675): 20       Signing Clinician: Bess Wilson, PT      [facility]                                                                                   OUTPATIENT PHYSICAL THERAPY      PLAN OF TREATMENT FOR OUTPATIENT REHABILITATION   Patient's Last Name, First Name, Norma Ojeda YOB: 2000   Provider's Name   [facility]   Medical Record No.  [id number]     Onset Date: 09/12/23  Start of Care Date: 09/19/23     Medical Diagnosis:   Costochondritis (M94.0)      PT Treatment Diagnosis:  rib pain Plan of Treatment  Frequency/Duration: 1x/week/ 8 weeks    Certification date from 09/19/23 to 11/14/23         See note for plan of treatment details and functional goals     Bess Wilson, PT                         I CERTIFY THE NEED FOR THESE SERVICES FURNISHED UNDER        THIS PLAN OF TREATMENT AND WHILE UNDER MY CARE     (Physician attestation of this document indicates review and certification of the therapy plan).                Referring Provider:  Denise Ramirez      Initial Assessment  See Epic Evaluation- Start of Care Date: 09/19/23

## 2023-09-20 ENCOUNTER — MYC REFILL (OUTPATIENT)
Dept: FAMILY MEDICINE | Facility: CLINIC | Age: 23
End: 2023-09-20

## 2023-09-20 ENCOUNTER — TELEPHONE (OUTPATIENT)
Dept: FAMILY MEDICINE | Facility: CLINIC | Age: 23
End: 2023-09-20
Payer: COMMERCIAL

## 2023-09-20 DIAGNOSIS — F41.1 GAD (GENERALIZED ANXIETY DISORDER): ICD-10-CM

## 2023-09-20 NOTE — TELEPHONE ENCOUNTER
Norma has been seen multiple times in the past few weeks in the ED and clinic for costochondritis and chest pain, flank pain and pleurisy. She has been following instructions taking Ibuprofen 600 mg BID and using ice and heat. She says today after physical therapy she feels much worse and now has tingling and numbness in both legs, this is a new symptom. Advised she needs to be seen again for this new concerning symptom. She will evaluate in the ED or urgent care today

## 2023-09-21 ENCOUNTER — OFFICE VISIT (OUTPATIENT)
Dept: OBGYN | Facility: CLINIC | Age: 23
End: 2023-09-21
Payer: COMMERCIAL

## 2023-09-21 VITALS
DIASTOLIC BLOOD PRESSURE: 69 MMHG | BODY MASS INDEX: 54.97 KG/M2 | WEIGHT: 293 LBS | HEART RATE: 104 BPM | TEMPERATURE: 97.4 F | SYSTOLIC BLOOD PRESSURE: 124 MMHG

## 2023-09-21 DIAGNOSIS — N83.202 LEFT OVARIAN CYST: ICD-10-CM

## 2023-09-21 DIAGNOSIS — R10.31 RLQ ABDOMINAL PAIN: ICD-10-CM

## 2023-09-21 LAB
CLUE CELLS: ABNORMAL
TRICHOMONAS, WET PREP: ABNORMAL
WBC'S/HIGH POWER FIELD, WET PREP: ABNORMAL
YEAST, WET PREP: ABNORMAL

## 2023-09-21 PROCEDURE — 87210 SMEAR WET MOUNT SALINE/INK: CPT | Performed by: OBSTETRICS & GYNECOLOGY

## 2023-09-21 PROCEDURE — 99215 OFFICE O/P EST HI 40 MIN: CPT | Performed by: OBSTETRICS & GYNECOLOGY

## 2023-09-21 RX ORDER — HYDROXYZINE PAMOATE 25 MG/1
25-50 CAPSULE ORAL 3 TIMES DAILY PRN
Qty: 45 CAPSULE | Refills: 4 | Status: SHIPPED | OUTPATIENT
Start: 2023-09-21 | End: 2024-02-27

## 2023-09-21 RX ORDER — AMOXICILLIN 250 MG
1-2 CAPSULE ORAL 2 TIMES DAILY PRN
Qty: 60 TABLET | Refills: 3 | Status: SHIPPED | OUTPATIENT
Start: 2023-09-21 | End: 2024-02-27

## 2023-09-21 NOTE — PROGRESS NOTES
Gynecology Consult Note      HPI: Norma Collins is a 23 year old  who presents with chronic vaginal discharge and RLQ pain.  Patient states that she has had intermittent right lower quadrant pain for about 2 weeks.  She has tried Tylenol without significant improvement.  She notes that she had CT and pelvic ultrasound completed which were notable for a left-sided ovarian cyst.  Right ovary was not seen on ultrasound.  The patient states that she has generally regular, predictable menses.  These are not particularly bothersome to her.  She does note a history of frequent bacterial vaginosis infections and wonders what options may be available to prevent these.  Has also had yeast infections in the past.  She notes a history of chronic constipation and maternal family history of colon cancer at age 40-45.  She has plans to see [facility] tomorrow to evaluate bowel pathology.  She has used MiraLAX before.    ROS: 10 pt ROS neg other than HPI    PMH:   Past Medical History:   Diagnosis Date    Abnormal uterine bleeding due to endometrial polyp     Acanthosis nigricans     Adenotonsillar hypertrophy     Aphthous ulcer of ileum     Arthropathy of ankle and foot     Family history of hemochromatosis     Iron deficiency anemia due to chronic blood loss     Obesity     PCOS (polycystic ovarian syndrome)        PSHx:   Past Surgical History:   Procedure Laterality Date    CHOLECYSTECTOMY      HYSTEROSCOPY DIAGNOSTIC N/A 10/14/2022    Procedure: HYSTEROSCOPY WITH POLYPECTOMY;  Surgeon: Aston Yanes MD;  Location: [facility] OR    LAPAROSCOPIC CHOLECYSTECTOMY      OTHER SURGICAL HISTORY      none    TONSILLECTOMY      TONSILLECTOMY & ADENOIDECTOMY Bilateral 2017    Procedure: BILATERAL TONSILLECTOMY AND ADENOIDECTOMY;  Surgeon: Jacob Arguello MD;  Location: [facility];  Service:        Medications:   buPROPion (WELLBUTRIN XL) 150 MG 24 hr tablet, Take 1 tablet (150 mg) by mouth  every morning  cyclobenzaprine (FLEXERIL) 10 MG tablet, Take 1 tablet (10 mg) by mouth 3 times daily as needed for muscle spasms  famotidine (PEPCID) 20 MG tablet, Take 1 tablet (20 mg) by mouth 2 times daily  hydrOXYzine (VISTARIL) 25 MG capsule, Take 1-2 capsules (25-50 mg) by mouth 3 times daily as needed for anxiety  ibuprofen (ADVIL/MOTRIN) 200 MG tablet, Take 400 mg by mouth every 4 hours as needed for pain  omeprazole (PRILOSEC) 20 MG DR capsule, Take 1 capsule (20 mg) by mouth 2 times daily  prochlorperazine (COMPAZINE) 10 MG tablet, Take 1 tablet (10 mg) by mouth every 6 hours as needed for nausea or vomiting  sucralfate (CARAFATE) 1 GM tablet, Take 1 tablet (1 g) by mouth 4 times daily Pt takes PRN    No current facility-administered medications on file prior to visit.       Allergies:      Allergies   Allergen Reactions    [place] Sprouts [Brassica Oleracea]     Keflex [Cephalexin] Itching    Other Food Allergy Rash     [place] sprouts       Social History:   Social History     Socioeconomic History    Marital status: Single     Spouse name: Not on file    Number of children: Not on file    Years of education: Not on file    Highest education level: Not on file   Occupational History    Not on file   Tobacco Use    Smoking status: Every Day     Types: Vaping Device    Smokeless tobacco: Never   Vaping Use    Vaping Use: Every day    Substances: Nicotine, Flavoring    Devices: Disposable   Substance and Sexual Activity    Alcohol use: Yes     Comment: occ    Drug use: Never    Sexual activity: Yes     Partners: Male     Birth control/protection: Condom   Other Topics Concern    Not on file   Social History Narrative    Not on file     Social Determinants of Health     Financial Resource Strain: Not on file   Food Insecurity: Not on file   Transportation Needs: Not on file   Physical Activity: Not on file   Stress: Not on file   Social Connections: Not on file   Interpersonal Safety: Not on file   Housing  Stability: Not on file       Family History:  Family History   Problem Relation Age of Onset    Colon Cancer Mother 43    Cancer Mother         colon    Venous thrombosis Mother         cancer-related    Cerebrovascular Disease Mother     Liver Disease Father         cirrhosis of liver    Other - See Comments Father         high iron    Cirrhosis Father     Hemochromatosis Father     Hypertension Father     Hyperlipidemia Father     Myocardial Infarction Maternal Grandmother     Heart Disease Maternal Grandmother     Dementia Maternal Grandmother     Cerebrovascular Disease Paternal Grandmother     Other - See Comments Maternal Grandfather         heart attack or cancer/unsure    Bladder Cancer Maternal Grandfather     Cirrhosis Paternal Grandfather     Other - See Comments Paternal Grandfather         high iron    Diabetes Paternal Grandfather     Hemochromatosis Paternal Grandfather     Kidney Disease Paternal Grandfather     Hypertension Paternal Grandfather     Colon Cancer Maternal Great-Grandmother         70-90 years old     Uterine Cancer Maternal Great-Grandmother     Breast Cancer Maternal Great-Grandmother     Brain Cancer Maternal Aunt        Physical Exam:   Vitals:    09/21/23 1104   BP: 124/69   BP Location: Right arm   Patient Position: Chair   Cuff Size: Adult Large   Pulse: 104   Temp: 97.4  F (36.3  C)   TempSrc: Tympanic   Weight: (!) 159.2 kg (351 lb)      Gen: lying in bed, NAD  CV: Reg rate, well perfused  Pulm: no increased work of breathing  Abd: non-tender, non-distended, no masses   Pelvis: normal appearing external genitalia, vaginal mucosa, cervix, bimanual exam with normal size and contour of uterus with no adnexal masses exam limited by BMI  Extremities: non-tender, no erythema; no edema  Psych: normal mood and affect  Neuro: no focal deficits    Imaging:  Pelvic US     FINDINGS: The exam was limited due to uterine position and body  habitus.     The uterus measures 8.3 x 4.3 x 4.8 cm.  No evidence for mass. The  endometrium is within normal limits at 11 mm.     The right ovary is not definitely visualized. No evidence for right  adnexal mass or cyst.     The left ovary measures 4.4 x 5.5 x 6.2 cm. There is a  simple-appearing cyst on the left ovary measuring 3.1 x 4.3 x 4.0 cm.  Doppler waveform analysis demonstrates venous flow within the left  ovary. Despite multiple attempts, no arterial waveforms could be  detected.     No evidence for free fluid.                                                                      IMPRESSION:   1. Limited exam due to body habitus and uterine position.  2. The right ovary could not be visualized. No evidence of right  adnexal mass or cyst.  3. The left ovary is enlarged by a cyst that measures up to 4.3 cm.  Venous flow is demonstrated within the left ovary, but arterial flow  could not be detected.     A&P: Norma Collins is a 23 year old  who Zentz with right lower quadrant pain and chronic vaginal discharge.  On review the etiology of the patient's right lower quadrant plan remains unclear.  Her ovary was not seen well with previous ultrasound.  She did have a left ovarian cyst measuring 4 cm, simple in appearance.  Reviewed the nature of ovarian cyst with the patient and discussed that this is likely an ovulatory cyst on the left and I think unlikely to be a source of discomfort for her.  Given that the right ovary was not well seen, did discuss that we could repeat ultrasound to confirm no obvious right ovarian pathology.  Her uterus is normal in appearance and she has no abnormal bleeding at this time.  Therefore do not think there is uterine cause for her discomfort.  Patient does have a long history of chronic constant to patient with family history of colon cancer.  Discussed possible bowel etiology as a source for her discomfort.  She has GI follow-up pending.  She occasionally takes MiraLAX but does not have daily bowel movements.  Did  prescribe serena S that she could try to help with more regular bowel movements to see if this changes her discomfort.  Given the left ovarian cyst, did recommend a second repeat ultrasound in approximately 2 months to confirm that this is not growing or changing and has hopefully resolved.  Patient denies any bleeding issues and is not interested in menstrual management at this time.  Patient also reports chronic vaginal discharge.  She has intermittent bacterial vaginosis and yeast infections.  Wet prep collected today was normal.  Therefore did discuss the option of trying over-the-counter boric acid suppositories for several weeks to see if this improves her symptoms.  If she continues to have positive wet prep for BV could consider prolonged use of metronidazole gel but given negative result today did not prescribe this.  Did review vulvar hygiene with the patient as well.       I spent a total of 45 minutes reviewing chart, obtaining history, counseling, examining, coordinating care, documenting this encounter.     Trinity Dowd MD   9/21/2023 1:09 PM

## 2023-09-21 NOTE — NURSING NOTE
"Initial /69 (BP Location: Right arm, Patient Position: Chair, Cuff Size: Adult Large)   Pulse 104   Temp 97.4  F (36.3  C) (Tympanic)   Wt (!) 159.2 kg (351 lb)   LMP 09/12/2023 (Exact Date)   BMI 54.97 kg/m   Estimated body mass index is 54.97 kg/m  as calculated from the following:    Height as of 9/16/23: 1.702 m (5' 7\").    Weight as of this encounter: 159.2 kg (351 lb). .        Jihan García MA    "

## 2023-09-22 ENCOUNTER — TRANSFERRED RECORDS (OUTPATIENT)
Dept: HEALTH INFORMATION MANAGEMENT | Facility: CLINIC | Age: 23
End: 2023-09-22
Payer: COMMERCIAL

## 2023-09-22 ENCOUNTER — HOSPITAL ENCOUNTER (OUTPATIENT)
Dept: ULTRASOUND IMAGING | Facility: CLINIC | Age: 23
Discharge: HOME | End: 2023-09-22
Attending: OBSTETRICS & GYNECOLOGY | Admitting: OBSTETRICS & GYNECOLOGY
Payer: COMMERCIAL

## 2023-09-22 DIAGNOSIS — N83.202 LEFT OVARIAN CYST: ICD-10-CM

## 2023-09-22 DIAGNOSIS — R10.31 RLQ ABDOMINAL PAIN: ICD-10-CM

## 2023-09-22 PROCEDURE — 76856 US EXAM PELVIC COMPLETE: CPT

## 2023-09-26 ENCOUNTER — THERAPY VISIT (OUTPATIENT)
Dept: PHYSICAL THERAPY | Facility: CLINIC | Age: 23
End: 2023-09-26
Attending: STUDENT IN AN ORGANIZED HEALTH CARE EDUCATION/TRAINING PROGRAM
Payer: COMMERCIAL

## 2023-09-26 DIAGNOSIS — M94.0 COSTOCHONDRITIS: Primary | ICD-10-CM

## 2023-09-26 PROCEDURE — 97110 THERAPEUTIC EXERCISES: CPT | Mod: GP | Performed by: PHYSICAL THERAPIST

## 2023-10-10 ENCOUNTER — THERAPY VISIT (OUTPATIENT)
Dept: PHYSICAL THERAPY | Facility: CLINIC | Age: 23
End: 2023-10-10
Attending: STUDENT IN AN ORGANIZED HEALTH CARE EDUCATION/TRAINING PROGRAM
Payer: COMMERCIAL

## 2023-10-10 DIAGNOSIS — M94.0 COSTOCHONDRITIS: Primary | ICD-10-CM

## 2023-10-10 PROCEDURE — 97110 THERAPEUTIC EXERCISES: CPT | Mod: GP | Performed by: PHYSICAL THERAPIST

## 2023-10-10 PROCEDURE — 97140 MANUAL THERAPY 1/> REGIONS: CPT | Mod: GP | Performed by: PHYSICAL THERAPIST

## 2023-10-12 ENCOUNTER — HOSPITAL ENCOUNTER (EMERGENCY)
Facility: CLINIC | Age: 23
Discharge: HOME | End: 2023-10-12
Attending: EMERGENCY MEDICINE | Admitting: EMERGENCY MEDICINE
Payer: COMMERCIAL

## 2023-10-12 VITALS
HEIGHT: 67 IN | HEART RATE: 94 BPM | OXYGEN SATURATION: 97 % | SYSTOLIC BLOOD PRESSURE: 137 MMHG | TEMPERATURE: 98.5 F | DIASTOLIC BLOOD PRESSURE: 93 MMHG | RESPIRATION RATE: 18 BRPM | BODY MASS INDEX: 45.99 KG/M2 | WEIGHT: 293 LBS

## 2023-10-12 DIAGNOSIS — R07.9 CHEST PAIN, UNSPECIFIED TYPE: ICD-10-CM

## 2023-10-12 PROCEDURE — 93010 ELECTROCARDIOGRAM REPORT: CPT | Performed by: EMERGENCY MEDICINE

## 2023-10-12 PROCEDURE — 99284 EMERGENCY DEPT VISIT MOD MDM: CPT | Mod: 25 | Performed by: EMERGENCY MEDICINE

## 2023-10-12 PROCEDURE — 96372 THER/PROPH/DIAG INJ SC/IM: CPT | Performed by: EMERGENCY MEDICINE

## 2023-10-12 PROCEDURE — 99284 EMERGENCY DEPT VISIT MOD MDM: CPT | Performed by: EMERGENCY MEDICINE

## 2023-10-12 PROCEDURE — 250N000011 HC RX IP 250 OP 636: Mod: JZ | Performed by: EMERGENCY MEDICINE

## 2023-10-12 PROCEDURE — 93005 ELECTROCARDIOGRAM TRACING: CPT | Performed by: EMERGENCY MEDICINE

## 2023-10-12 RX ORDER — KETOROLAC TROMETHAMINE 30 MG/ML
30 INJECTION, SOLUTION INTRAMUSCULAR; INTRAVENOUS ONCE
Status: COMPLETED | OUTPATIENT
Start: 2023-10-12 | End: 2023-10-12

## 2023-10-12 RX ADMIN — KETOROLAC TROMETHAMINE 30 MG: 30 INJECTION, SOLUTION INTRAMUSCULAR; INTRAVENOUS at 03:20

## 2023-10-12 ASSESSMENT — ACTIVITIES OF DAILY LIVING (ADL): ADLS_ACUITY_SCORE: 35

## 2023-10-12 NOTE — DISCHARGE INSTRUCTIONS
Continue Tylenol, and ibuprofen.  You can take up to 600 mg ibuprofen 4 times daily.  You can take an additional 1000 mg acetaminophen 4 times daily    Follow-up in clinic as planned.    Return to be seen if new or worsening symptoms develop

## 2023-10-12 NOTE — ED PROVIDER NOTES
ED Provider Note  [facility]      History     Chief Complaint   Patient presents with    Chest Pain     Continued pain     HPI  Norma Collins is a 23 year old female who presents to the emergency department with concerns regarding chest pain.  This has been present over the past 2 months.  It is intermittent in nature, coming and going, without specific aggravating, or relieving factors.  Has otherwise been eating and drinking okay.  Occasional nausea.  No severe abdominal discomfort.  No severe shortness of breath.  Has been attending physical therapy.        Independent Historian:        Review of External Notes:  Reviewed prior ED visits, which included negative cardiac work-up.      Allergies:  Allergies   Allergen Reactions    [place] Sprouts [Brassica Oleracea]     Keflex [Cephalexin] Itching    Other Food Allergy Rash     [place] sprouts       Problem List:    Patient Active Problem List    Diagnosis Date Noted    Costochondritis 09/26/2023     Priority: Medium    Abnormal uterine bleeding due to endometrial polyp 08/24/2022     Priority: Medium    Change in bowel habit 08/23/2022     Priority: Medium    Ileitis 08/23/2022     Priority: Medium    Family history of colon cancer 07/08/2021     Priority: Medium     Mother at age 40      Family history of hemochromatosis 07/08/2021     Priority: Medium     Father and Grandfather      Acanthosis nigricans 12/08/2020     Priority: Medium     Created by Conversion      Last Assessment & Plan:   Patient doing very well and adding exercise, and watching diet closely.  We'll now add metformin as per orders.  Side effects and precautions discussed.  Follow up in one month.      Arthropathy of ankle and foot 12/08/2020     Priority: Medium     Created by Conversion    Replacement Utility updated for latest IMO load    Last Assessment & Plan:   With diffuse joint aches treating with progressive weight loss and lifestyle modifications.       "Iron deficiency anemia due to chronic blood loss 11/22/2019     Priority: Medium    Aphthous ulcer of ileum 10/30/2019     Priority: Medium    Elevated BP without diagnosis of hypertension 10/16/2017     Priority: Medium    Adenotonsillar hypertrophy 01/04/2017     Priority: Medium    PCOS (polycystic ovarian syndrome) 02/29/2016     Priority: Medium     Last Assessment & Plan:   Her dietary changes are working very well.  Also working very well for the household.  She is exercising more, having more energy, and pants are fitting looser.  We'll plan for in body at next visit along with remeasurement of abdominal and neck circumference.  Also with her forgetting the metformin in the morning, will move it to the afternoon with lunch.      Class 3 severe obesity due to excess calories without serious comorbidity with body mass index (BMI) of 50.0 to 59.9 in adult (H) 10/30/2015     Priority: Medium     Last Assessment & Plan:   Continue lifestyle modifications.  Will breakfast a [place] protein-based with shakes or aches.  May also use chicken or steak.  Stopped carbohydrates in the morning.  Better food choices discussed.  Encouraged to watch the documentary, \"In defense of food\" before next visit.          Past Medical History:    Past Medical History:   Diagnosis Date    Abnormal uterine bleeding due to endometrial polyp     Acanthosis nigricans     Adenotonsillar hypertrophy     Aphthous ulcer of ileum     Arthropathy of ankle and foot     Family history of hemochromatosis     Iron deficiency anemia due to chronic blood loss     Obesity     PCOS (polycystic ovarian syndrome)        Past Surgical History:    Past Surgical History:   Procedure Laterality Date    CHOLECYSTECTOMY      HYSTEROSCOPY DIAGNOSTIC N/A 10/14/2022    Procedure: HYSTEROSCOPY WITH POLYPECTOMY;  Surgeon: Aston Yanes MD;  [facility] OR    LAPAROSCOPIC CHOLECYSTECTOMY      OTHER SURGICAL HISTORY      none    TONSILLECTOMY   "    TONSILLECTOMY & ADENOIDECTOMY Bilateral 2/16/2017    Procedure: BILATERAL TONSILLECTOMY AND ADENOIDECTOMY;  Surgeon: Jacob Arguello MD;  Location: [facility];  Service:        Family History:    Family History   Problem Relation Age of Onset    Colon Cancer Mother 43    Cancer Mother         colon    Venous thrombosis Mother         cancer-related    Cerebrovascular Disease Mother     Liver Disease Father         cirrhosis of liver    Other - See Comments Father         high iron    Cirrhosis Father     Hemochromatosis Father     Hypertension Father     Hyperlipidemia Father     Myocardial Infarction Maternal Grandmother     Heart Disease Maternal Grandmother     Dementia Maternal Grandmother     Cerebrovascular Disease Paternal Grandmother     Other - See Comments Maternal Grandfather         heart attack or cancer/unsure    Bladder Cancer Maternal Grandfather     Cirrhosis Paternal Grandfather     Other - See Comments Paternal Grandfather         high iron    Diabetes Paternal Grandfather     Hemochromatosis Paternal Grandfather     Kidney Disease Paternal Grandfather     Hypertension Paternal Grandfather     Colon Cancer Maternal Great-Grandmother         70-90 years old     Uterine Cancer Maternal Great-Grandmother     Breast Cancer Maternal Great-Grandmother     Brain Cancer Maternal Aunt        Social History:  Marital Status:  Single [1]  Social History     Tobacco Use    Smoking status: Every Day     Types: Vaping Device    Smokeless tobacco: Never   Vaping Use    Vaping Use: Every day    Substances: Nicotine, Flavoring    Devices: Disposable   Substance Use Topics    Alcohol use: Yes     Comment: occ    Drug use: Never        Medications:    buPROPion (WELLBUTRIN XL) 150 MG 24 hr tablet  cyclobenzaprine (FLEXERIL) 10 MG tablet  famotidine (PEPCID) 20 MG tablet  hydrOXYzine (VISTARIL) 25 MG capsule  ibuprofen (ADVIL/MOTRIN) 200 MG tablet  omeprazole (PRILOSEC) 20 MG   "capsule  prochlorperazine (COMPAZINE) 10 MG tablet  senna-docusate (SENOKOT-S/PERICOLACE) 8.6-50 MG tablet  sucralfate (CARAFATE) 1 GM tablet          Review of Systems  A medically appropriate review of systems was performed with pertinent positives and negatives noted in the HPI, and all other systems negative.    Physical Exam   Patient Vitals for the past 24 hrs:   BP Temp Temp src Pulse Resp SpO2 Height Weight   10/12/23 0215 (!) 141/89 -- -- -- -- -- -- --   10/12/23 0146 (!) 158/74 98.5  F (36.9  C) Tympanic 105 18 97 % 1.702 m (5' 7\") (!) 158.8 kg (350 lb)          Physical Exam  General: alert and in no acute distress on arrival  Head: atraumatic, normocephalic  Lungs:  nonlabored  CV:  extremities warm and perfused  Abd: nondistended  Skin: no rashes, no diaphoresis and skin color normal  Neuro: Patient awake, alert, speech is fluent,   Psychiatric: affect/mood normal,        ED Course                 Procedures         EKG, reviewed by myself shows normal sinus rhythm.  Rate 99 bpm.  Normal intervals.  No ectopy.  Nonspecific BN-[id number] changes.  No acute ischemic appearing changes.                 No results found for this or any previous visit (from the past 24 hour(s)).    MEDICATIONS GIVEN IN THE EMERGENCY DEPARTMENT:  Medications   ketorolac (TORADOL) injection 30 mg (has no administration in time range)           Independent Interpretation (X-rays, CTs, rhythm strip):  None    Consultations/Discussion of Management or Tests:  None       Social Determinants of Health affecting care:         Assessments & Plan (with Medical Decision Making)  23 year old female who presents to the Emergency Department for evaluation of chest pain.  Present over the past couple of months.  Has had negative cardiac work-ups previously.  No significant respiratory symptoms, with no shortness of breath, and I do not feel PE is likely.  Consideration for repeat testing and repeat imaging, however given the recent negative " work-ups, I feel it is reasonable for discharge home, follow-up in clinic as needed, and return instructions discussed if new or worsening symptoms develop.       I have reviewed the nursing notes.    I have reviewed the findings, diagnosis, plan and need for follow up with the patient.       Critical Care time:  none      NEW PRESCRIPTIONS STARTED AT TODAY'S ER VISIT  New Prescriptions    No medications on file       Final diagnoses:   Chest pain, unspecified type       10/12/2023   [facility] EMERGENCY DEPT       GhanshyamEfra pate MD  10/12/23 0312

## 2023-10-12 NOTE — ED TRIAGE NOTES
Continued chest pain   Triage Assessment (Adult)       Row Name 10/12/23 0144          Triage Assessment    Airway WDL WDL        Respiratory WDL    Respiratory WDL WDL        Skin Circulation/Temperature WDL    Skin Circulation/Temperature WDL WDL        Cardiac WDL    Cardiac WDL chest pain        Chest Pain Assessment    Chest Pain Location midsternal     Character stabbing     Precipitating Factors physical exertion        Peripheral/Neurovascular WDL    Peripheral Neurovascular WDL WDL        Cognitive/Neuro/Behavioral WDL    Cognitive/Neuro/Behavioral WDL WDL

## 2023-10-17 ENCOUNTER — THERAPY VISIT (OUTPATIENT)
Dept: PHYSICAL THERAPY | Facility: CLINIC | Age: 23
End: 2023-10-17
Attending: STUDENT IN AN ORGANIZED HEALTH CARE EDUCATION/TRAINING PROGRAM
Payer: COMMERCIAL

## 2023-10-17 ENCOUNTER — OFFICE VISIT (OUTPATIENT)
Dept: FAMILY MEDICINE | Facility: CLINIC | Age: 23
End: 2023-10-17
Payer: COMMERCIAL

## 2023-10-17 ENCOUNTER — LAB (OUTPATIENT)
Dept: LAB | Facility: CLINIC | Age: 23
End: 2023-10-17
Payer: COMMERCIAL

## 2023-10-17 VITALS
SYSTOLIC BLOOD PRESSURE: 126 MMHG | HEIGHT: 67 IN | HEART RATE: 83 BPM | BODY MASS INDEX: 45.99 KG/M2 | TEMPERATURE: 98.1 F | RESPIRATION RATE: 18 BRPM | DIASTOLIC BLOOD PRESSURE: 82 MMHG | OXYGEN SATURATION: 98 % | WEIGHT: 293 LBS

## 2023-10-17 DIAGNOSIS — R20.2 PARESTHESIA: ICD-10-CM

## 2023-10-17 DIAGNOSIS — R07.89 CHEST WALL PAIN: Primary | ICD-10-CM

## 2023-10-17 DIAGNOSIS — M94.0 COSTOCHONDRITIS: Primary | ICD-10-CM

## 2023-10-17 DIAGNOSIS — Z13.1 SCREENING FOR DIABETES MELLITUS: ICD-10-CM

## 2023-10-17 LAB
B BURGDOR IGG+IGM SER QL: 0.13
CRP SERPL-MCNC: 8.92 MG/L
ERYTHROCYTE [SEDIMENTATION RATE] IN BLOOD BY WESTERGREN METHOD: 11 MM/HR (ref 0–20)
HBA1C MFR BLD: 5.7 % (ref 0–5.6)
MAGNESIUM SERPL-MCNC: 1.9 MG/DL (ref 1.7–2.3)
TSH SERPL DL<=0.005 MIU/L-ACNC: 2.52 UIU/ML (ref 0.3–4.2)
VIT B12 SERPL-MCNC: 467 PG/ML (ref 232–1245)

## 2023-10-17 PROCEDURE — 86200 CCP ANTIBODY: CPT

## 2023-10-17 PROCEDURE — 83036 HEMOGLOBIN GLYCOSYLATED A1C: CPT

## 2023-10-17 PROCEDURE — 86618 LYME DISEASE ANTIBODY: CPT

## 2023-10-17 PROCEDURE — 97140 MANUAL THERAPY 1/> REGIONS: CPT | Mod: GP | Performed by: PHYSICAL THERAPIST

## 2023-10-17 PROCEDURE — 83735 ASSAY OF MAGNESIUM: CPT

## 2023-10-17 PROCEDURE — 99214 OFFICE O/P EST MOD 30 MIN: CPT | Performed by: NURSE PRACTITIONER

## 2023-10-17 PROCEDURE — 97110 THERAPEUTIC EXERCISES: CPT | Mod: GP | Performed by: PHYSICAL THERAPIST

## 2023-10-17 PROCEDURE — 82607 VITAMIN B-12: CPT

## 2023-10-17 PROCEDURE — 85652 RBC SED RATE AUTOMATED: CPT

## 2023-10-17 PROCEDURE — 36415 COLL VENOUS BLD VENIPUNCTURE: CPT

## 2023-10-17 PROCEDURE — 86431 RHEUMATOID FACTOR QUANT: CPT

## 2023-10-17 PROCEDURE — 86140 C-REACTIVE PROTEIN: CPT

## 2023-10-17 PROCEDURE — 84443 ASSAY THYROID STIM HORMONE: CPT

## 2023-10-17 PROCEDURE — 86038 ANTINUCLEAR ANTIBODIES: CPT

## 2023-10-17 RX ORDER — METHYLPREDNISOLONE 4 MG
TABLET, DOSE PACK ORAL
Qty: 21 TABLET | Refills: 0 | Status: SHIPPED | OUTPATIENT
Start: 2023-10-17 | End: 2023-10-31

## 2023-10-17 NOTE — PROGRESS NOTES
"  Assessment & Plan     Chest wall pain  Unclear etiology, acute infectious process vs. Autoimmune vs. Fibromyalgia. Chest wall tenderness to palpation, no improvement with 5 session of PT. Try address potential inflammatory cause. May benefit from dietician referral, labs pending.    Paresthesia  - TSH with free T4 reflex  - Vitamin B12  - Magnesium  - Lyme Disease Total Abs Bld with Reflex to Confirm CLIA  - Anti Nuclear Deepthi IgG by IFA with Reflex  - CRP inflammation  - Cyclic Citrullinated Peptide Antibody IgG  - Erythrocyte sedimentation rate auto  - Rheumatoid factor  - methylPREDNISolone (MEDROL DOSEPAK) 4 MG tablet therapy pack  Dispense: 21 tablet; Refill: 0    Screening for diabetes mellitus  - Hemoglobin A1c    Time spent in chart review in preparation to see patient, time with patient for interview/exam, ordering medications/tests/and/or procedures, and time spent in charting and coordinating care: 30 minutes.              BMI:   Estimated body mass index is 55.32 kg/m  as calculated from the following:    Height as of this encounter: 1.702 m (5' 7\").    Weight as of this encounter: 160.2 kg (353 lb 3.2 oz).   Weight management plan: Discussed healthy diet and exercise guidelines      Lupe Fraga, DERRICK Student, TONYU  XENA Deng CNP [facility]    Provider Addendum  I performed the history and physical examination of the patient and discussed the management with the student. I have reviewed the patients past medical history, past surgical history, current medications, current allergies, family history and social history. I reviewed the available lab and imaging studies. I reviewed the student's note and agree with the documented findings and plan of care. I have reviewed all diagnostics noted and performed the medical decision making. Changes were made in the body of the note to achieve one comprehensive document.    XENA Rojo CNP  [facility] " [facility]      Jason Green is a 23 year old, presenting for the following health issues:  Shaking and Chest Pain        10/17/2023     9:05 AM   Additional Questions   Roomed by Leonarda BROWNE       History of Present Illness       Reason for visit:  Feeling shaky and also still having chest pain    She eats 0-1 servings of fruits and vegetables daily.She consumes 2 sweetened beverage(s) daily.She exercises with enough effort to increase her heart rate 30 to 60 minutes per day.  She exercises with enough effort to increase her heart rate 3 or less days per week.   She is taking medications regularly.       ED/UC Followup:    Facility:  [facility]   Date of visit: 10/12/23  Reason for visit: chest pain  Current Status: Has not had any improvement. 12 days ago she started feeling jittery and her heart was racing. EKG in ED was normal.  Some shortness of breath, having hot flashes, and nausea. Chest pain radiates from her sternum up into her right and left side of chest. Pain is sharp and feels like something is sitting on her chest. Rates it at a 7/10. Has been doing PT for her chest pain. Right eye has been twitching since this all started as well but unclear if it is related.     Months ago pain in chest, has been doing PT since. Two Fridays ago felt jittery, felt like heart was racing, with SOB. Pain in chest moves, but mostly stays center - can also be felt it in her breasts. ER Thursday, EKG and shot of toradol. Mostly concerned about feeling jittery and heart racing. When jitters start, eye twitching also noted, but not always. Pain in chests lasts all day, jitteriness comes and goes - with it comes racing heart. Nothing makes it better. Jitteriness usually lasts ~ 10 min., comes and goes, 4-6 times/day. Notices shakiness in hands and leg.           Review of Systems   Cardiovascular:  Positive for chest pain.      CONSTITUTIONAL: NEGATIVE for fever, chills, change in weight  ENT/MOUTH: NEGATIVE  "for ear, mouth and throat problems  RESP: NEGATIVE for significant cough or SOB  CV: chest pain/pressure and palpitations        Objective    /82   Pulse 83   Temp 98.1  F (36.7  C) (Tympanic)   Resp 18   Ht 1.702 m (5' 7\")   Wt (!) 160.2 kg (353 lb 3.2 oz)   LMP 09/12/2023 (Exact Date)   SpO2 98%   BMI 55.32 kg/m    Body mass index is 55.32 kg/m .  Physical Exam   GENERAL: healthy, alert and no distress  EYES: Eyes grossly normal to inspection, PERRL and conjunctivae and sclerae normal  HENT: ear canals and TM's normal, nose and mouth without ulcers or lesions  NECK: no adenopathy, no asymmetry, masses, or scars and thyroid normal to palpation  RESP: lungs clear to auscultation - no rales, rhonchi or wheezes  CV: regular rates and rhythm, normal S1 S2, no S3 or S4, no murmur, click or rub, and no peripheral edema, sternal chest wall tenderness with palpation  ABDOMEN: tenderness RUQ  MS: extremities normal- no gross deformities noted, tender to palpation over sternum  SKIN: no suspicious lesions or rashes  NEURO: Normal strength and tone, mentation intact and speech normal  PSYCH: mentation appears normal, affect normal/bright                     "

## 2023-10-17 NOTE — PROGRESS NOTES
10/17/23 0500   Appointment Info   Signing clinician's name / credentials Bess Wilson, PT, DPT   Visits Used 4   Medical Diagnosis Costochondritis (M94.0)   PT Tx Diagnosis rib pain   Quick Adds Certification   Progress Note/Certification   Start of Care Date 09/19/23   Onset of illness/injury or Date of Surgery 09/12/23   Therapy Frequency 1x/week   Predicted Duration 8 weeks   Certification date from 09/19/23   Certification date to 11/14/23   GOALS   PT Goals 2;3   PT Goal 1   Goal Identifier 1.   Goal Description Patient will report <3/10 pain on a day to day basis to improve QOL.   Goal Progress somewhat met, inconsistent   Target Date 10/31/23   PT Goal 2   Goal Identifier 2.   Goal Description Patient will report 50% improvement in breathing tolerance in order to enjoy walking activities.   Goal Progress met   Target Date 11/14/23   Date Met 10/17/23   PT Goal 3   Goal Identifier 3.   Goal Description Patient will be ind with HEP in order to risk return of symptoms.   Goal Progress met   Target Date 11/14/23   Date Met 10/17/23   Subjective Report   Subjective Report Patient states she went to the ED because she was feeling jittery. Everything looked okay otherwise. Pain has gone down a little bit overall, but its still constant day to day.   Treatment Interventions (PT)   Interventions Therapeutic Procedure/Exercise;Manual Therapy;Therapeutic Activity   Therapeutic Procedure/Exercise   Therapeutic Procedures: strength, endurance, ROM, flexibillity minutes (26800) 15   Ther Proc 1 mobility, strength, HEP   Ther Proc 1 - Details shoulder taps in low plank 1x5 warmup, isometric supine neutral chest press with 7.5# 2x10, iso hold with contralateral modified chest fly 2x8 each side, standing isometric chest press with black TB 1x10, wall push ups 1x10, reviewed HEP and discussed importance of daily activity   Skilled Intervention VCs and TC for technique, tempo, dosage   Manual Therapy   Manual Therapy:  Mobilization, MFR, MLD, friction massage minutes (59282) 8   Manual Therapy 1 STM, mob   Manual Therapy 1 - Details R sided intercostals STM, sidelying MET for improved R & L sided rib mobility, STM pec minor/major bilaterally, pin and stretch pec minor   Plan   Home program [id number]   Plan for next session DC   Total Session Time   Timed Code Treatment Minutes 23   Total Treatment Time (sum of timed and untimed services) 23         DISCHARGE  Reason for Discharge: Patient has met all goals.  No further expectation of progress.    Equipment Issued: HEP    Discharge Plan: Patient to continue home program.  Other services: follow up with provider to discuss other causes of condition/symptoms.    Referring Provider:  Denise Ramirez

## 2023-10-18 LAB
ANA SER QL IF: NEGATIVE
CCP AB SER IA-ACNC: 1 U/ML
RHEUMATOID FACT SER NEPH-ACNC: <6 IU/ML

## 2023-10-19 NOTE — RESULT ENCOUNTER NOTE
Zena Green    Your diabetes marker is up from last check to the pre-diabetes range. Recommend heart healthy, low inflammation diets. You can visit:     [email or web address]/[email or web address]    For some information on a healthy diet.     One of two inflammatory markers (crp, sed rate) is mildly up. The rest of the autoimmune inflammatory tests are negative/normal. I don't think at this point you will need to see Rheumatology but I would recommend a dietician to help with healthy eating to see if that helps- Do you want me to put a referral in?    The B12 is at the low end of normal, this can cause tingling, stinging sensations, recommend to start 1,000 mg of vitamin B12 daily, you can get this over the counter. Let me know if the pain does not improve after the steroid pack.    Please let us know if you have any questions.     Take care,    XENA Rojo CNP

## 2023-10-27 DIAGNOSIS — R11.0 NAUSEA: ICD-10-CM

## 2023-10-27 DIAGNOSIS — R10.816 EPIGASTRIC ABDOMINAL TENDERNESS WITHOUT REBOUND TENDERNESS: ICD-10-CM

## 2023-10-27 RX ORDER — FAMOTIDINE 20 MG/1
20 TABLET, FILM COATED ORAL 2 TIMES DAILY
Qty: 180 TABLET | Refills: 0 | Status: SHIPPED | OUTPATIENT
Start: 2023-10-27 | End: 2024-01-24

## 2023-10-31 ENCOUNTER — OFFICE VISIT (OUTPATIENT)
Dept: FAMILY MEDICINE | Facility: CLINIC | Age: 23
End: 2023-10-31
Payer: COMMERCIAL

## 2023-10-31 VITALS
TEMPERATURE: 97.3 F | WEIGHT: 293 LBS | DIASTOLIC BLOOD PRESSURE: 80 MMHG | BODY MASS INDEX: 45.99 KG/M2 | SYSTOLIC BLOOD PRESSURE: 122 MMHG | HEART RATE: 102 BPM | RESPIRATION RATE: 18 BRPM | OXYGEN SATURATION: 96 % | HEIGHT: 67 IN

## 2023-10-31 DIAGNOSIS — M94.0 COSTOCHONDRITIS: ICD-10-CM

## 2023-10-31 DIAGNOSIS — R00.2 HEART PALPITATIONS: Primary | ICD-10-CM

## 2023-10-31 PROBLEM — R73.03 PREDIABETES: Status: ACTIVE | Noted: 2023-10-31

## 2023-10-31 PROCEDURE — 99213 OFFICE O/P EST LOW 20 MIN: CPT | Performed by: STUDENT IN AN ORGANIZED HEALTH CARE EDUCATION/TRAINING PROGRAM

## 2023-10-31 ASSESSMENT — PAIN SCALES - GENERAL: PAINLEVEL: MODERATE PAIN (5)

## 2023-10-31 NOTE — PROGRESS NOTES
Assessment & Plan     Patient is a pleasant 53-year-old female presents today for follow-up on her chest pain.  Patient saw me a couple months ago for this chest pain.  She then had palpitations and worsened chest pain that prompted an ED visit. She had workup at that time that was reassuring including EKG. She was seen in follow up by Nathaly Erazo, had significant workup completed that yielded only a mildly positive CRP as previously noted. Given her symptoms of pain with palpation of the sternum, I do still believe that costochondritis is the main cause of symptoms, possible onset 2/2 viral illness.     Heart palpitations  However, with new heart palpitations that happen multiple times during the day, will have her complete a 3 day heart monitor to evaluate. No findings on EKG to suggest pericarditis.   - Adult Leadless EKG Monitor 3 to 7 Days    Costochondritis  For treatment, has tried PT with minimal improvement. Will try topical voltaren. Avoiding oral nsaids given history of GI issues.   - diclofenac (VOLTAREN) 1 % topical gel  Dispense: 150 g; Refill: 1      I spent a total of 28 minutes on the day of the visit.   Time spent by me doing chart review, history and exam, documentation and further activities per the note     Denise Ramirez MD  [facility]    Jason Green is a 23 year old, presenting for the following health issues:  Chest Pain        10/31/2023     6:54 AM   Additional Questions   Roomed by Norma CULLEN       History of Present Illness       Reason for visit:  Chest pain    She eats 2-3 servings of fruits and vegetables daily.She consumes 2 sweetened beverage(s) daily.She exercises with enough effort to increase her heart rate 30 to 60 minutes per day.  She exercises with enough effort to increase her heart rate 4 days per week.   She is taking medications regularly.       Patient is here to follow up with her chest pain. She states that her pain is about the  "same but she has been noticed that she feels more jittery and is having a different pain on the sides of her chest.      Seen in the ED on 10/12/23 hypertensive to 158/74    Seen in follow up 10/17/23  Prediabetic now    Chest pain:   Feels the same   No PT anymore.   Still do the exercises at home. Doesn't feel like it has changed much.     Saw Nathaly Erazo    Crp slightly elevated, esr normal.       Jittery/shaky now, comes and goes.   Jair come on, feels like heart is racing. Look on the watch and and it seems to be within normal range.   Jittery feeling lasts anywhere from 5-10 minutes.   Gets this feeling 4-5 times a day.     Has a scope planned in January.     Lateral breast pain:   Bothside  Will push on it and it's a little tender, unsure if muscle or what.   No discharge.   Rash under breast but otherwise nothing new.   No armpit swellng.     No lightheadedness/dizziness, leg pain leg swelling.   No shorness of breath.     Tried a medrol dosepak, sight improvement, not a whole lot.     Take compazine once daily      Review of Systems   Constitutional, HEENT, cardiovascular, pulmonary, GI, , musculoskeletal, neuro, skin, endocrine and psych systems are negative, except as otherwise noted.      Objective    /80 (BP Location: Right arm, Cuff Size: Adult Large)   Pulse 102   Temp 97.3  F (36.3  C) (Tympanic)   Resp 18   Ht 1.702 m (5' 7.01\")   Wt (!) 163.3 kg (360 lb)   LMP 09/12/2023 (Exact Date)   SpO2 96%   BMI 56.37 kg/m    Body mass index is 56.37 kg/m .  Physical Exam  Constitutional:       Appearance: Normal appearance.   HENT:      Head: Normocephalic.   Eyes:      General: No scleral icterus.     Extraocular Movements: Extraocular movements intact.      Conjunctiva/sclera: Conjunctivae normal.   Cardiovascular:      Rate and Rhythm: Normal rate and regular rhythm.      Heart sounds: Normal heart sounds.   Pulmonary:      Effort: Pulmonary effort is normal.      Breath sounds: Normal " breath sounds.   Chest:       Musculoskeletal:         General: Normal range of motion.      Cervical back: Normal range of motion.   Neurological:      General: No focal deficit present.      Mental Status: She is alert and oriented to person, place, and time.           Results from this visitNo results found for any visits on 10/31/23.    Results from last visit:  Lab on 10/17/2023   Component Date Value Ref Range Status    TSH 10/17/2023 2.52  0.30 - 4.20 uIU/mL Final    Vitamin B12 10/17/2023 467  232 - 1,245 pg/mL Final    Magnesium 10/17/2023 1.9  1.7 - 2.3 mg/dL Final    Lyme Disease Antibodies Total 10/17/2023 0.13  <0.90 Final    Non-reactive, Absence of detectable Borrelia burgdorferi antibodies. A non-reactive result does not exclude the possibility of Borrelia burgdorferi infection. If early Lyme disease is suspected, a second sample should be collected and tested 2 to 4 weeks later.    SAMANTHA interpretation 10/17/2023 Negative  Negative Final      Negative:              <1:40  Borderline Positive:   1:40 - 1:80  Positive:              >1:80    CRP Inflammation 10/17/2023 8.92 (H)  <5.00 mg/L Final    Cyclic Citrullinated Peptide Antib* 10/17/2023 1.0  <7.0 U/mL Final    Negative    Erythrocyte Sedimentation Rate 10/17/2023 11  0 - 20 mm/hr Final    Rheumatoid Factor 10/17/2023 <6  <12 IU/mL Final    Hemoglobin A1C 10/17/2023 5.7 (H)  0.0 - 5.6 % Final    Normal <5.7%   Prediabetes 5.7-6.4%    Diabetes 6.5% or higher     Note: Adopted from ADA consensus guidelines.

## 2023-11-02 ENCOUNTER — HOSPITAL ENCOUNTER (OUTPATIENT)
Dept: CARDIOLOGY | Facility: CLINIC | Age: 23
Discharge: HOME | End: 2023-11-02
Attending: STUDENT IN AN ORGANIZED HEALTH CARE EDUCATION/TRAINING PROGRAM | Admitting: STUDENT IN AN ORGANIZED HEALTH CARE EDUCATION/TRAINING PROGRAM
Payer: COMMERCIAL

## 2023-11-02 DIAGNOSIS — R00.2 HEART PALPITATIONS: ICD-10-CM

## 2023-11-02 PROCEDURE — 93244 EXT ECG>48HR<7D REV&INTERPJ: CPT | Performed by: INTERNAL MEDICINE

## 2023-11-02 PROCEDURE — 93242 EXT ECG>48HR<7D RECORDING: CPT

## 2023-11-14 ENCOUNTER — HOSPITAL ENCOUNTER (EMERGENCY)
Facility: CLINIC | Age: 23
Discharge: HOME | End: 2023-11-14
Attending: EMERGENCY MEDICINE | Admitting: EMERGENCY MEDICINE
Payer: COMMERCIAL

## 2023-11-14 ENCOUNTER — APPOINTMENT (OUTPATIENT)
Dept: GENERAL RADIOLOGY | Facility: CLINIC | Age: 23
End: 2023-11-14
Attending: EMERGENCY MEDICINE
Payer: COMMERCIAL

## 2023-11-14 VITALS
HEART RATE: 104 BPM | BODY MASS INDEX: 45.99 KG/M2 | HEIGHT: 67 IN | DIASTOLIC BLOOD PRESSURE: 67 MMHG | SYSTOLIC BLOOD PRESSURE: 124 MMHG | TEMPERATURE: 97.6 F | RESPIRATION RATE: 21 BRPM | WEIGHT: 293 LBS | OXYGEN SATURATION: 98 %

## 2023-11-14 DIAGNOSIS — R07.9 CHEST PAIN, UNSPECIFIED TYPE: ICD-10-CM

## 2023-11-14 LAB
ANION GAP SERPL CALCULATED.3IONS-SCNC: 14 MMOL/L (ref 7–15)
BUN SERPL-MCNC: 12.3 MG/DL (ref 6–20)
CALCIUM SERPL-MCNC: 9.8 MG/DL (ref 8.6–10)
CHLORIDE SERPL-SCNC: 101 MMOL/L (ref 98–107)
CREAT SERPL-MCNC: 0.96 MG/DL (ref 0.51–0.95)
D DIMER PPP FEU-MCNC: 0.43 UG/ML FEU (ref 0–0.5)
DEPRECATED HCO3 PLAS-SCNC: 23 MMOL/L (ref 22–29)
EGFRCR SERPLBLD CKD-EPI 2021: 85 ML/MIN/1.73M2
GLUCOSE SERPL-MCNC: 96 MG/DL (ref 70–99)
POTASSIUM SERPL-SCNC: 3.7 MMOL/L (ref 3.4–5.3)
SODIUM SERPL-SCNC: 138 MMOL/L (ref 135–145)

## 2023-11-14 PROCEDURE — 71046 X-RAY EXAM CHEST 2 VIEWS: CPT

## 2023-11-14 PROCEDURE — 93005 ELECTROCARDIOGRAM TRACING: CPT | Performed by: EMERGENCY MEDICINE

## 2023-11-14 PROCEDURE — 36415 COLL VENOUS BLD VENIPUNCTURE: CPT | Performed by: EMERGENCY MEDICINE

## 2023-11-14 PROCEDURE — 85379 FIBRIN DEGRADATION QUANT: CPT | Performed by: EMERGENCY MEDICINE

## 2023-11-14 PROCEDURE — 99285 EMERGENCY DEPT VISIT HI MDM: CPT | Mod: 25 | Performed by: EMERGENCY MEDICINE

## 2023-11-14 PROCEDURE — 99284 EMERGENCY DEPT VISIT MOD MDM: CPT | Mod: 25 | Performed by: EMERGENCY MEDICINE

## 2023-11-14 PROCEDURE — 80048 BASIC METABOLIC PNL TOTAL CA: CPT | Performed by: EMERGENCY MEDICINE

## 2023-11-14 PROCEDURE — 93010 ELECTROCARDIOGRAM REPORT: CPT | Performed by: EMERGENCY MEDICINE

## 2023-11-14 ASSESSMENT — ACTIVITIES OF DAILY LIVING (ADL)
ADLS_ACUITY_SCORE: 35
ADLS_ACUITY_SCORE: 35

## 2023-11-14 NOTE — ED TRIAGE NOTES
Right chest stabbing chest pain started 1 hour ago     Triage Assessment (Adult)       Row Name 11/14/23 1443          Cardiac WDL    Cardiac WDL chest pain

## 2023-11-15 ENCOUNTER — PATIENT OUTREACH (OUTPATIENT)
Dept: CARE COORDINATION | Facility: CLINIC | Age: 23
End: 2023-11-15
Payer: COMMERCIAL

## 2023-11-15 NOTE — PROGRESS NOTES
Clinic Care Coordination Contact  Community Health Worker Initial Outreach    CHW Initial Information Gathering:  Referral Source: ED Follow-Up  CHW Additional Questions  If ED/Hospital discharge, follow-up appointment scheduled as recommended?: No  Patient agreeable to assistance with scheduling?: No  Patient declined (specify): Patient would like to schedule her follow up appointment using [employer]. CHW informed her if she's having any trouble finding an appointment soon enough, to call her clinic to schedue an emergency room follow up  MyChart active?: Yes    Patient accepts CC: No, patient declined at this time. Patient will be sent Care Coordination introduction letter for future reference.       Joelle Jalloh  Community Health Worker  [facility], [facility]    *Connected Care Resource Team does NOT follow patient ongoing. Referrals are identified based on internal discharge reports and the outreach is to ensure patient has an understanding of their discharge instructions.

## 2023-11-15 NOTE — DISCHARGE INSTRUCTIONS
Follow-up with your primary clinic as needed.    Return to the emergency department for any problems.

## 2023-11-15 NOTE — LETTER
Norma Collins  [address]  [place] [address]    Dear Norma Collins,      I am a team member within the [facility] with [facility]. I recently contacted you to ensure you were doing well following a recent visit within our health system. I also wanted to take this chance to introduce Clinic Care Coordination should you have any interest in this program in the future.     Below is a description of Clinic Care Coordination and how this team can further assist you:       The Clinic Care Coordination team is made up of a Registered Nurse, , Financial Resource Worker, and a Community Health Worker who understand and can help navigate the health care system. The goal of clinic care coordination is to help you manage your health, improve access to care, and achieve optimal health outcomes. They work alongside your provider to assist you in determining your health and social needs, obtain health care and community resources, and provide you with necessary information and education. Clinic Care Coordination can work with you through any barriers and develop a care plan that helps coordinate and strengthen the relationship between you and your care team.    If you wish to connect with the Clinic Care Coordination Team, please let your [facility] Primary Care Provider or Clinic Care Team know and they can place a referral. The Clinic Care Coordination team will then reach out by phone to further support you.    We are focused on providing you with the highest-quality healthcare experience possible.    Sincerely,   Your care team with [facility]

## 2023-11-28 NOTE — PROGRESS NOTES
[facility] Hematology and Oncology Consult Note    Patient: Norma Collins  MRN: [id number]  Date of Service: 09/24/2019      Reason for Visit    Chief Complaint   Patient presents with     Benign Hematology     Referred by Galina Leung MD regarding elevated ferritin    Assessment/Plan    ECOG Performance   ECOG Performance Status: 0  Distress Assessment  Distress Assessment Score: 3(visit today)    #. Mildly elevated ferritin   Based on her transferrin saturation, she does not have any evidence of iron overload.  HFE gene analysis was normal.  Based on that, she does not have hemochromatosis or iron overload iron study and mildly elevated ferritin  suggest inflammation of some sort or representing acute phase reactant.   No additional hematology work-up needed.  I again reassured her that this degree of ferritin elevation is more suggestive of secondary effect rather than primary iron metabolic syndrome and risks of iron deposition in organs is not likely.    Follow-up with me as needed.  She is advised to call me with any questions or concerns.    Problem List    1. Elevated ferritin       ______________________________________________________________________________    History    Norma Collins is a very pleasant 19-year-old girl accompanied by her mother's friend, Julissa.    She was found a slightly elevated ferritin level on evaluation of family history of hemochromatosis in her father.  Patient reported that her father was diagnosed with cirrhosis and within a few months, he had liver failure waiting for liver transplant.  Etiology was found to be hemochromatosis.  She completed hemochromatosis mutation analysis on 9/5/2019 and it was completely normal at C282Y, H63D, S605C.  Serum iron level was normal, transferrin saturation was 10% at [facility] on 9/12/2019.  She wants to clarify whether she has hemochromatosis she is currently dealing with chronic abdominal pain and  planning.    For upper and lower endoscopy in October.  She also has history of pelvic pain and ovarian cyst rupture.  She has stopped taking birth control pill due to slightly high ferritin.  She has not have menstrual cycle yet.      She is single.  She used to smoke vapor for about 2 years but she quit smoking.  Does not drink alcohol.        Past History    Past Medical History:   Diagnosis Date     Acanthosis nigricans      Adenotonsillar hypertrophy      Arthropathy of ankle/foot      Obesity      PCOS (polycystic ovarian syndrome)     Family History   Problem Relation Age of Onset     Cancer Mother         colon     Venous thrombosis Mother         cancer-related     Stroke Paternal Grandmother       Past Surgical History:   Procedure Laterality Date     CHOLECYSTECTOMY       none       TONSILLECTOMY AND ADENOIDECTOMY Bilateral 2/16/2017    Procedure: BILATERAL TONSILLECTOMY AND ADENOIDECTOMY;  Surgeon: Jacob Arguello MD;  Location: [facility];  Service:     Social History     Socioeconomic History     Marital status: Single     Spouse name: Not on file     Number of children: Not on file     Years of education: Not on file     Highest education level: Not on file   Occupational History     Not on file   Social Needs     Financial resource strain: Not on file     Food insecurity:     Worry: Not on file     Inability: Not on file     Transportation needs:     Medical: Not on file     Non-medical: Not on file   Tobacco Use     Smoking status: Former Smoker     Packs/day: 0.00     Years: 2.00     Pack years: 0.00     Smokeless tobacco: Current User     Tobacco comment: occas. e-cigarette   Substance and Sexual Activity     Alcohol use: No     Drug use: No     Sexual activity: Never     Birth control/protection: Pill   Lifestyle     Physical activity:     Days per week: Not on file     Minutes per session: Not on file     Stress: Not on file   Relationships     Social connections:     Talks on  phone: Not on file     Gets together: Not on file     Attends Anabaptist service: Not on file     Active member of club or organization: Not on file     Attends meetings of clubs or organizations: Not on file     Relationship status: Not on file     Intimate partner violence:     Fear of current or ex partner: Not on file     Emotionally abused: Not on file     Physically abused: Not on file     Forced sexual activity: Not on file   Other Topics Concern     Not on file   Social History Narrative    Lives with father.  4 siblings including twin brother and 3 sisters.  Works at [employer].        Allergies    Allergies   Allergen Reactions     Other Food Allergy Rash     Colver sprouts       Review of Systems    General  General (WDL): All general elements are within defined limits  ENT  ENT (WDL): Exceptions to WDL  Changes in vision: None  Glasses or Contacts: None  Hearing loss: None  Hearing Aids: None  Tinnitus: None  Pain/Pressure in ears: None  Sinus Congestion/Drainage: Yes - Chronic (Greater than 3 months)  Hoarseness: None  Sore Throat: None  Dental Problems: None  Dentures: None  Respiratory  Respiratory (WDL): All respiratory elements are within defined limits  Cardiovascular  Cardiovascular (WDL): All cardiovascular elements are within defined limits  Endocrine  Endocrine (WDL): All endocrine elements are within defined limits  Gastrointestinal  Gastrointestinal (WDL): Exceptions to WDL  Difficulty Swallowing: None  Heartburn: None  Constipation: None  Yellowish skin and/or eyes: None  Blood from rectum: None  Nausea and Vomiting: Yes - Chronic (Greater than 3 months)  Abdominal Pain: Yes - Chronic (Greater than 3 months)  Diarrhea: Yes - Chronic (Greater than 3 months)  Have had black or tan stools?: None  Hemorrhoids: None  Poor Appetite: Yes- Chronic (Greater than 3 months)  Musculoskeletal  Musculoskeletal (WDL): Exceptions to WDL  Range of Motion Limitation: None  Joint pain: None  Back Pain: Yes -  Chronic (Greater than 3 months)  Activity Assistance: None  Difficulty to lie flat for more than 30 minutes: None  Pain interfering with walking: None  Muscle pain or stiffness: None  Recent fall: None  Assistive device: None  Neurological  Neurological (WDL): All neurological elements are within defined limits  Dominant Hand: Right  Psychological/Emotional  Psychological/Emotional (WDL): All psychological/emotional elements are within defined limits  Hematological/Lymphatic  Hematological/Lymphatic (WDL): All hematological/lymphatic elements are within defined limits  Dermatological  Dermatologic (WDL): All dermatological elements are within defined limits  Genitourinary/Reproductive  Genitourinary/Reproductive (WDL): All genitourinary/reproductive elements are within defined limits  Reproductive (Females only)  Menstrual irritation or increase in discharge: No  Age at start of periods: 11/12 years old  Last menstural cycle: Sept. 10, 2019  Number of pregnancies: 0  Age at first pregnancy: n/a  Patient Pregnant?: No  Is the patient trying to get pregnant?: No  Is the patient on birth control: No(was but  had her stop because of the high iron)  Pain  Currently in Pain: Yes  Pain Score (Initial OR Reassessment): 3  Pain Frequency: Constant/continuous  Location: abdomen(upper abdomen radiates around to lower back)  Pain Characteristics : Stabbing  Pain Intervention(s): Home medication  Response to Interventions: improved    Physical Exam    Recent Vitals 9/24/2019   Height -   Weight 307 lbs 11 oz   BSA (m2) 2.55 m2   /58   Pulse 71   Temp 98.4   Temp src 1   SpO2 93   Some recent data might be hidden     General: alert, awake, not in acute distress. Obese female.  HEENT: Head: Normal, normocephalic, atraumatic.  Eye: Normal external eye, conjunctiva, lids cornea, LAURYN.  Ears:  Non-tender.  Nose: Normal external nose, mucus membranes and septum.  Pharynx: Dental Hygiene adequate. Normal buccal mucosa. Normal  pharynx.  Neck / Thyroid: Supple, no masses, nodes, nodules or enlargement.  Lymphatics: No abnormally enlarged lymph nodes.  Chest: Normal chest wall and respirations. Clear to auscultation.  Heart: S1 S2 RRR, no murmur.   Abdomen: abdomen is soft without significant tenderness, masses, organomegaly or guarding  Extremities: normal strength, tone, and muscle mass  Skin: normal. no rash or abnormalities  CNS: non focal.    Lab Results    No results found for this or any previous visit (from the past 168 hour(s)).    Imaging Results    Us Pelvis With Transvaginal Non Ob    Result Date: 9/9/2019  EXAM: US PELVIS WITH TRANSVAGINAL NON OB LOCATION: [facility] DATE/TIME: 9/9/2019 10:48 AM INDICATION: 3rd ED visit for abd pain, ovarian cyst COMPARISON: 09/06/2019 TECHNIQUE: Transabdominal scans were performed. Endovaginal ultrasound was performed to better visualize the adnexa. FINDINGS: Uterus measures 7 x 4 x 5 cm. Normal uterus with no masses. Endometrial thickness is 8 mm. Normal smooth endometrium.  Right ovary measures 2.1 x 1.7 x 1.7 cm. Normal right ovary. Normal arterial duplex. Left ovary measures 2.8 x 2.5 x 2 cm. Normal left ovary. Normal arterial duplex. No significant free fluid in the cul-de-sac.     CONCLUSION: 1.  Normal ultrasound of the pelvis.    Ct Abdomen Pelvis Without Oral With Iv Contrast    Result Date: 9/6/2019  EXAM: CT ABDOMEN PELVIS WO ORAL W IV CONTRAST LOCATION: [facility] DATE/TIME: 9/6/2019 7:21 PM INDICATION: Abdominal pain, acute, nonlocalized diffuse abd pain. COMPARISON: Ultrasound 10/16/2017, no prior CT. TECHNIQUE: Helical enhanced thin-section CT scan of the abdomen and pelvis was performed following injection of IV contrast. Multiplanar reformats were obtained. Dose reduction techniques were used. CONTRAST: Iohexol (Omni) 100 mL. FINDINGS: LUNG BASES: Mild fibroatelectasis with possible superimposed infiltrate left lower lobe. ABDOMEN: Cholecystectomy. Hepatic  steatosis. Spleen, adrenal glands, kidneys, and pancreas unremarkable. No aortic aneurysm. No adenopathy. PELVIS: 3.6 x 2.9 x 4.0 cm left adnexal cystic lesion. Normal appendix. No bowel obstruction. Pelvic organs otherwise unremarkable. MUSCULOSKELETAL: Degenerative disease.     CONCLUSION: 1.  4.0 x 3.6 x 2.9 cm cystic left adnexal lesion. See guidelines below. 2.  Mild fibroatelectasis with equivocal infiltrate left lower lobe consistent with pneumonia in the appropriate clinical setting although possibly just more prominent atelectasis. 3.  Hepatic steatosis. REFERENCE: Guidelines for incidental benign or probably benign adnexal cyst on CT/MRI. Managing Incidental Findings on Abdominal and Pelvic CT and MRI, Part 1: White Paper of the ACR Incidental Findings Committee II on Adnexal Findings. J Am Ross Radiol 2013;10:675-681. Premenopausal: Less than 5 cm: Benign, no follow up. Greater than 5 cm: US follow up in 8 weeks.     Us Pelvis Non Ob    Result Date: 9/6/2019  EXAM: US PELVIS NON OB LOCATION: [facility] DATE/TIME: 9/6/2019 8:45 PM INDICATION: Acute diffuse abdominal pain with cystic lesion noted on CT within the left adnexa. History of PCOS. COMPARISON: None. TECHNIQUE: Transabdominal scans were performed. FINDINGS: Uterus measures 7.3 x 4.6 x 4.0 cm. Unremarkable uterus although not well seen as patient refused endovaginal scanning and bladder was not well distended. Endometrial thickness is 9 mm. No definite abnormality although poorly seen as patient refused endovaginal scanning and bladder was not distended.  Right ovary measures 2.9 x 2.4 x 2.0 cm. Normal right ovary. Normal arterial duplex. Left ovary measures 5.7 x 4.7 x 5.4 cm. 3.7 x 4.0 x 3.3 cm lesion not well delineated due to poor acoustic window and patient's refusal of endovaginal scanning. No significant free fluid in the cul-de-sac.     CONCLUSION: 1.  Left adnexal lesion not well characterized as noted above. See guidelines below.  REFERENCE: Management of Asymptomatic Ovarian and Other Adnexal Cysts Imaged at US: Society of Radiologists in Ultrasound Consensus Conference Statement. Radiology September 2010; [id number]. PROBABLY BENIGN CYSTS: (single thin (<3mm) septum, calcification in cyst wall, not classic to be described as hemorrhagic, endometrioma, or dermoid cyst). Premenopausal/early post-menopausal (< 5 years from LMP): </=7 cm: generally benign. Follow up in 6-12 weeks, >7 cm: recommend further evaluation with MRI or surgical evaluation Late post menopausal woman (>5 years from LMP): </=1 cm: benign; no follow up needed, >1 cm: follow up at 6-12 weeks or consider surgical evaluation.      Signed by: Franklyn Alvarez MD   None

## 2023-12-08 ENCOUNTER — OFFICE VISIT (OUTPATIENT)
Dept: FAMILY MEDICINE | Facility: CLINIC | Age: 23
End: 2023-12-08
Payer: COMMERCIAL

## 2023-12-08 VITALS
HEIGHT: 67 IN | BODY MASS INDEX: 45.99 KG/M2 | HEART RATE: 111 BPM | WEIGHT: 293 LBS | SYSTOLIC BLOOD PRESSURE: 122 MMHG | TEMPERATURE: 97.9 F | DIASTOLIC BLOOD PRESSURE: 89 MMHG | OXYGEN SATURATION: 96 % | RESPIRATION RATE: 16 BRPM

## 2023-12-08 DIAGNOSIS — Z01.818 PREOP GENERAL PHYSICAL EXAM: Primary | ICD-10-CM

## 2023-12-08 DIAGNOSIS — K52.9 ILEITIS: ICD-10-CM

## 2023-12-08 DIAGNOSIS — N64.4 BREAST PAIN, LEFT: ICD-10-CM

## 2023-12-08 LAB — PROLACTIN SERPL 3RD IS-MCNC: 14 NG/ML (ref 5–23)

## 2023-12-08 PROCEDURE — 84146 ASSAY OF PROLACTIN: CPT | Performed by: STUDENT IN AN ORGANIZED HEALTH CARE EDUCATION/TRAINING PROGRAM

## 2023-12-08 PROCEDURE — 36415 COLL VENOUS BLD VENIPUNCTURE: CPT | Performed by: STUDENT IN AN ORGANIZED HEALTH CARE EDUCATION/TRAINING PROGRAM

## 2023-12-08 PROCEDURE — 99213 OFFICE O/P EST LOW 20 MIN: CPT | Performed by: STUDENT IN AN ORGANIZED HEALTH CARE EDUCATION/TRAINING PROGRAM

## 2023-12-08 ASSESSMENT — PAIN SCALES - GENERAL: PAINLEVEL: NO PAIN (0)

## 2023-12-08 NOTE — PROGRESS NOTES
[facility]  [address] [place] [id number]  Phone: [phone]  Fax: [phone]  Primary Provider: Nathaly Erazo  Pre-op Performing Provider: KVNG PULIDO      PREOPERATIVE EVALUATION:  Today's date: 12/8/2023    Norma is a 23 year old, presenting for the following:  Pre-Op Exam        12/8/2023     9:38 AM   Additional Questions   Roomed by Ragini       Surgical Information:  Surgery/Procedure:   ESOPHAGOGASTRODUODENOSCOPY WITH N/A MAC   COLONOSCOPY       Surgery Location: [facility]  Surgeon: Dr Bailey  Surgery Date: 1/04/2024  Time of Surgery:   Where patient plans to recover: At home with family  Fax number for surgical facility: Note does not need to be faxed, will be available electronically in Epic.    Assessment & Plan     The proposed surgical procedure is considered LOW risk.    Preop general physical exam  Ileitis  Patient is a pleasant 23 year old who presents today for preop evaluation prior to above listed procedure. She has history of aphthous ulcer of the ileum/ileitis, heartburn. She is scheduled to have scope evaluation.     Breast pain, left  Unrelated to above listed procedure, patient has continued chest pains that radiate into her breasts, usually the left. No new breast masses. We opted for below evaluation today as a cause of her pain has not yet been found. Has had cardiac workup including multiple EKGs, Ziopatch without abnormal rhythms.  - US Breast Left Limited 1-3 Quadrants  - Prolactin       - No identified additional risk factors other than previously addressed    Antiplatelet or Anticoagulation Medication Instructions:   - Patient is on no antiplatelet or anticoagulation medications.    Additional Medication Instructions:   - sucralfate: HOLD day of surgery.   - ibuprofen (Advil, Motrin): HOLD 1 day before surgery.    - SSRIs, SNRIs, TCAs, Antipsychotics: Continue without modification.     RECOMMENDATION:  APPROVAL GIVEN to  proceed with proposed procedure, without further diagnostic evaluation.    I spent a total of 22 minutes on the day of the visit.   Time spent by me doing chart review, history and exam, documentation and further activities per the note    Subjective       HPI related to upcoming procedure:   Intermittent chest pain unchanged.         12/8/2023     8:58 AM   Preop Questions   1. Have you ever had a heart attack or stroke? No   2. Have you ever had surgery on your heart or blood vessels, such as a stent placement, a coronary artery bypass, or surgery on an artery in your head, neck, heart, or legs? No   3. Do you have chest pain with activity? UNKNOWN - intermittent known chest pain, no known cardiac cause after significant workup.    4. Do you have a history of  heart failure? No   5. Do you currently have a cold, bronchitis or symptoms of other infection? No   6. Do you have a cough, shortness of breath, or wheezing? No   7. Do you or anyone in your family have previous history of blood clots? YES - thinks mom has had them before while during chemo, possibly that dad had as well. All related to their illnesses at the time.    8. Do you or does anyone in your family have a serious bleeding problem such as prolonged bleeding following surgeries or cuts? No   9. Have you ever had problems with anemia or been told to take iron pills? No   10. Have you had any abnormal blood loss such as black, tarry or bloody stools, or abnormal vaginal bleeding? No   11. Have you ever had a blood transfusion? No   12. Are you willing to have a blood transfusion if it is medically needed before, during, or after your surgery? Yes   13. Have you or any of your relatives ever had problems with anesthesia? No   14. Do you have sleep apnea, excessive snoring or daytime drowsiness? No   15. Do you have any artifical heart valves or other implanted medical devices like a pacemaker, defibrillator, or continuous glucose monitor? No   16. Do  you have artificial joints? No   17. Are you allergic to latex? No   18. Is there any chance that you may be pregnant? No       With chest pain, will often have pain that shoots through the breast, usually lefft sometimes rright.   Sometimes pain without chest pain.   These are lasting maximum of 5 minutes.   Gets a lot of zits on breasts.   No nipple discharge or new masses.     Preoperative Review of :   reviewed - no record of controlled substances prescribed.      Review of Systems  Constitutional, neuro, ENT, endocrine, pulmonary, cardiac, gastrointestinal, genitourinary, musculoskeletal, integument and psychiatric systems are negative, except as otherwise noted.    Patient Active Problem List    Diagnosis Date Noted    Prediabetes 10/31/2023     Priority: Medium    Abnormal uterine bleeding due to endometrial polyp 08/24/2022     Priority: Medium    Change in bowel habit 08/23/2022     Priority: Medium    Ileitis 08/23/2022     Priority: Medium    Family history of colon cancer 07/08/2021     Priority: Medium     Mother at age 40      Family history of hemochromatosis 07/08/2021     Priority: Medium     Father and Grandfather      Acanthosis nigricans 12/08/2020     Priority: Medium     Created by Conversion      Last Assessment & Plan:   Patient doing very well and adding exercise, and watching diet closely.  We'll now add metformin as per orders.  Side effects and precautions discussed.  Follow up in one month.      Arthropathy of ankle and foot 12/08/2020     Priority: Medium     Created by Conversion    Replacement Utility updated for latest IMO load    Last Assessment & Plan:   With diffuse joint aches treating with progressive weight loss and lifestyle modifications.      Iron deficiency anemia due to chronic blood loss 11/22/2019     Priority: Medium    Aphthous ulcer of ileum 10/30/2019     Priority: Medium    Elevated BP without diagnosis of hypertension 10/16/2017     Priority: Medium     "Adenotonsillar hypertrophy 01/04/2017     Priority: Medium    PCOS (polycystic ovarian syndrome) 02/29/2016     Priority: Medium     Last Assessment & Plan:   Her dietary changes are working very well.  Also working very well for the household.  She is exercising more, having more energy, and pants are fitting looser.  We'll plan for in body at next visit along with remeasurement of abdominal and neck circumference.  Also with her forgetting the metformin in the morning, will move it to the afternoon with lunch.      Class 3 severe obesity due to excess calories without serious comorbidity with body mass index (BMI) of 50.0 to 59.9 in adult (H) 10/30/2015     Priority: Medium     Last Assessment & Plan:   Continue lifestyle modifications.  Will breakfast a [place] protein-based with shakes or aches.  May also use chicken or steak.  Stopped carbohydrates in the morning.  Better food choices discussed.  Encouraged to watch the documentary, \"In defense of food\" before next visit.        Past Medical History:   Diagnosis Date    Abnormal uterine bleeding due to endometrial polyp     Acanthosis nigricans     Adenotonsillar hypertrophy     Aphthous ulcer of ileum     Arthropathy of ankle and foot     Family history of hemochromatosis     Iron deficiency anemia due to chronic blood loss     Obesity     PCOS (polycystic ovarian syndrome)      Past Surgical History:   Procedure Laterality Date    CHOLECYSTECTOMY      HYSTEROSCOPY DIAGNOSTIC N/A 10/14/2022    Procedure: HYSTEROSCOPY WITH POLYPECTOMY;  Surgeon: Aston Yanes MD;  [facility]    LAPAROSCOPIC CHOLECYSTECTOMY      OTHER SURGICAL HISTORY      none    TONSILLECTOMY      TONSILLECTOMY & ADENOIDECTOMY Bilateral 2/16/2017    Procedure: BILATERAL TONSILLECTOMY AND ADENOIDECTOMY;  Surgeon: Jacob Arguello MD;  Location: [facility] OR;  Service:      Current Outpatient Medications   Medication Sig Dispense Refill    buPROPion " (WELLBUTRIN XL) 150 MG 24 hr tablet Take 1 tablet (150 mg) by mouth every morning 90 tablet 3    cyclobenzaprine (FLEXERIL) 10 MG tablet Take 1 tablet (10 mg) by mouth 3 times daily as needed for muscle spasms 45 tablet 1    diclofenac (VOLTAREN) 1 % topical gel Apply 2 g topically 4 times daily Over sternum 150 g 1    famotidine (PEPCID) 20 MG tablet Take 1 tablet (20 mg) by mouth 2 times daily 180 tablet 0    hydrOXYzine (VISTARIL) 25 MG capsule Take 1-2 capsules (25-50 mg) by mouth 3 times daily as needed for anxiety 45 capsule 4    ibuprofen (ADVIL/MOTRIN) 200 MG tablet Take 400 mg by mouth every 4 hours as needed for pain      omeprazole (PRILOSEC) 20 MG DR capsule Take 1 capsule (20 mg) by mouth 2 times daily 180 capsule 3    prochlorperazine (COMPAZINE) 10 MG tablet Take 1 tablet (10 mg) by mouth every 6 hours as needed for nausea or vomiting 45 tablet 0    senna-docusate (SENOKOT-S/PERICOLACE) 8.6-50 MG tablet Take 1-2 tablets by mouth 2 times daily as needed for constipation 60 tablet 3    sucralfate (CARAFATE) 1 GM tablet Take 1 tablet (1 g) by mouth 4 times daily Pt takes  tablet 0       Allergies   Allergen Reactions    Lakeland Sprouts [Brassica Oleracea]     Keflex [Cephalexin] Itching    Other Food Allergy Rash     Lakeland sprouts        Social History     Tobacco Use    Smoking status: Former     Types: Vaping Device    Smokeless tobacco: Never   Substance Use Topics    Alcohol use: Yes     Comment: occ     Family History   Problem Relation Age of Onset    Colon Cancer Mother 43    Cancer Mother         colon    Venous thrombosis Mother         cancer-related    Cerebrovascular Disease Mother     Liver Disease Father         cirrhosis of liver    Other - See Comments Father         high iron    Cirrhosis Father     Hemochromatosis Father     Hypertension Father     Hyperlipidemia Father     Myocardial Infarction Maternal Grandmother     Heart Disease Maternal Grandmother     Dementia Maternal  "Grandmother     Cerebrovascular Disease Paternal Grandmother     Other - See Comments Maternal Grandfather         heart attack or cancer/unsure    Bladder Cancer Maternal Grandfather     Cirrhosis Paternal Grandfather     Other - See Comments Paternal Grandfather         high iron    Diabetes Paternal Grandfather     Hemochromatosis Paternal Grandfather     Kidney Disease Paternal Grandfather     Hypertension Paternal Grandfather     Colon Cancer Maternal Great-Grandmother         70-90 years old     Uterine Cancer Maternal Great-Grandmother     Breast Cancer Maternal Great-Grandmother     Brain Cancer Maternal Aunt      History   Drug Use Unknown         Objective     /89 (BP Location: Right arm)   Pulse 111   Temp 97.9  F (36.6  C) (Tympanic)   Resp 16   Ht 1.702 m (5' 7\")   Wt (!) 162.4 kg (358 lb)   LMP 11/14/2023   SpO2 96%   BMI 56.07 kg/m      Physical Exam    GENERAL APPEARANCE: healthy, alert and no distress     EYES: EOMI, corrective lenses in place     HENT: ear canals and TM's normal and nose and mouth without ulcers or lesions     NECK: no adenopathy, no asymmetry, masses, or scars     RESP: lungs clear to auscultation - no rales, rhonchi or wheezes     CV: regular rates and rhythm, normal S1 S2, no S3 or S4 and no murmur, click or rub     ABDOMEN:  soft, nontender, no HSM or masses and bowel sounds normal     MS: walking boot in place right lower extremity. 1+ pitting edema right lower extremity to mid-shin.      SKIN: no suspicious lesions or rashes     NEURO: Normal strength and tone, sensory exam grossly normal, mentation intact and speech normal     PSYCH: mentation appears normal. and affect normal/bright     LYMPHATICS: No cervical adenopathy    Recent Labs   Lab Test 11/14/23  1905 10/17/23  1012 09/16/23  1704 09/12/23  1044 02/09/23  1931 11/08/22  1427 10/04/22  1426 12/09/21  0746   HGB  --   --  13.7 13.3   < >  --    < > 12.3   PLT  --   --  299 260   < >  --    < > 250 "   INR  --   --   --   --   --   --   --  0.96     --  137 138   < >  --    < > 142   POTASSIUM 3.7  --  4.4 4.8   < >  --    < > 4.2   CR 0.96*  --  0.93 0.84   < >  --    < > 0.86   A1C  --  5.7*  --   --   --  5.5  --   --     < > = values in this interval not displayed.        Diagnostics:  No labs were ordered during this visit.   No EKG required, no history of coronary heart disease, significant arrhythmia, peripheral arterial disease or other structural heart disease.    Revised Cardiac Risk Index (RCRI):  The patient has the following serious cardiovascular risks for perioperative complications:   - No serious cardiac risks = 0 points     RCRI Interpretation: 0 points: Class I (very low risk - 0.4% complication rate)       Signed Electronically by: Denise Ramirez MD  Copy of this evaluation report is provided to requesting physician.

## 2023-12-08 NOTE — H&P (VIEW-ONLY)
[facility]  [address] [place] [address]  Phone: [phone]  Fax: [phone]  Primary Provider: Nathaly Erazo  Pre-op Performing Provider: KVNG PULIDO      PREOPERATIVE EVALUATION:  Today's date: 12/8/2023    Norma is a 23 year old, presenting for the following:  Pre-Op Exam        12/8/2023     9:38 AM   Additional Questions   Roomed by Ragini       Surgical Information:  Surgery/Procedure:   ESOPHAGOGASTRODUODENOSCOPY WITH N/A MAC   COLONOSCOPY       Surgery Location: [facility]  Surgeon: Dr Bailey  Surgery Date: 1/04/2024  Time of Surgery:   Where patient plans to recover: At home with family  Fax number for surgical facility: Note does not need to be faxed, will be available electronically in Epic.    Assessment & Plan     The proposed surgical procedure is considered LOW risk.    Preop general physical exam  Ileitis  Patient is a pleasant 23 year old who presents today for preop evaluation prior to above listed procedure. She has history of aphthous ulcer of the ileum/ileitis, heartburn. She is scheduled to have scope evaluation.     Breast pain, left  Unrelated to above listed procedure, patient has continued chest pains that radiate into her breasts, usually the left. No new breast masses. We opted for below evaluation today as a cause of her pain has not yet been found. Has had cardiac workup including multiple EKGs, Ziopatch without abnormal rhythms.  - US Breast Left Limited 1-3 Quadrants  - Prolactin       - No identified additional risk factors other than previously addressed    Antiplatelet or Anticoagulation Medication Instructions:   - Patient is on no antiplatelet or anticoagulation medications.    Additional Medication Instructions:   - sucralfate: HOLD day of surgery.   - ibuprofen (Advil, Motrin): HOLD 1 day before surgery.    - SSRIs, SNRIs, TCAs, Antipsychotics: Continue without modification.     RECOMMENDATION:  APPROVAL GIVEN to  proceed with proposed procedure, without further diagnostic evaluation.    I spent a total of 22 minutes on the day of the visit.   Time spent by me doing chart review, history and exam, documentation and further activities per the note    Subjective       HPI related to upcoming procedure:   Intermittent chest pain unchanged.         12/8/2023     8:58 AM   Preop Questions   1. Have you ever had a heart attack or stroke? No   2. Have you ever had surgery on your heart or blood vessels, such as a stent placement, a coronary artery bypass, or surgery on an artery in your head, neck, heart, or legs? No   3. Do you have chest pain with activity? UNKNOWN - intermittent known chest pain, no known cardiac cause after significant workup.    4. Do you have a history of  heart failure? No   5. Do you currently have a cold, bronchitis or symptoms of other infection? No   6. Do you have a cough, shortness of breath, or wheezing? No   7. Do you or anyone in your family have previous history of blood clots? YES - thinks mom has had them before while during chemo, possibly that dad had as well. All related to their illnesses at the time.    8. Do you or does anyone in your family have a serious bleeding problem such as prolonged bleeding following surgeries or cuts? No   9. Have you ever had problems with anemia or been told to take iron pills? No   10. Have you had any abnormal blood loss such as black, tarry or bloody stools, or abnormal vaginal bleeding? No   11. Have you ever had a blood transfusion? No   12. Are you willing to have a blood transfusion if it is medically needed before, during, or after your surgery? Yes   13. Have you or any of your relatives ever had problems with anesthesia? No   14. Do you have sleep apnea, excessive snoring or daytime drowsiness? No   15. Do you have any artifical heart valves or other implanted medical devices like a pacemaker, defibrillator, or continuous glucose monitor? No   16. Do  you have artificial joints? No   17. Are you allergic to latex? No   18. Is there any chance that you may be pregnant? No       With chest pain, will often have pain that shoots through the breast, usually lefft sometimes rright.   Sometimes pain without chest pain.   These are lasting maximum of 5 minutes.   Gets a lot of zits on breasts.   No nipple discharge or new masses.     Preoperative Review of :   reviewed - no record of controlled substances prescribed.      Review of Systems  Constitutional, neuro, ENT, endocrine, pulmonary, cardiac, gastrointestinal, genitourinary, musculoskeletal, integument and psychiatric systems are negative, except as otherwise noted.    Patient Active Problem List    Diagnosis Date Noted    Prediabetes 10/31/2023     Priority: Medium    Abnormal uterine bleeding due to endometrial polyp 08/24/2022     Priority: Medium    Change in bowel habit 08/23/2022     Priority: Medium    Ileitis 08/23/2022     Priority: Medium    Family history of colon cancer 07/08/2021     Priority: Medium     Mother at age 40      Family history of hemochromatosis 07/08/2021     Priority: Medium     Father and Grandfather      Acanthosis nigricans 12/08/2020     Priority: Medium     Created by Conversion      Last Assessment & Plan:   Patient doing very well and adding exercise, and watching diet closely.  We'll now add metformin as per orders.  Side effects and precautions discussed.  Follow up in one month.      Arthropathy of ankle and foot 12/08/2020     Priority: Medium     Created by Conversion    Replacement Utility updated for latest IMO load    Last Assessment & Plan:   With diffuse joint aches treating with progressive weight loss and lifestyle modifications.      Iron deficiency anemia due to chronic blood loss 11/22/2019     Priority: Medium    Aphthous ulcer of ileum 10/30/2019     Priority: Medium    Elevated BP without diagnosis of hypertension 10/16/2017     Priority: Medium     "Adenotonsillar hypertrophy 01/04/2017     Priority: Medium    PCOS (polycystic ovarian syndrome) 02/29/2016     Priority: Medium     Last Assessment & Plan:   Her dietary changes are working very well.  Also working very well for the household.  She is exercising more, having more energy, and pants are fitting looser.  We'll plan for in body at next visit along with remeasurement of abdominal and neck circumference.  Also with her forgetting the metformin in the morning, will move it to the afternoon with lunch.      Class 3 severe obesity due to excess calories without serious comorbidity with body mass index (BMI) of 50.0 to 59.9 in adult (H) 10/30/2015     Priority: Medium     Last Assessment & Plan:   Continue lifestyle modifications.  Will breakfast a [place] protein-based with shakes or aches.  May also use chicken or steak.  Stopped carbohydrates in the morning.  Better food choices discussed.  Encouraged to watch the documentary, \"In defense of food\" before next visit.        Past Medical History:   Diagnosis Date    Abnormal uterine bleeding due to endometrial polyp     Acanthosis nigricans     Adenotonsillar hypertrophy     Aphthous ulcer of ileum     Arthropathy of ankle and foot     Family history of hemochromatosis     Iron deficiency anemia due to chronic blood loss     Obesity     PCOS (polycystic ovarian syndrome)      Past Surgical History:   Procedure Laterality Date    CHOLECYSTECTOMY      HYSTEROSCOPY DIAGNOSTIC N/A 10/14/2022    Procedure: HYSTEROSCOPY WITH POLYPECTOMY;  Surgeon: Aston Yanes MD;  [facility]    LAPAROSCOPIC CHOLECYSTECTOMY      OTHER SURGICAL HISTORY      none    TONSILLECTOMY      TONSILLECTOMY & ADENOIDECTOMY Bilateral 2/16/2017    Procedure: BILATERAL TONSILLECTOMY AND ADENOIDECTOMY;  Surgeon: Jacob Arguello MD;  [facility];  Service:      Current Outpatient Medications   Medication Sig Dispense Refill    buPROPion " (WELLBUTRIN XL) 150 MG 24 hr tablet Take 1 tablet (150 mg) by mouth every morning 90 tablet 3    cyclobenzaprine (FLEXERIL) 10 MG tablet Take 1 tablet (10 mg) by mouth 3 times daily as needed for muscle spasms 45 tablet 1    diclofenac (VOLTAREN) 1 % topical gel Apply 2 g topically 4 times daily Over sternum 150 g 1    famotidine (PEPCID) 20 MG tablet Take 1 tablet (20 mg) by mouth 2 times daily 180 tablet 0    hydrOXYzine (VISTARIL) 25 MG capsule Take 1-2 capsules (25-50 mg) by mouth 3 times daily as needed for anxiety 45 capsule 4    ibuprofen (ADVIL/MOTRIN) 200 MG tablet Take 400 mg by mouth every 4 hours as needed for pain      omeprazole (PRILOSEC) 20 MG DR capsule Take 1 capsule (20 mg) by mouth 2 times daily 180 capsule 3    prochlorperazine (COMPAZINE) 10 MG tablet Take 1 tablet (10 mg) by mouth every 6 hours as needed for nausea or vomiting 45 tablet 0    senna-docusate (SENOKOT-S/PERICOLACE) 8.6-50 MG tablet Take 1-2 tablets by mouth 2 times daily as needed for constipation 60 tablet 3    sucralfate (CARAFATE) 1 GM tablet Take 1 tablet (1 g) by mouth 4 times daily Pt takes  tablet 0       Allergies   Allergen Reactions    [place] Sprouts [Brassica Oleracea]     Keflex [Cephalexin] Itching    Other Food Allergy Rash     [place] sprouts        Social History     Tobacco Use    Smoking status: Former     Types: Vaping Device    Smokeless tobacco: Never   Substance Use Topics    Alcohol use: Yes     Comment: occ     Family History   Problem Relation Age of Onset    Colon Cancer Mother 43    Cancer Mother         colon    Venous thrombosis Mother         cancer-related    Cerebrovascular Disease Mother     Liver Disease Father         cirrhosis of liver    Other - See Comments Father         high iron    Cirrhosis Father     Hemochromatosis Father     Hypertension Father     Hyperlipidemia Father     Myocardial Infarction Maternal Grandmother     Heart Disease Maternal Grandmother     Dementia Maternal  "Grandmother     Cerebrovascular Disease Paternal Grandmother     Other - See Comments Maternal Grandfather         heart attack or cancer/unsure    Bladder Cancer Maternal Grandfather     Cirrhosis Paternal Grandfather     Other - See Comments Paternal Grandfather         high iron    Diabetes Paternal Grandfather     Hemochromatosis Paternal Grandfather     Kidney Disease Paternal Grandfather     Hypertension Paternal Grandfather     Colon Cancer Maternal Great-Grandmother         70-90 years old     Uterine Cancer Maternal Great-Grandmother     Breast Cancer Maternal Great-Grandmother     Brain Cancer Maternal Aunt      History   Drug Use Unknown         Objective     /89 (BP Location: Right arm)   Pulse 111   Temp 97.9  F (36.6  C) (Tympanic)   Resp 16   Ht 1.702 m (5' 7\")   Wt (!) 162.4 kg (358 lb)   LMP 11/14/2023   SpO2 96%   BMI 56.07 kg/m      Physical Exam    GENERAL APPEARANCE: healthy, alert and no distress     EYES: EOMI, corrective lenses in place     HENT: ear canals and TM's normal and nose and mouth without ulcers or lesions     NECK: no adenopathy, no asymmetry, masses, or scars     RESP: lungs clear to auscultation - no rales, rhonchi or wheezes     CV: regular rates and rhythm, normal S1 S2, no S3 or S4 and no murmur, click or rub     ABDOMEN:  soft, nontender, no HSM or masses and bowel sounds normal     MS: walking boot in place right lower extremity. 1+ pitting edema right lower extremity to mid-shin.      SKIN: no suspicious lesions or rashes     NEURO: Normal strength and tone, sensory exam grossly normal, mentation intact and speech normal     PSYCH: mentation appears normal. and affect normal/bright     LYMPHATICS: No cervical adenopathy    Recent Labs   Lab Test 11/14/23  1905 10/17/23  1012 09/16/23  1704 09/12/23  1044 02/09/23  1931 11/08/22  1427 10/04/22  1426 12/09/21  0746   HGB  --   --  13.7 13.3   < >  --    < > 12.3   PLT  --   --  299 260   < >  --    < > 250 "   INR  --   --   --   --   --   --   --  0.96     --  137 138   < >  --    < > 142   POTASSIUM 3.7  --  4.4 4.8   < >  --    < > 4.2   CR 0.96*  --  0.93 0.84   < >  --    < > 0.86   A1C  --  5.7*  --   --   --  5.5  --   --     < > = values in this interval not displayed.        Diagnostics:  No labs were ordered during this visit.   No EKG required, no history of coronary heart disease, significant arrhythmia, peripheral arterial disease or other structural heart disease.    Revised Cardiac Risk Index (RCRI):  The patient has the following serious cardiovascular risks for perioperative complications:   - No serious cardiac risks = 0 points     RCRI Interpretation: 0 points: Class I (very low risk - 0.4% complication rate)       Signed Electronically by: Denise Ramirez MD  Copy of this evaluation report is provided to requesting physician.

## 2023-12-08 NOTE — PATIENT INSTRUCTIONS
Preparing for Your Surgery  Getting started  A nurse will call you to review your health history and instructions. They will give you an arrival time based on your scheduled surgery time. Please be ready to share:  Your doctor's clinic name and phone number  Your medical, surgical, and anesthesia history  A list of allergies and sensitivities  A list of medicines, including herbal treatments and over-the-counter drugs  Whether the patient has a legal guardian (ask how to send us the papers in advance)  Please tell us if you're pregnant--or if there's any chance you might be pregnant. Some surgeries may injure a fetus (unborn baby), so they require a pregnancy test. Surgeries that are safe for a fetus don't always need a test, and you can choose whether to have one.   If you have a child who's having surgery, please ask for a copy of Preparing for Your Child's Surgery.    Preparing for surgery  Within 10 to 30 days of surgery: Have a pre-op exam (sometimes called an H&P, or History and Physical). This can be done at a clinic or pre-operative center.  If you're having a , you may not need this exam. Talk to your care team.  At your pre-op exam, talk to your care team about all medicines you take. If you need to stop any medicines before surgery, ask when to start taking them again.  We do this for your safety. Many medicines can make you bleed too much during surgery. Some change how well surgery (anesthesia) drugs work.  Call your insurance company to let them know you're having surgery. (If you don't have insurance, call [phone].)  Call your clinic if there's any change in your health. This includes signs of a cold or flu (sore throat, runny nose, cough, rash, fever). It also includes a scrape or scratch near the surgery site.  If you have questions on the day of surgery, call your hospital or surgery center.  Eating and drinking guidelines  For your safety: Unless your surgeon tells you otherwise,  follow the guidelines below.  Eat and drink as usual until 8 hours before you arrive for surgery. After that, no food or milk.  Drink clear liquids until 2 hours before you arrive. These are liquids you can see through, like water, Gatorade, and Propel Water. They also include plain black coffee and tea (no cream or milk), candy, and breath mints. You can spit out gum when you arrive.  If you drink alcohol: Stop drinking it the night before surgery.  If your care team tells you to take medicine on the morning of surgery, it's okay to take it with a sip of water.  Preventing infection  Shower or bathe the night before and morning of your surgery. Follow the instructions your clinic gave you. (If no instructions, use regular soap.)  Don't shave or clip hair near your surgery site. We'll remove the hair if needed.  Don't smoke or vape the morning of surgery. You may chew nicotine gum up to 2 hours before surgery. A nicotine patch is okay.  Note: Some surgeries require you to completely quit smoking and nicotine. Check with your surgeon.  Your care team will make every effort to keep you safe from infection. We will:  Clean our hands often with soap and water (or an alcohol-based hand rub).  Clean the skin at your surgery site with a special soap that kills germs.  Give you a special gown to keep you warm. (Cold raises the risk of infection.)  Wear special hair covers, masks, gowns and gloves during surgery.  Give antibiotic medicine, if prescribed. Not all surgeries need antibiotics.  What to bring on the day of surgery  Photo ID and insurance card  Copy of your health care directive, if you have one  Glasses and hearing aids (bring cases)  You can't wear contacts during surgery  Inhaler and eye drops, if you use them (tell us about these when you arrive)  CPAP machine or breathing device, if you use them  A few personal items, if spending the night  If you have . . .  A pacemaker, ICD (cardiac defibrillator) or other  implant: Bring the ID card.  An implanted stimulator: Bring the remote control.  A legal guardian: Bring a copy of the certified (court-stamped) guardianship papers.  Please remove any jewelry, including body piercings. Leave jewelry and other valuables at home.  If you're going home the day of surgery  You must have a responsible adult drive you home. They should stay with you overnight as well.  If you don't have someone to stay with you, and you aren't safe to go home alone, we may keep you overnight. Insurance often won't pay for this.  After surgery  If it's hard to control your pain or you need more pain medicine, please call your surgeon's office.  Questions?   If you have any questions for your care team, list them here: _________________________________________________________________________________________________________________________________________________________________________ ____________________________________ ____________________________________ ____________________________________  For informational purposes only. Not to replace the advice of your health care provider. Copyright   2003, 2019 [employer] [facility]. All rights reserved. Clinically reviewed by Shahrzad Medrano MD. [employer] 454497 - REV 12/22.    How to Take Your Medication Before Surgery  - Take all of your medications before surgery as usual  - HOLD (do not take) ibuprofen for at least 24 hours before the procedure  - Ask the GI specialist if they want you to take the GI related medications before surgery.

## 2023-12-29 ENCOUNTER — HOSPITAL ENCOUNTER (OUTPATIENT)
Dept: ULTRASOUND IMAGING | Facility: CLINIC | Age: 23
Discharge: HOME | End: 2023-12-29
Attending: STUDENT IN AN ORGANIZED HEALTH CARE EDUCATION/TRAINING PROGRAM
Payer: COMMERCIAL

## 2023-12-29 DIAGNOSIS — N64.4 BREAST PAIN, LEFT: ICD-10-CM

## 2023-12-29 PROCEDURE — 76642 ULTRASOUND BREAST LIMITED: CPT | Mod: LT

## 2024-01-03 ENCOUNTER — ANESTHESIA EVENT (OUTPATIENT)
Dept: SURGERY | Facility: CLINIC | Age: 24
End: 2024-01-03
Payer: COMMERCIAL

## 2024-01-04 ENCOUNTER — HOSPITAL ENCOUNTER (OUTPATIENT)
Facility: CLINIC | Age: 24
Discharge: HOME | End: 2024-01-04
Attending: INTERNAL MEDICINE | Admitting: INTERNAL MEDICINE
Payer: COMMERCIAL

## 2024-01-04 ENCOUNTER — ANESTHESIA (OUTPATIENT)
Dept: SURGERY | Facility: CLINIC | Age: 24
End: 2024-01-04
Payer: COMMERCIAL

## 2024-01-04 VITALS
SYSTOLIC BLOOD PRESSURE: 122 MMHG | DIASTOLIC BLOOD PRESSURE: 80 MMHG | HEART RATE: 83 BPM | HEIGHT: 67 IN | TEMPERATURE: 97.8 F | OXYGEN SATURATION: 95 % | RESPIRATION RATE: 16 BRPM | WEIGHT: 293 LBS | BODY MASS INDEX: 45.99 KG/M2

## 2024-01-04 LAB
COLONOSCOPY: NORMAL
UPPER GI ENDOSCOPY: NORMAL

## 2024-01-04 PROCEDURE — 360N000075 HC SURGERY LEVEL 2, PER MIN: Performed by: INTERNAL MEDICINE

## 2024-01-04 PROCEDURE — 250N000011 HC RX IP 250 OP 636: Performed by: NURSE ANESTHETIST, CERTIFIED REGISTERED

## 2024-01-04 PROCEDURE — 88342 IMHCHEM/IMCYTCHM 1ST ANTB: CPT | Mod: 26 | Performed by: PATHOLOGY

## 2024-01-04 PROCEDURE — 88305 TISSUE EXAM BY PATHOLOGIST: CPT | Mod: TC | Performed by: INTERNAL MEDICINE

## 2024-01-04 PROCEDURE — 250N000009 HC RX 250: Performed by: NURSE ANESTHETIST, CERTIFIED REGISTERED

## 2024-01-04 PROCEDURE — 370N000017 HC ANESTHESIA TECHNICAL FEE, PER MIN: Performed by: INTERNAL MEDICINE

## 2024-01-04 PROCEDURE — 999N000141 HC STATISTIC PRE-PROCEDURE NURSING ASSESSMENT: Performed by: INTERNAL MEDICINE

## 2024-01-04 PROCEDURE — 250N000011 HC RX IP 250 OP 636: Performed by: INTERNAL MEDICINE

## 2024-01-04 PROCEDURE — 258N000003 HC RX IP 258 OP 636: Performed by: ANESTHESIOLOGY

## 2024-01-04 PROCEDURE — 272N000001 HC OR GENERAL SUPPLY STERILE: Performed by: INTERNAL MEDICINE

## 2024-01-04 PROCEDURE — 710N000012 HC RECOVERY PHASE 2, PER MINUTE: Performed by: INTERNAL MEDICINE

## 2024-01-04 PROCEDURE — 88305 TISSUE EXAM BY PATHOLOGIST: CPT | Mod: 26 | Performed by: PATHOLOGY

## 2024-01-04 RX ORDER — DEXAMETHASONE SODIUM PHOSPHATE 4 MG/ML
INJECTION, SOLUTION INTRA-ARTICULAR; INTRALESIONAL; INTRAMUSCULAR; INTRAVENOUS; SOFT TISSUE PRN
Status: DISCONTINUED | OUTPATIENT
Start: 2024-01-04 | End: 2024-01-04

## 2024-01-04 RX ORDER — ONDANSETRON 2 MG/ML
4 INJECTION INTRAMUSCULAR; INTRAVENOUS
Status: COMPLETED | OUTPATIENT
Start: 2024-01-04 | End: 2024-01-04

## 2024-01-04 RX ORDER — HYDROMORPHONE HCL IN WATER/PF 6 MG/30 ML
0.4 PATIENT CONTROLLED ANALGESIA SYRINGE INTRAVENOUS EVERY 5 MIN PRN
Status: DISCONTINUED | OUTPATIENT
Start: 2024-01-04 | End: 2024-01-04 | Stop reason: HOSPADM

## 2024-01-04 RX ORDER — LIDOCAINE 40 MG/G
CREAM TOPICAL
Status: DISCONTINUED | OUTPATIENT
Start: 2024-01-04 | End: 2024-01-04 | Stop reason: HOSPADM

## 2024-01-04 RX ORDER — HALOPERIDOL 5 MG/ML
1 INJECTION INTRAMUSCULAR
Status: DISCONTINUED | OUTPATIENT
Start: 2024-01-04 | End: 2024-01-04 | Stop reason: HOSPADM

## 2024-01-04 RX ORDER — FENTANYL CITRATE 50 UG/ML
50 INJECTION, SOLUTION INTRAMUSCULAR; INTRAVENOUS EVERY 5 MIN PRN
Status: DISCONTINUED | OUTPATIENT
Start: 2024-01-04 | End: 2024-01-04 | Stop reason: HOSPADM

## 2024-01-04 RX ORDER — NALOXONE HYDROCHLORIDE 0.4 MG/ML
0.2 INJECTION, SOLUTION INTRAMUSCULAR; INTRAVENOUS; SUBCUTANEOUS
Status: CANCELLED | OUTPATIENT
Start: 2024-01-04

## 2024-01-04 RX ORDER — PROCHLORPERAZINE MALEATE 10 MG
10 TABLET ORAL EVERY 6 HOURS PRN
Status: CANCELLED | OUTPATIENT
Start: 2024-01-04

## 2024-01-04 RX ORDER — ONDANSETRON 4 MG/1
4 TABLET, ORALLY DISINTEGRATING ORAL EVERY 6 HOURS PRN
Status: CANCELLED | OUTPATIENT
Start: 2024-01-04

## 2024-01-04 RX ORDER — ONDANSETRON 2 MG/ML
4 INJECTION INTRAMUSCULAR; INTRAVENOUS EVERY 30 MIN PRN
Status: DISCONTINUED | OUTPATIENT
Start: 2024-01-04 | End: 2024-01-04 | Stop reason: HOSPADM

## 2024-01-04 RX ORDER — ONDANSETRON 2 MG/ML
4 INJECTION INTRAMUSCULAR; INTRAVENOUS EVERY 6 HOURS PRN
Status: CANCELLED | OUTPATIENT
Start: 2024-01-04

## 2024-01-04 RX ORDER — NALOXONE HYDROCHLORIDE 0.4 MG/ML
0.4 INJECTION, SOLUTION INTRAMUSCULAR; INTRAVENOUS; SUBCUTANEOUS
Status: CANCELLED | OUTPATIENT
Start: 2024-01-04

## 2024-01-04 RX ORDER — ALBUTEROL SULFATE 0.83 MG/ML
2.5 SOLUTION RESPIRATORY (INHALATION) EVERY 4 HOURS PRN
Status: DISCONTINUED | OUTPATIENT
Start: 2024-01-04 | End: 2024-01-04 | Stop reason: HOSPADM

## 2024-01-04 RX ORDER — FENTANYL CITRATE 50 UG/ML
25 INJECTION, SOLUTION INTRAMUSCULAR; INTRAVENOUS EVERY 5 MIN PRN
Status: DISCONTINUED | OUTPATIENT
Start: 2024-01-04 | End: 2024-01-04 | Stop reason: HOSPADM

## 2024-01-04 RX ORDER — PROPOFOL 10 MG/ML
INJECTION, EMULSION INTRAVENOUS CONTINUOUS PRN
Status: DISCONTINUED | OUTPATIENT
Start: 2024-01-04 | End: 2024-01-04

## 2024-01-04 RX ORDER — MEPERIDINE HYDROCHLORIDE 25 MG/ML
12.5 INJECTION INTRAMUSCULAR; INTRAVENOUS; SUBCUTANEOUS EVERY 5 MIN PRN
Status: DISCONTINUED | OUTPATIENT
Start: 2024-01-04 | End: 2024-01-04 | Stop reason: HOSPADM

## 2024-01-04 RX ORDER — SODIUM CHLORIDE, SODIUM LACTATE, POTASSIUM CHLORIDE, CALCIUM CHLORIDE 600; 310; 30; 20 MG/100ML; MG/100ML; MG/100ML; MG/100ML
INJECTION, SOLUTION INTRAVENOUS CONTINUOUS
Status: DISCONTINUED | OUTPATIENT
Start: 2024-01-04 | End: 2024-01-04 | Stop reason: HOSPADM

## 2024-01-04 RX ORDER — FLUMAZENIL 0.1 MG/ML
0.2 INJECTION, SOLUTION INTRAVENOUS
Status: CANCELLED | OUTPATIENT
Start: 2024-01-04 | End: 2024-01-04

## 2024-01-04 RX ORDER — HYDROMORPHONE HCL IN WATER/PF 6 MG/30 ML
0.2 PATIENT CONTROLLED ANALGESIA SYRINGE INTRAVENOUS EVERY 5 MIN PRN
Status: DISCONTINUED | OUTPATIENT
Start: 2024-01-04 | End: 2024-01-04 | Stop reason: HOSPADM

## 2024-01-04 RX ORDER — LIDOCAINE HYDROCHLORIDE 10 MG/ML
INJECTION, SOLUTION INFILTRATION; PERINEURAL PRN
Status: DISCONTINUED | OUTPATIENT
Start: 2024-01-04 | End: 2024-01-04

## 2024-01-04 RX ORDER — ONDANSETRON 4 MG/1
4 TABLET, ORALLY DISINTEGRATING ORAL EVERY 30 MIN PRN
Status: DISCONTINUED | OUTPATIENT
Start: 2024-01-04 | End: 2024-01-04 | Stop reason: HOSPADM

## 2024-01-04 RX ORDER — LABETALOL HYDROCHLORIDE 5 MG/ML
10 INJECTION, SOLUTION INTRAVENOUS
Status: DISCONTINUED | OUTPATIENT
Start: 2024-01-04 | End: 2024-01-04 | Stop reason: HOSPADM

## 2024-01-04 RX ORDER — PROPOFOL 10 MG/ML
INJECTION, EMULSION INTRAVENOUS PRN
Status: DISCONTINUED | OUTPATIENT
Start: 2024-01-04 | End: 2024-01-04

## 2024-01-04 RX ADMIN — ONDANSETRON 4 MG: 2 INJECTION INTRAMUSCULAR; INTRAVENOUS at 09:06

## 2024-01-04 RX ADMIN — LIDOCAINE HYDROCHLORIDE 5 ML: 10 INJECTION, SOLUTION INFILTRATION; PERINEURAL at 09:00

## 2024-01-04 RX ADMIN — SODIUM CHLORIDE, POTASSIUM CHLORIDE, SODIUM LACTATE AND CALCIUM CHLORIDE: 600; 310; 30; 20 INJECTION, SOLUTION INTRAVENOUS at 08:25

## 2024-01-04 RX ADMIN — DEXAMETHASONE SODIUM PHOSPHATE 4 MG: 4 INJECTION, SOLUTION INTRA-ARTICULAR; INTRALESIONAL; INTRAMUSCULAR; INTRAVENOUS; SOFT TISSUE at 09:06

## 2024-01-04 RX ADMIN — PROPOFOL 40 MG: 10 INJECTION, EMULSION INTRAVENOUS at 09:01

## 2024-01-04 RX ADMIN — PROPOFOL 150 MCG/KG/MIN: 10 INJECTION, EMULSION INTRAVENOUS at 09:00

## 2024-01-04 ASSESSMENT — ACTIVITIES OF DAILY LIVING (ADL): ADLS_ACUITY_SCORE: 35

## 2024-01-04 NOTE — ANESTHESIA CARE TRANSFER NOTE
Patient: Norma Collins    Procedure: Procedure(s):  ESOPHAGOGASTRODUODENOSCOPY WITH BIOPSIES  COLONOSCOPY WITH BIOPSIES       Diagnosis: Ileitis [K52.9]  Diagnosis Additional Information: No value filed.    Anesthesia Type:   MAC     Note:    Oropharynx: oropharynx clear of all foreign objects and spontaneously breathing  Level of Consciousness: awake  Oxygen Supplementation: face mask  Level of Supplemental Oxygen (L/min / FiO2): 6  Independent Airway: airway patency satisfactory and stable  Dentition: dentition unchanged  Vital Signs Stable: post-procedure vital signs reviewed and stable  Report to RN Given: handoff report given  Patient transferred to: Phase II          Vitals:  Vitals Value Taken Time   /80 01/04/24 1019   Temp 36.6  C (97.8  F) 01/04/24 0947   Pulse 86 01/04/24 1020   Resp 16 01/04/24 1019   SpO2 95 % 01/04/24 1020   Vitals shown include unfiled device data.    Electronically Signed By: XENA Yeh CRNA  January 4, 2024  10:31 AM

## 2024-01-04 NOTE — INTERVAL H&P NOTE
"I have reviewed the surgical (or preoperative) H&P that is linked to this encounter, and examined the patient. There are no significant changes    Clinical Conditions Present on Arrival:  Clinically Significant Risk Factors Present on Admission                  # Severe Obesity: Estimated body mass index is 54.82 kg/m  as calculated from the following:    Height as of this encounter: 1.702 m (5' 7\").    Weight as of this encounter: 158.8 kg (350 lb).       "

## 2024-01-04 NOTE — ANESTHESIA POSTPROCEDURE EVALUATION
Patient: Norma Collins    Procedure: Procedure(s):  ESOPHAGOGASTRODUODENOSCOPY WITH BIOPSIES  COLONOSCOPY WITH BIOPSIES       Anesthesia Type:  MAC    Note:  Disposition: Outpatient   Postop Pain Control: Uneventful            Sign Out: Well controlled pain   PONV: No   Neuro/Psych: Uneventful            Sign Out: Acceptable/Baseline neuro status   Airway/Respiratory: Uneventful            Sign Out: Acceptable/Baseline resp. status   CV/Hemodynamics: Uneventful            Sign Out: Acceptable CV status   Other NRE:    DID A NON-ROUTINE EVENT OCCUR?            Last vitals:  Vitals Value Taken Time   /80 01/04/24 1019   Temp 36.6  C (97.8  F) 01/04/24 0947   Pulse 86 01/04/24 1020   Resp 16 01/04/24 1019   SpO2 95 % 01/04/24 1020   Vitals shown include unfiled device data.    Electronically Signed By: Jayme Gayle MD  January 4, 2024  10:59 AM

## 2024-01-04 NOTE — ANESTHESIA PREPROCEDURE EVALUATION
Anesthesia Pre-Procedure Evaluation    Patient: Norma Collins   MRN: [id number] : 2000        Procedure : Procedure(s):  ESOPHAGOGASTRODUODENOSCOPY WITH  COLONOSCOPY          Past Medical History:   Diagnosis Date    Abnormal uterine bleeding due to endometrial polyp     Acanthosis nigricans     Adenotonsillar hypertrophy     Aphthous ulcer of ileum     Arthropathy of ankle and foot     Family history of hemochromatosis     Iron deficiency anemia due to chronic blood loss     Obesity     PCOS (polycystic ovarian syndrome)       Past Surgical History:   Procedure Laterality Date    CHOLECYSTECTOMY      HYSTEROSCOPY DIAGNOSTIC N/A 10/14/2022    Procedure: HYSTEROSCOPY WITH POLYPECTOMY;  Surgeon: Aston Yanes MD;  Location: [facility] [place]    LAPAROSCOPIC CHOLECYSTECTOMY      OTHER SURGICAL HISTORY      none    TONSILLECTOMY      TONSILLECTOMY & ADENOIDECTOMY Bilateral 2017    Procedure: BILATERAL TONSILLECTOMY AND ADENOIDECTOMY;  Surgeon: Jacob Arguello MD;  Location: [facility];  Service:       Allergies   Allergen Reactions    Christine Sprouts [Brassica Oleracea]     Keflex [Cephalexin] Itching    Other Food Allergy Rash     Christine sprouts      Social History     Tobacco Use    Smoking status: Former     Types: Vaping Device    Smokeless tobacco: Never   Substance Use Topics    Alcohol use: Yes     Comment: occ      Wt Readings from Last 1 Encounters:   24 (!) 158.8 kg (350 lb)        Anesthesia Evaluation            ROS/MED HX  ENT/Pulmonary:  - neg pulmonary ROS     Neurologic:  - neg neurologic ROS     Cardiovascular:  - neg cardiovascular ROS     METS/Exercise Tolerance:     Hematologic:       Musculoskeletal:       GI/Hepatic:  - neg GI/hepatic ROS     Renal/Genitourinary:  - neg Renal ROS     Endo: Comment: PCOS    (+)               Obesity,       Psychiatric/Substance Use:       Infectious Disease:       Malignancy:       Other:            Physical  Exam    Airway        Mallampati: I   TM distance: > 3 FB   Neck ROM: full   Mouth opening: > 3 cm    Respiratory Devices and Support         Dental       (+) Completely normal teeth      Cardiovascular          Rhythm and rate: regular and normal     Pulmonary           breath sounds clear to auscultation           OUTSIDE LABS:  CBC:   Lab Results   Component Value Date    WBC 12.8 (H) 09/16/2023    WBC 9.7 09/12/2023    HGB 13.7 09/16/2023    HGB 13.3 09/12/2023    HCT 41.8 09/16/2023    HCT 40.7 09/12/2023     09/16/2023     09/12/2023     BMP:   Lab Results   Component Value Date     11/14/2023     09/16/2023    POTASSIUM 3.7 11/14/2023    POTASSIUM 4.4 09/16/2023    CHLORIDE 101 11/14/2023    CHLORIDE 101 09/16/2023    CO2 23 11/14/2023    CO2 26 09/16/2023    BUN 12.3 11/14/2023    BUN 13.4 09/16/2023    CR 0.96 (H) 11/14/2023    CR 0.93 09/16/2023    GLC 96 11/14/2023    GLC 93 09/16/2023     COAGS:   Lab Results   Component Value Date    INR 0.96 12/09/2021     POC:   Lab Results   Component Value Date    HCG Negative 02/09/2023    HCGS Negative 09/16/2023     HEPATIC:   Lab Results   Component Value Date    ALBUMIN 4.7 09/16/2023    PROTTOTAL 7.5 09/16/2023    ALT 46 09/16/2023    AST 35 09/16/2023    ALKPHOS 87 09/16/2023    BILITOTAL 0.2 09/16/2023     OTHER:   Lab Results   Component Value Date    LACT 0.7 04/11/2020    A1C 5.7 (H) 10/17/2023    LOUIS 9.8 11/14/2023    MAG 1.9 10/17/2023    LIPASE 24 09/16/2023    TSH 2.52 10/17/2023    CRP 0.7 07/08/2020    SED 11 10/17/2023       Anesthesia Plan    ASA Status:  4    NPO Status:  NPO Appropriate    Anesthesia Type: MAC.     - Reason for MAC: straight local not clinically adequate              Consents    Anesthesia Plan(s) and associated risks, benefits, and realistic alternatives discussed. Questions answered and patient/representative(s) expressed understanding.     - Discussed:     - Discussed with:  Patient         "    Postoperative Care            Comments:    Other Comments: Offered pregnancy test. Patient declined. Discussed risks and benefits of MAC including remembering portions of the case and possible need to convert to general anesthesia if intolerant of procedure or respiratory compromise.              Jayme Gayle MD    I have reviewed the pertinent notes and labs in the chart from the past 30 days and (re)examined the patient.  Any updates or changes from those notes are reflected in this note.              # Severe Obesity: Estimated body mass index is 54.82 kg/m  as calculated from the following:    Height as of this encounter: 1.702 m (5' 7\").    Weight as of this encounter: 158.8 kg (350 lb).      "

## 2024-01-06 ENCOUNTER — OFFICE VISIT (OUTPATIENT)
Dept: URGENT CARE | Facility: URGENT CARE | Age: 24
End: 2024-01-06
Payer: COMMERCIAL

## 2024-01-06 ENCOUNTER — APPOINTMENT (OUTPATIENT)
Dept: CT IMAGING | Facility: CLINIC | Age: 24
End: 2024-01-06
Attending: FAMILY MEDICINE
Payer: COMMERCIAL

## 2024-01-06 ENCOUNTER — HOSPITAL ENCOUNTER (EMERGENCY)
Facility: CLINIC | Age: 24
Discharge: HOME | End: 2024-01-06
Attending: FAMILY MEDICINE | Admitting: FAMILY MEDICINE
Payer: COMMERCIAL

## 2024-01-06 VITALS
HEIGHT: 67 IN | SYSTOLIC BLOOD PRESSURE: 167 MMHG | DIASTOLIC BLOOD PRESSURE: 98 MMHG | OXYGEN SATURATION: 96 % | RESPIRATION RATE: 18 BRPM | HEART RATE: 88 BPM | TEMPERATURE: 97.8 F | WEIGHT: 293 LBS | BODY MASS INDEX: 45.99 KG/M2

## 2024-01-06 VITALS
BODY MASS INDEX: 55.91 KG/M2 | TEMPERATURE: 98.2 F | WEIGHT: 293 LBS | SYSTOLIC BLOOD PRESSURE: 114 MMHG | DIASTOLIC BLOOD PRESSURE: 90 MMHG | OXYGEN SATURATION: 96 % | HEART RATE: 102 BPM | RESPIRATION RATE: 18 BRPM

## 2024-01-06 DIAGNOSIS — R30.0 DYSURIA: Primary | ICD-10-CM

## 2024-01-06 DIAGNOSIS — R10.31 RLQ ABDOMINAL PAIN: ICD-10-CM

## 2024-01-06 LAB
ALBUMIN SERPL BCG-MCNC: 4.2 G/DL (ref 3.5–5.2)
ALBUMIN UR-MCNC: NEGATIVE MG/DL
ALBUMIN UR-MCNC: NEGATIVE MG/DL
ALP SERPL-CCNC: 80 U/L (ref 40–150)
ALT SERPL W P-5'-P-CCNC: 51 U/L (ref 0–50)
ANION GAP SERPL CALCULATED.3IONS-SCNC: 6 MMOL/L (ref 7–15)
APPEARANCE UR: ABNORMAL
APPEARANCE UR: CLEAR
AST SERPL W P-5'-P-CCNC: 26 U/L (ref 0–45)
BACTERIA #/AREA URNS HPF: ABNORMAL /HPF
BACTERIA #/AREA URNS HPF: ABNORMAL /HPF
BASOPHILS # BLD AUTO: 0.1 10E3/UL (ref 0–0.2)
BASOPHILS NFR BLD AUTO: 0 %
BILIRUB SERPL-MCNC: 0.2 MG/DL
BILIRUB UR QL STRIP: NEGATIVE
BILIRUB UR QL STRIP: NEGATIVE
BUN SERPL-MCNC: 11.2 MG/DL (ref 6–20)
CALCIUM SERPL-MCNC: 9.4 MG/DL (ref 8.6–10)
CHLORIDE SERPL-SCNC: 103 MMOL/L (ref 98–107)
COLOR UR AUTO: YELLOW
COLOR UR AUTO: YELLOW
CREAT SERPL-MCNC: 0.86 MG/DL (ref 0.51–0.95)
DEPRECATED HCO3 PLAS-SCNC: 28 MMOL/L (ref 22–29)
EGFRCR SERPLBLD CKD-EPI 2021: >90 ML/MIN/1.73M2
EOSINOPHIL # BLD AUTO: 0.2 10E3/UL (ref 0–0.7)
EOSINOPHIL NFR BLD AUTO: 1 %
ERYTHROCYTE [DISTWIDTH] IN BLOOD BY AUTOMATED COUNT: 12.5 % (ref 10–15)
GLUCOSE SERPL-MCNC: 105 MG/DL (ref 70–99)
GLUCOSE UR STRIP-MCNC: NEGATIVE MG/DL
GLUCOSE UR STRIP-MCNC: NEGATIVE MG/DL
HCG SERPL QL: NEGATIVE
HCT VFR BLD AUTO: 40.2 % (ref 35–47)
HGB BLD-MCNC: 13.2 G/DL (ref 11.7–15.7)
HGB UR QL STRIP: NEGATIVE
HGB UR QL STRIP: NEGATIVE
HOLD SPECIMEN: NORMAL
IMM GRANULOCYTES # BLD: 0.1 10E3/UL
IMM GRANULOCYTES NFR BLD: 1 %
KETONES UR STRIP-MCNC: NEGATIVE MG/DL
KETONES UR STRIP-MCNC: NEGATIVE MG/DL
LEUKOCYTE ESTERASE UR QL STRIP: ABNORMAL
LEUKOCYTE ESTERASE UR QL STRIP: ABNORMAL
LYMPHOCYTES # BLD AUTO: 2.3 10E3/UL (ref 0.8–5.3)
LYMPHOCYTES NFR BLD AUTO: 19 %
MCH RBC QN AUTO: 28.2 PG (ref 26.5–33)
MCHC RBC AUTO-ENTMCNC: 32.8 G/DL (ref 31.5–36.5)
MCV RBC AUTO: 86 FL (ref 78–100)
MONOCYTES # BLD AUTO: 0.9 10E3/UL (ref 0–1.3)
MONOCYTES NFR BLD AUTO: 7 %
NEUTROPHILS # BLD AUTO: 9 10E3/UL (ref 1.6–8.3)
NEUTROPHILS NFR BLD AUTO: 72 %
NITRATE UR QL: NEGATIVE
NITRATE UR QL: NEGATIVE
NRBC # BLD AUTO: 0 10E3/UL
NRBC BLD AUTO-RTO: 0 /100
PH UR STRIP: 7 [PH] (ref 5–7)
PH UR STRIP: 7 [PH] (ref 5–7)
PLATELET # BLD AUTO: 270 10E3/UL (ref 150–450)
POTASSIUM SERPL-SCNC: 4 MMOL/L (ref 3.4–5.3)
PROT SERPL-MCNC: 7 G/DL (ref 6.4–8.3)
RBC # BLD AUTO: 4.68 10E6/UL (ref 3.8–5.2)
RBC #/AREA URNS AUTO: ABNORMAL /HPF
RBC URINE: 0 /HPF
SODIUM SERPL-SCNC: 137 MMOL/L (ref 135–145)
SP GR UR STRIP: 1.01 (ref 1–1.03)
SP GR UR STRIP: 1.02 (ref 1–1.03)
SQUAMOUS #/AREA URNS AUTO: ABNORMAL /LPF
SQUAMOUS EPITHELIAL: 24 /HPF
UROBILINOGEN UR STRIP-ACNC: 0.2 E.U./DL
UROBILINOGEN UR STRIP-MCNC: NORMAL MG/DL
WBC # BLD AUTO: 12.5 10E3/UL (ref 4–11)
WBC #/AREA URNS AUTO: ABNORMAL /HPF
WBC URINE: 17 /HPF

## 2024-01-06 PROCEDURE — 74177 CT ABD & PELVIS W/CONTRAST: CPT

## 2024-01-06 PROCEDURE — 250N000011 HC RX IP 250 OP 636: Performed by: FAMILY MEDICINE

## 2024-01-06 PROCEDURE — 84703 CHORIONIC GONADOTROPIN ASSAY: CPT | Performed by: FAMILY MEDICINE

## 2024-01-06 PROCEDURE — 81001 URINALYSIS AUTO W/SCOPE: CPT | Performed by: EMERGENCY MEDICINE

## 2024-01-06 PROCEDURE — 87086 URINE CULTURE/COLONY COUNT: CPT | Performed by: EMERGENCY MEDICINE

## 2024-01-06 PROCEDURE — 99213 OFFICE O/P EST LOW 20 MIN: CPT | Performed by: EMERGENCY MEDICINE

## 2024-01-06 PROCEDURE — 85025 COMPLETE CBC W/AUTO DIFF WBC: CPT | Performed by: FAMILY MEDICINE

## 2024-01-06 PROCEDURE — 80053 COMPREHEN METABOLIC PANEL: CPT | Performed by: FAMILY MEDICINE

## 2024-01-06 PROCEDURE — 99284 EMERGENCY DEPT VISIT MOD MDM: CPT | Performed by: FAMILY MEDICINE

## 2024-01-06 PROCEDURE — 99285 EMERGENCY DEPT VISIT HI MDM: CPT | Mod: 25 | Performed by: FAMILY MEDICINE

## 2024-01-06 PROCEDURE — 36415 COLL VENOUS BLD VENIPUNCTURE: CPT | Performed by: FAMILY MEDICINE

## 2024-01-06 PROCEDURE — 250N000009 HC RX 250: Performed by: FAMILY MEDICINE

## 2024-01-06 PROCEDURE — 81001 URINALYSIS AUTO W/SCOPE: CPT | Performed by: FAMILY MEDICINE

## 2024-01-06 RX ORDER — IOPAMIDOL 755 MG/ML
100 INJECTION, SOLUTION INTRAVASCULAR ONCE
Status: COMPLETED | OUTPATIENT
Start: 2024-01-06 | End: 2024-01-06

## 2024-01-06 RX ORDER — POLYETHYLENE GLYCOL 3350 17 G/17G
POWDER, FOR SOLUTION ORAL EVERY 24 HOURS
COMMUNITY
Start: 2023-06-30

## 2024-01-06 RX ORDER — SULFAMETHOXAZOLE/TRIMETHOPRIM 800-160 MG
1 TABLET ORAL 2 TIMES DAILY
Qty: 14 TABLET | Refills: 0 | Status: SHIPPED | OUTPATIENT
Start: 2024-01-06 | End: 2024-02-27

## 2024-01-06 RX ORDER — NITROFURANTOIN 25; 75 MG/1; MG/1
CAPSULE ORAL
COMMUNITY
Start: 2023-12-29 | End: 2024-02-27

## 2024-01-06 RX ADMIN — IOPAMIDOL 100 ML: 755 INJECTION, SOLUTION INTRAVENOUS at 17:22

## 2024-01-06 RX ADMIN — SODIUM CHLORIDE 75 ML: 9 INJECTION, SOLUTION INTRAVENOUS at 17:22

## 2024-01-06 ASSESSMENT — ACTIVITIES OF DAILY LIVING (ADL): ADLS_ACUITY_SCORE: 35

## 2024-01-06 NOTE — ED PROVIDER NOTES
HPI   Patient is a 23-year-old female presenting with abdominal pain.  She has a known history of cholecystectomy, obesity, and reflux.  She takes Pepcid.  She had an upper endoscopy and lower endoscopy on 1/4.  Upper endoscopy showed erythematous mucosa in the antrum and a small polyp which was removed by cold forcep.  The lower endoscopy showed internal hemorrhoids that were not bleeding and some very small ulcerations just inside the anus.  Known history of polycystic ovarian syndrome and abnormal uterine bleeding.  The bleeding was related to an endometrial polyp.  She has a known history of iron deficiency anemia as a result of this bleeding.  Former smoker by vaping.  Occasional alcohol.    The patient has had 2 days of constant right lower quadrant and right mid abdominal pain.  She has had similar symptoms previously.  She does report urinary urgency but no dysuria or frequency.  No hematuria.  No vaginal discharge or irritation of the ordinary.  No recent menstruation or ongoing vaginal bleeding.  She had a colonoscopy performed on 11/4, 2 days ago.  She had similar discomfort in her right abdomen prior to the colonoscopy.  Her pain is worsened and radiating toward the right low back.  No trauma or injury.  No skin rash.      Allergies:  Allergies   Allergen Reactions    Hedley Sprouts [Brassica Oleracea]     Keflex [Cephalexin] Itching    Other Food Allergy Rash     Hedley sprouts     Problem List:    Patient Active Problem List    Diagnosis Date Noted    Prediabetes 10/31/2023     Priority: Medium    Abnormal uterine bleeding due to endometrial polyp 08/24/2022     Priority: Medium    Change in bowel habit 08/23/2022     Priority: Medium    Ileitis 08/23/2022     Priority: Medium    Family history of colon cancer 07/08/2021     Priority: Medium     Mother at age 40      Family history of hemochromatosis 07/08/2021     Priority: Medium     Father and Grandfather      Acanthosis nigricans 12/08/2020  "    Priority: Medium     Created by Conversion      Last Assessment & Plan:   Patient doing very well and adding exercise, and watching diet closely.  We'll now add metformin as per orders.  Side effects and precautions discussed.  Follow up in one month.      Arthropathy of ankle and foot 12/08/2020     Priority: Medium     Created by Conversion    Replacement Utility updated for latest IMO load    Last Assessment & Plan:   With diffuse joint aches treating with progressive weight loss and lifestyle modifications.      Iron deficiency anemia due to chronic blood loss 11/22/2019     Priority: Medium    Aphthous ulcer of ileum 10/30/2019     Priority: Medium    Elevated BP without diagnosis of hypertension 10/16/2017     Priority: Medium    Adenotonsillar hypertrophy 01/04/2017     Priority: Medium    PCOS (polycystic ovarian syndrome) 02/29/2016     Priority: Medium     Last Assessment & Plan:   Her dietary changes are working very well.  Also working very well for the household.  She is exercising more, having more energy, and pants are fitting looser.  We'll plan for in body at next visit along with remeasurement of abdominal and neck circumference.  Also with her forgetting the metformin in the morning, will move it to the afternoon with lunch.      Class 3 severe obesity due to excess calories without serious comorbidity with body mass index (BMI) of 50.0 to 59.9 in adult (H) 10/30/2015     Priority: Medium     Last Assessment & Plan:   Continue lifestyle modifications.  Will breakfast a [place] protein-based with shakes or aches.  May also use chicken or steak.  Stopped carbohydrates in the morning.  Better food choices discussed.  Encouraged to watch the documentary, \"In defense of food\" before next visit.        Past Medical History:    Past Medical History:   Diagnosis Date    Abnormal uterine bleeding due to endometrial polyp     Acanthosis nigricans     Adenotonsillar hypertrophy     Aphthous ulcer of ileum  "    Arthropathy of ankle and foot     Family history of hemochromatosis     Iron deficiency anemia due to chronic blood loss     Motion sickness     Obesity     PCOS (polycystic ovarian syndrome)      Past Surgical History:    Past Surgical History:   Procedure Laterality Date    CHOLECYSTECTOMY      COLONOSCOPY N/A 1/4/2024    Procedure: COLONOSCOPY WITH BIOPSIES;  Surgeon: Agustin Bailey MD;  Location: [facility]    ESOPHAGOSCOPY, GASTROSCOPY, DUODENOSCOPY (EGD), COMBINED N/A 1/4/2024    Procedure: ESOPHAGOGASTRODUODENOSCOPY WITH BIOPSIES;  Surgeon: Agustin Bailey MD;  Location: [facility] OR    HYSTEROSCOPY DIAGNOSTIC N/A 10/14/2022    Procedure: HYSTEROSCOPY WITH POLYPECTOMY;  Surgeon: Aston Yanes MD;  Location: [facility] OR    LAPAROSCOPIC CHOLECYSTECTOMY      OTHER SURGICAL HISTORY      none    TONSILLECTOMY      TONSILLECTOMY & ADENOIDECTOMY Bilateral 2/16/2017    Procedure: BILATERAL TONSILLECTOMY AND ADENOIDECTOMY;  Surgeon: Jacob Arguello MD;  Location: [facility];  Service:      Family History:    Family History   Problem Relation Age of Onset    Colon Cancer Mother 43    Cancer Mother         colon    Venous thrombosis Mother         cancer-related    Cerebrovascular Disease Mother     Liver Disease Father         cirrhosis of liver    Other - See Comments Father         high iron    Cirrhosis Father     Hemochromatosis Father     Hypertension Father     Hyperlipidemia Father     Myocardial Infarction Maternal Grandmother     Heart Disease Maternal Grandmother     Dementia Maternal Grandmother     Cerebrovascular Disease Paternal Grandmother     Other - See Comments Maternal Grandfather         heart attack or cancer/unsure    Bladder Cancer Maternal Grandfather     Cirrhosis Paternal Grandfather     Other - See Comments Paternal Grandfather         high iron    Diabetes Paternal Grandfather     Hemochromatosis Paternal Grandfather     Kidney Disease Paternal  "Grandfather     Hypertension Paternal Grandfather     Colon Cancer Maternal Great-Grandmother         70-90 years old     Uterine Cancer Maternal Great-Grandmother     Breast Cancer Maternal Great-Grandmother     Brain Cancer Maternal Aunt      Social History:  Marital Status:  Single [1]  Social History     Tobacco Use    Smoking status: Former     Types: Vaping Device    Smokeless tobacco: Never   Vaping Use    Vaping Use: Former    Substances: Nicotine, Flavoring    Devices: Disposable   Substance Use Topics    Alcohol use: Yes     Comment: occ    Drug use: Never      Medications:    sulfamethoxazole-trimethoprim (BACTRIM DS) 800-160 MG tablet  buPROPion (WELLBUTRIN XL) 150 MG 24 hr tablet  diclofenac (VOLTAREN) 1 % topical gel  docusate sodium (COLACE) 50 MG capsule  famotidine (PEPCID) 20 MG tablet  hydrOXYzine (VISTARIL) 25 MG capsule  ibuprofen (ADVIL/MOTRIN) 200 MG tablet  nitroFURantoin macrocrystal-monohydrate (MACROBID) 100 MG capsule  omeprazole (PRILOSEC) 20 MG DR capsule  polyethylene glycol (MIRALAX) 17 GM/Dose powder  prochlorperazine (COMPAZINE) 10 MG tablet  senna-docusate (SENOKOT-S/PERICOLACE) 8.6-50 MG tablet  sucralfate (CARAFATE) 1 GM tablet      Review of Systems   All other systems reviewed and are negative.      PE   BP: (!) 167/98  Pulse: 88  Temp: 97.8  F (36.6  C)  Resp: 18  Height: 170.2 cm (5' 7\")  Weight: (!) 161.9 kg (357 lb)  SpO2: 96 %  Physical Exam  Vitals reviewed.   Constitutional:       General: She is not in acute distress.     Appearance: She is well-developed.   HENT:      Head: Normocephalic and atraumatic.      Right Ear: External ear normal.      Left Ear: External ear normal.      Nose: Nose normal.      Mouth/Throat:      Mouth: Mucous membranes are moist.      Pharynx: Oropharynx is clear.   Eyes:      Extraocular Movements: Extraocular movements intact.      Conjunctiva/sclera: Conjunctivae normal.      Pupils: Pupils are equal, round, and reactive to light. "   Cardiovascular:      Rate and Rhythm: Normal rate and regular rhythm.   Pulmonary:      Effort: Pulmonary effort is normal.   Abdominal:      Comments: Tender in the right abdomen, lower greater than upper.  Obese, no distention.  No organomegaly or mass.   Musculoskeletal:         General: Normal range of motion.      Cervical back: Normal range of motion.   Skin:     General: Skin is warm and dry.   Neurological:      Mental Status: She is alert and oriented to person, place, and time.   Psychiatric:         Behavior: Behavior normal.         ED COURSE and [facility]   1641.  Patient has symptoms and signs as described above.  Urine analysis shows 5-10 white blood cells but otherwise unremarkable.  Urine culture added.  Blood work pending, imaging will be ordered after results.  She refuses medication for analgesia.    1834.  CT imaging does not show acute pathology requiring hospitalization or surgical consultation.  No obvious cause of right lower quadrant abdominal pain is identified.  She does have pyuria and urgency.  Will provide Bactrim DS twice daily for 7 days in case of acute cystitis.  Culture added here in the ED prior to discharge.  Return here for worsening.  Follow-up if not improved.    Electronic medical chart reviewed, including medical problems, medications, medical allergies, social history.  Recent hospitalizations and surgical procedures reviewed.  Recent clinic visits and consultations reviewed.  Recent labs and test results reviewed.  Nursing notes reviewed.    The patient, their parent if applicable, and/or their medical decision maker(s) and I have reviewed all of the available historical information, applicable PMH, physical exam findings, and objective diagnostic data gathered during this ED visit.  We then discussed all work-up options and then together agreed upon the course taken during this visit.  The ultimate disposition and plan was a cooperative decision made between myself and the  patient, their parent if applicable, and/or their legal decision maker(s).  The risks and benefits of all decisions made during this visit were discussed to the best of my abilities given the circumstances, and all parties are understanding of the pertinent ramifications of these decisions.      LABS  Labs Ordered and Resulted from Time of ED Arrival to Time of ED Departure   COMPREHENSIVE METABOLIC PANEL - Abnormal       Result Value    Sodium 137      Potassium 4.0      Carbon Dioxide (CO2) 28      Anion Gap 6 (*)     Urea Nitrogen 11.2      Creatinine 0.86      GFR Estimate >90      Calcium 9.4      Chloride 103      Glucose 105 (*)     Alkaline Phosphatase 80      AST 26      ALT 51 (*)     Protein Total 7.0      Albumin 4.2      Bilirubin Total 0.2     CBC WITH PLATELETS AND DIFFERENTIAL - Abnormal    WBC Count 12.5 (*)     RBC Count 4.68      Hemoglobin 13.2      Hematocrit 40.2      MCV 86      MCH 28.2      MCHC 32.8      RDW 12.5      Platelet Count 270      % Neutrophils 72      % Lymphocytes 19      % Monocytes 7      % Eosinophils 1      % Basophils 0      % Immature Granulocytes 1      NRBCs per 100 WBC 0      Absolute Neutrophils 9.0 (*)     Absolute Lymphocytes 2.3      Absolute Monocytes 0.9      Absolute Eosinophils 0.2      Absolute Basophils 0.1      Absolute Immature Granulocytes 0.1      Absolute NRBCs 0.0     HCG QUALITATIVE PREGNANCY - Normal    hCG Serum Qualitative Negative     ROUTINE UA WITH MICROSCOPIC REFLEX TO CULTURE       IMAGING  Images reviewed by me.  Radiology report also reviewed.  CT Abdomen Pelvis w Contrast   Final Result   IMPRESSION:       1.  No acute findings to explain symptoms.       2.  No diverticulitis or appendicitis.      3.  Hepatic steatosis.             Procedures    Medications   iopamidol (ISOVUE-370) solution 100 mL (100 mLs Intravenous $Given 1/6/24 1722)   sodium chloride 0.9 % bag 500mL for CT scan flush use (75 mLs Intravenous $Given 1/6/24 1722)          IMPRESSION       ICD-10-CM    1. RLQ abdominal pain  R10.31                Medication List        Started      sulfamethoxazole-trimethoprim 800-160 MG tablet  Commonly known as: BACTRIM DS  1 tablet, Oral, 2 TIMES DAILY                            Samy Addison MD  01/06/24 4353

## 2024-01-06 NOTE — PROGRESS NOTES
CHIEF COMPLAINT: Right-sided abdominal pain      HPI: Patient is a 23-year-old female who underwent colonoscopy as a screening study 2 days ago.  Patient has a family history of colon cancer.  She reports that they removed some polyps but otherwise no significant findings.  Patient describes the pain now as right lower lateral abdomen with some sense of radiation into her back.  The pain was slightly present before the procedure and she called it i a level 2 the past but now she calls in 5.  She does have a history of recurrent ovarian cyst.  She is currently midcycle.  No history of kidney stones.      ROS: Chronic intermittent nausea otherwise see above.    Allergies   Allergen Reactions    Hayes Sprouts [Brassica Oleracea]     Keflex [Cephalexin] Itching    Other Food Allergy Rash     Hayes sprouts      Current Outpatient Medications   Medication Sig Dispense Refill    buPROPion (WELLBUTRIN XL) 150 MG 24 hr tablet Take 1 tablet (150 mg) by mouth every morning 90 tablet 3    diclofenac (VOLTAREN) 1 % topical gel Apply 2 g topically 4 times daily Over sternum 150 g 1    famotidine (PEPCID) 20 MG tablet Take 1 tablet (20 mg) by mouth 2 times daily 180 tablet 0    hydrOXYzine (VISTARIL) 25 MG capsule Take 1-2 capsules (25-50 mg) by mouth 3 times daily as needed for anxiety 45 capsule 4    ibuprofen (ADVIL/MOTRIN) 200 MG tablet Take 400 mg by mouth every 4 hours as needed for pain      omeprazole (PRILOSEC) 20 MG DR capsule Take 1 capsule (20 mg) by mouth 2 times daily 180 capsule 3    polyethylene glycol (MIRALAX) 17 GM/Dose powder Take by mouth every 24 hours      prochlorperazine (COMPAZINE) 10 MG tablet Take 1 tablet (10 mg) by mouth every 6 hours as needed for nausea or vomiting 45 tablet 0    senna-docusate (SENOKOT-S/PERICOLACE) 8.6-50 MG tablet Take 1-2 tablets by mouth 2 times daily as needed for constipation 60 tablet 3    sucralfate (CARAFATE) 1 GM tablet Take 1 tablet (1 g) by mouth 4 times daily Pt  takes  tablet 0    docusate sodium (COLACE) 50 MG capsule Take 1 capsule by mouth every 24 hours (Patient not taking: Reported on 1/6/2024)      nitroFURantoin macrocrystal-monohydrate (MACROBID) 100 MG capsule TAKE 1 CAPSULE BY MOUTH TWICE DAILY WITH FOOD UNTIL GONE (Patient not taking: Reported on 1/6/2024)           PE: No acute distress.  Afebrile.  Overall appears well-hydrated.  Examination of patient's abdomen reveals some slight localized tenderness in the right iliac fossa region.  No diffuse tenderness throughout her abdomen.  No peritoneal irritation.        TREATMENT: UA:      ASSESSMENT: Old records reviewed: Patient has had multiple CTs and ultrasound studies which repeatedly shows cystic changes on the left ovary but never on the right.  Review of colonoscopy revealed ileitis in the terminal ileum which is consistent with patient's location of pain.    Patient has no acute distress but requires definitive follow-up.  Differential diagnosis is most likely related to ileitis versus right ovarian region as source of pain.  Doubt UTI but culture pending.  Do not feel this is a surgical etiology such as complication of colonoscopy or appendicitis.      DIAGNOSIS: Right lower quadrant abdominal pain      PLAN: Contact Dr. Bailey  through [facility] to be seen in the next 2 to 3 days.  Arrange GYN follow-up for their evaluation.  Go emergency department if more pain or feeling more ill.  Tylenol for pain.  Avoid NSAIDs

## 2024-01-06 NOTE — ED TRIAGE NOTES
"Pt had colonoscopy and endo on 1/4/23 at [facility].  Pt went to UC today due to right lower abdomen/flank pain started yesterday.   Pt had urine test that was normal in UC but wanted \"more of a work up because I am in pain\"       Triage Assessment (Adult)       Row Name 01/06/24 1564          Triage Assessment    Airway WDL WDL        Respiratory WDL    Respiratory WDL WDL                     "

## 2024-01-06 NOTE — ED NOTES
Pt reports sharp and cramping constant RLQ pain radiating to the back starting on 1/4 after a colonscopy/endoscopy. Pt reports nausea w/out emesis starting last night, and no relief from ibuprofen, tylenol, or miralax. Pt has hx of PCOS, but states that her cysts have always been on the left side.

## 2024-01-07 NOTE — DISCHARGE INSTRUCTIONS
RETURN TO THE EMERGENCY ROOM FOR THE FOLLOWING:    Severely worsened pain, fever greater than 101, repeated vomiting and dehydration, fainting, or at anytime for any concern.    FOLLOW UP:    With your primary clinic if not improved over the next 2 to 3 days.    TREATMENT RECOMMENDATIONS:    Bactrim DS.    Ibuprofen 600 mg every six hours for pain (7 days duration).  Tylenol 1000 mg every six hours for pain (7 days duration).  Therefore, you can alternate these every three hours and do it safely.  ONLY take these medications if it is safe to do so given your medical history.    NURSE ADVICE LINE:  [phone] or [phone]

## 2024-01-08 LAB — BACTERIA UR CULT: NORMAL

## 2024-01-10 LAB
PATH REPORT.ADDENDUM SPEC: NORMAL
PATH REPORT.COMMENTS IMP SPEC: NORMAL
PATH REPORT.FINAL DX SPEC: NORMAL
PATH REPORT.GROSS SPEC: NORMAL
PATH REPORT.MICROSCOPIC SPEC OTHER STN: NORMAL
PATH REPORT.RELEVANT HX SPEC: NORMAL
PHOTO IMAGE: NORMAL

## 2024-01-24 ENCOUNTER — OFFICE VISIT (OUTPATIENT)
Dept: FAMILY MEDICINE | Facility: CLINIC | Age: 24
End: 2024-01-24
Payer: COMMERCIAL

## 2024-01-24 VITALS
OXYGEN SATURATION: 97 % | RESPIRATION RATE: 18 BRPM | SYSTOLIC BLOOD PRESSURE: 136 MMHG | HEIGHT: 67 IN | DIASTOLIC BLOOD PRESSURE: 82 MMHG | WEIGHT: 293 LBS | HEART RATE: 86 BPM | BODY MASS INDEX: 45.99 KG/M2 | TEMPERATURE: 98.2 F

## 2024-01-24 DIAGNOSIS — R11.0 NAUSEA: ICD-10-CM

## 2024-01-24 DIAGNOSIS — R42 DIZZINESS: Primary | ICD-10-CM

## 2024-01-24 DIAGNOSIS — R10.816 EPIGASTRIC ABDOMINAL TENDERNESS WITHOUT REBOUND TENDERNESS: ICD-10-CM

## 2024-01-24 PROCEDURE — 99213 OFFICE O/P EST LOW 20 MIN: CPT | Performed by: FAMILY MEDICINE

## 2024-01-24 RX ORDER — FAMOTIDINE 20 MG/1
20 TABLET, FILM COATED ORAL 2 TIMES DAILY
Qty: 180 TABLET | Refills: 3 | Status: SHIPPED | OUTPATIENT
Start: 2024-01-24 | End: 2024-05-05

## 2024-01-24 RX ORDER — MECLIZINE HYDROCHLORIDE 25 MG/1
25 TABLET ORAL 3 TIMES DAILY PRN
Qty: 20 TABLET | Refills: 0 | Status: SHIPPED | OUTPATIENT
Start: 2024-01-24 | End: 2024-06-28

## 2024-01-24 ASSESSMENT — PAIN SCALES - GENERAL: PAINLEVEL: NO PAIN (0)

## 2024-01-24 NOTE — TELEPHONE ENCOUNTER
Pending Prescriptions:                       Disp   Refills    famotidine (PEPCID) 20 MG tablet [Pharmac*180 ta*0            Sig: TAKE 1 TABLET BY MOUTH TWICE A DAY    Routing refill request to provider for review/approval because:   Medication indicated for associated diagnosis       Antonio Carroll RN

## 2024-01-24 NOTE — PROGRESS NOTES
"S :Norma Collins is a 24 year old female with a couple days of dizziness.  Lightheaded, certain motions of head cause woozy feeling.  Looking up as well.  Resolved quickly yesterday after getting out of bed, lasted longer today, but improved through day    No hearing changes.  No HA.  No fever.  No rash or swelling in legs, not feeling sick.    No breathing changes or CP or palpitations.    Went to work today at [employer].     Says she had this bewfore, given a med that helped.  Looked it up, was given meclizine a couple years ago for similar sx    O:/82   Pulse 86   Temp 98.2  F (36.8  C) (Oral)   Resp 18   Ht 1.702 m (5' 7\")   Wt (!) 164.2 kg (362 lb)   LMP 01/17/2024   SpO2 97%   BMI 56.70 kg/m    GEN: Alert and oriented, in no acute distress  Normal gait  No nystagmus, cranial nerves intact 2-12  Ears normal   Neck: neck supple without mass or lymphadenopathy  No papilledema on fundoscopic exam    A: dizziness, nos      P :seems inner ear in source, head movements will provoke.  Similar to previous improved with meclizine.  Will do that again, see how she does.     She agrees with plan.  Follow-up if worsening or other sx.    "

## 2024-02-14 ENCOUNTER — TRANSFERRED RECORDS (OUTPATIENT)
Dept: HEALTH INFORMATION MANAGEMENT | Facility: CLINIC | Age: 24
End: 2024-02-14
Payer: COMMERCIAL

## 2024-02-21 ENCOUNTER — MEDICAL CORRESPONDENCE (OUTPATIENT)
Dept: SCHEDULING | Facility: CLINIC | Age: 24
End: 2024-02-21
Payer: COMMERCIAL

## 2024-02-27 ENCOUNTER — OFFICE VISIT (OUTPATIENT)
Dept: FAMILY MEDICINE | Facility: CLINIC | Age: 24
End: 2024-02-27
Payer: COMMERCIAL

## 2024-02-27 VITALS
BODY MASS INDEX: 44.41 KG/M2 | DIASTOLIC BLOOD PRESSURE: 80 MMHG | TEMPERATURE: 97.9 F | OXYGEN SATURATION: 97 % | HEART RATE: 82 BPM | RESPIRATION RATE: 18 BRPM | HEIGHT: 68 IN | WEIGHT: 293 LBS | SYSTOLIC BLOOD PRESSURE: 124 MMHG

## 2024-02-27 DIAGNOSIS — Z00.00 ROUTINE GENERAL MEDICAL EXAMINATION AT A HEALTH CARE FACILITY: Primary | ICD-10-CM

## 2024-02-27 DIAGNOSIS — E66.01 CLASS 3 SEVERE OBESITY DUE TO EXCESS CALORIES WITHOUT SERIOUS COMORBIDITY WITH BODY MASS INDEX (BMI) OF 50.0 TO 59.9 IN ADULT (H): ICD-10-CM

## 2024-02-27 DIAGNOSIS — E66.813 CLASS 3 SEVERE OBESITY DUE TO EXCESS CALORIES WITHOUT SERIOUS COMORBIDITY WITH BODY MASS INDEX (BMI) OF 50.0 TO 59.9 IN ADULT (H): ICD-10-CM

## 2024-02-27 DIAGNOSIS — F41.1 GAD (GENERALIZED ANXIETY DISORDER): ICD-10-CM

## 2024-02-27 DIAGNOSIS — K52.9 INFLAMMATORY BOWEL DISEASE: ICD-10-CM

## 2024-02-27 PROBLEM — F19.982 DRUG-INDUCED INSOMNIA (H): Status: ACTIVE | Noted: 2024-02-27

## 2024-02-27 PROBLEM — F19.982 DRUG-INDUCED INSOMNIA (H): Status: RESOLVED | Noted: 2024-02-27 | Resolved: 2024-02-27

## 2024-02-27 LAB
BASOPHILS # BLD AUTO: 0 10E3/UL (ref 0–0.2)
BASOPHILS NFR BLD AUTO: 0 %
EOSINOPHIL # BLD AUTO: 0.2 10E3/UL (ref 0–0.7)
EOSINOPHIL NFR BLD AUTO: 2 %
ERYTHROCYTE [DISTWIDTH] IN BLOOD BY AUTOMATED COUNT: 12.4 % (ref 10–15)
HCT VFR BLD AUTO: 40.1 % (ref 35–47)
HGB BLD-MCNC: 13.2 G/DL (ref 11.7–15.7)
IMM GRANULOCYTES # BLD: 0 10E3/UL
IMM GRANULOCYTES NFR BLD: 0 %
LYMPHOCYTES # BLD AUTO: 2.3 10E3/UL (ref 0.8–5.3)
LYMPHOCYTES NFR BLD AUTO: 21 %
MCH RBC QN AUTO: 28 PG (ref 26.5–33)
MCHC RBC AUTO-ENTMCNC: 32.9 G/DL (ref 31.5–36.5)
MCV RBC AUTO: 85 FL (ref 78–100)
MONOCYTES # BLD AUTO: 0.7 10E3/UL (ref 0–1.3)
MONOCYTES NFR BLD AUTO: 6 %
NEUTROPHILS # BLD AUTO: 7.6 10E3/UL (ref 1.6–8.3)
NEUTROPHILS NFR BLD AUTO: 70 %
PLATELET # BLD AUTO: 268 10E3/UL (ref 150–450)
RBC # BLD AUTO: 4.71 10E6/UL (ref 3.8–5.2)
WBC # BLD AUTO: 10.9 10E3/UL (ref 4–11)

## 2024-02-27 PROCEDURE — 99214 OFFICE O/P EST MOD 30 MIN: CPT | Mod: 25 | Performed by: STUDENT IN AN ORGANIZED HEALTH CARE EDUCATION/TRAINING PROGRAM

## 2024-02-27 PROCEDURE — 99395 PREV VISIT EST AGE 18-39: CPT | Mod: 25 | Performed by: STUDENT IN AN ORGANIZED HEALTH CARE EDUCATION/TRAINING PROGRAM

## 2024-02-27 PROCEDURE — 85025 COMPLETE CBC W/AUTO DIFF WBC: CPT | Performed by: STUDENT IN AN ORGANIZED HEALTH CARE EDUCATION/TRAINING PROGRAM

## 2024-02-27 PROCEDURE — 36415 COLL VENOUS BLD VENIPUNCTURE: CPT | Performed by: STUDENT IN AN ORGANIZED HEALTH CARE EDUCATION/TRAINING PROGRAM

## 2024-02-27 RX ORDER — BUPROPION HYDROCHLORIDE 150 MG/1
300 TABLET ORAL DAILY
Qty: 180 TABLET | Refills: 3 | Status: SHIPPED | OUTPATIENT
Start: 2024-02-27

## 2024-02-27 RX ORDER — HYDROXYZINE PAMOATE 25 MG/1
25-50 CAPSULE ORAL 3 TIMES DAILY PRN
Qty: 180 CAPSULE | Refills: 3 | Status: SHIPPED | OUTPATIENT
Start: 2024-02-27

## 2024-02-27 SDOH — HEALTH STABILITY: PHYSICAL HEALTH: ON AVERAGE, HOW MANY DAYS PER WEEK DO YOU ENGAGE IN MODERATE TO STRENUOUS EXERCISE (LIKE A BRISK WALK)?: 3 DAYS

## 2024-02-27 ASSESSMENT — PAIN SCALES - GENERAL: PAINLEVEL: NO PAIN (0)

## 2024-02-27 ASSESSMENT — ANXIETY QUESTIONNAIRES
GAD7 TOTAL SCORE: 8
GAD7 TOTAL SCORE: 8
6. BECOMING EASILY ANNOYED OR IRRITABLE: SEVERAL DAYS
1. FEELING NERVOUS, ANXIOUS, OR ON EDGE: MORE THAN HALF THE DAYS
GAD7 TOTAL SCORE: 8
4. TROUBLE RELAXING: SEVERAL DAYS
8. IF YOU CHECKED OFF ANY PROBLEMS, HOW DIFFICULT HAVE THESE MADE IT FOR YOU TO DO YOUR WORK, TAKE CARE OF THINGS AT HOME, OR GET ALONG WITH OTHER PEOPLE?: SOMEWHAT DIFFICULT
3. WORRYING TOO MUCH ABOUT DIFFERENT THINGS: SEVERAL DAYS
2. NOT BEING ABLE TO STOP OR CONTROL WORRYING: SEVERAL DAYS
7. FEELING AFRAID AS IF SOMETHING AWFUL MIGHT HAPPEN: SEVERAL DAYS
IF YOU CHECKED OFF ANY PROBLEMS ON THIS QUESTIONNAIRE, HOW DIFFICULT HAVE THESE PROBLEMS MADE IT FOR YOU TO DO YOUR WORK, TAKE CARE OF THINGS AT HOME, OR GET ALONG WITH OTHER PEOPLE: SOMEWHAT DIFFICULT
5. BEING SO RESTLESS THAT IT IS HARD TO SIT STILL: SEVERAL DAYS
7. FEELING AFRAID AS IF SOMETHING AWFUL MIGHT HAPPEN: SEVERAL DAYS

## 2024-02-27 ASSESSMENT — SOCIAL DETERMINANTS OF HEALTH (SDOH): HOW OFTEN DO YOU GET TOGETHER WITH FRIENDS OR RELATIVES?: ONCE A WEEK

## 2024-02-27 NOTE — PROGRESS NOTES
Preventive Care Visit  [facility]  Denise Ramirez MD, Family Medicine  Feb 27, 2024    Assessment & Plan     Routine general medical examination at a health care facility  Patient is a very pleasant 24 year old who presents today for annual visit. She will be due later this year for pap, declines completion today. Declines vaccinations, though we discussed TDAP and HPV in particular, and she will consider returning in the future for this.     LC (generalized anxiety disorder)  Feels anxiety is a little worse in the mornings, interested in medication adjustments. Opted for increasing bedtime hydrox (discussed this works better if not taken daily), and if not helping enough, increase wellbutrin to 300 mg daily. If this is not helping enough, consider selective serotonin reuptake inhibitor vs starting the trazodone she has previously been prescribed.   - hydrOXYzine delta (VISTARIL) 25 MG capsule  Dispense: 180 capsule; Refill: 3  - buPROPion (WELLBUTRIN XL) 150 MG 24 hr tablet  Dispense: 180 tablet; Refill: 3    Inflammatory bowel disease  Patient is working with [facility] for ongoing bowel issues. She had CBC drawn today for them. Has upcoming imaging planned.   - CBC with Platelets & Differential  - CBC with Platelets & Differential    Class 3 severe obesity due to excess calories without serious comorbidity with body mass index (BMI) of 50.0 to 59.9 in adult (H)  Did not discuss in detail today. With GI symptoms, will hold off on discussion of potential medication management at this time. I'm happy to discuss this with her, though, at any time.       Patient has been advised of split billing requirements and indicates understanding: Yes    Counseling  Appropriate preventive services were discussed with this patient, including applicable screening as appropriate for fall prevention, nutrition, physical activity, Tobacco-use cessation, weight loss and cognition.  Checklist reviewing  preventive services available has been given to the patient.  Reviewed patient's diet, addressing concerns and/or questions.   She is at risk for lack of exercise and has been provided with information to increase physical activity for the benefit of her well-being.   The patient was instructed to see the dentist every 6 months.   She is at risk for psychosocial distress and has been provided with information to reduce risk.     Jason Green is a 24 year old, presenting for the following:  Physical        2/27/2024     8:23 AM   Additional Questions   Roomed by Trinity CHAVARRIA   Accompanied by Self        Health Care Directive  Patient does not have a Health Care Directive or Living Will: Discussed advance care planning with patient; information given to patient to review.    HPI      2/27/2024   General Health   How would you rate your overall physical health? Good   Feel stress (tense, anxious, or unable to sleep) Only a little   (!) STRESS CONCERN      2/27/2024   Nutrition   Three or more servings of calcium each day? Yes   Diet: Regular (no restrictions)   How many servings of fruit and vegetables per day? (!) 2-3   How many sweetened beverages each day? 0-1         2/27/2024   Exercise   Days per week of moderate/strenous exercise 3 days         2/27/2024   Social Factors   Frequency of gathering with friends or relatives Once a week   Worry food won't last until get money to buy more No   Food not last or not have enough money for food? No   Do you have housing?  Yes   Are you worried about losing your housing? No   Lack of transportation? No   Unable to get utilities (heat,electricity)? No         2/27/2024   Dental   Dentist two times every year? (!) NO         2/27/2024   TB Screening   Were you born outside of [place]?  NO       Today's PHQ-9 Score:       2/27/2024     8:14 AM   PHQ-9 SCORE   PHQ-9 Total Score MyChart 4 (Minimal depression)   PHQ-9 Total Score 4         2/27/2024   Substance Use   Alcohol  "more than 3/day or more than 7/wk No   Do you use any other substances recreationally? (!) ALCOHOL     Social History     Tobacco Use    Smoking status: Former     Types: Vaping Device    Smokeless tobacco: Never   Vaping Use    Vaping Use: Former    Substances: Nicotine, Flavoring    Devices: Disposable   Substance Use Topics    Alcohol use: Yes     Comment: occ    Drug use: Never           2/27/2024   STI Screening   New sexual partner(s) since last STI/HIV test? No     History of abnormal Pap smear: NO - age 21-29 PAP every 3 years recommended        7/8/2021    10:51 AM   PAP / HPV   PAP (Historical) NIL            2/27/2024   Contraception/Family Planning   Questions about contraception or family planning No        Reviewed and updated as needed this visit by Provider                      Review of Systems  Constitutional, HEENT, cardiovascular, pulmonary, gi and gu systems are negative, except as otherwise noted.     Objective    Exam  /80 (BP Location: Right arm, Patient Position: Sitting, Cuff Size: Adult Large)   Pulse 82   Temp 97.9  F (36.6  C) (Tympanic)   Resp 18   Ht 1.715 m (5' 7.5\")   Wt (!) 166 kg (366 lb)   LMP 02/14/2024   SpO2 97%   BMI 56.48 kg/m     Estimated body mass index is 56.48 kg/m  as calculated from the following:    Height as of this encounter: 1.715 m (5' 7.5\").    Weight as of this encounter: 166 kg (366 lb).    Physical Exam  GENERAL: alert and no distress  EYES: Eyes grossly normal to inspection, and conjunctivae and sclerae normal  HENT: ear canals and TM's normal, nose and mouth without ulcers or lesions  NECK: no adenopathy, no asymmetry, masses, or scars  RESP: lungs clear to auscultation - no rales, rhonchi or wheezes  CV: regular rate and rhythm, normal S1 S2, no S3 or S4, no murmur, click or rub, no peripheral edema  ABDOMEN: soft, nontender, no hepatosplenomegaly, no masses and bowel sounds normal  MS: no gross musculoskeletal defects noted, no edema  SKIN: " no suspicious lesions or rashes  NEURO: Normal strength and tone, mentation intact and speech normal  PSYCH: mentation appears normal, affect normal/bright    Signed Electronically by: Denise Ramirez MD    Answers submitted by the patient for this visit:  Patient Health Questionnaire (Submitted on 2/27/2024)  If you checked off any problems, how difficult have these problems made it for you to do your work, take care of things at home, or get along with other people?: Somewhat difficult  PHQ9 TOTAL SCORE: 4  LC-7 (Submitted on 2/27/2024)  LC 7 TOTAL SCORE: 8

## 2024-02-27 NOTE — PATIENT INSTRUCTIONS
Preventive Care Advice   This is general advice given by our system to help you stay healthy. However, your care team may have specific advice just for you. Please talk to your care team about your preventive care needs.  Nutrition  Eat 5 or more servings of fruits and vegetables each day.  Try wheat bread, brown rice and whole grain pasta (instead of white bread, rice, and pasta).  Get enough calcium and vitamin D. Check the label on foods and aim for 100% of the RDA (recommended daily allowance).  Lifestyle  Exercise at least 150 minutes each week   (30 minutes a day, 5 days a week).  Do muscle strengthening activities 2 days a week. These help control your weight and prevent disease.  No smoking.  Wear sunscreen to prevent skin cancer.  Have a dental exam and cleaning every 6 months.  Yearly exams  See your health care team every year to talk about:  Any changes in your health.  Any medicines your care team has prescribed.  Preventive care, family planning, and ways to prevent chronic diseases.  Shots (vaccines)   HPV shots (up to age 26), if you've never had them before.  Hepatitis B shots (up to age 59), if you've never had them before.  COVID-19 shot: Get this shot when it's due.  Flu shot: Get a flu shot every year.  Tetanus shot: Get a tetanus shot every 10 years.  Pneumococcal, hepatitis A, and RSV shots: Ask your care team if you need these based on your risk.  Shingles shot (for age 50 and up).  General health tests  Diabetes screening:  Starting at age 35, Get screened for diabetes at least every 3 years.  If you are younger than age 35, ask your care team if you should be screened for diabetes.  Cholesterol test: At age 39, start having a cholesterol test every 5 years, or more often if advised.  Bone density scan (DEXA): At age 50, ask your care team if you should have this scan for osteoporosis (brittle bones).  Hepatitis C: Get tested at least once in your life.  STIs (sexually transmitted  infections)  Before age 24: Ask your care team if you should be screened for STIs.  After age 24: Get screened for STIs if you're at risk. You are at risk for STIs (including HIV) if:  You are sexually active with more than one person.  You don't use condoms every time.  You or a partner was diagnosed with a sexually transmitted infection.  If you are at risk for HIV, ask about PrEP medicine to prevent HIV.  Get tested for HIV at least once in your life, whether you are at risk for HIV or not.  Cancer screening tests  Cervical cancer screening: If you have a cervix, begin getting regular cervical cancer screening tests at age 21. Most people who have regular screenings with normal results can stop after age 65. Talk about this with your provider.  Breast cancer scan (mammogram): If you've ever had breasts, begin having regular mammograms starting at age 40. This is a scan to check for breast cancer.  Colon cancer screening: It is important to start screening for colon cancer at age 45.  Have a colonoscopy test every 10 years (or more often if you're at risk) Or, ask your provider about stool tests like a FIT test every year or Cologuard test every 3 years.  To learn more about your testing options, visit: https://www.[email or web address]/923399.pdf.  For help making a decision, visit: https://bit.ly/hn85192.  Prostate cancer screening test: If you have a prostate and are age 55 to 69, ask your provider if you would benefit from a yearly prostate cancer screening test.  Lung cancer screening: If you are a current or former smoker age 50 to 80, ask your care team if ongoing lung cancer screenings are right for you.  For informational purposes only. Not to replace the advice of your health care provider. Copyright   2023 [employer]. All rights reserved. Clinically reviewed by the [facility]. [place] [facility] 697626 - REV 01/24.    Learning About Stress  What is stress?     Stress is your  body's response to a hard situation. Your body can have a physical, emotional, or mental response. Stress is a fact of life for most people, and it affects everyone differently. What causes stress for you may not be stressful for someone else.  A lot of things can cause stress. You may feel stress when you go on a job interview, take a test, or run a race. This kind of short-term stress is normal and even useful. It can help you if you need to work hard or react quickly. For example, stress can help you finish an important job on time.  Long-term stress is caused by ongoing stressful situations or events. Examples of long-term stress include long-term health problems, ongoing problems at work, or conflicts in your family. Long-term stress can harm your health.  How does stress affect your health?  When you are stressed, your body responds as though you are in danger. It makes hormones that speed up your heart, make you breathe faster, and give you a burst of energy. This is called the fight-or-flight stress response. If the stress is over quickly, your body goes back to normal and no harm is done.  But if stress happens too often or lasts too long, it can have bad effects. Long-term stress can make you more likely to get sick, and it can make symptoms of some diseases worse. If you tense up when you are stressed, you may develop neck, shoulder, or low back pain. Stress is linked to high blood pressure and heart disease.  Stress also harms your emotional health. It can make you mccray, tense, or depressed. Your relationships may suffer, and you may not do well at work or school.  What can you do to manage stress?  You can try these things to help manage stress:   Do something active. Exercise or activity can help reduce stress. Walking is a great way to get started. Even everyday activities such as housecleaning or yard work can help.  Try yoga or marilia chi. These techniques combine exercise and meditation. You may need  some training at first to learn them.  Do something you enjoy. For example, listen to music or go to a movie. Practice your hobby or do volunteer work.  Meditate. This can help you relax, because you are not worrying about what happened before or what may happen in the future.  Do guided imagery. Imagine yourself in any setting that helps you feel calm. You can use online videos, books, or a teacher to guide you.  Do breathing exercises. For example:  From a standing position, bend forward from the waist with your knees slightly bent. Let your arms dangle close to the floor.  Breathe in slowly and deeply as you return to a standing position. Roll up slowly and lift your head last.  Hold your breath for just a few seconds in the standing position.  Breathe out slowly and bend forward from the waist.  Let your feelings out. Talk, laugh, cry, and express anger when you need to. Talking with supportive friends or family, a counselor, or a josemanuel leader about your feelings is a healthy way to relieve stress. Avoid discussing your feelings with people who make you feel worse.  Write. It may help to write about things that are bothering you. This helps you find out how much stress you feel and what is causing it. When you know this, you can find better ways to cope.  What can you do to prevent stress?  You might try some of these things to help prevent stress:  Manage your time. This helps you find time to do the things you want and need to do.  Get enough sleep. Your body recovers from the stresses of the day while you are sleeping.  Get support. Your family, friends, and community can make a difference in how you experience stress.  Limit your news feed. Avoid or limit time on social media or news that may make you feel stressed.  Do something active. Exercise or activity can help reduce stress. Walking is a great way to get started.  Where can you learn more?  Go to https://www.healthwise.net/patiented  Enter N032 in the  "search box to learn more about \"Learning About Stress.\"  Current as of: February 26, 2023               Content Version: 13.8    5297-6502 [employer].   Care instructions adapted under license by your healthcare professional. If you have questions about a medical condition or this instruction, always ask your healthcare professional. [employer] disclaims any warranty or liability for your use of this information.      Substance Use Disorder: Care Instructions  Overview     You can improve your life and health by stopping your use of alcohol or drugs. When you don't drink or use drugs, you may feel and sleep better. You may get along better with your family, friends, and coworkers. There are medicines and programs that can help with substance use disorder.  How can you care for yourself at home?  Here are some ways to help you stay sober and prevent relapse.  If you have been given medicine to help keep you sober or reduce your cravings, be sure to take it exactly as prescribed.  Talk to your doctor about programs that can help you stop using drugs or drinking alcohol.  Do not keep alcohol or drugs in your home.  Plan ahead. Think about what you'll say if other people ask you to drink or use drugs. Try not to spend time with people who drink or use drugs.  Use the time and money spent on drinking or drugs to do something that's important to you.  Preventing a relapse  Have a plan to deal with relapse. Learn to recognize changes in your thinking that lead you to drink or use drugs. Get help before you start to drink or use drugs again.  Try to stay away from situations, friends, or places that may lead you to drink or use drugs.  If you feel the need to drink alcohol or use drugs again, seek help right away. Call a trusted friend or family member. Some people get support from organizations such as Narcotics Anonymous or vogogo or from treatment facilities.  If you relapse, get help " as soon as you can. Some people make a plan with another person that outlines what they want that person to do for them if they relapse. The plan usually includes how to handle the relapse and who to notify in case of relapse.  Don't give up. Remember that a relapse doesn't mean that you have failed. Use the experience to learn the triggers that lead you to drink or use drugs. Then quit again. Recovery is a lifelong process. Many people have several relapses before they are able to quit for good.  Follow-up care is a key part of your treatment and safety. Be sure to make and go to all appointments, and call your doctor if you are having problems. It's also a good idea to know your test results and keep a list of the medicines you take.  When should you call for help?   Call 911  anytime you think you may need emergency care. For example, call if you or someone else:    Has overdosed or has withdrawal signs. Be sure to tell the emergency workers that you are or someone else is using or trying to quit using drugs. Overdose or withdrawal signs may include:  Losing consciousness.  Seizure.  Seeing or hearing things that aren't there (hallucinations).     Is thinking or talking about suicide or harming others.   Where to get help 24 hours a day, 7 days a week   If you or someone you know talks about suicide, self-harm, a mental health crisis, a substance use crisis, or any other kind of emotional distress, get help right away. You can:    Call the Suicide and Crisis Lifeline at 987.     Call 3-773-906-TALK (1-716.596.3670).     Text HOME to 363138 to access the Crisis Text Line.   Consider saving these numbers in your phone.  Go to PubNativeline.org for more information or to chat online.  Call your doctor now or seek immediate medical care if:    You are having withdrawal symptoms. These may include nausea or vomiting, sweating, shakiness, and anxiety.   Watch closely for changes in your health, and be sure to contact  "your doctor if:    You have a relapse.     You need more help or support to stop.   Where can you learn more?  Go to https://www.[employer].net/patiented  Enter H573 in the search box to learn more about \"Substance Use Disorder: Care Instructions.\"  Current as of: March 21, 2023               Content Version: 13.8    6746-8283 [employer].   Care instructions adapted under license by your healthcare professional. If you have questions about a medical condition or this instruction, always ask your healthcare professional. Healthwise, [employer] disclaims any warranty or liability for your use of this information.      "

## 2024-02-28 ENCOUNTER — PATIENT OUTREACH (OUTPATIENT)
Dept: GASTROENTEROLOGY | Facility: CLINIC | Age: 24
End: 2024-02-28
Payer: COMMERCIAL

## 2024-05-05 ENCOUNTER — MYC REFILL (OUTPATIENT)
Dept: FAMILY MEDICINE | Facility: CLINIC | Age: 24
End: 2024-05-05
Payer: COMMERCIAL

## 2024-05-05 DIAGNOSIS — R10.816 EPIGASTRIC ABDOMINAL TENDERNESS WITHOUT REBOUND TENDERNESS: ICD-10-CM

## 2024-05-05 DIAGNOSIS — R11.0 NAUSEA: ICD-10-CM

## 2024-05-06 RX ORDER — FAMOTIDINE 20 MG/1
20 TABLET, FILM COATED ORAL 2 TIMES DAILY
Qty: 180 TABLET | Refills: 3 | Status: SHIPPED | OUTPATIENT
Start: 2024-05-06 | End: 2024-09-08

## 2024-05-07 ENCOUNTER — NURSE TRIAGE (OUTPATIENT)
Dept: FAMILY MEDICINE | Facility: CLINIC | Age: 24
End: 2024-05-07

## 2024-05-07 ENCOUNTER — OFFICE VISIT (OUTPATIENT)
Dept: FAMILY MEDICINE | Facility: CLINIC | Age: 24
End: 2024-05-07
Payer: COMMERCIAL

## 2024-05-07 VITALS
OXYGEN SATURATION: 97 % | HEART RATE: 84 BPM | HEIGHT: 68 IN | RESPIRATION RATE: 16 BRPM | TEMPERATURE: 98.7 F | WEIGHT: 293 LBS | DIASTOLIC BLOOD PRESSURE: 78 MMHG | SYSTOLIC BLOOD PRESSURE: 120 MMHG | BODY MASS INDEX: 44.41 KG/M2

## 2024-05-07 DIAGNOSIS — R10.31 RLQ ABDOMINAL PAIN: Primary | ICD-10-CM

## 2024-05-07 DIAGNOSIS — R39.9 URINARY SYMPTOM OR SIGN: ICD-10-CM

## 2024-05-07 DIAGNOSIS — M79.661 BILATERAL CALF PAIN: ICD-10-CM

## 2024-05-07 DIAGNOSIS — M79.662 BILATERAL CALF PAIN: ICD-10-CM

## 2024-05-07 LAB
ALBUMIN UR-MCNC: NEGATIVE MG/DL
APPEARANCE UR: CLEAR
BACTERIA #/AREA URNS HPF: ABNORMAL /HPF
BILIRUB UR QL STRIP: NEGATIVE
COLOR UR AUTO: YELLOW
GLUCOSE UR STRIP-MCNC: NEGATIVE MG/DL
HGB UR QL STRIP: NEGATIVE
KETONES UR STRIP-MCNC: NEGATIVE MG/DL
LEUKOCYTE ESTERASE UR QL STRIP: ABNORMAL
NITRATE UR QL: NEGATIVE
PH UR STRIP: 6.5 [PH] (ref 5–7)
RBC #/AREA URNS AUTO: ABNORMAL /HPF
SP GR UR STRIP: 1.01 (ref 1–1.03)
SQUAMOUS #/AREA URNS AUTO: ABNORMAL /LPF
UROBILINOGEN UR STRIP-ACNC: 0.2 E.U./DL
WBC #/AREA URNS AUTO: ABNORMAL /HPF

## 2024-05-07 PROCEDURE — 99213 OFFICE O/P EST LOW 20 MIN: CPT | Performed by: FAMILY MEDICINE

## 2024-05-07 PROCEDURE — G2211 COMPLEX E/M VISIT ADD ON: HCPCS | Performed by: FAMILY MEDICINE

## 2024-05-07 PROCEDURE — 81001 URINALYSIS AUTO W/SCOPE: CPT | Performed by: FAMILY MEDICINE

## 2024-05-07 RX ORDER — SUCRALFATE 1 G/1
TABLET ORAL 4 TIMES DAILY PRN
COMMUNITY
Start: 2024-02-14 | End: 2024-07-16

## 2024-05-07 NOTE — TELEPHONE ENCOUNTER
Provider Response to 2nd Level Triage Request    I have reviewed the RN documentation. My recommendation is:  Keep currently scheduled visit

## 2024-05-07 NOTE — TELEPHONE ENCOUNTER
RN was asked to call patient before her appt today with Dr. Rose.  Patient has had her symptoms fro 3 days now. The stomach pain feels like a cramping feeling in her RLQ. Patient stated she had this pain before about a month ago. Looks like she also had the same pain in January as well.  Patient does still have her appendix. Pain is not severe. Patient is able to walk normally without feeling the need to brace her stomach or lay in the fetal position. No temp  Patient stated she has had a history of ovarian cysts and thinks this may be what is causing that.No urinary issues. Patient was treated for BV last month.  The pain in the leg started 3 days ago. It started in the right leg and then went to the left leg. She did not have any pain last night. She believes her legs may have been restless. No swelling. No redness. RN asked if she has had this pain before and she said other than a karen horse, she has not.     RN told patient she would call back if patient needed to be seen elsewhere.   Reason for Disposition   MODERATE pain (e.g., interferes with normal activities that comes and goes (cramps) lasts > 24 hours  (Exception: Pain with Vomiting or Diarrhea - see that Protocol.)   Leg pain    Protocols used: Abdominal Pain - Female-A-OH, Leg Pain-A-OH    Zoraida Lucas RN on 5/7/2024 at 8:52 AM

## 2024-05-07 NOTE — PROGRESS NOTES
"  A/P:      ICD-10-CM    1. RLQ abdominal pain  R10.31 US Pelvic Complete with Transvaginal      2. Urinary symptom or sign  R39.9 UA Macroscopic with reflex to Microscopic and Culture - Clinic Collect     UA Microscopic with Reflex to Culture      3. Bilateral calf pain  M79.661     M79.662         Pt with significant eval for this pain in the past including CT times 2, 1 <1 month ago  .  Pt concerned for ovarian cyst, us ordered however pain seems high for a pelvic etiology.  With no significant change in pain, no systemic symptoms (fever, nausea) doubt development of new infectious process.  Suspect pain most likely related to ongoing chronic GI condition.  Encouraged pt to follow-up with her GI provider to get diagnostic studies scheduled and pursue additional eval there.      Unclear etiology of mirlande calf pain, seems resolved now.  No swelling.  Pt is not on any medication that would increase clot risk and given mirlande nature clot would not be suspected.  She will follow-up if it returns.       Jason Green is a 24 year old, presenting for the following health issues:  Abdominal Pain        5/7/2024     4:17 PM   Additional Questions   Roomed by Radha BROWNE   Accompanied by Self      History of Present Illness       Reason for visit:  Pain on the right side of my stomach and pain and calfs    She eats 0-1 servings of fruits and vegetables daily.She consumes 1 sweetened beverage(s) daily.She exercises with enough effort to increase her heart rate 30 to 60 minutes per day.  She exercises with enough effort to increase her heart rate 3 or less days per week.   She is taking medications regularly.       Has \"weird\" stomach pain.  Notes she has a long history of GI symptoms and follows with GI for this reason.  Unclear to her if her current symptoms are related to her chronic GI history or something more acute.  Is supposed to be scheduling a study with her GI which she has not yet done.  Has had this pain before with " "eval in ED in Jan and the UC times 2 in April.  Most recently had a CT abd/pelvis in mid April that did not show a cause for her pelvic pain    Pain is very similar to what she experienced last month but now seems to be radiating a bit lower in the abdomen.  Has 3 had episodes of diarrhea recently although this is not unusual with her GI history.    Pain is constant and improved briefly (minutes) with BM    Sleeps with heating pad which helps.    No change in urine output.    No fevers/chill or nausea or vomiting.  Appetite has been normal.     No vaginal symptoms recently.  LMP 4/12/24.  Normal period at that time.  Has had ovarian cyts in the past.  Unclear if this current symptom is similar.    Has had new calf pain as well.  Initially felt painful in the R calf during the day, just felt tight throughout.  At bedtime felt tight and sharp discomfort.  When she woke the next day the same pain was present in the L leg.  Pain is now present only when laying down and resolves when she gets up and walks during the day. Pain was worst Sunday night at bedtime.  Has been improving since.  Did not notice it at all this AM when she woke.  Has never had any swelling.    Wonder if it is just resolving since it is better today.      Not sexually active and never has been        Review of Systems  Constitutional, HEENT, cardiovascular, pulmonary, gi and gu systems are negative, except as otherwise noted.      Objective    /78   Pulse 84   Temp 98.7  F (37.1  C) (Tympanic)   Resp 16   Ht 1.715 m (5' 7.5\")   Wt (!) 169.2 kg (373 lb)   LMP 04/16/2024   SpO2 97%   BMI 57.56 kg/m    Body mass index is 57.56 kg/m .  Physical Exam   GENERAL: alert and no distress  NECK: no adenopathy, no asymmetry, masses, or scars  RESP: lungs clear to auscultation - no rales, rhonchi or wheezes  CV: regular rate and rhythm, normal S1 S2, no S3 or S4, no murmur, click or rub, no peripheral edema  ABDOMEN: obese which limits assessment " of HSM.  Mild tenderness R mid abdomen to RLQ, no significant very low abd/pelvic pain present.  No rebound or guarding.    MS: no gross musculoskeletal defects noted, no edema  PSYCH: mentation appears normal, affect normal/bright    Epic reviewed        Signed Electronically by: Jasmin Rose DO

## 2024-05-31 ENCOUNTER — HOSPITAL ENCOUNTER (OUTPATIENT)
Dept: MRI IMAGING | Facility: CLINIC | Age: 24
Discharge: HOME | End: 2024-05-31
Attending: INTERNAL MEDICINE | Admitting: INTERNAL MEDICINE
Payer: COMMERCIAL

## 2024-05-31 DIAGNOSIS — K52.9 ILEITIS: ICD-10-CM

## 2024-05-31 PROCEDURE — 255N000002 HC RX 255 OP 636: Performed by: INTERNAL MEDICINE

## 2024-05-31 PROCEDURE — A9585 GADOBUTROL INJECTION: HCPCS | Performed by: INTERNAL MEDICINE

## 2024-05-31 PROCEDURE — 258N000003 HC RX IP 258 OP 636: Performed by: INTERNAL MEDICINE

## 2024-05-31 PROCEDURE — 250N000011 HC RX IP 250 OP 636: Performed by: INTERNAL MEDICINE

## 2024-05-31 PROCEDURE — 74183 MRI ABD W/O CNTR FLWD CNTR: CPT

## 2024-05-31 RX ORDER — GADOBUTROL 604.72 MG/ML
17 INJECTION INTRAVENOUS ONCE
Status: COMPLETED | OUTPATIENT
Start: 2024-05-31 | End: 2024-05-31

## 2024-05-31 RX ADMIN — SODIUM CHLORIDE 50 ML: 9 INJECTION, SOLUTION INTRAVENOUS at 10:03

## 2024-05-31 RX ADMIN — GLUCAGON 1 MG: KIT at 10:03

## 2024-05-31 RX ADMIN — GADOBUTROL 17 ML: 604.72 INJECTION INTRAVENOUS at 09:50

## 2024-06-03 ENCOUNTER — TRANSFERRED RECORDS (OUTPATIENT)
Dept: HEALTH INFORMATION MANAGEMENT | Facility: CLINIC | Age: 24
End: 2024-06-03
Payer: COMMERCIAL

## 2024-06-28 ENCOUNTER — OFFICE VISIT (OUTPATIENT)
Dept: FAMILY MEDICINE | Facility: CLINIC | Age: 24
End: 2024-06-28
Payer: COMMERCIAL

## 2024-06-28 VITALS
DIASTOLIC BLOOD PRESSURE: 88 MMHG | BODY MASS INDEX: 56.63 KG/M2 | TEMPERATURE: 98 F | RESPIRATION RATE: 14 BRPM | SYSTOLIC BLOOD PRESSURE: 124 MMHG | WEIGHT: 293 LBS | HEART RATE: 73 BPM | OXYGEN SATURATION: 98 %

## 2024-06-28 DIAGNOSIS — R10.31 RLQ ABDOMINAL PAIN: Primary | ICD-10-CM

## 2024-06-28 LAB
ALBUMIN UR-MCNC: NEGATIVE MG/DL
APPEARANCE UR: CLEAR
BACTERIA #/AREA URNS HPF: ABNORMAL /HPF
BASOPHILS # BLD AUTO: 0 10E3/UL (ref 0–0.2)
BASOPHILS NFR BLD AUTO: 0 %
BILIRUB UR QL STRIP: NEGATIVE
COLOR UR AUTO: YELLOW
EOSINOPHIL # BLD AUTO: 0.3 10E3/UL (ref 0–0.7)
EOSINOPHIL NFR BLD AUTO: 3 %
ERYTHROCYTE [DISTWIDTH] IN BLOOD BY AUTOMATED COUNT: 13.3 % (ref 10–15)
GLUCOSE UR STRIP-MCNC: NEGATIVE MG/DL
HCT VFR BLD AUTO: 40.7 % (ref 35–47)
HGB BLD-MCNC: 13.2 G/DL (ref 11.7–15.7)
HGB UR QL STRIP: NEGATIVE
IMM GRANULOCYTES # BLD: 0 10E3/UL
IMM GRANULOCYTES NFR BLD: 0 %
KETONES UR STRIP-MCNC: NEGATIVE MG/DL
LEUKOCYTE ESTERASE UR QL STRIP: ABNORMAL
LYMPHOCYTES # BLD AUTO: 1.9 10E3/UL (ref 0.8–5.3)
LYMPHOCYTES NFR BLD AUTO: 20 %
MCH RBC QN AUTO: 27.3 PG (ref 26.5–33)
MCHC RBC AUTO-ENTMCNC: 32.4 G/DL (ref 31.5–36.5)
MCV RBC AUTO: 84 FL (ref 78–100)
MONOCYTES # BLD AUTO: 0.7 10E3/UL (ref 0–1.3)
MONOCYTES NFR BLD AUTO: 7 %
NEUTROPHILS # BLD AUTO: 6.5 10E3/UL (ref 1.6–8.3)
NEUTROPHILS NFR BLD AUTO: 69 %
NITRATE UR QL: NEGATIVE
PH UR STRIP: 6.5 [PH] (ref 5–7)
PLATELET # BLD AUTO: 284 10E3/UL (ref 150–450)
RBC # BLD AUTO: 4.84 10E6/UL (ref 3.8–5.2)
RBC #/AREA URNS AUTO: ABNORMAL /HPF
SP GR UR STRIP: 1.01 (ref 1–1.03)
SQUAMOUS #/AREA URNS AUTO: ABNORMAL /LPF
UROBILINOGEN UR STRIP-ACNC: 0.2 E.U./DL
WBC # BLD AUTO: 9.5 10E3/UL (ref 4–11)
WBC #/AREA URNS AUTO: ABNORMAL /HPF

## 2024-06-28 PROCEDURE — 36415 COLL VENOUS BLD VENIPUNCTURE: CPT | Performed by: STUDENT IN AN ORGANIZED HEALTH CARE EDUCATION/TRAINING PROGRAM

## 2024-06-28 PROCEDURE — 81001 URINALYSIS AUTO W/SCOPE: CPT | Performed by: STUDENT IN AN ORGANIZED HEALTH CARE EDUCATION/TRAINING PROGRAM

## 2024-06-28 PROCEDURE — 85025 COMPLETE CBC W/AUTO DIFF WBC: CPT | Performed by: STUDENT IN AN ORGANIZED HEALTH CARE EDUCATION/TRAINING PROGRAM

## 2024-06-28 PROCEDURE — 87086 URINE CULTURE/COLONY COUNT: CPT | Performed by: STUDENT IN AN ORGANIZED HEALTH CARE EDUCATION/TRAINING PROGRAM

## 2024-06-28 PROCEDURE — 99213 OFFICE O/P EST LOW 20 MIN: CPT | Performed by: STUDENT IN AN ORGANIZED HEALTH CARE EDUCATION/TRAINING PROGRAM

## 2024-06-28 RX ORDER — BUDESONIDE 3 MG/1
CAPSULE, COATED PELLETS ORAL
COMMUNITY
Start: 2024-06-03 | End: 2024-07-16

## 2024-06-28 ASSESSMENT — ENCOUNTER SYMPTOMS: ABDOMINAL PAIN: 1

## 2024-06-28 ASSESSMENT — PAIN SCALES - GENERAL: PAINLEVEL: SEVERE PAIN (6)

## 2024-06-28 NOTE — NURSING NOTE
"Chief Complaint   Patient presents with    Abdominal Pain     Right side lower abdominal pain - cramp and burning     /88   Pulse 73   Temp 98  F (36.7  C) (Tympanic)   Resp 14   Wt (!) 166.5 kg (367 lb)   LMP 05/13/2024   SpO2 98%   BMI 56.63 kg/m   Estimated body mass index is 56.63 kg/m  as calculated from the following:    Height as of 5/7/24: 1.715 m (5' 7.5\").    Weight as of this encounter: 166.5 kg (367 lb).  Patient presents to the clinic using No DME      Health Maintenance that is potentially due pending provider review:    Health Maintenance Due   Topic Date Due    HEPATITIS B IMMUNIZATION (2 of 3 - 3-dose series) 2000    DTAP/TDAP/TD IMMUNIZATION (2 - Td or Tdap) 08/23/2012    HPV IMMUNIZATION (1 - 3-dose series) Never done    COVID-19 Vaccine (1 - 2023-24 season) Never done    PAP  07/08/2024                  " Patient Name:  Davonte Pillai  MR#:  [id number]  : 1944      Patient Education Summary For 2017    During the visit on 2017, Davonte Pillai received patient-specific education and/or education materials from Юлия Cohen regarding the following topic(s):    Date 2017   Time 9:16 AM   Tobacco Use Screening       Smoking Cessation Counseling Not Applicable   General       After Hours On-Call Info Yes     Discharge Instructions Yes   Site-Specific Instructions       Fatigue Yes     Hydration Yes     Activity Management Yes     Skin Care. Yes     Medication. Yes     Other (teaching), Specify f/u CT scan on 9/15 and f/u appt Dr. Feliz after scan   Prevention Teaching       Fall/ Safety Yes     Infection Prevention Yes   Individual Taught       Patient. Yes   Preferred Learning Method       Explanation Preferred. Yes   Teaching Method       Explanation. Yes   Ability to Learn       Receptive. Yes   Barriers to Learning       None Evident. Yes   Outcome       Acceptable Level Knwldge/Perf Yes        Authenticated by Юлия Cohen on 2017 at 12:20 PM

## 2024-06-28 NOTE — PROGRESS NOTES
"  Assessment & Plan     RLQ abdominal pain  Patient is a 24-year-old female who has been working with [facility] for ongoing bowel concerns.  She recently had MRI of the abdomen that showed possible ileitis.  She has been prescribed budesonide, however has not started this yet.  She presents today for right lower quadrant abdominal pain that has been worsening..  She is late with regards to her menstrual cycle.  Declines any possibility of pregnancy.  Has also had more diarrhea in the past couple days.  She did have a pelvic ultrasound completed in May when she was having similar symptoms and noted to have a right ovarian cyst measuring 3.6 x 3.6 x 3.4 cm.  On exam today, does have tenderness to deep palpation in the right lower quadrant, psoas sign is negative.  We discussed possible etiologies including worsening ileitis versus ovarian cyst torsion versus benign cyst versus renal stone versus appendicitis.  We opted for below workup today.  Reassuringly, UA negative for blood and CBC without elevated white count.  Low concern at this point for stone versus appendicitis.  Most suspect that this is worsening ileitis, and recommended that she take the budesonide that was prescribed by the [facility].  However, if she were to have unremitting severe abdominal pain, recommended she be reevaluated in the emergency department for concern of ovarian torsion.  No concern for that on exam today.  - CBC with platelets and differential  - UA Macroscopic with reflex to Microscopic and Culture - Lab Collect  - CBC with platelets and differential  - UA Macroscopic with reflex to Microscopic and Culture - Lab Collect  - UA Microscopic with Reflex to Culture  - Urine Culture    BMI  Estimated body mass index is 56.63 kg/m  as calculated from the following:    Height as of 5/7/24: 1.715 m (5' 7.5\").    Weight as of this encounter: 166.5 kg (367 lb).       Jason Green is a 24 year old, presenting for the following health " issues:  Abdominal Pain (Right side lower abdominal pain - cramp and burning)        6/28/2024     2:44 PM   Additional Questions   Roomed by Tomasa BROWNE   Accompanied by self         6/28/2024     2:44 PM   Patient Reported Additional Medications   Patient reports taking the following new medications .     History of Present Illness       Reason for visit:  Stomach pain  Symptom onset:  3-7 days ago  Symptoms include:  Right side pain sharp and stinging  Symptom intensity:  Moderate  Symptom progression:  Worsening  Had these symptoms before:  Yes  Has tried/received treatment for these symptoms:  No  What makes it worse:  No  What makes it better:  No    She eats 2-3 servings of fruits and vegetables daily.She consumes 1 sweetened beverage(s) daily.She exercises with enough effort to increase her heart rate 30 to 60 minutes per day.  She exercises with enough effort to increase her heart rate 3 or less days per week.   She is taking medications regularly.     15 days late on menses.   Usually pretty regular. So this is unusual.   More constant pain today.   Like having cramps but also a stinging sensation  Deep in the abdmen.  When stinging almost feels like it is stinging on the surface.   Looser stools the past 2 days, no blood.     [facility] - want her to start a medication she hasn't started it, but will start it soon. Budesonide pills.     No vaginal symptoms.     Urinary symptoms? Different odor when she urinates. No blood or dysuria.     Pain goes to lateral abdomen and stops, not up to CVA region.     Has diarrhea x 2 days   Still has appendix, no fevers  Had Pelvic ultrasound 5/8/24     1.  Normal appearance of the uterus, endometrium and left ovary.  2.  Simple 3.6 cm cyst in the right ovary, favored to represent a functional follicular cyst.  Narrative    For Patients: As a result of the 21st Century Cures Act, medical imaging exams and procedure reports are released immediately into your electronic medical  record. You may view this report before your referring provider. If you have questions, please contact your health care provider.    EXAM: US PELVIS COMPLETE TA AND TV WITH DUPLEX  LOCATION: [facility] [place]  DATE: 5/8/2024    INDICATION: Pain/tenderness  COMPARISON: The CT from 4/13/2024.  TECHNIQUE: Transabdominal scans were performed. Endovaginal ultrasound was performed to better visualize the adnexa. Color flow with spectral Doppler and waveform analysis performed.    FINDINGS:    UTERUS: 9.3 x 6.0 x 4.6 cm. Normal in size and position with no masses.    ENDOMETRIUM: 7 mm. Normal smooth endometrium.    RIGHT OVARY: 3.9 x 3.6 x 3.9 cm. Documented vascular flow. Anechoic avascular cyst within the right ovary measuring approximately 3.6 x 3.6 x 3.4 cm, likely functional follicular cyst.    LEFT OVARY: 3.0 x 2.5 x 1.6 cm. Documented vascular flow.         Review of Systems  Constitutional, HEENT, cardiovascular, pulmonary, gi and gu systems are negative, except as otherwise noted.      Objective    /88   Pulse 73   Temp 98  F (36.7  C) (Tympanic)   Resp 14   Wt (!) 166.5 kg (367 lb)   LMP 05/13/2024   SpO2 98%   BMI 56.63 kg/m    Body mass index is 56.63 kg/m .  Physical Exam  Constitutional:       Appearance: Normal appearance.   HENT:      Head: Normocephalic.   Eyes:      General: No scleral icterus.     Extraocular Movements: Extraocular movements intact.      Conjunctiva/sclera: Conjunctivae normal.   Cardiovascular:      Rate and Rhythm: Normal rate and regular rhythm.      Heart sounds: Normal heart sounds.   Pulmonary:      Effort: Pulmonary effort is normal.      Breath sounds: Normal breath sounds.   Abdominal:      General: There is no distension.      Tenderness: There is abdominal tenderness (RLQ to deep palpation). There is no right CVA tenderness or left CVA tenderness.      Comments: Psoas sign negative   Musculoskeletal:         General: Normal range of motion.       Cervical back: Normal range of motion.   Neurological:      General: No focal deficit present.      Mental Status: She is alert and oriented to person, place, and time.           Results from this visit  Results for orders placed or performed in visit on 06/28/24   UA Macroscopic with reflex to Microscopic and Culture - Lab Collect     Status: Abnormal    Specimen: Urine, Midstream   Result Value Ref Range    Color Urine Yellow Colorless, Straw, Light Yellow, Yellow    Appearance Urine Clear Clear    Glucose Urine Negative Negative mg/dL    Bilirubin Urine Negative Negative    Ketones Urine Negative Negative mg/dL    Specific Gravity Urine 1.015 1.003 - 1.035    Blood Urine Negative Negative    pH Urine 6.5 5.0 - 7.0    Protein Albumin Urine Negative Negative mg/dL    Urobilinogen Urine 0.2 0.2, 1.0 E.U./dL    Nitrite Urine Negative Negative    Leukocyte Esterase Urine Small (A) Negative   CBC with platelets and differential     Status: None   Result Value Ref Range    WBC Count 9.5 4.0 - 11.0 10e3/uL    RBC Count 4.84 3.80 - 5.20 10e6/uL    Hemoglobin 13.2 11.7 - 15.7 g/dL    Hematocrit 40.7 35.0 - 47.0 %    MCV 84 78 - 100 fL    MCH 27.3 26.5 - 33.0 pg    MCHC 32.4 31.5 - 36.5 g/dL    RDW 13.3 10.0 - 15.0 %    Platelet Count 284 150 - 450 10e3/uL    % Neutrophils 69 %    % Lymphocytes 20 %    % Monocytes 7 %    % Eosinophils 3 %    % Basophils 0 %    % Immature Granulocytes 0 %    Absolute Neutrophils 6.5 1.6 - 8.3 10e3/uL    Absolute Lymphocytes 1.9 0.8 - 5.3 10e3/uL    Absolute Monocytes 0.7 0.0 - 1.3 10e3/uL    Absolute Eosinophils 0.3 0.0 - 0.7 10e3/uL    Absolute Basophils 0.0 0.0 - 0.2 10e3/uL    Absolute Immature Granulocytes 0.0 <=0.4 10e3/uL   UA Microscopic with Reflex to Culture     Status: Abnormal   Result Value Ref Range    Bacteria Urine Few (A) None Seen /HPF    RBC Urine 0-2 0-2 /HPF /HPF    WBC Urine 10-25 (A) 0-5 /HPF /HPF    Squamous Epithelials Urine Moderate (A) None Seen /LPF   CBC with  platelets and differential     Status: None    Narrative    The following orders were created for panel order CBC with platelets and differential.  Procedure                               Abnormality         Status                     ---------                               -----------         ------                     CBC with platelets and d...[[id number]]                      Final result                 Please view results for these tests on the individual orders.             Signed Electronically by: Denise Ramirez MD

## 2024-06-30 LAB — BACTERIA UR CULT: NORMAL

## 2024-07-16 ENCOUNTER — OFFICE VISIT (OUTPATIENT)
Dept: FAMILY MEDICINE | Facility: CLINIC | Age: 24
End: 2024-07-16
Payer: COMMERCIAL

## 2024-07-16 VITALS
WEIGHT: 293 LBS | DIASTOLIC BLOOD PRESSURE: 88 MMHG | HEIGHT: 68 IN | OXYGEN SATURATION: 98 % | BODY MASS INDEX: 44.41 KG/M2 | TEMPERATURE: 97.5 F | SYSTOLIC BLOOD PRESSURE: 110 MMHG | RESPIRATION RATE: 16 BRPM | HEART RATE: 88 BPM

## 2024-07-16 DIAGNOSIS — R79.82 ELEVATED C-REACTIVE PROTEIN (CRP): ICD-10-CM

## 2024-07-16 DIAGNOSIS — R07.89 CHEST WALL PAIN: ICD-10-CM

## 2024-07-16 DIAGNOSIS — R10.9 FLANK PAIN: Primary | ICD-10-CM

## 2024-07-16 LAB
ALBUMIN UR-MCNC: NEGATIVE MG/DL
APPEARANCE UR: ABNORMAL
BACTERIA #/AREA URNS HPF: ABNORMAL /HPF
BILIRUB UR QL STRIP: NEGATIVE
COLOR UR AUTO: YELLOW
CRP SERPL-MCNC: 10.03 MG/L
ERYTHROCYTE [SEDIMENTATION RATE] IN BLOOD BY WESTERGREN METHOD: 9 MM/HR (ref 0–20)
GLUCOSE UR STRIP-MCNC: NEGATIVE MG/DL
HGB UR QL STRIP: ABNORMAL
KETONES UR STRIP-MCNC: NEGATIVE MG/DL
LEUKOCYTE ESTERASE UR QL STRIP: ABNORMAL
NITRATE UR QL: NEGATIVE
PH UR STRIP: 7 [PH] (ref 5–7)
RBC #/AREA URNS AUTO: ABNORMAL /HPF
RHEUMATOID FACT SERPL-ACNC: <10 IU/ML
SP GR UR STRIP: 1.02 (ref 1–1.03)
SQUAMOUS #/AREA URNS AUTO: ABNORMAL /LPF
UROBILINOGEN UR STRIP-ACNC: 0.2 E.U./DL
VIT D+METAB SERPL-MCNC: 15 NG/ML (ref 20–50)
WBC #/AREA URNS AUTO: ABNORMAL /HPF

## 2024-07-16 PROCEDURE — 82306 VITAMIN D 25 HYDROXY: CPT | Performed by: NURSE PRACTITIONER

## 2024-07-16 PROCEDURE — 86431 RHEUMATOID FACTOR QUANT: CPT | Performed by: NURSE PRACTITIONER

## 2024-07-16 PROCEDURE — 86140 C-REACTIVE PROTEIN: CPT | Performed by: NURSE PRACTITIONER

## 2024-07-16 PROCEDURE — 36415 COLL VENOUS BLD VENIPUNCTURE: CPT | Performed by: NURSE PRACTITIONER

## 2024-07-16 PROCEDURE — 99213 OFFICE O/P EST LOW 20 MIN: CPT | Performed by: NURSE PRACTITIONER

## 2024-07-16 PROCEDURE — 81001 URINALYSIS AUTO W/SCOPE: CPT | Performed by: NURSE PRACTITIONER

## 2024-07-16 PROCEDURE — 86200 CCP ANTIBODY: CPT | Performed by: NURSE PRACTITIONER

## 2024-07-16 PROCEDURE — 85652 RBC SED RATE AUTOMATED: CPT | Performed by: NURSE PRACTITIONER

## 2024-07-16 NOTE — RESULT ENCOUNTER NOTE
Zena Green    Your antibiotics are working for the kidney infection, please complete the entire course. Please let us know if you have any questions.     Take care,    XENA Rojo CNP

## 2024-07-16 NOTE — PROGRESS NOTES
"  Assessment & Plan     Flank pain  Continue Bactrim course  - UA Macroscopic with reflex to Microscopic and Culture - Clinic Collect  - UA Microscopic with Reflex to Culture    Chest wall pain  Rheum referral if CRP still elevated.  - ESR: Erythrocyte sedimentation rate; Future  - CRP, inflammation; Future  - Rheumatoid factor; Future  - Cyclic Citrullinated Peptide Antibody IgG; Future  - Vitamin D Deficiency; Future  - ESR: Erythrocyte sedimentation rate  - CRP, inflammation  - Rheumatoid factor  - Cyclic Citrullinated Peptide Antibody IgG  - Vitamin D Deficiency    Elevated C-reactive protein (CRP)    - ESR: Erythrocyte sedimentation rate; Future  - CRP, inflammation; Future  - Rheumatoid factor; Future  - Cyclic Citrullinated Peptide Antibody IgG; Future  - Vitamin D Deficiency; Future  - ESR: Erythrocyte sedimentation rate  - CRP, inflammation  - Rheumatoid factor  - Cyclic Citrullinated Peptide Antibody IgG  - Vitamin D Deficiency        MED REC REQUIRED  Post Medication Reconciliation Status: patient was not discharged from an inpatient facility or TCU  BMI  Estimated body mass index is 56.6 kg/m  as calculated from the following:    Height as of this encounter: 1.715 m (5' 7.5\").    Weight as of this encounter: 166.4 kg (366 lb 12.8 oz).   Weight management plan: Discussed healthy diet and exercise guidelines      FUTURE APPOINTMENTS:       - Follow-up for annual visit or as needed    Work on weight loss  Regular exercise    See Patient Instructions    Jason Green is a 24 year old, presenting for the following health issues:  No chief complaint on file.        7/16/2024    10:01 AM   Additional Questions   Roomed by Leonarda ANNE       ED[facility]UC Followup:    [facility]   Date of visit: 7/8/24  Reason for visit: flank pain   Current Status: still has sharp stabbing pain in right side, intermittent. No other sx.Patient rates pain currently a 3/10, 8/10 at its worst.      Still " "having the chest wall pain, no improvement with physical therapy. Wondering next steps. Cousin diagnosed with RA.      Review of Systems  Constitutional, HEENT, cardiovascular, pulmonary, gi and gu systems are negative, except as otherwise noted.      Objective    /88   Pulse 88   Temp 97.5  F (36.4  C) (Tympanic)   Resp 16   Ht 1.715 m (5' 7.5\")   Wt (!) 166.4 kg (366 lb 12.8 oz)   LMP 05/13/2024   SpO2 98%   BMI 56.60 kg/m    Body mass index is 56.6 kg/m .  Physical Exam   GENERAL: alert and no distress  RESP: lungs clear to auscultation - no rales, rhonchi or wheezes  CV: regular rate and rhythm, normal S1 S2, no S3 or S4, no murmur, click or rub, no peripheral edema  ABDOMEN: soft, nontender and no palpable or pulsatile masses  MS: tenderness to palpation bilateral sternum and between upper anterior ribs  BACK: no CVA tenderness, no paralumbar tenderness    Results for orders placed or performed in visit on 07/16/24   UA Macroscopic with reflex to Microscopic and Culture - Clinic Collect     Status: Abnormal    Specimen: Urine, Midstream   Result Value Ref Range    Color Urine Yellow Colorless, Straw, Light Yellow, Yellow    Appearance Urine Slightly Cloudy (A) Clear    Glucose Urine Negative Negative mg/dL    Bilirubin Urine Negative Negative    Ketones Urine Negative Negative mg/dL    Specific Gravity Urine 1.025 1.003 - 1.035    Blood Urine Small (A) Negative    pH Urine 7.0 5.0 - 7.0    Protein Albumin Urine Negative Negative mg/dL    Urobilinogen Urine 0.2 0.2, 1.0 E.U./dL    Nitrite Urine Negative Negative    Leukocyte Esterase Urine Trace (A) Negative   ESR: Erythrocyte sedimentation rate     Status: Normal   Result Value Ref Range    Erythrocyte Sedimentation Rate 9 0 - 20 mm/hr   UA Microscopic with Reflex to Culture     Status: Abnormal   Result Value Ref Range    Bacteria Urine None Seen None Seen /HPF    RBC Urine 0-2 0-2 /HPF /HPF    WBC Urine 0-5 0-5 /HPF /HPF    Squamous Epithelials " Urine Few (A) None Seen /LPF    Narrative    Microscopic exam performed on unspun urine.    Urine Culture not indicated           Signed Electronically by: XENA Deng CNP

## 2024-07-18 LAB — CCP AB SER IA-ACNC: 0.8 U/ML

## 2024-07-18 NOTE — RESULT ENCOUNTER NOTE
Zena Green    Your one inflammatory marker is still elevated. I will put a Rheumatology referral and they will call you to schedule. The vitamin D is slightly low. Recommend to start Vitamin D3- 2,000 units over the counter daily. Please let us know if you have any questions.     Take care,    XENA Rojo CNP

## 2024-07-30 ENCOUNTER — TELEPHONE (OUTPATIENT)
Dept: OBGYN | Facility: CLINIC | Age: 24
End: 2024-07-30
Payer: COMMERCIAL

## 2024-07-30 NOTE — LETTER
July 30, 2024      Norma Collins  [address] [address] [address]    Dear MsBalajiDennis,      This letter is to remind you that you are due for your follow up PAP smear.    Please call [phone] to schedule your appointment at your earliest convenience.     If you have completed the tests outside of [place], please have the results forwarded to our office. We will update the chart for your primary Physician to review before your next annual physical.     Sincerely,      Your [place] Care Team

## 2024-07-30 NOTE — TELEPHONE ENCOUNTER
Panel Management Review    Health Maintenance List    Health Maintenance   Topic Date Due    HEPATITIS B IMMUNIZATION (2 of 3 - 3-dose series) 2000    DTAP/TDAP/TD IMMUNIZATION (2 - Td or Tdap) 08/23/2012    HPV IMMUNIZATION (1 - 3-dose series) Never done    COVID-19 Vaccine (1 - 2023-24 season) Never done    PAP  07/08/2024    INFLUENZA VACCINE (1) 09/01/2024    YEARLY PREVENTIVE VISIT  02/27/2025    CHLAMYDIA SCREENING  04/05/2025    ADVANCE CARE PLANNING  02/27/2029    COLORECTAL CANCER SCREENING  01/04/2034    HEPATITIS C SCREENING  Completed    HIV SCREENING  Completed    PHQ-2 (once per calendar year)  Completed    MENINGITIS IMMUNIZATION  Completed    Pneumococcal Vaccine: Pediatrics (0 to 5 Years) and At-Risk Patients (6 to 64 Years)  Aged Out    IPV IMMUNIZATION  Aged Out    RSV MONOCLONAL ANTIBODY  Aged Out       Composite cancer screening  Chart review shows that this patient is due/due soon for the following Pap Smear  Lab Results   Component Value Date    PAP NIL 07/08/2021     Past Surgical History:   Procedure Laterality Date    ABDOMEN SURGERY  2017    Gallbladder removal    CHOLECYSTECTOMY      COLONOSCOPY N/A 01/04/2024    Procedure: COLONOSCOPY WITH BIOPSIES;  Surgeon: Agustin Bailey MD;  Location: [facility] OR    ESOPHAGOSCOPY, GASTROSCOPY, DUODENOSCOPY (EGD), COMBINED N/A 01/04/2024    Procedure: ESOPHAGOGASTRODUODENOSCOPY WITH BIOPSIES;  Surgeon: Agustin Bailey MD;  Location: [facility] OR    HYSTEROSCOPY DIAGNOSTIC N/A 10/14/2022    Procedure: HYSTEROSCOPY WITH POLYPECTOMY;  Surgeon: Aston Yanes MD;  Location: [facility] OR    LAPAROSCOPIC CHOLECYSTECTOMY      OTHER SURGICAL HISTORY      none    TONSILLECTOMY      TONSILLECTOMY & ADENOIDECTOMY Bilateral 02/16/2017    Procedure: BILATERAL TONSILLECTOMY AND ADENOIDECTOMY;  Surgeon: Jacob Arguello MD;  Location: [facility] OR;  Service:        Is hysterectomy listed in surgical history? No   Is  mastectomy listed in surgical history? No     Summary:    Patient is due/failing the following:   Pap Smear    Action needed: Patient needs office visit for pap.    Type of outreach:  Sent [employer] message.      Staff Signature:  CLAYTON ROWE MA

## 2024-07-31 ENCOUNTER — HOSPITAL ENCOUNTER (EMERGENCY)
Facility: CLINIC | Age: 24
Discharge: HOME | End: 2024-07-31
Attending: NURSE PRACTITIONER | Admitting: NURSE PRACTITIONER
Payer: COMMERCIAL

## 2024-07-31 VITALS
RESPIRATION RATE: 17 BRPM | TEMPERATURE: 98.3 F | OXYGEN SATURATION: 97 % | HEART RATE: 99 BPM | DIASTOLIC BLOOD PRESSURE: 43 MMHG | SYSTOLIC BLOOD PRESSURE: 123 MMHG

## 2024-07-31 DIAGNOSIS — N39.0 RECURRENT UTI: Primary | ICD-10-CM

## 2024-07-31 LAB
ALBUMIN UR-MCNC: NEGATIVE MG/DL
APPEARANCE UR: ABNORMAL
BACTERIA #/AREA URNS HPF: ABNORMAL /HPF
BILIRUB UR QL STRIP: NEGATIVE
CLUE CELLS: ABNORMAL
COLOR UR AUTO: YELLOW
GLUCOSE UR STRIP-MCNC: NEGATIVE MG/DL
HGB UR QL STRIP: NEGATIVE
KETONES UR STRIP-MCNC: NEGATIVE MG/DL
LEUKOCYTE ESTERASE UR QL STRIP: ABNORMAL
MUCOUS THREADS #/AREA URNS LPF: PRESENT /LPF
NITRATE UR QL: NEGATIVE
PH UR STRIP: 5 [PH] (ref 5–7)
RBC URINE: 2 /HPF
SP GR UR STRIP: 1.03 (ref 1–1.03)
SQUAMOUS EPITHELIAL: 4 /HPF
TRICHOMONAS, WET PREP: ABNORMAL
UROBILINOGEN UR STRIP-MCNC: NORMAL MG/DL
WBC URINE: 8 /HPF
WBC'S/HIGH POWER FIELD, WET PREP: ABNORMAL
YEAST, WET PREP: ABNORMAL

## 2024-07-31 PROCEDURE — 81001 URINALYSIS AUTO W/SCOPE: CPT | Performed by: PHYSICIAN ASSISTANT

## 2024-07-31 PROCEDURE — 99214 OFFICE O/P EST MOD 30 MIN: CPT | Performed by: NURSE PRACTITIONER

## 2024-07-31 PROCEDURE — 87210 SMEAR WET MOUNT SALINE/INK: CPT | Performed by: PHYSICIAN ASSISTANT

## 2024-07-31 PROCEDURE — G0463 HOSPITAL OUTPT CLINIC VISIT: HCPCS | Performed by: NURSE PRACTITIONER

## 2024-07-31 RX ORDER — PHENAZOPYRIDINE HYDROCHLORIDE 100 MG/1
100 TABLET, FILM COATED ORAL 3 TIMES DAILY PRN
Qty: 6 TABLET | Refills: 0 | Status: SHIPPED | OUTPATIENT
Start: 2024-07-31 | End: 2024-08-02

## 2024-07-31 RX ORDER — NITROFURANTOIN 25; 75 MG/1; MG/1
100 CAPSULE ORAL 2 TIMES DAILY
Qty: 14 CAPSULE | Refills: 0 | Status: SHIPPED | OUTPATIENT
Start: 2024-07-31 | End: 2024-09-08

## 2024-07-31 ASSESSMENT — COLUMBIA-SUICIDE SEVERITY RATING SCALE - C-SSRS
6. HAVE YOU EVER DONE ANYTHING, STARTED TO DO ANYTHING, OR PREPARED TO DO ANYTHING TO END YOUR LIFE?: NO
2. HAVE YOU ACTUALLY HAD ANY THOUGHTS OF KILLING YOURSELF IN THE PAST MONTH?: NO
1. IN THE PAST MONTH, HAVE YOU WISHED YOU WERE DEAD OR WISHED YOU COULD GO TO SLEEP AND NOT WAKE UP?: NO

## 2024-07-31 ASSESSMENT — ACTIVITIES OF DAILY LIVING (ADL): ADLS_ACUITY_SCORE: 35

## 2024-07-31 NOTE — ED PROVIDER NOTES
ED Provider Note  [facility]      History   No chief complaint on file.    HPI  Norma Collins is a 24 year old female who presents with frequency, urgency and burning with urination for 3 days.  Reports that she was seen in the middle of July with pyelonephritis symptoms began with frequency, urgency and dysuria, reports that she had right-sided flank pain when she was treated with IV antibiotics and oral antibiotics at the emergency room.  Today she has less flank pain but she does still have flank pain denies any obvious hematuria.  Reports that she has some vaginal itching currently, recently treated for bacterial vaginosis but her symptoms have returned.  Denies any fever, chills tolerating oral fluid intake has been nauseated on and off today. Chart review from the urgency visit there was not a urine culture that was ordered.             Allergies:  Allergies   Allergen Reactions    [place] Sprouts [Brassica Oleracea]     Keflex [Cephalexin] Itching       Problem List:    Patient Active Problem List    Diagnosis Date Noted    Prediabetes 10/31/2023     Priority: Medium    Abnormal uterine bleeding due to endometrial polyp 08/24/2022     Priority: Medium    Change in bowel habit 08/23/2022     Priority: Medium    Ileitis 08/23/2022     Priority: Medium    Family history of colon cancer 07/08/2021     Priority: Medium     Mother at age 40      Family history of hemochromatosis 07/08/2021     Priority: Medium     Father and Grandfather      Acanthosis nigricans 12/08/2020     Priority: Medium     Created by Conversion      Last Assessment & Plan:   Patient doing very well and adding exercise, and watching diet closely.  We'll now add metformin as per orders.  Side effects and precautions discussed.  Follow up in one month.      Arthropathy of ankle and foot 12/08/2020     Priority: Medium     Created by Conversion    Replacement Utility updated for latest IMO load    Last Assessment &  "Plan:   With diffuse joint aches treating with progressive weight loss and lifestyle modifications.      Iron deficiency anemia due to chronic blood loss 11/22/2019     Priority: Medium    Aphthous ulcer of ileum 10/30/2019     Priority: Medium    Elevated BP without diagnosis of hypertension 10/16/2017     Priority: Medium    Adenotonsillar hypertrophy 01/04/2017     Priority: Medium    PCOS (polycystic ovarian syndrome) 02/29/2016     Priority: Medium     Last Assessment & Plan:   Her dietary changes are working very well.  Also working very well for the household.  She is exercising more, having more energy, and pants are fitting looser.  We'll plan for in body at next visit along with remeasurement of abdominal and neck circumference.  Also with her forgetting the metformin in the morning, will move it to the afternoon with lunch.      Class 3 severe obesity due to excess calories without serious comorbidity with body mass index (BMI) of 50.0 to 59.9 in adult (H) 10/30/2015     Priority: Medium     Last Assessment & Plan:   Continue lifestyle modifications.  Will breakfast a [place] protein-based with shakes or aches.  May also use chicken or steak.  Stopped carbohydrates in the morning.  Better food choices discussed.  Encouraged to watch the documentary, \"In defense of food\" before next visit.          Past Medical History:    Past Medical History:   Diagnosis Date    Abnormal uterine bleeding due to endometrial polyp     Acanthosis nigricans     Adenotonsillar hypertrophy     Aphthous ulcer of ileum     Arthropathy of ankle and foot     Family history of hemochromatosis     Iron deficiency anemia due to chronic blood loss     Motion sickness     Obesity     PCOS (polycystic ovarian syndrome)        Past Surgical History:    Past Surgical History:   Procedure Laterality Date    ABDOMEN SURGERY  2017    Gallbladder removal    CHOLECYSTECTOMY      COLONOSCOPY N/A 01/04/2024    Procedure: COLONOSCOPY WITH BIOPSIES; "  Surgeon: Agustin Bailey MD;  Location: [facility] OR    ESOPHAGOSCOPY, GASTROSCOPY, DUODENOSCOPY (EGD), COMBINED N/A 01/04/2024    Procedure: ESOPHAGOGASTRODUODENOSCOPY WITH BIOPSIES;  Surgeon: Agustin Bailey MD;  Location: [facility] OR    HYSTEROSCOPY DIAGNOSTIC N/A 10/14/2022    Procedure: HYSTEROSCOPY WITH POLYPECTOMY;  Surgeon: Aston Yanes MD;  Location: [facility] OR    LAPAROSCOPIC CHOLECYSTECTOMY      OTHER SURGICAL HISTORY      none    TONSILLECTOMY      TONSILLECTOMY & ADENOIDECTOMY Bilateral 02/16/2017    Procedure: BILATERAL TONSILLECTOMY AND ADENOIDECTOMY;  Surgeon: Jacob Arguello MD;  Location: [facility];  Service:        Family History:    Family History   Problem Relation Age of Onset    Colon Cancer Mother 43    Cancer Mother         colon    Venous thrombosis Mother         cancer-related    Cerebrovascular Disease Mother     Liver Disease Father         cirrhosis of liver    Other - See Comments Father         high iron    Cirrhosis Father     Hemochromatosis Father     Hypertension Father     Hyperlipidemia Father     Myocardial Infarction Maternal Grandmother     Heart Disease Maternal Grandmother     Dementia Maternal Grandmother     Cerebrovascular Disease Paternal Grandmother     Other - See Comments Maternal Grandfather         heart attack or cancer/unsure    Bladder Cancer Maternal Grandfather     Cirrhosis Paternal Grandfather     Other - See Comments Paternal Grandfather         high iron    Diabetes Paternal Grandfather     Hemochromatosis Paternal Grandfather     Kidney Disease Paternal Grandfather     Hypertension Paternal Grandfather     Colon Cancer Maternal Great-Grandmother         70-90 years old     Uterine Cancer Maternal Great-Grandmother     Breast Cancer Maternal Great-Grandmother     Brain Cancer Maternal Aunt        Social History:  Marital Status:  Single [1]  Social History     Tobacco Use    Smoking status: Former     Types:  Vaping Device    Smokeless tobacco: Never   Vaping Use    Vaping status: Former    Substances: Nicotine, Flavoring    Devices: Disposable   Substance Use Topics    Alcohol use: Yes     Comment: occ    Drug use: Never        Medications:    buPROPion (WELLBUTRIN XL) 150 MG 24 hr tablet  famotidine (PEPCID) 20 MG tablet  hydrOXYzine delta (VISTARIL) 25 MG capsule  omeprazole (PRILOSEC) 20 MG DR capsule  polyethylene glycol (MIRALAX) 17 GM/Dose powder          Review of Systems  A medically appropriate review of systems was performed with pertinent positives and negatives noted in the HPI, and all other systems negative.    Physical Exam   Patient Vitals for the past 24 hrs:   BP Temp Temp src Pulse Resp SpO2   07/31/24 1539 123/43 98.3  F (36.8  C) Oral 99 17 97 %          Physical Exam  General: alert and in no acute distress on arrival  Head: atraumatic, normocephalic  Lungs:  nonlabored, CTA bilateral throughout.  CV: Regular rate and rhythm, extremities warm and perfused  Abd: nondistended, nontender normal bowel sounds, nondistended bladder, no CVA tenderness bilateral tapping.  Patient had reported flank pain which is now present on exam there is nonreproducible.   exam: Patient declined exam  Skin: no rashes, no diaphoresis and skin color normal.  Neuro: Patient awake, alert, speech is fluent, no focal deficits  Psychiatric: affect/mood normal, appropriate historian      ED Course                 Procedures                Results for orders placed or performed during the hospital encounter of 07/31/24 (from the past 24 hour(s))   UA with Microscopic reflex to Culture    Specimen: Urine, Clean Catch   Result Value Ref Range    Color Urine Yellow Colorless, Straw, Light Yellow, Yellow    Appearance Urine Slightly Cloudy (A) Clear    Glucose Urine Negative Negative mg/dL    Bilirubin Urine Negative Negative    Ketones Urine Negative Negative mg/dL    Specific Gravity Urine 1.028 1.003 - 1.035    Blood Urine  Negative Negative    pH Urine 5.0 5.0 - 7.0    Protein Albumin Urine Negative Negative mg/dL    Urobilinogen Urine Normal Normal, 2.0 mg/dL    Nitrite Urine Negative Negative    Leukocyte Esterase Urine Small (A) Negative    Bacteria Urine Few (A) None Seen /HPF    Mucus Urine Present (A) None Seen /LPF    RBC Urine 2 <=2 /HPF    WBC Urine 8 (H) <=5 /HPF    Squamous Epithelials Urine 4 (H) <=1 /HPF    Narrative    Urine Culture not indicated   Wet prep    Specimen: Vagina; Swab   Result Value Ref Range    Trichomonas Absent Absent    Yeast Absent Absent    Clue Cells Absent Absent    WBCs/high power field 3+ (A) None       MEDICATIONS GIVEN IN THE EMERGENCY DEPARTMENT:  Medications - No data to display             Assessments & Plan (with Medical Decision Making)  24 year old female who presents to the Urgent Care for evaluation of recurrent UTI symptoms, UA results are not convincing for urinary tract infection.  Due to the severity of her symptoms, recent urinary tract infection symptoms, will treat with Macrobid while awaiting ordered urine culture which is ordered.  Advised to return if symptoms worsen despite recommended treatment.  Consult was placed for urology  appointment.  Wet prep was negative for bacterial vaginosis or yeast.     Urine culture pending     I have reviewed the nursing notes.    I have reviewed the findings, diagnosis, plan and need for follow up with the patient.        NEW PRESCRIPTIONS STARTED AT TODAY'S ER VISIT  Discharge Medication List as of 7/31/2024  4:26 PM        START taking these medications    Details   nitroFURantoin macrocrystal-monohydrate (MACROBID) 100 MG capsule Take 1 capsule (100 mg) by mouth 2 times daily, Disp-14 capsule, R-0, E-Prescribe      phenazopyridine (PYRIDIUM) 100 MG tablet Take 1 tablet (100 mg) by mouth 3 times daily as needed for urinary tract discomfort or pain, Disp-6 tablet, R-0, E-Prescribe             Final diagnoses:   Recurrent UTI        7/31/2024   [facility] EMERGENCY DEPT       Merry Quijano, APRN CNP  08/05/24 0033

## 2024-07-31 NOTE — DISCHARGE INSTRUCTIONS
A urology consult has been placed they will contact you to schedule this if you choose to schedule this you can call the central scheduling phone number at [phone].  Recommend that you increase oral fluid intake manage fevers and pain with ibuprofen or Tylenol, Macrobid is ordered for you twice daily for 7 days.  Urine culture has been sent you will be contacted in the next 2 days with your culture results and if they need to change or augment your antibiotics they will do this at this time according to the culture results.  Pyridium is ordered for you to decrease urinary discomfort be advised this is normal to see red in your urine when you are taking this.  Please return if your symptoms or not improving or if they worsen despite recommended treatment plan.

## 2024-08-22 ENCOUNTER — OFFICE VISIT (OUTPATIENT)
Dept: UROLOGY | Facility: CLINIC | Age: 24
End: 2024-08-22
Payer: COMMERCIAL

## 2024-08-22 VITALS
OXYGEN SATURATION: 99 % | WEIGHT: 293 LBS | HEART RATE: 79 BPM | BODY MASS INDEX: 44.41 KG/M2 | RESPIRATION RATE: 12 BRPM | DIASTOLIC BLOOD PRESSURE: 78 MMHG | HEIGHT: 68 IN | SYSTOLIC BLOOD PRESSURE: 126 MMHG

## 2024-08-22 DIAGNOSIS — N89.8 VAGINAL DISCHARGE: Primary | ICD-10-CM

## 2024-08-22 DIAGNOSIS — N39.0 RECURRENT UTI: ICD-10-CM

## 2024-08-22 PROCEDURE — 87798 DETECT AGENT NOS DNA AMP: CPT | Mod: 90 | Performed by: STUDENT IN AN ORGANIZED HEALTH CARE EDUCATION/TRAINING PROGRAM

## 2024-08-22 PROCEDURE — 87563 M. GENITALIUM AMP PROBE: CPT | Mod: 90 | Performed by: STUDENT IN AN ORGANIZED HEALTH CARE EDUCATION/TRAINING PROGRAM

## 2024-08-22 PROCEDURE — 51798 US URINE CAPACITY MEASURE: CPT | Performed by: STUDENT IN AN ORGANIZED HEALTH CARE EDUCATION/TRAINING PROGRAM

## 2024-08-22 PROCEDURE — 87210 SMEAR WET MOUNT SALINE/INK: CPT | Performed by: STUDENT IN AN ORGANIZED HEALTH CARE EDUCATION/TRAINING PROGRAM

## 2024-08-22 PROCEDURE — 99000 SPECIMEN HANDLING OFFICE-LAB: CPT | Performed by: STUDENT IN AN ORGANIZED HEALTH CARE EDUCATION/TRAINING PROGRAM

## 2024-08-22 PROCEDURE — 99203 OFFICE O/P NEW LOW 30 MIN: CPT | Mod: 25 | Performed by: STUDENT IN AN ORGANIZED HEALTH CARE EDUCATION/TRAINING PROGRAM

## 2024-08-22 ASSESSMENT — PAIN SCALES - GENERAL: PAINLEVEL: MODERATE PAIN (5)

## 2024-08-22 NOTE — NURSING NOTE
"Chief Complaint   Patient presents with    UTI     Currently, pain in the right side, some itchiness. No dysuria or hematuria. Period used to be regular, has been late the last 2, currently 12 days late, no chance of pregnancy       Vitals:    08/22/24 0900 08/22/24 0906   BP: (!) 132/90 126/78   BP Location: Left arm Right arm   Patient Position: Sitting    Cuff Size: Adult Large    Pulse: 79    Resp: 12    SpO2: 99%    Weight: (!) 166.4 kg (366 lb 12 oz)    Height: 1.715 m (5' 7.5\")      Wt Readings from Last 1 Encounters:   08/22/24 (!) 166.4 kg (366 lb 12 oz)   Active order to obtain bladder scan? Yes   Name of ordering provider: Cynthia  Bladder scan preformed post void yes.    Bladder scan reveled 16ML  Provider notified?  Yes    Lori CONWAY, NORMAN CONWAY CMA.................8/22/2024    "

## 2024-08-22 NOTE — PATIENT INSTRUCTIONS
Supplements to prevent UTIs:  1. Cranberry-use as directed   a. Ellura: www.myellura.com   b. Theracran HP by TherEclectorix   2. Vitamin C 500-1000 mg twice daily  3. D-mannose 2 g daily

## 2024-08-22 NOTE — PROGRESS NOTES
Chief Complaint:   Frequent UTIs         History of Present Illness:   Norma Collins is a 24 year old female with a history of PCOS, prediabetes, and iron deficiency anemia who presents for evaluation of frequent UTIs.     The patient reports frequent UTIs for several years. She has had two UTIs in the last two months. She reports right lower abdominal pain, lower back pain, and urinary frequency. She denies gross hematuria.    She reports constipation. She takes daily MiraLAX and has a bowel movement 1-2 times per day.     She denies a history of urologic surgeries. She denies a history of kidney stones.     She is not sexually active.          Past Medical History:     Past Medical History:   Diagnosis Date    Abnormal uterine bleeding due to endometrial polyp     Acanthosis nigricans     Adenotonsillar hypertrophy     Aphthous ulcer of ileum     Arthropathy of ankle and foot     Family history of hemochromatosis     Iron deficiency anemia due to chronic blood loss     Motion sickness     Obesity     PCOS (polycystic ovarian syndrome)             Past Surgical History:     Past Surgical History:   Procedure Laterality Date    ABDOMEN SURGERY  2017    Gallbladder removal    CHOLECYSTECTOMY      COLONOSCOPY N/A 01/04/2024    Procedure: COLONOSCOPY WITH BIOPSIES;  Surgeon: Agustin Bailey MD;  Location: [facility] OR    ESOPHAGOSCOPY, GASTROSCOPY, DUODENOSCOPY (EGD), COMBINED N/A 01/04/2024    Procedure: ESOPHAGOGASTRODUODENOSCOPY WITH BIOPSIES;  Surgeon: Agustin Bailey MD;  Location: [facility] OR    HYSTEROSCOPY DIAGNOSTIC N/A 10/14/2022    Procedure: HYSTEROSCOPY WITH POLYPECTOMY;  Surgeon: Aston Yanes MD;  Location: [facility] OR    LAPAROSCOPIC CHOLECYSTECTOMY      OTHER SURGICAL HISTORY      none    TONSILLECTOMY      TONSILLECTOMY & ADENOIDECTOMY Bilateral 02/16/2017    Procedure: BILATERAL TONSILLECTOMY AND ADENOIDECTOMY;  Surgeon: Jacob Arguello MD;  [facility]: [facility];  Service:             Medications     Current Outpatient Medications   Medication Sig Dispense Refill    buPROPion (WELLBUTRIN XL) 150 MG 24 hr tablet Take 2 tablets (300 mg) by mouth daily 180 tablet 3    famotidine (PEPCID) 20 MG tablet Take 1 tablet (20 mg) by mouth 2 times daily (Patient taking differently: Take 20 mg by mouth 2 times daily as needed.) 180 tablet 3    hydrOXYzine delta (VISTARIL) 25 MG capsule Take 1-2 capsules (25-50 mg) by mouth 3 times daily as needed for anxiety 180 capsule 3    nitroFURantoin macrocrystal-monohydrate (MACROBID) 100 MG capsule Take 1 capsule (100 mg) by mouth 2 times daily 14 capsule 0    omeprazole (PRILOSEC) 20 MG DR capsule Take 1 capsule (20 mg) by mouth 2 times daily (Patient taking differently: Take 20 mg by mouth daily.) 180 capsule 3    polyethylene glycol (MIRALAX) 17 GM/Dose powder Take by mouth every 24 hours       No current facility-administered medications for this visit.            Allergies:   [place] sprouts [brassica oleracea] and Keflex [cephalexin]         Review of Systems:  From intake questionnaire   Negative 14 system review except as noted on HPI, nurse's note.         Physical Exam:   Patient is a 24 year old  female   Vitals: Blood pressure 126/78, pulse 79, resp. rate 12, height 1.715 m (5' 7.5\"), weight (!) 166.4 kg (366 lb 12 oz), last menstrual period 05/13/2024, SpO2 99%, not currently breastfeeding.  General Appearance Adult: Alert, no acute distress, oriented.  Lungs: Non-labored breathing.  Heart: No obvious jugular venous distension present.  Neuro: Alert, oriented, speech and mentation normal    PVR: 16 mL      Labs and Pathology:    I personally reviewed all applicable laboratory data and went over findings with patient  Significant for:    CBC RESULTS:  Recent Labs   Lab Test 06/28/24  1545 02/27/24  0859 01/06/24  1636 09/16/23  1704   WBC 9.5 10.9 12.5* 12.8*   HGB 13.2 13.2 13.2 13.7    268 270 299        BMP " RESULTS:  Recent Labs   Lab Test 01/06/24  1636 11/14/23  1905 09/16/23  1704 09/12/23  1044 08/19/21  1010 07/08/20  0105 04/11/20  1326 04/11/20  0030 09/09/19  0941    138 137 138   < > 139 139 140 138   POTASSIUM 4.0 3.7 4.4 4.8   < > 4.5 4.0 4.2 4.1   CHLORIDE 103 101 101 104   < > 105 107 105 104   CO2 28 23 26 26   < > 22 23 23 25   ANIONGAP 6* 14 10 8   < > 12 9 12 9   * 96 93 96   < > 96 102 97 99   BUN 11.2 12.3 13.4 14.4   < > 13 12 15 9   CR 0.86 0.96* 0.93 0.84   < > 0.81 0.76 0.86 0.90   GFRESTIMATED >90 85 88 >90   < > >60 >60 >60 >60   GFRESTBLACK  --   --   --   --   --  >60 >60 >60 >60   LOUIS 9.4 9.8 9.9 9.8   < > 9.5 9.3 9.5 10.4    < > = values in this interval not displayed.       UA RESULTS:   Recent Labs   Lab Test 07/31/24  1542 07/16/24  1033 06/28/24  1545   SG 1.028 1.025 1.015   URINEPH 5.0 7.0 6.5   NITRITE Negative Negative Negative   RBCU 2 0-2 0-2   WBCU 8* 0-5 10-25*            Assessment and Plan:     Assessment: 24 year old female seen in evaluation for frequent UTIs.     We discussed preventative measures for UTIs including hydration, management of irregular bowel movements, proper hygiene, supplementation with vitamin C or cranberry, vaginal estrogen cream, methenamine with vitamin C, post-coital antibiotic prophylaxis, and daily antibiotic prophylaxis. At this time, we will start with OTC supplements. If in 4-6 weeks, she has continued with frequent infections, we can consider starting methenamine.     She has recurrent vaginal yeast infections and BV. She has vaginal itching and would like to do a wet prep.     Plan:  Ureaplasma/mycoplasma culture to evaluate for atypical cause of UTI.   Wet prep for vaginal itching.   OTC supplements for UTI prevention.   Follow up in three months, sooner for recurrent infections.     DOMINIQUE FERNANDES PA-C  Department of Urology

## 2024-08-25 LAB
M GENITALIUM DNA SPEC QL NAA+PROBE: NOT DETECTED
M HOMINIS DNA SPEC QL NAA+PROBE: NOT DETECTED
U PARVUM DNA SPEC QL NAA+PROBE: NOT DETECTED
U UREALYTICUM DNA SPEC QL NAA+PROBE: NOT DETECTED

## 2024-09-08 ENCOUNTER — OFFICE VISIT (OUTPATIENT)
Dept: URGENT CARE | Facility: URGENT CARE | Age: 24
End: 2024-09-08
Payer: COMMERCIAL

## 2024-09-08 VITALS
WEIGHT: 293 LBS | HEART RATE: 86 BPM | TEMPERATURE: 97.8 F | OXYGEN SATURATION: 99 % | DIASTOLIC BLOOD PRESSURE: 84 MMHG | BODY MASS INDEX: 57.25 KG/M2 | RESPIRATION RATE: 20 BRPM | SYSTOLIC BLOOD PRESSURE: 135 MMHG

## 2024-09-08 DIAGNOSIS — J02.9 PHARYNGITIS, UNSPECIFIED ETIOLOGY: ICD-10-CM

## 2024-09-08 DIAGNOSIS — H66.002 NON-RECURRENT ACUTE SUPPURATIVE OTITIS MEDIA OF LEFT EAR WITHOUT SPONTANEOUS RUPTURE OF TYMPANIC MEMBRANE: Primary | ICD-10-CM

## 2024-09-08 DIAGNOSIS — H60.392 INFECTIVE OTITIS EXTERNA OF LEFT EAR: ICD-10-CM

## 2024-09-08 LAB
DEPRECATED S PYO AG THROAT QL EIA: NEGATIVE
GROUP A STREP BY PCR: NOT DETECTED

## 2024-09-08 PROCEDURE — 99214 OFFICE O/P EST MOD 30 MIN: CPT | Performed by: FAMILY MEDICINE

## 2024-09-08 PROCEDURE — 87651 STREP A DNA AMP PROBE: CPT | Performed by: FAMILY MEDICINE

## 2024-09-08 RX ORDER — FAMOTIDINE 20 MG/1
20 TABLET, FILM COATED ORAL AT BEDTIME
COMMUNITY
Start: 2024-09-08

## 2024-09-08 RX ORDER — NEOMYCIN SULFATE, POLYMYXIN B SULFATE AND HYDROCORTISONE 10; 3.5; 1 MG/ML; MG/ML; [USP'U]/ML
3 SUSPENSION/ DROPS AURICULAR (OTIC) 3 TIMES DAILY
Qty: 10 ML | Refills: 0 | Status: SHIPPED | OUTPATIENT
Start: 2024-09-08 | End: 2024-09-15

## 2024-09-08 NOTE — PATIENT INSTRUCTIONS
Start augmentin 2 times a day for 10 days always take with food to prevent nausea and vomiting-if your strep test comes back positive, it will cover strep as well    For the ear canal infection, drops in left ear 3 drops 3 times a day for 7 days     For pain    Acetaminophen (Tylenol ) 1000 mg 3 times a day for the next 5 to 7 days until pain resolves-maximum dose of acetaminophen (tylenol)  is 3000 mg in 24-hours

## 2024-09-08 NOTE — PROGRESS NOTES
ASSESSMENT/PLAN:    No diagnosis found.    There are no Patient Instructions on file for this visit.    Reviewed medication instructions and side effects. Follow up if experiences side effects.     I reviewed supportive care, otc meds to use if needed, expected course, and signs of concern.  Follow up as needed or if she does not improve within  1-2 days or if worsens in any way.  Reviewed red flag symptoms and is to go to the ER if experiences any of these.     The use of Dragon/PowerMic dictation services may have been used to construct the content in this note; any grammatical or spelling errors are non-intentional. Please contact the author of this note directly if you are in need of any clarification.      On the day of the encounter, time spend on chart review, patient visit, review of testing, documentation was *** minutes              Patient presents with:  Urgent Care  Throat Problem: Per patient states symptoms started yesterday, left side facial pain from ear down to jaw and today started having throat pain        Subjective     Norma Collins is a 24 year old female who presents to clinic today for the following health issues:    HPI     {UC Conditions (Optional):917106}  {additional problems for provider to add (Optional): 228587}  {ACUTE SUPERLIST - extended history:741198}    Past Medical History:   Diagnosis Date    Abnormal uterine bleeding due to endometrial polyp     Acanthosis nigricans     Adenotonsillar hypertrophy     Aphthous ulcer of ileum     Arthropathy of ankle and foot     Family history of hemochromatosis     Iron deficiency anemia due to chronic blood loss     Motion sickness     Obesity     PCOS (polycystic ovarian syndrome)      Social History     Tobacco Use    Smoking status: Some Days     Types: Vaping Device    Smokeless tobacco: Never   Substance Use Topics    Alcohol use: Yes     Comment: occ       Current Outpatient Medications   Medication Sig Dispense Refill     buPROPion (WELLBUTRIN XL) 150 MG 24 hr tablet Take 2 tablets (300 mg) by mouth daily 180 tablet 3    famotidine (PEPCID) 20 MG tablet Take 1 tablet (20 mg) by mouth 2 times daily (Patient taking differently: Take 20 mg by mouth 2 times daily as needed.) 180 tablet 3    hydrOXYzine delta (VISTARIL) 25 MG capsule Take 1-2 capsules (25-50 mg) by mouth 3 times daily as needed for anxiety 180 capsule 3    nitroFURantoin macrocrystal-monohydrate (MACROBID) 100 MG capsule Take 1 capsule (100 mg) by mouth 2 times daily 14 capsule 0    omeprazole (PRILOSEC) 20 MG DR capsule Take 1 capsule (20 mg) by mouth 2 times daily (Patient taking differently: Take 20 mg by mouth daily.) 180 capsule 3    polyethylene glycol (MIRALAX) 17 GM/Dose powder Take by mouth every 24 hours       Allergies   Allergen Reactions    [place] Sprouts [Brassica Oleracea]     Keflex [Cephalexin] Itching             ROS are negative, except as otherwise noted HPI      Objective    /84   Pulse 86   Temp 97.8  F (36.6  C) (Tympanic)   Resp 20   Wt (!) 168.3 kg (371 lb)   LMP 08/28/2024   SpO2 99%   BMI 57.25 kg/m    Body mass index is 57.25 kg/m .  Physical Exam   {Exam List (Optional):391080}  {O PHRASES:818057}     {Diagnostic Test Results (Optional):080680}

## 2024-09-08 NOTE — PROGRESS NOTES
ASSESSMENT/PLAN:      ICD-10-CM    1. Non-recurrent acute suppurative otitis media of left ear without spontaneous rupture of tympanic membrane  H66.002 amoxicillin-clavulanate (AUGMENTIN) 875-125 MG tablet      2. Infective otitis externa of left ear  H60.392 neomycin-polymyxin-hydrocortisone (CORTISPORIN) 3.5-81642-1 otic suspension      3. Pharyngitis, unspecified etiology  J02.9 Streptococcus A Rapid Screen w/Reflex to PCR - Clinic Collect     Group A Streptococcus PCR Throat Swab          Patient Instructions     Start augmentin 2 times a day for 10 days always take with food to prevent nausea and vomiting-if your strep test comes back positive, it will cover strep as well    For the ear canal infection, drops in left ear 3 drops 3 times a day for 7 days     For pain    Acetaminophen (Tylenol ) 1000 mg 3 times a day for the next 5 to 7 days until pain resolves-maximum dose of acetaminophen (tylenol)  is 3000 mg in 24-hours     Reviewed medication instructions and side effects. Follow up if experiences side effects.     I reviewed supportive care, otc meds to use if needed, expected course, and signs of concern.  Follow up as needed or if she does not improve within  1-2 days or if worsens in any way.  Reviewed red flag symptoms and is to go to the ER if experiences any of these.     The use of Dragon/Doorman dictation services may have been used to construct the content in this note; any grammatical or spelling errors are non-intentional. Please contact the author of this note directly if you are in need of any clarification.                Patient presents with:  Urgent Care  Throat Problem: Per patient states symptoms started yesterday, left side facial pain from ear down to jaw and today started having throat pain        Subjective     Norma Collins is a 24 year old female who presents to clinic today for the following health issues:    HPI     Acute Illness  Acute illness concerns: Patient with onset  of left-sided pain radiating down ear into his left submandibular soft tissue  and now today having sore throat and left ear pain,     Symptoms:  Fever: No  Chills/Sweats: YES-chills  Headache (location?): YES-mild frontal mild left-sided forehead and associated with pain in her left ear  Sinus Pressure: No  Conjunctivitis:  No  Ear Pain: YES: left  Rhinorrhea: YES  Congestion: YES  Sore Throat: YES-tonsils surgically absent removed at age [age]  Cough: no  Wheeze: No, she vapes a couple times a day nicotine solution for several years she has recently weaned down to just 2-3 vaping episodes a day  Decreased Appetite: No  Nausea: No  Vomiting: No  Diarrhea: No  Fatigue/Achiness: YES-fatigue, no myalgias  Sick/Strep Exposure: YES  Therapies tried and outcome:   Tylenol  Unable to take ibuprofen due to GI issues    Past Medical History:   Diagnosis Date    Abnormal uterine bleeding due to endometrial polyp     Acanthosis nigricans     Adenotonsillar hypertrophy     Aphthous ulcer of ileum     Arthropathy of ankle and foot     Family history of hemochromatosis     Iron deficiency anemia due to chronic blood loss     Motion sickness     Obesity     PCOS (polycystic ovarian syndrome)      Social History     Tobacco Use    Smoking status: Some Days     Types: Vaping Device    Smokeless tobacco: Never   Substance Use Topics    Alcohol use: Yes     Comment: occ       Current Outpatient Medications   Medication Sig Dispense Refill    amoxicillin-clavulanate (AUGMENTIN) 875-125 MG tablet Take 1 tablet by mouth 2 times daily for 10 days. 20 tablet 0    buPROPion (WELLBUTRIN XL) 150 MG 24 hr tablet Take 2 tablets (300 mg) by mouth daily 180 tablet 3    famotidine (PEPCID) 20 MG tablet Take 1 tablet (20 mg) by mouth at bedtime.      hydrOXYzine delta (VISTARIL) 25 MG capsule Take 1-2 capsules (25-50 mg) by mouth 3 times daily as needed for anxiety 180 capsule 3    neomycin-polymyxin-hydrocortisone (CORTISPORIN) 3.5-44036-8 otic  suspension Place 3 drops Into the left ear 3 times daily for 7 days. 10 mL 0    polyethylene glycol (MIRALAX) 17 GM/Dose powder Take by mouth every 24 hours       Allergies   Allergen Reactions    [place] Sprouts [Brassica Oleracea]     Keflex [Cephalexin] Itching             ROS are negative, except as otherwise noted HPI      Objective    /84   Pulse 86   Temp 97.8  F (36.6  C) (Tympanic)   Resp 20   Wt (!) 168.3 kg (371 lb)   LMP 08/28/2024   SpO2 99%   BMI 57.25 kg/m    Body mass index is 57.25 kg/m .  Physical Exam     GENERAL:   Alert, mild distress due to left ear pain  HEENT: Diffuse pharyngeal erythema. Tonsils surgically absent.  Sclera, lids and conjunctiva are normal.  Nose clear and ears-right ear-canal clear, TM dull, left ear-not erythematous and tender, TM dull erythematous  NECK: Shotty anterior chain adenopathy mild submandibular gland tenderness bilaterally at the angles of the mandible left greater than right no soft tissue asymmetry, no masses  CHEST:  clear, no wheezing or rales. Normal symmetric air entry throughout both lung fields.  HEART:  S1 and S2 normal, no murmurs, clicks, gallops or rubs. Regular rate and rhythm.  NEURO:Alert and oriented x3, normal strength and tone, normal gait        Diagnostic Test Results:  Labs reviewed in Epic  Results for orders placed or performed in visit on 09/08/24   Streptococcus A Rapid Screen w/Reflex to PCR - Clinic Collect     Status: Normal    Specimen: Throat; Swab   Result Value Ref Range    Group A Strep antigen Negative Negative   Group A Streptococcus PCR Throat Swab     Status: Normal    Specimen: Throat; Swab   Result Value Ref Range    Group A strep by PCR Not Detected Not Detected    Narrative    The Xpert Xpress Strep A test, performed on the [employer]  Instrument Systems, is a rapid, qualitative in vitro diagnostic test for the detection of Streptococcus pyogenes (Group A ß-hemolytic Streptococcus, Strep A) in throat swab  specimens from patients with signs and symptoms of pharyngitis. The Xpert Xpress Strep A test can be used as an aid in the diagnosis of Group A Streptococcal pharyngitis. The assay is not intended to monitor treatment for Group A Streptococcus infections. The Xpert Xpress Strep A test utilizes an automated real-time polymerase chain reaction (PCR) to detect Streptococcus pyogenes DNA.

## 2024-09-09 ENCOUNTER — HOSPITAL ENCOUNTER (EMERGENCY)
Facility: CLINIC | Age: 24
Discharge: HOME | End: 2024-09-09
Payer: COMMERCIAL

## 2024-09-09 VITALS
HEART RATE: 92 BPM | SYSTOLIC BLOOD PRESSURE: 136 MMHG | TEMPERATURE: 99 F | OXYGEN SATURATION: 98 % | RESPIRATION RATE: 16 BRPM | DIASTOLIC BLOOD PRESSURE: 61 MMHG

## 2024-09-09 DIAGNOSIS — H60.502 ACUTE OTITIS EXTERNA OF LEFT EAR, UNSPECIFIED TYPE: ICD-10-CM

## 2024-09-09 DIAGNOSIS — H66.92 LEFT ACUTE OTITIS MEDIA: ICD-10-CM

## 2024-09-09 PROCEDURE — 99212 OFFICE O/P EST SF 10 MIN: CPT

## 2024-09-09 PROCEDURE — G0463 HOSPITAL OUTPT CLINIC VISIT: HCPCS

## 2024-09-09 ASSESSMENT — COLUMBIA-SUICIDE SEVERITY RATING SCALE - C-SSRS
1. IN THE PAST MONTH, HAVE YOU WISHED YOU WERE DEAD OR WISHED YOU COULD GO TO SLEEP AND NOT WAKE UP?: NO
6. HAVE YOU EVER DONE ANYTHING, STARTED TO DO ANYTHING, OR PREPARED TO DO ANYTHING TO END YOUR LIFE?: NO
1. IN THE PAST MONTH, HAVE YOU WISHED YOU WERE DEAD OR WISHED YOU COULD GO TO SLEEP AND NOT WAKE UP?: NO
6. HAVE YOU EVER DONE ANYTHING, STARTED TO DO ANYTHING, OR PREPARED TO DO ANYTHING TO END YOUR LIFE?: NO
2. HAVE YOU ACTUALLY HAD ANY THOUGHTS OF KILLING YOURSELF IN THE PAST MONTH?: NO

## 2024-09-09 ASSESSMENT — ACTIVITIES OF DAILY LIVING (ADL): ADLS_ACUITY_SCORE: 35

## 2024-09-09 NOTE — ED TRIAGE NOTES
Pt present with left sided jaw pain getting worse.  Was seen in UC in [place] yesterday and dx with left ear infection and jaw pain, started antibiotic but pain in jaw is worse today.

## 2024-09-16 NOTE — ED PROVIDER NOTES
History   No chief complaint on file.    HPI  Norma Collins is a 24 year old female who presents to urgent care with chief complaint of worsening left ear pain and jaw pain.  Patient reports she was seen in urgent care 1 day ago and diagnosed with left acute otitis externa, left acute otitis media and pharyngitis.  Patient was started on antibiotic eardrops and Augmentin.  She is concerned as she continues to have left ear pain along with radiation into left jaw.  Patient denies dysphagia, fever, chills, headaches, vision changes, trismus.    Allergies:  Allergies   Allergen Reactions    [place] Sprouts [Brassica Oleracea]     Keflex [Cephalexin] Itching       Problem List:    Patient Active Problem List    Diagnosis Date Noted    Prediabetes 10/31/2023     Priority: Medium    Abnormal uterine bleeding due to endometrial polyp 08/24/2022     Priority: Medium    Change in bowel habit 08/23/2022     Priority: Medium    Ileitis 08/23/2022     Priority: Medium    Family history of colon cancer 07/08/2021     Priority: Medium     Mother at age 40      Family history of hemochromatosis 07/08/2021     Priority: Medium     Father and Grandfather      Acanthosis nigricans 12/08/2020     Priority: Medium     Created by Conversion      Last Assessment & Plan:   Patient doing very well and adding exercise, and watching diet closely.  We'll now add metformin as per orders.  Side effects and precautions discussed.  Follow up in one month.      Arthropathy of ankle and foot 12/08/2020     Priority: Medium     Created by Conversion    Replacement Utility updated for latest IMO load    Last Assessment & Plan:   With diffuse joint aches treating with progressive weight loss and lifestyle modifications.      Iron deficiency anemia due to chronic blood loss 11/22/2019     Priority: Medium    Aphthous ulcer of ileum 10/30/2019     Priority: Medium    Elevated BP without diagnosis of hypertension 10/16/2017     Priority: Medium     "Adenotonsillar hypertrophy 01/04/2017     Priority: Medium    PCOS (polycystic ovarian syndrome) 02/29/2016     Priority: Medium     Last Assessment & Plan:   Her dietary changes are working very well.  Also working very well for the household.  She is exercising more, having more energy, and pants are fitting looser.  We'll plan for in body at next visit along with remeasurement of abdominal and neck circumference.  Also with her forgetting the metformin in the morning, will move it to the afternoon with lunch.      Class 3 severe obesity due to excess calories without serious comorbidity with body mass index (BMI) of 50.0 to 59.9 in adult (H) 10/30/2015     Priority: Medium     Last Assessment & Plan:   Continue lifestyle modifications.  Will breakfast a [place] protein-based with shakes or aches.  May also use chicken or steak.  Stopped carbohydrates in the morning.  Better food choices discussed.  Encouraged to watch the documentary, \"In defense of food\" before next visit.          Past Medical History:    Past Medical History:   Diagnosis Date    Abnormal uterine bleeding due to endometrial polyp     Acanthosis nigricans     Adenotonsillar hypertrophy     Aphthous ulcer of ileum     Arthropathy of ankle and foot     Family history of hemochromatosis     Iron deficiency anemia due to chronic blood loss     Motion sickness     Obesity     PCOS (polycystic ovarian syndrome)        Past Surgical History:    Past Surgical History:   Procedure Laterality Date    ABDOMEN SURGERY  2017    Gallbladder removal    CHOLECYSTECTOMY      COLONOSCOPY N/A 01/04/2024    Procedure: COLONOSCOPY WITH BIOPSIES;  Surgeon: Agustin Bailey MD;  Location: [facility] OR    ESOPHAGOSCOPY, GASTROSCOPY, DUODENOSCOPY (EGD), COMBINED N/A 01/04/2024    Procedure: ESOPHAGOGASTRODUODENOSCOPY WITH BIOPSIES;  Surgeon: Agustin Bailey MD;  Location: [facility] OR    HYSTEROSCOPY DIAGNOSTIC N/A 10/14/2022    Procedure: HYSTEROSCOPY WITH " POLYPECTOMY;  Surgeon: Aston Yanes MD;  Location: [facility] OR    LAPAROSCOPIC CHOLECYSTECTOMY      OTHER SURGICAL HISTORY      none    TONSILLECTOMY      TONSILLECTOMY & ADENOIDECTOMY Bilateral 02/16/2017    Procedure: BILATERAL TONSILLECTOMY AND ADENOIDECTOMY;  Surgeon: Jacob Arguello MD;  Location: [facility] OR;  Service:        Family History:    Family History   Problem Relation Age of Onset    Colon Cancer Mother 43    Cancer Mother         colon    Venous thrombosis Mother         cancer-related    Cerebrovascular Disease Mother     Liver Disease Father         cirrhosis of liver    Other - See Comments Father         high iron    Cirrhosis Father     Hemochromatosis Father     Hypertension Father     Hyperlipidemia Father     Myocardial Infarction Maternal Grandmother     Heart Disease Maternal Grandmother     Dementia Maternal Grandmother     Cerebrovascular Disease Paternal Grandmother     Other - See Comments Maternal Grandfather         heart attack or cancer/unsure    Bladder Cancer Maternal Grandfather     Cirrhosis Paternal Grandfather     Other - See Comments Paternal Grandfather         high iron    Diabetes Paternal Grandfather     Hemochromatosis Paternal Grandfather     Kidney Disease Paternal Grandfather     Hypertension Paternal Grandfather     Colon Cancer Maternal Great-Grandmother         70-90 years old     Uterine Cancer Maternal Great-Grandmother     Breast Cancer Maternal Great-Grandmother     Brain Cancer Maternal Aunt        Social History:  Marital Status:  Single [1]  Social History     Tobacco Use    Smoking status: Some Days     Types: Vaping Device    Smokeless tobacco: Never   Vaping Use    Vaping status: Former    Substances: Nicotine, Flavoring    Devices: Disposable   Substance Use Topics    Alcohol use: Yes     Comment: occ    Drug use: Never        Medications:    amoxicillin-clavulanate (AUGMENTIN) 875-125 MG tablet  buPROPion (WELLBUTRIN  XL) 150 MG 24 hr tablet  famotidine (PEPCID) 20 MG tablet  hydrOXYzine delta (VISTARIL) 25 MG capsule  polyethylene glycol (MIRALAX) 17 GM/Dose powder          Review of Systems   All other systems reviewed and are negative.      Physical Exam   BP: 136/61  Pulse: 92  Temp: 99  F (37.2  C)  Resp: 16  SpO2: 98 %      Physical Exam       GENERAL:   Alert, mild distress due to left ear pain  HEENT: Diffuse pharyngeal erythema. Tonsils surgically absent.  Sclera, lids and conjunctiva are normal.  Nose clear and ears-right ear-canal clear, TM dull, left ear-not erythematous and tender, TM dull erythematous  NECK: Shotty anterior chain adenopathy mild submandibular gland tenderness bilaterally at the angles of the mandible left greater than right no soft tissue asymmetry, no masses  CHEST:  clear, no wheezing or rales. Normal symmetric air entry throughout both lung fields.  HEART:  S1 and S2 normal, no murmurs, clicks, gallops or rubs. Regular rate and rhythm.  NEURO:Alert and oriented x3, normal strength and tone, normal gait    ED Course        Procedures      No results found for this or any previous visit (from the past 24 hour(s)).    Medications - No data to display    Assessments & Plan (with Medical Decision Making)     I have reviewed the nursing notes.    I have reviewed the findings, diagnosis, plan and need for follow up with the patient.      Medical Decision Making    Patient is a 24 year old female who presents to urgent care with chief complaint of worsening left ear pain and jaw pain.  Patient reports she was seen in urgent care 1 day ago and diagnosed with left acute otitis externa, left acute otitis media and pharyngitis.  Patient was started on antibiotic eardrops and Augmentin.  She is concerned as she continues to have left ear pain along with radiation into left jaw.  Patient denies dysphagia, fever, chills, headaches, vision changes, trismus.      Exam above.  Left external ear canal is erythematous  and swollen.  Left TM is erythematous and bulging without rupture.    I reassured patient at this time that symptoms should start improving approximately 48 hours after starting antibiotics.  She should continue ibuprofen and Tylenol for pain relief.    Patient should follow-up if symptoms are not improving over the next 3 to 4 days.    Prior to making a final disposition on this patient the results of patient's tests and other diagnostic studies were discussed with the patient. All questions were answered. Patient expressed understanding of the plan and was amenable to it.     Disclaimer: This note consists of symbols derived from keyboarding, dictation and/or voice recognition software. As a result, there may be errors in the script that have gone undetected. Please consider this when interpreting information found in this chart.    Discharge Medication List as of 9/9/2024  6:07 PM          Final diagnoses:   Left acute otitis media   Acute otitis externa of left ear, unspecified type       9/9/2024   [facility] EMERGENCY DEPT       Courtney Mahan PA-C  09/16/24 [id number]

## 2024-09-18 ENCOUNTER — OFFICE VISIT (OUTPATIENT)
Dept: FAMILY MEDICINE | Facility: CLINIC | Age: 24
End: 2024-09-18
Payer: COMMERCIAL

## 2024-09-18 VITALS
DIASTOLIC BLOOD PRESSURE: 80 MMHG | HEART RATE: 90 BPM | RESPIRATION RATE: 16 BRPM | SYSTOLIC BLOOD PRESSURE: 128 MMHG | WEIGHT: 293 LBS | OXYGEN SATURATION: 97 % | HEIGHT: 68 IN | BODY MASS INDEX: 44.41 KG/M2

## 2024-09-18 DIAGNOSIS — N39.0 RECURRENT UTI: ICD-10-CM

## 2024-09-18 DIAGNOSIS — E55.9 VITAMIN D DEFICIENCY: Primary | ICD-10-CM

## 2024-09-18 DIAGNOSIS — R10.9 RECURRENT ABDOMINAL PAIN: ICD-10-CM

## 2024-09-18 DIAGNOSIS — N89.8 VAGINAL DISCHARGE: ICD-10-CM

## 2024-09-18 DIAGNOSIS — E66.01 MORBID OBESITY (H): ICD-10-CM

## 2024-09-18 DIAGNOSIS — R79.89 ELEVATED LFTS: ICD-10-CM

## 2024-09-18 LAB
ALBUMIN SERPL BCG-MCNC: 4.6 G/DL (ref 3.5–5.2)
ALBUMIN UR-MCNC: NEGATIVE MG/DL
ALP SERPL-CCNC: 95 U/L (ref 40–150)
ALT SERPL W P-5'-P-CCNC: 60 U/L (ref 0–50)
ANION GAP SERPL CALCULATED.3IONS-SCNC: 12 MMOL/L (ref 7–15)
APPEARANCE UR: CLEAR
AST SERPL W P-5'-P-CCNC: 40 U/L (ref 0–45)
BILIRUB SERPL-MCNC: 0.2 MG/DL
BILIRUB UR QL STRIP: NEGATIVE
BUN SERPL-MCNC: 12.2 MG/DL (ref 6–20)
CALCIUM SERPL-MCNC: 10 MG/DL (ref 8.8–10.4)
CHLORIDE SERPL-SCNC: 100 MMOL/L (ref 98–107)
CLUE CELLS: ABNORMAL
COLOR UR AUTO: YELLOW
CREAT SERPL-MCNC: 0.93 MG/DL (ref 0.51–0.95)
EGFRCR SERPLBLD CKD-EPI 2021: 88 ML/MIN/1.73M2
EST. AVERAGE GLUCOSE BLD GHB EST-MCNC: 117 MG/DL
GLUCOSE SERPL-MCNC: 96 MG/DL (ref 70–99)
GLUCOSE UR STRIP-MCNC: NEGATIVE MG/DL
HBA1C MFR BLD: 5.7 % (ref 0–5.6)
HCO3 SERPL-SCNC: 27 MMOL/L (ref 22–29)
HGB UR QL STRIP: NEGATIVE
KETONES UR STRIP-MCNC: NEGATIVE MG/DL
LEUKOCYTE ESTERASE UR QL STRIP: NEGATIVE
NITRATE UR QL: NEGATIVE
PH UR STRIP: 6 [PH] (ref 5–7)
POTASSIUM SERPL-SCNC: 4.1 MMOL/L (ref 3.4–5.3)
PROT SERPL-MCNC: 7.6 G/DL (ref 6.4–8.3)
RBC #/AREA URNS AUTO: ABNORMAL /HPF
SODIUM SERPL-SCNC: 139 MMOL/L (ref 135–145)
SP GR UR STRIP: 1.01 (ref 1–1.03)
SQUAMOUS #/AREA URNS AUTO: ABNORMAL /LPF
TRICHOMONAS, WET PREP: ABNORMAL
UROBILINOGEN UR STRIP-ACNC: 0.2 E.U./DL
WBC #/AREA URNS AUTO: ABNORMAL /HPF
WBC'S/HIGH POWER FIELD, WET PREP: ABNORMAL
YEAST, WET PREP: ABNORMAL

## 2024-09-18 PROCEDURE — 83036 HEMOGLOBIN GLYCOSYLATED A1C: CPT | Performed by: FAMILY MEDICINE

## 2024-09-18 PROCEDURE — 99214 OFFICE O/P EST MOD 30 MIN: CPT | Performed by: FAMILY MEDICINE

## 2024-09-18 PROCEDURE — 36415 COLL VENOUS BLD VENIPUNCTURE: CPT | Performed by: FAMILY MEDICINE

## 2024-09-18 PROCEDURE — 81001 URINALYSIS AUTO W/SCOPE: CPT | Performed by: FAMILY MEDICINE

## 2024-09-18 PROCEDURE — 87210 SMEAR WET MOUNT SALINE/INK: CPT | Performed by: FAMILY MEDICINE

## 2024-09-18 PROCEDURE — 80053 COMPREHEN METABOLIC PANEL: CPT | Performed by: FAMILY MEDICINE

## 2024-09-18 PROCEDURE — 82306 VITAMIN D 25 HYDROXY: CPT | Performed by: FAMILY MEDICINE

## 2024-09-18 RX ORDER — MULTIVIT-MIN/IRON/FOLIC ACID/K 18-600-40
CAPSULE ORAL
COMMUNITY

## 2024-09-18 ASSESSMENT — PAIN SCALES - GENERAL: PAINLEVEL: SEVERE PAIN (6)

## 2024-09-18 NOTE — PROGRESS NOTES
Assessment & Plan     Vitamin D deficiency  Taking vitamin D  - Vitamin D Deficiency  - Vitamin D Deficiency    Recurrent UTI  Has met with urology, would recommend touching base  - Hemoglobin A1c  - Comprehensive metabolic panel (BMP + Alb, Alk Phos, ALT, AST, Total. Bili, TP)  - UA with Microscopic reflex to Culture - lab collect  - Hemoglobin A1c  - Comprehensive metabolic panel (BMP + Alb, Alk Phos, ALT, AST, Total. Bili, TP)  - UA with Microscopic reflex to Culture - lab collect  - UA Microscopic with Reflex to Culture    Morbid obesity (H)  Would benefit from weight loss   - Adult Comprehensive Weight Management  Referral    Vaginal discharge  Recurrent yeast infection and BV   - Wet prep - lab collect  - Wet prep - lab collect    Recurrent abdominal pain  Has been working with [facility]-has not seen them for a little bit. EGD/colonoscopy-concern for ileitis-possible NSAID induced vs chrons-recommend following up with [facility], trial of gluten free diet and increasing fiber.       Jason Green is a 24 year old, presenting for the following health issues:  GI Problem        9/18/2024     3:46 PM   Additional Questions   Roomed by Rosanne CHAVARRIA MA   Accompanied by Self     History of Present Illness       Reason for visit:  Stomach pain    She eats 2-3 servings of fruits and vegetables daily.She consumes 1 sweetened beverage(s) daily.She exercises with enough effort to increase her heart rate 30 to 60 minutes per day.  She exercises with enough effort to increase her heart rate 3 or less days per week.   She is taking medications regularly.     Pain History:    Where in your body do you have pain? Abdominal/Flank Pain  Onset/Duration: Ongoing  Description:   Character: Cramping  Location: epigastric region; bernabe-umbilical region.  Feels like she is bloated, full feeling  Radiation: None  Intensity: moderate  Progression of Symptoms:  intermittent  Accompanying Signs & Symptoms:  Fever/Chills:  "No  Gas/Bloating: YES  Nausea: No  Vomitting: No  Diarrhea: YES  Constipation: No  Dysuria or Hematuria: No  History:   Trauma: No  Previous similar pain: YES  Previous tests done: EGD and Colonoscopy  Precipitating factors:   Does the pain change with:     Food: No    Bowel Movement: No    Urination: No   Other factors:  No  Therapies tried and outcome: None  Patient's last menstrual period was 08/28/2024.        Review of Systems  Constitutional, HEENT, cardiovascular, pulmonary, gi and gu systems are negative, except as otherwise noted.      Objective    /80 (BP Location: Right arm, Patient Position: Chair, Cuff Size: Adult Large)   Pulse 90   Resp 16   Ht 1.715 m (5' 7.5\")   Wt (!) 166.7 kg (367 lb 6.4 oz)   LMP 08/28/2024   SpO2 97%   BMI 56.69 kg/m    Body mass index is 56.69 kg/m .  Physical Exam  Constitutional:       Appearance: Normal appearance.   HENT:      Head: Normocephalic.   Eyes:      Conjunctiva/sclera: Conjunctivae normal.   Cardiovascular:      Rate and Rhythm: Normal rate and regular rhythm.   Pulmonary:      Effort: Pulmonary effort is normal.      Breath sounds: Normal breath sounds.   Abdominal:      General: Bowel sounds are normal.   Skin:     General: Skin is warm and dry.   Neurological:      General: No focal deficit present.      Mental Status: She is alert.   Psychiatric:         Thought Content: Thought content normal.         Judgment: Judgment normal.            Signed Electronically by: RHEA URIBE DO    "

## 2024-09-19 LAB — VIT D+METAB SERPL-MCNC: 24 NG/ML (ref 20–50)

## 2024-10-05 ENCOUNTER — HOSPITAL ENCOUNTER (EMERGENCY)
Facility: CLINIC | Age: 24
Discharge: HOME | End: 2024-10-05
Attending: FAMILY MEDICINE | Admitting: FAMILY MEDICINE
Payer: OTHER MISCELLANEOUS

## 2024-10-05 VITALS
HEIGHT: 67 IN | TEMPERATURE: 98.9 F | BODY MASS INDEX: 45.99 KG/M2 | RESPIRATION RATE: 18 BRPM | HEART RATE: 91 BPM | WEIGHT: 293 LBS | DIASTOLIC BLOOD PRESSURE: 93 MMHG | SYSTOLIC BLOOD PRESSURE: 144 MMHG | OXYGEN SATURATION: 98 %

## 2024-10-05 DIAGNOSIS — T63.481A INSECT STINGS, ACCIDENTAL OR UNINTENTIONAL, INITIAL ENCOUNTER: ICD-10-CM

## 2024-10-05 PROCEDURE — 99283 EMERGENCY DEPT VISIT LOW MDM: CPT | Performed by: FAMILY MEDICINE

## 2024-10-05 PROCEDURE — 250N000013 HC RX MED GY IP 250 OP 250 PS 637: Performed by: FAMILY MEDICINE

## 2024-10-05 RX ORDER — CETIRIZINE HYDROCHLORIDE 5 MG/1
5 TABLET ORAL ONCE
Status: COMPLETED | OUTPATIENT
Start: 2024-10-05 | End: 2024-10-05

## 2024-10-05 RX ADMIN — CETIRIZINE HYDROCHLORIDE 5 MG: 5 TABLET ORAL at 17:20

## 2024-10-05 ASSESSMENT — COLUMBIA-SUICIDE SEVERITY RATING SCALE - C-SSRS
2. HAVE YOU ACTUALLY HAD ANY THOUGHTS OF KILLING YOURSELF IN THE PAST MONTH?: NO
6. HAVE YOU EVER DONE ANYTHING, STARTED TO DO ANYTHING, OR PREPARED TO DO ANYTHING TO END YOUR LIFE?: NO
1. IN THE PAST MONTH, HAVE YOU WISHED YOU WERE DEAD OR WISHED YOU COULD GO TO SLEEP AND NOT WAKE UP?: NO

## 2024-10-05 ASSESSMENT — ACTIVITIES OF DAILY LIVING (ADL): ADLS_ACUITY_SCORE: 35

## 2024-10-05 NOTE — ED PROVIDER NOTES
History     Chief Complaint   Patient presents with    Insect Bite     HPI    Norma Collins is a 24 year old female who comes in from work after a sting to her left arm.  A coworker brushed away what appeared to be a wasp or bee.  She has had no prior allergy to insect stings but is not certain she is ever been stung before.  She has swelling and redness in the area of the sting.  She does not have any diffuse itching, diffuse erythema, or hives.  She does feel a sense of tightness in her throat that started after the sting but is now improving..  The sting occurred at 3:30 PM about one of the recorder hours ago.  No vomiting, diarrhea, chest pain, abdominal pain.  She has not taken any medication.    Allergies:  Allergies   Allergen Reactions    [place] Sprouts [Brassica Oleracea]     Keflex [Cephalexin] Itching       Problem List:    Patient Active Problem List    Diagnosis Date Noted    Prediabetes 10/31/2023     Priority: Medium    Abnormal uterine bleeding due to endometrial polyp 08/24/2022     Priority: Medium    Change in bowel habit 08/23/2022     Priority: Medium    Ileitis 08/23/2022     Priority: Medium    Family history of colon cancer 07/08/2021     Priority: Medium     Mother at age 40      Family history of hemochromatosis 07/08/2021     Priority: Medium     Father and Grandfather      Acanthosis nigricans 12/08/2020     Priority: Medium     Created by Conversion      Last Assessment & Plan:   Patient doing very well and adding exercise, and watching diet closely.  We'll now add metformin as per orders.  Side effects and precautions discussed.  Follow up in one month.      Arthropathy of ankle and foot 12/08/2020     Priority: Medium     Created by Conversion    Replacement Utility updated for latest IMO load    Last Assessment & Plan:   With diffuse joint aches treating with progressive weight loss and lifestyle modifications.      Iron deficiency anemia due to chronic blood loss 11/22/2019      "Priority: Medium    Aphthous ulcer of ileum 10/30/2019     Priority: Medium    Elevated BP without diagnosis of hypertension 10/16/2017     Priority: Medium    Adenotonsillar hypertrophy 01/04/2017     Priority: Medium    PCOS (polycystic ovarian syndrome) 02/29/2016     Priority: Medium     Last Assessment & Plan:   Her dietary changes are working very well.  Also working very well for the household.  She is exercising more, having more energy, and pants are fitting looser.  We'll plan for in body at next visit along with remeasurement of abdominal and neck circumference.  Also with her forgetting the metformin in the morning, will move it to the afternoon with lunch.      Class 3 severe obesity due to excess calories without serious comorbidity with body mass index (BMI) of 50.0 to 59.9 in adult (H) 10/30/2015     Priority: Medium     Last Assessment & Plan:   Continue lifestyle modifications.  Will breakfast a [place] protein-based with shakes or aches.  May also use chicken or steak.  Stopped carbohydrates in the morning.  Better food choices discussed.  Encouraged to watch the documentary, \"In defense of food\" before next visit.          Past Medical History:    Past Medical History:   Diagnosis Date    Abnormal uterine bleeding due to endometrial polyp     Acanthosis nigricans     Adenotonsillar hypertrophy     Aphthous ulcer of ileum     Arthropathy of ankle and foot     Family history of hemochromatosis     Iron deficiency anemia due to chronic blood loss     Motion sickness     Obesity     PCOS (polycystic ovarian syndrome)        Past Surgical History:    Past Surgical History:   Procedure Laterality Date    ABDOMEN SURGERY  2017    Gallbladder removal    CHOLECYSTECTOMY      COLONOSCOPY N/A 01/04/2024    Procedure: COLONOSCOPY WITH BIOPSIES;  Surgeon: Agustin Bailey MD;  Location: [facility] OR    ESOPHAGOSCOPY, GASTROSCOPY, DUODENOSCOPY (EGD), COMBINED N/A 01/04/2024    Procedure: " ESOPHAGOGASTRODUODENOSCOPY WITH BIOPSIES;  Surgeon: Agustin Bailey MD;  Location: [facility] OR    HYSTEROSCOPY DIAGNOSTIC N/A 10/14/2022    Procedure: HYSTEROSCOPY WITH POLYPECTOMY;  Surgeon: Aston Yanes MD;  Location: [facility] OR    LAPAROSCOPIC CHOLECYSTECTOMY      OTHER SURGICAL HISTORY      none    TONSILLECTOMY      TONSILLECTOMY & ADENOIDECTOMY Bilateral 02/16/2017    Procedure: BILATERAL TONSILLECTOMY AND ADENOIDECTOMY;  Surgeon: Jacob Arguello MD;  Location: [facility] OR;  Service:        Family History:    Family History   Problem Relation Age of Onset    Colon Cancer Mother 43    Cancer Mother         colon    Venous thrombosis Mother         cancer-related    Cerebrovascular Disease Mother     Liver Disease Father         cirrhosis of liver    Other - See Comments Father         high iron    Cirrhosis Father     Hemochromatosis Father     Hypertension Father     Hyperlipidemia Father     Myocardial Infarction Maternal Grandmother     Heart Disease Maternal Grandmother     Dementia Maternal Grandmother     Cerebrovascular Disease Paternal Grandmother     Other - See Comments Maternal Grandfather         heart attack or cancer/unsure    Bladder Cancer Maternal Grandfather     Cirrhosis Paternal Grandfather     Other - See Comments Paternal Grandfather         high iron    Diabetes Paternal Grandfather     Hemochromatosis Paternal Grandfather     Kidney Disease Paternal Grandfather     Hypertension Paternal Grandfather     Colon Cancer Maternal Great-Grandmother         70-90 years old     Uterine Cancer Maternal Great-Grandmother     Breast Cancer Maternal Great-Grandmother     Brain Cancer Maternal Aunt        Social History:  Marital Status:  Single [1]  Social History     Tobacco Use    Smoking status: Some Days     Types: Vaping Device     Passive exposure: Never    Smokeless tobacco: Never   Vaping Use    Vaping status: Former    Substances: Nicotine, Flavoring     "Devices: Disposable   Substance Use Topics    Alcohol use: Yes     Comment: occ    Drug use: Never        Medications:    Ascorbic Acid (VITAMIN C) 500 MG CAPS  buPROPion (WELLBUTRIN XL) 150 MG 24 hr tablet  cholecalciferol (VITAMIN D3) 25 mcg (1000 units) capsule  Cranberry 125 MG TABS  famotidine (PEPCID) 20 MG tablet  hydrOXYzine delta (VISTARIL) 25 MG capsule  polyethylene glycol (MIRALAX) 17 GM/Dose powder      Review of Systems  Further problem focused system review negative.    Physical Exam   BP: (!) 142/74  Pulse: 100  Temp: 98.9  F (37.2  C)  Resp: 18  Height: 170.2 cm (5' 7\")  Weight: (!) 160.6 kg (354 lb)  SpO2: 98 %      Physical Exam    Nursing note and vitals were reviewed.  Constitutional: Awake and alert, adequately nourished and developed appearing 24-year-old in no apparent discomfort, who does not appear acutely ill, and who answers questions appropriately and cooperates with examination.  HEENT: Speech is fluent.  Voice quality is normal.  PERRL.  EOMI.   Neck: Freely mobile.  Cardiovascular: Cardiac examination reveals normal heart rate and regular rhythm without murmur.  Pulmonary/Chest: Breathing is unlabored.  Breath sounds are clear and equal bilaterally.  There no retractions, tachypnea, rales, wheezes, or rhonchi.  Musculoskeletal: Extremities are warm and well-perfused.  On the volar surface of the left arm below the axilla is a sting toxin reaction 3 x 5 cm and oval in shape.  Neurological: Alert, oriented, thought content logical, coherent   Skin: Warm, dry, sting toxin reaction on the left arm as noted above.  No diffuse urticaria.  No diffuse erythema.  Psychiatric: Affect broad and appropriate.    ED Course        Procedures              Critical Care time:  none              No results found for this or any previous visit (from the past 24 hour(s)).    Medications   cetirizine (zyrTEC) tablet 5 mg (has no administration in time range)       Assessments & Plan (with Medical Decision " Making)     24-year-old female presented after insect sting to the left arm as noted above.  She has a small local reaction and also says that her throat feels a bit tight.  She is however swallowing and breathing without any difficulty and she is not coughing, wheezing, or having chest or abdominal symptoms, vomiting or diarrhea, shortness of breath.  No prior sting reactions.  We discussed that it is possible this throat tightness is reaction to the sting but it is unusual to have it isolated without skin symptoms or other more systemic symptoms.  Given that the symptoms are improving and that she is having no difficulty breathing or swallowing and no changes in her voice I recommended only a dose of cetirizine and not any adrenaline.  The symptoms should not worsen and only improve at this point.  We did discuss that the next reaction could be more severe and if it is she should seek immediate care.    I have reviewed the nursing notes.    I have reviewed the findings, diagnosis, plan and need for follow up with the patient.         New Prescriptions    No medications on file       Final diagnoses:   Insect stings, accidental or unintentional, initial encounter       10/5/2024   [facility] EMERGENCY DEPT       Anselmo Malin MD  10/05/24 [id number]

## 2024-10-05 NOTE — DISCHARGE INSTRUCTIONS
There is a local toxin reaction to the sting.  It is unlikely that there is also a systemic allergic reaction but if so, the symptoms should not worsen and should continue to resolve.  However with future stings it is possible to have a more serious reaction and if you do develop itching all over your body, vomiting, difficulty breathing, or other worrisome symptoms with future reactions you should be seen immediately.  You should have no further problems with this particular sting.

## 2024-10-05 NOTE — ED TRIAGE NOTES
"Pt was stung by a bee at 1530 this afternoon in her LUE. There is a reddened area. Pt states she has never been stung by a bee before. Multiple family members who are allergic to bee stings. Reports that her throat \"feels a little tight and my lips feel tingly\".  No hives noted or complains of itching.      Triage Assessment (Adult)       Row Name 10/05/24 7936          Triage Assessment    Airway WDL WDL        Respiratory WDL    Respiratory WDL WDL        Skin Circulation/Temperature WDL    Skin Circulation/Temperature WDL X        Cardiac WDL    Cardiac WDL WDL        Peripheral/Neurovascular WDL    Peripheral Neurovascular WDL WDL        Cognitive/Neuro/Behavioral WDL    Cognitive/Neuro/Behavioral WDL WDL                     "

## 2024-10-22 ENCOUNTER — OFFICE VISIT (OUTPATIENT)
Dept: FAMILY MEDICINE | Facility: CLINIC | Age: 24
End: 2024-10-22
Payer: COMMERCIAL

## 2024-10-22 VITALS
OXYGEN SATURATION: 97 % | TEMPERATURE: 97.6 F | WEIGHT: 293 LBS | SYSTOLIC BLOOD PRESSURE: 122 MMHG | BODY MASS INDEX: 45.99 KG/M2 | RESPIRATION RATE: 16 BRPM | HEIGHT: 67 IN | DIASTOLIC BLOOD PRESSURE: 84 MMHG | HEART RATE: 76 BPM

## 2024-10-22 DIAGNOSIS — N91.5 OLIGOMENORRHEA, UNSPECIFIED TYPE: Primary | ICD-10-CM

## 2024-10-22 DIAGNOSIS — T63.441D BEE STING REACTION, ACCIDENTAL OR UNINTENTIONAL, SUBSEQUENT ENCOUNTER: ICD-10-CM

## 2024-10-22 DIAGNOSIS — H65.93 MIDDLE EAR EFFUSION, BILATERAL: ICD-10-CM

## 2024-10-22 LAB
ERYTHROCYTE [DISTWIDTH] IN BLOOD BY AUTOMATED COUNT: 12.9 % (ref 10–15)
FSH SERPL IRP2-ACNC: 5.8 MIU/ML
HCT VFR BLD AUTO: 39.3 % (ref 35–47)
HGB BLD-MCNC: 13 G/DL (ref 11.7–15.7)
LH SERPL-ACNC: 7.6 MIU/ML
MCH RBC QN AUTO: 28.1 PG (ref 26.5–33)
MCHC RBC AUTO-ENTMCNC: 33.1 G/DL (ref 31.5–36.5)
MCV RBC AUTO: 85 FL (ref 78–100)
PLATELET # BLD AUTO: 247 10E3/UL (ref 150–450)
PROLACTIN SERPL 3RD IS-MCNC: 10 NG/ML (ref 5–23)
RBC # BLD AUTO: 4.63 10E6/UL (ref 3.8–5.2)
TSH SERPL DL<=0.005 MIU/L-ACNC: 2.77 UIU/ML (ref 0.3–4.2)
WBC # BLD AUTO: 8.8 10E3/UL (ref 4–11)

## 2024-10-22 PROCEDURE — 83002 ASSAY OF GONADOTROPIN (LH): CPT | Performed by: STUDENT IN AN ORGANIZED HEALTH CARE EDUCATION/TRAINING PROGRAM

## 2024-10-22 PROCEDURE — 85027 COMPLETE CBC AUTOMATED: CPT | Performed by: STUDENT IN AN ORGANIZED HEALTH CARE EDUCATION/TRAINING PROGRAM

## 2024-10-22 PROCEDURE — 84443 ASSAY THYROID STIM HORMONE: CPT | Performed by: STUDENT IN AN ORGANIZED HEALTH CARE EDUCATION/TRAINING PROGRAM

## 2024-10-22 PROCEDURE — 84403 ASSAY OF TOTAL TESTOSTERONE: CPT | Performed by: STUDENT IN AN ORGANIZED HEALTH CARE EDUCATION/TRAINING PROGRAM

## 2024-10-22 PROCEDURE — 99214 OFFICE O/P EST MOD 30 MIN: CPT | Performed by: STUDENT IN AN ORGANIZED HEALTH CARE EDUCATION/TRAINING PROGRAM

## 2024-10-22 PROCEDURE — 83001 ASSAY OF GONADOTROPIN (FSH): CPT | Performed by: STUDENT IN AN ORGANIZED HEALTH CARE EDUCATION/TRAINING PROGRAM

## 2024-10-22 PROCEDURE — 84146 ASSAY OF PROLACTIN: CPT | Performed by: STUDENT IN AN ORGANIZED HEALTH CARE EDUCATION/TRAINING PROGRAM

## 2024-10-22 PROCEDURE — 36415 COLL VENOUS BLD VENIPUNCTURE: CPT | Performed by: STUDENT IN AN ORGANIZED HEALTH CARE EDUCATION/TRAINING PROGRAM

## 2024-10-22 RX ORDER — EPINEPHRINE 0.3 MG/.3ML
0.3 INJECTION SUBCUTANEOUS PRN
Qty: 2 EACH | Refills: 2 | Status: SHIPPED | OUTPATIENT
Start: 2024-10-22

## 2024-10-22 RX ORDER — FLUTICASONE PROPIONATE 50 MCG
1 SPRAY, SUSPENSION (ML) NASAL DAILY
Qty: 18.2 ML | Refills: 1 | Status: SHIPPED | OUTPATIENT
Start: 2024-10-22

## 2024-10-22 ASSESSMENT — PAIN SCALES - GENERAL: PAINLEVEL: NO PAIN (0)

## 2024-10-22 NOTE — PATIENT INSTRUCTIONS
For nose sprays for fluid in the middle ear:   Flonase (fluticasone) or Nasonex (mometasone) - 1 spray in each nostril once daily (preferably at bedtime). You can do it 1 additional time a day if needed for worse symptoms.

## 2024-10-22 NOTE — PROGRESS NOTES
Assessment & Plan     Oligomenorrhea, unspecified type  Patient is a pleasant 24 year old who presents for multiple concerns. First, menses have been irregular since May. Missed period in June, August was late, then no menses since August. Listed history of PCOS, and only about 5 months of irregularity. However, we did opt to complete labs as below today.   - Testosterone total  - Follicle stimulating hormone  - Luteinizing Hormone  - TSH with free T4 reflex  - CBC with platelets  - Prolactin    Bee sting reaction, accidental or unintentional, subsequent encounter  Recently seen for bee sting reaction. Strong family history of bee sting anaphylaxis. This was her first sting. She would like to be prepped with epipen and given her family history I think this is reasonable. Also recommended to keep benadryl. Given explanation for what symptoms to consider as early anaphylaxis to consider epipen usage as not clear if she has true anaphylaxis or not.   - EPINEPHrine (ANY BX GENERIC EQUIV) 0.3 MG/0.3ML injection 2-pack  Dispense: 2 each; Refill: 2    Middle ear effusion, bilateral  Left ear pain. Middle ear effusion with erythema of the external ear canal. Will trial flonase, then have her use previously prescribed polymyxin/hydrocort/neomycin 2 times daily for the next few days to see if symptoms resolve.   - fluticasone (FLONASE) 50 MCG/ACT nasal spray  Dispense: 18.2 mL; Refill: 1          MED REC REQUIRED  Post Medication Reconciliation Status:  Patient was not discharged from an inpatient facility or TCU      Jason Green is a 24 year old, presenting for the following health issues:  ER F/U and Ear Problem (Left ear pain)        10/22/2024     9:53 AM   Additional Questions   Roomed by Trinity CHAVARRIA   Accompanied by Self     HPI     ED/UC Followup:    Facility:  [place]  Date of visit: 10/05/2024  Reason for visit: Insect stings  Current Status: improved    Seen 10/5/24  Stung by bee at work.   Had an allergic  "reaction. Mom and brother and his kids are allergic.   Want to get ahead of it before gets stung again.   Started spreading on her arm.   Given some cetirizine and it went away.     Ear infection   Had one not too long ago maybe a month or so ago.   Hurts, sore. Aches once in a while. Has some ear drops that she's using here and there when it bothers her. Never had ear pain before just the last 2 months has had issues.   Left side.   Neomycin polymyx hydrocort. 3 drops roughly    Periods have been \"weird \" last couple have been super late. This one hasn't quite gotten it. Some cramping pain. Had some cramping before without period. Last period was August 28th- until sept 3rd and this was more mild, hardly any on a pad, only when wiping.     1/17-2/14-3/14-4/16-5/13 (skipped June) 7/10 (delayed in August) 8/28, nothing since.     August one was much lighter and longer than usual.      Review of Systems  Constitutional, HEENT, cardiovascular, pulmonary, gi and gu systems are negative, except as otherwise noted.      Objective    /84 (BP Location: Right arm, Patient Position: Sitting, Cuff Size: Adult Large)   Pulse 76   Temp 97.6  F (36.4  C) (Tympanic)   Resp 16   Ht 1.702 m (5' 7\")   Wt (!) 167.8 kg (370 lb)   LMP 08/28/2024   SpO2 97%   BMI 57.95 kg/m    Body mass index is 57.95 kg/m .  Physical Exam  Constitutional:       Appearance: Normal appearance.   HENT:      Head: Normocephalic.      Right Ear: A middle ear effusion is present.      Left Ear: A middle ear effusion is present.      Ears:      Comments: Erythema left ear canal without drainage. No TM erythema.  Eyes:      General: No scleral icterus.     Extraocular Movements: Extraocular movements intact.      Conjunctiva/sclera: Conjunctivae normal.   Cardiovascular:      Rate and Rhythm: Normal rate.   Pulmonary:      Effort: Pulmonary effort is normal.   Neurological:      General: No focal deficit present.      Mental Status: She is alert " and oriented to person, place, and time.           Results from this visitNo results found for any visits on 10/22/24.          Signed Electronically by: Denise Ramirez MD

## 2024-10-24 LAB — TESTOST SERPL-MCNC: 27 NG/DL (ref 8–60)

## 2024-11-06 ENCOUNTER — OFFICE VISIT (OUTPATIENT)
Dept: FAMILY MEDICINE | Facility: CLINIC | Age: 24
End: 2024-11-06
Payer: COMMERCIAL

## 2024-11-06 VITALS
WEIGHT: 293 LBS | DIASTOLIC BLOOD PRESSURE: 82 MMHG | SYSTOLIC BLOOD PRESSURE: 120 MMHG | BODY MASS INDEX: 45.99 KG/M2 | RESPIRATION RATE: 18 BRPM | OXYGEN SATURATION: 98 % | HEIGHT: 67 IN | HEART RATE: 87 BPM | TEMPERATURE: 97.7 F

## 2024-11-06 DIAGNOSIS — E66.813 CLASS 3 SEVERE OBESITY DUE TO EXCESS CALORIES WITHOUT SERIOUS COMORBIDITY WITH BODY MASS INDEX (BMI) OF 50.0 TO 59.9 IN ADULT (H): ICD-10-CM

## 2024-11-06 DIAGNOSIS — E66.01 CLASS 3 SEVERE OBESITY DUE TO EXCESS CALORIES WITHOUT SERIOUS COMORBIDITY WITH BODY MASS INDEX (BMI) OF 50.0 TO 59.9 IN ADULT (H): ICD-10-CM

## 2024-11-06 DIAGNOSIS — G47.69 OTHER SLEEP RELATED MOVEMENT DISORDERS: Primary | ICD-10-CM

## 2024-11-06 DIAGNOSIS — K52.9 ILEITIS: ICD-10-CM

## 2024-11-06 DIAGNOSIS — K76.0 FATTY LIVER: ICD-10-CM

## 2024-11-06 PROCEDURE — 99214 OFFICE O/P EST MOD 30 MIN: CPT | Performed by: FAMILY MEDICINE
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
## 2024-11-21 ENCOUNTER — OFFICE VISIT (OUTPATIENT)
Dept: FAMILY MEDICINE | Facility: CLINIC | Age: 24
End: 2024-11-21
Payer: COMMERCIAL

## 2024-11-21 VITALS
RESPIRATION RATE: 16 BRPM | SYSTOLIC BLOOD PRESSURE: 132 MMHG | BODY MASS INDEX: 45.99 KG/M2 | OXYGEN SATURATION: 97 % | HEART RATE: 96 BPM | WEIGHT: 293 LBS | DIASTOLIC BLOOD PRESSURE: 82 MMHG | HEIGHT: 67 IN | TEMPERATURE: 97.9 F

## 2024-11-21 DIAGNOSIS — R10.2 PELVIC PAIN IN FEMALE: Primary | ICD-10-CM

## 2024-11-21 DIAGNOSIS — N76.0 BV (BACTERIAL VAGINOSIS): ICD-10-CM

## 2024-11-21 DIAGNOSIS — R10.9 FLANK PAIN: ICD-10-CM

## 2024-11-21 DIAGNOSIS — B96.89 BV (BACTERIAL VAGINOSIS): ICD-10-CM

## 2024-11-21 LAB

## 2024-11-21 RX ORDER — METRONIDAZOLE 500 MG/1
500 TABLET ORAL 2 TIMES DAILY
Qty: 14 TABLET | Refills: 0 | Status: SHIPPED | OUTPATIENT
Start: 2024-11-21 | End: 2024-11-28

## 2024-11-21 ASSESSMENT — PAIN SCALES - GENERAL: PAINLEVEL_OUTOF10: SEVERE PAIN (6)

## 2024-11-21 NOTE — PROGRESS NOTES
"  {PROVIDER CHARTING PREFERENCE:852918}    Jason Green is a 24 year old, presenting for the following health issues:  Abdominal Pain (Left side)        11/21/2024     3:12 PM   Additional Questions   Roomed by Audrey     History of Present Illness       Reason for visit:  Left side pain   She is taking medications regularly.       ABDOMINAL PAIN (and/or Flank Pain)  Onset: 1 day(s) ago  Description:   Location: right lower quadrant left lower quadrant right flank left flank  Radiation: Back  Character: Stabbing  Frequency (if intermittent):consistant hour(s)        Pain-free between episodes: no  History:    Previous similar pain? YES   Previous tests done? U/A, Wet prep, xray  Any related trauma?: no  Accompanying Signs & Symptoms:   Fever? No  Nausea YES  Vomiting (bloody?, coffee-grounds?) no  Dysuria? No  Hematuria: No  Change in stool color: no  Change in stool pattern: YES- some constipation recently  Weight loss: No  Precipitating and/or Alleviating factors:   Does the pain change with: Food No                                                BM No                                                Body movement No                                                 Urination No  Possibility of Pregnancy: No ; Patient's last menstrual period was 11/04/2024 (exact date).  Therapies tried and outcome: biofreeze with  no relief              {MA/LPN/RN Pre-Provider Visit Orders- hCG/UA/Strep (Optional):899705}  {SUPERLIST (Optional):770431}  {additonal problems for provider to add (Optional):541381}    {ROS Picklists (Optional):645286}      Objective    /82   Pulse 96   Temp 97.9  F (36.6  C) (Tympanic)   Resp 16   Ht 1.702 m (5' 7\")   Wt (!) 169.2 kg (373 lb)   LMP 11/04/2024 (Exact Date)   SpO2 97%   BMI 58.42 kg/m    Body mass index is 58.42 kg/m .  Physical Exam   {Exam List (Optional):059146}    {Diagnostic Test Results (Optional):873266}        Signed Electronically by: XENA Deng " CNP  {Email feedback regarding this note to [email or web address]   :525277}

## 2024-12-11 ENCOUNTER — HOSPITAL ENCOUNTER (EMERGENCY)
Facility: CLINIC | Age: 24
Discharge: HOME | End: 2024-12-11
Attending: FAMILY MEDICINE | Admitting: FAMILY MEDICINE
Payer: COMMERCIAL

## 2024-12-11 VITALS
SYSTOLIC BLOOD PRESSURE: 114 MMHG | HEART RATE: 80 BPM | RESPIRATION RATE: 18 BRPM | DIASTOLIC BLOOD PRESSURE: 61 MMHG | TEMPERATURE: 98.8 F | OXYGEN SATURATION: 96 %

## 2024-12-11 DIAGNOSIS — G44.209 TENSION HEADACHE: ICD-10-CM

## 2024-12-11 DIAGNOSIS — H81.399 PERIPHERAL VERTIGO, UNSPECIFIED LATERALITY: ICD-10-CM

## 2024-12-11 LAB
ANION GAP SERPL CALCULATED.3IONS-SCNC: 11 MMOL/L (ref 7–15)
BASOPHILS # BLD AUTO: 0.1 10E3/UL (ref 0–0.2)
BASOPHILS NFR BLD AUTO: 0 %
BUN SERPL-MCNC: 13.7 MG/DL (ref 6–20)
CALCIUM SERPL-MCNC: 9.6 MG/DL (ref 8.8–10.4)
CHLORIDE SERPL-SCNC: 103 MMOL/L (ref 98–107)
CREAT SERPL-MCNC: 0.96 MG/DL (ref 0.51–0.95)
CRP SERPL-MCNC: 9.02 MG/L
EGFRCR SERPLBLD CKD-EPI 2021: 84 ML/MIN/1.73M2
EOSINOPHIL # BLD AUTO: 0.3 10E3/UL (ref 0–0.7)
EOSINOPHIL NFR BLD AUTO: 3 %
ERYTHROCYTE [DISTWIDTH] IN BLOOD BY AUTOMATED COUNT: 12.3 % (ref 10–15)
GLUCOSE SERPL-MCNC: 102 MG/DL (ref 70–99)
HCO3 SERPL-SCNC: 26 MMOL/L (ref 22–29)
HCT VFR BLD AUTO: 40.6 % (ref 35–47)
HGB BLD-MCNC: 13.5 G/DL (ref 11.7–15.7)
IMM GRANULOCYTES # BLD: 0 10E3/UL
IMM GRANULOCYTES NFR BLD: 0 %
LYMPHOCYTES # BLD AUTO: 2.2 10E3/UL (ref 0.8–5.3)
LYMPHOCYTES NFR BLD AUTO: 18 %
MCH RBC QN AUTO: 28.5 PG (ref 26.5–33)
MCHC RBC AUTO-ENTMCNC: 33.3 G/DL (ref 31.5–36.5)
MCV RBC AUTO: 86 FL (ref 78–100)
MONOCYTES # BLD AUTO: 0.6 10E3/UL (ref 0–1.3)
MONOCYTES NFR BLD AUTO: 5 %
NEUTROPHILS # BLD AUTO: 8.9 10E3/UL (ref 1.6–8.3)
NEUTROPHILS NFR BLD AUTO: 73 %
NRBC # BLD AUTO: 0 10E3/UL
NRBC BLD AUTO-RTO: 0 /100
PLATELET # BLD AUTO: 270 10E3/UL (ref 150–450)
POTASSIUM SERPL-SCNC: 4 MMOL/L (ref 3.4–5.3)
RBC # BLD AUTO: 4.74 10E6/UL (ref 3.8–5.2)
SODIUM SERPL-SCNC: 140 MMOL/L (ref 135–145)
WBC # BLD AUTO: 12.2 10E3/UL (ref 4–11)

## 2024-12-11 PROCEDURE — 85004 AUTOMATED DIFF WBC COUNT: CPT | Performed by: FAMILY MEDICINE

## 2024-12-11 PROCEDURE — 96374 THER/PROPH/DIAG INJ IV PUSH: CPT | Mod: 59 | Performed by: FAMILY MEDICINE

## 2024-12-11 PROCEDURE — 36415 COLL VENOUS BLD VENIPUNCTURE: CPT | Performed by: FAMILY MEDICINE

## 2024-12-11 PROCEDURE — 82565 ASSAY OF CREATININE: CPT | Performed by: FAMILY MEDICINE

## 2024-12-11 PROCEDURE — 250N000009 HC RX 250: Performed by: FAMILY MEDICINE

## 2024-12-11 PROCEDURE — 85048 AUTOMATED LEUKOCYTE COUNT: CPT | Performed by: FAMILY MEDICINE

## 2024-12-11 PROCEDURE — 99284 EMERGENCY DEPT VISIT MOD MDM: CPT | Performed by: FAMILY MEDICINE

## 2024-12-11 PROCEDURE — 82435 ASSAY OF BLOOD CHLORIDE: CPT | Performed by: FAMILY MEDICINE

## 2024-12-11 PROCEDURE — 86140 C-REACTIVE PROTEIN: CPT | Performed by: FAMILY MEDICINE

## 2024-12-11 PROCEDURE — 99285 EMERGENCY DEPT VISIT HI MDM: CPT | Mod: 25 | Performed by: FAMILY MEDICINE

## 2024-12-11 PROCEDURE — 250N000011 HC RX IP 250 OP 636: Performed by: FAMILY MEDICINE

## 2024-12-11 PROCEDURE — 80048 BASIC METABOLIC PNL TOTAL CA: CPT | Performed by: FAMILY MEDICINE

## 2024-12-11 RX ORDER — KETOROLAC TROMETHAMINE 15 MG/ML
15 INJECTION, SOLUTION INTRAMUSCULAR; INTRAVENOUS ONCE
Status: COMPLETED | OUTPATIENT
Start: 2024-12-11 | End: 2024-12-11

## 2024-12-11 RX ORDER — IOPAMIDOL 755 MG/ML
67 INJECTION, SOLUTION INTRAVASCULAR ONCE
Status: COMPLETED | OUTPATIENT
Start: 2024-12-11 | End: 2024-12-11

## 2024-12-11 RX ADMIN — SODIUM CHLORIDE 100 ML: 9 INJECTION, SOLUTION INTRAVENOUS at 18:17

## 2024-12-11 RX ADMIN — IOPAMIDOL 67 ML: 755 INJECTION, SOLUTION INTRAVENOUS at 18:17

## 2024-12-11 RX ADMIN — KETOROLAC TROMETHAMINE 15 MG: 15 INJECTION, SOLUTION INTRAMUSCULAR; INTRAVENOUS at 18:39

## 2024-12-11 ASSESSMENT — ACTIVITIES OF DAILY LIVING (ADL)
ADLS_ACUITY_SCORE: 41

## 2024-12-11 NOTE — ED TRIAGE NOTES
Orbital headache since Thursday radiates to jaw and neck, makes patient nauseous and dizzy. hx of vertigo, but not migraines. Has tried tylenol, ibuprofen, and dramamine at home without relief.

## 2024-12-11 NOTE — ED PROVIDER NOTES
"  HPI   Patient is a 24-year-old female presenting with headache and vertigo.  Per my chart review, the patient was seen on 12/5 for chest pain and vertigo.  Note is reviewed in its entirety.  Minimal conversation given about the vertigo.  The patient tells me that she \"just told them that it was probably vertigo and that was it and so we just left it at that.\"  On 11/24 she was seen with BV diagnoses and low back pain.  She has a known history of cholecystectomy performed in 2017.  She has polycystic ovarian syndrome and obesity.  She has had a tonsillectomy.  No history of neck or back problems.  No history of migraine.  No medication changes.  No behavioral changes or social history changes.    Patient describes new headache since last Wednesday, 7 days ago.  The headache has been constant since onset but will wax and wane without obvious cause.  She will go to bed at night with a headache and wake up in the middle of the night with a headache.  Headache is felt in the back of her head and in her neck.  It does not seem to favor 1 side or the other.  No obvious trauma or injury.  No new activities or overuse.  No skin rash.  No arm weakness or paresthesia.  No radiating arm pain.  No vision changes or light sensitivity.  She does report occasional nausea that seems to come on with her vertigo.  The vertigo will happen without obvious cause as well.  Specifically, she denies that position change or movement brings on vertigo or headache symptoms.  No facial pain or pressure.  No green nasal discharge.  No dental pain.  No jaw pain.  She has not had similar symptoms previously.  She generally does not get headaches.      Allergies:  Allergies   Allergen Reactions    Bees Swelling    Powersite Sprouts [Brassica Oleracea]     Keflex [Cephalexin] Itching     Problem List:    Patient Active Problem List    Diagnosis Date Noted    Prediabetes 10/31/2023     Priority: Medium    Abnormal uterine bleeding due to endometrial " polyp 08/24/2022     Priority: Medium    Change in bowel habit 08/23/2022     Priority: Medium    Ileitis 08/23/2022     Priority: Medium    Family history of colon cancer 07/08/2021     Priority: Medium     Mother at age 40      Family history of hemochromatosis 07/08/2021     Priority: Medium     Father and Grandfather      Acanthosis nigricans 12/08/2020     Priority: Medium     Created by Conversion      Last Assessment & Plan:   Patient doing very well and adding exercise, and watching diet closely.  We'll now add metformin as per orders.  Side effects and precautions discussed.  Follow up in one month.      Arthropathy of ankle and foot 12/08/2020     Priority: Medium     Created by Conversion    Replacement Utility updated for latest IMO load    Last Assessment & Plan:   With diffuse joint aches treating with progressive weight loss and lifestyle modifications.      Iron deficiency anemia due to chronic blood loss 11/22/2019     Priority: Medium    Aphthous ulcer of ileum 10/30/2019     Priority: Medium    Elevated BP without diagnosis of hypertension 10/16/2017     Priority: Medium    Adenotonsillar hypertrophy 01/04/2017     Priority: Medium    PCOS (polycystic ovarian syndrome) 02/29/2016     Priority: Medium     Last Assessment & Plan:   Her dietary changes are working very well.  Also working very well for the household.  She is exercising more, having more energy, and pants are fitting looser.  We'll plan for in body at next visit along with remeasurement of abdominal and neck circumference.  Also with her forgetting the metformin in the morning, will move it to the afternoon with lunch.      Class 3 severe obesity due to excess calories without serious comorbidity with body mass index (BMI) of 50.0 to 59.9 in adult (H) 10/30/2015     Priority: Medium     Last Assessment & Plan:   Continue lifestyle modifications.  Will breakfast a [place] protein-based with shakes or aches.  May also use chicken or  "steak.  Stopped carbohydrates in the morning.  Better food choices discussed.  Encouraged to watch the documentary, \"In defense of food\" before next visit.        Past Medical History:    Past Medical History:   Diagnosis Date    Abnormal uterine bleeding due to endometrial polyp     Acanthosis nigricans     Adenotonsillar hypertrophy     Aphthous ulcer of ileum     Arthropathy of ankle and foot     Family history of hemochromatosis     Iron deficiency anemia due to chronic blood loss     Motion sickness     Obesity     PCOS (polycystic ovarian syndrome)      Past Surgical History:    Past Surgical History:   Procedure Laterality Date    ABDOMEN SURGERY  2017    Gallbladder removal    CHOLECYSTECTOMY      COLONOSCOPY N/A 01/04/2024    Procedure: COLONOSCOPY WITH BIOPSIES;  Surgeon: Agustin Bailey MD;  Location: [facility] OR    ESOPHAGOSCOPY, GASTROSCOPY, DUODENOSCOPY (EGD), COMBINED N/A 01/04/2024    Procedure: ESOPHAGOGASTRODUODENOSCOPY WITH BIOPSIES;  Surgeon: Agustin Bailey MD;  Location: [facility] OR    HYSTEROSCOPY DIAGNOSTIC N/A 10/14/2022    Procedure: HYSTEROSCOPY WITH POLYPECTOMY;  Surgeon: Aston Yanes MD;  Location: [facility] OR    LAPAROSCOPIC CHOLECYSTECTOMY      OTHER SURGICAL HISTORY      none    TONSILLECTOMY      TONSILLECTOMY & ADENOIDECTOMY Bilateral 02/16/2017    Procedure: BILATERAL TONSILLECTOMY AND ADENOIDECTOMY;  Surgeon: Jacob Arguello MD;  Location: [facility];  Service:      Family History:    Family History   Problem Relation Age of Onset    Colon Cancer Mother 43    Cancer Mother         colon    Venous thrombosis Mother         cancer-related    Cerebrovascular Disease Mother     Liver Disease Father         cirrhosis of liver    Other - See Comments Father         high iron    Cirrhosis Father     Hemochromatosis Father     Hypertension Father     Hyperlipidemia Father     Myocardial Infarction Maternal Grandmother     Heart Disease Maternal " Grandmother     Dementia Maternal Grandmother     Cerebrovascular Disease Paternal Grandmother     Other - See Comments Maternal Grandfather         heart attack or cancer/unsure    Bladder Cancer Maternal Grandfather     Cirrhosis Paternal Grandfather     Other - See Comments Paternal Grandfather         high iron    Diabetes Paternal Grandfather     Hemochromatosis Paternal Grandfather     Kidney Disease Paternal Grandfather     Hypertension Paternal Grandfather     Colon Cancer Maternal Great-Grandmother         70-90 years old     Uterine Cancer Maternal Great-Grandmother     Breast Cancer Maternal Great-Grandmother     Brain Cancer Maternal Aunt      Social History:  Marital Status:  Single [1]  Social History     Tobacco Use    Smoking status: Never     Passive exposure: Never    Smokeless tobacco: Never   Vaping Use    Vaping status: Former    Substances: Nicotine, Flavoring    Devices: Disposable   Substance Use Topics    Alcohol use: Yes     Comment: occ    Drug use: Never      Medications:    buPROPion (WELLBUTRIN XL) 150 MG 24 hr tablet  Ascorbic Acid (VITAMIN C) 500 MG CAPS  cholecalciferol (VITAMIN D3) 25 mcg (1000 units) capsule  Cranberry 125 MG TABS  cyclobenzaprine (FLEXERIL) 10 MG tablet  EPINEPHrine (ANY BX GENERIC EQUIV) 0.3 MG/0.3ML injection 2-pack  famotidine (PEPCID) 20 MG tablet  fluticasone (FLONASE) 50 MCG/ACT nasal spray  hydrOXYzine delta (VISTARIL) 25 MG capsule  Lidocaine (LIDOCARE) 4 % Patch  polyethylene glycol (MIRALAX) 17 GM/Dose powder      Review of Systems   All other systems reviewed and are negative.      PE   BP: 91/42  Pulse: 93  Temp: 98.8  F (37.1  C)  Resp: 18  SpO2: 97 %  Physical Exam  Vitals reviewed.   Constitutional:       General: She is not in acute distress.     Appearance: She is well-developed.   HENT:      Head: Normocephalic and atraumatic.      Right Ear: External ear normal.      Left Ear: External ear normal.      Nose: Nose normal.      Mouth/Throat:       Mouth: Mucous membranes are moist.      Pharynx: Oropharynx is clear.   Eyes:      Extraocular Movements: Extraocular movements intact.      Conjunctiva/sclera: Conjunctivae normal.      Pupils: Pupils are equal, round, and reactive to light.   Neck:      Comments: No obvious masses or lymphadenopathy.  No overlying skin change.  Full range of motion without pain.  Cardiovascular:      Rate and Rhythm: Normal rate and regular rhythm.   Pulmonary:      Effort: Pulmonary effort is normal.   Musculoskeletal:         General: Normal range of motion.      Cervical back: Normal range of motion and neck supple. No rigidity or tenderness.   Skin:     General: Skin is warm and dry.   Neurological:      General: No focal deficit present.      Mental Status: She is alert and oriented to person, place, and time.   Psychiatric:         Behavior: Behavior normal.         ED COURSE and MDM   1747.  Patient with headache and associated vertigo symptoms, as above.  Lab values pending.  CT head without contrast, CTA head and neck.    1946.  CTA head and neck unremarkable for acute pathology, limitations discussed with the patient.  Low concern for intracranial hemorrhage or intravascular tearing/bleeding and stroke.  Low concern for tumor.  Low concern for abscess.  Low concern for meningitis.  There is no meningismus.  There is no fever.  She does have a mild leukocytosis present but very minimal CRP elevation.  Symptoms have worsened over the past week, I suspect related to tension and muscle related pain and headache.  Migraine less likely given the fact that she has not had this previously and because she does not have typical migraine symptoms.  That said, neurology referral order placed in case I am missing this.  TSH checked in October, unremarkable.    Electronic medical chart reviewed, including medical problems, medications, medical allergies, social history.  Recent hospitalizations and surgical procedures reviewed.   Recent clinic visits and consultations reviewed.  Recent labs and test results reviewed.  Nursing notes reviewed.    The patient, their parent if applicable, and/or their medical decision maker(s) and I have reviewed all of the available historical information, applicable PMH, physical exam findings, and objective diagnostic data gathered during this ED visit.  We then discussed all work-up options and then together agreed upon the course taken during this visit.  The ultimate disposition and plan was a cooperative decision made between myself and the patient, their parent if applicable, and/or their legal decision maker(s).  The risks and benefits of all decisions made during this visit were discussed to the best of my abilities given the circumstances, and all parties are understanding of the pertinent ramifications of these decisions.      LABS  Labs Ordered and Resulted from Time of ED Arrival to Time of ED Departure   CRP INFLAMMATION - Abnormal       Result Value    CRP Inflammation 9.02 (*)    BASIC METABOLIC PANEL - Abnormal    Sodium 140      Potassium 4.0      Chloride 103      Carbon Dioxide (CO2) 26      Anion Gap 11      Urea Nitrogen 13.7      Creatinine 0.96 (*)     GFR Estimate 84      Calcium 9.6      Glucose 102 (*)    CBC WITH PLATELETS AND DIFFERENTIAL - Abnormal    WBC Count 12.2 (*)     RBC Count 4.74      Hemoglobin 13.5      Hematocrit 40.6      MCV 86      MCH 28.5      MCHC 33.3      RDW 12.3      Platelet Count 270      % Neutrophils 73      % Lymphocytes 18      % Monocytes 5      % Eosinophils 3      % Basophils 0      % Immature Granulocytes 0      NRBCs per 100 WBC 0      Absolute Neutrophils 8.9 (*)     Absolute Lymphocytes 2.2      Absolute Monocytes 0.6      Absolute Eosinophils 0.3      Absolute Basophils 0.1      Absolute Immature Granulocytes 0.0      Absolute NRBCs 0.0         IMAGING  Images reviewed by me.  Radiology report also reviewed.  CTA Head Neck with Contrast   Final  Result   IMPRESSION:    HEAD CTA:    1.  Normal CTA [place].      NECK CTA:   1.  No hemodynamically significant stenosis in the carotid vessels.    2.  Limited assessment of vertebral arteries secondary to suboptimal contrast opacification/body habitus. No gross abnormality visualized. If there is strong clinical concern for vertebral artery dissection, consider MR angiogram with intravenous    contrast.         CT Head w/o Contrast   Final Result   IMPRESSION:   1.  Normal head CT.          Procedures    Medications   iopamidol (ISOVUE-370) solution 67 mL (67 mLs Intravenous $Given 12/11/24 1817)   sodium chloride 0.9 % bag 500mL for CT scan flush use (100 mLs Intravenous $Given 12/11/24 1817)   ketorolac (TORADOL) injection 15 mg (15 mg Intravenous $Given 12/11/24 1839)         IMPRESSION       ICD-10-CM    1. Tension headache  G44.209 Adult Neurology  Referral      2. Peripheral vertigo, unspecified laterality  H81.399 Adult Neurology  Referral               Medication List      There are no discharge medications for this visit.                             Samy Addison MD  12/11/24 1949

## 2024-12-12 ENCOUNTER — OFFICE VISIT (OUTPATIENT)
Dept: FAMILY MEDICINE | Facility: CLINIC | Age: 24
End: 2024-12-12
Payer: COMMERCIAL

## 2024-12-12 VITALS
WEIGHT: 293 LBS | HEART RATE: 80 BPM | BODY MASS INDEX: 45.99 KG/M2 | TEMPERATURE: 98.4 F | RESPIRATION RATE: 18 BRPM | HEIGHT: 67 IN | OXYGEN SATURATION: 97 % | SYSTOLIC BLOOD PRESSURE: 116 MMHG | DIASTOLIC BLOOD PRESSURE: 78 MMHG

## 2024-12-12 DIAGNOSIS — G44.209 TENSION HEADACHE: Primary | ICD-10-CM

## 2024-12-12 RX ORDER — KETOROLAC TROMETHAMINE 10 MG/1
10 TABLET, FILM COATED ORAL 2 TIMES DAILY PRN
Qty: 10 TABLET | Refills: 0 | Status: SHIPPED | OUTPATIENT
Start: 2024-12-12

## 2024-12-12 ASSESSMENT — PAIN SCALES - GENERAL: PAINLEVEL_OUTOF10: MODERATE PAIN (4)

## 2024-12-12 NOTE — PROGRESS NOTES
"  Assessment & Plan     Tension headache  Patient is a pleasant 24-year-old female who presents today for follow-up from multiple recent urgent care/emergency department visits.  Was diagnosed last night with tension headache, and I agree with this diagnosis is being the most accurate.  Does have some middle ear effusion, which may be causing some of her dizziness, but largely, she is dealing with a tension headache.  Does have some posterior occipital discomfort on palpation.  Given recommendations for stretching, or over-the-counter ibuprofen and Tylenol dosing, heat, sleep as needed, as well as will trial Toradol as this seemed to help her last night in the emergency department.  - ketorolac (TORADOL) 10 MG tablet  Dispense: 10 tablet; Refill: 0        MED REC REQUIRED  Post Medication Reconciliation Status:         Jason Green is a 24 year old, presenting for the following health issues:  ER F/U and UC Follow-Up        12/12/2024     2:58 PM   Additional Questions   Roomed by Trinity CHAVARRIA   Accompanied by Self     History of Present Illness       Headaches:   Since the patient's last clinic visit, headaches are: worsened  The patient is getting headaches:  Been a week  She is able to do normal daily activities when she has a migraine.  The patient is taking the following rescue/relief medications:  Ibuprofen (Advil, Motrin)   Patient states \"I get only a small amount of relief\" from the rescue/relief medications.   The patient is taking the following medications to prevent migraines:  Other  In the past 4 weeks, the patient has gone to an Urgent Care or Emergency Room 3 or more times times due to headaches.    She eats 2-3 servings of fruits and vegetables daily.She consumes 1 sweetened beverage(s) daily.She exercises with enough effort to increase her heart rate 30 to 60 minutes per day.  She exercises with enough effort to increase her heart rate 3 or less days per week.   She is taking medications " "regularly.       ED/UC Followup:    Facility:  [place]  Date of visit: 12/11/2024  Reason for visit: Headache, vertigo  Current Status: same    ED/UC Followup:    Facility:  [facility]  Date of visit: 12/06/2024  Reason for visit: Ear pain, dizziness  Current Status: same     ED/UC Followup:    Facility:  [facility]  Date of visit: 12/05/2024  Reason for visit: chest pain, low back pain  Current Status: comes and goes, could be a little better       Sleep and ice seem to be better.     Headache, more in the back of her neck.   When the worst almost feels like a [place] around the head.     Told to take dramamine/meclizine. Was doing that and wasn't getting better, added ibuprofen. Only thing that seemed to help a little.     When first seen last week, started midnight in the middle of the night woke up with dizziness. Was stumbling to the kitchen to take some meds. Dizziness only comes at night, sometimes comes through the day, but when it comes will see some \"stars\" and then goes away within a couple seconds.     When at the worst almost feels like she can't breath. But then not sure if that's maybe more anxiety related.     Was supposed to start her period, and hasn't but will get brown discharge here and there.   No chance of pregnancy    August 28th-> Last period was November 4th  Were regular, but more irregular lately    Tylenol doesn't seem to work for her.   Ibuprofen or aleve.     Review of Systems  Constitutional, HEENT, cardiovascular, pulmonary, gi and gu systems are negative, except as otherwise noted.      Objective    /78 (BP Location: Right arm, Patient Position: Sitting, Cuff Size: Adult Large)   Pulse 80   Temp 98.4  F (36.9  C) (Tympanic)   Resp 18   Ht 1.702 m (5' 7\")   Wt (!) 170.6 kg (376 lb)   LMP 11/04/2024 (Exact Date)   SpO2 97%   BMI 58.89 kg/m    Body mass index is 58.89 kg/m .  Physical Exam  Constitutional:       Appearance: Normal appearance.   HENT:      " Head: Normocephalic.      Comments: Posterior occipital tenderness to palpation of the skull     Right Ear: A middle ear effusion is present.      Left Ear: A middle ear effusion is present.   Eyes:      General: No scleral icterus.     Extraocular Movements: Extraocular movements intact.      Conjunctiva/sclera: Conjunctivae normal.   Cardiovascular:      Rate and Rhythm: Normal rate.   Pulmonary:      Effort: Pulmonary effort is normal.   Neurological:      General: No focal deficit present.      Mental Status: She is alert and oriented to person, place, and time.                Signed Electronically by: Denise Ramirez MD

## 2024-12-12 NOTE — DISCHARGE INSTRUCTIONS
RETURN TO THE EMERGENCY ROOM FOR THE FOLLOWING:    Severely worsened pain, severely worsened vertigo, new one-sided weakness or incoordination, new trouble with speech, new facial droop, new fever greater than 101, or at anytime for any concern.    FOLLOW UP:    Neurology referral order placed at the time of discharge, expect a phone call within the next few days to help with scheduling.    Consider optometry/ophthalmology follow-up for vision acuity testing.    TREATMENT RECOMMENDATIONS:    There have been no new medications provided and there are no prescription medication changes recommended.     NURSE ADVICE LINE:  [phone] or [phone]

## 2024-12-12 NOTE — PATIENT INSTRUCTIONS
Ice/heat  Stretching!!! Consider looking up Yoga with Lakesha on youtube and her headache or migraine videos  Ibuprofen 400-600 mg with tylenol (acetaminophen) 975 or 1000 mg every morning  Ketorolac 10 mg with tylenol (acetaminophen) 975 or 1000 mg every morning      For nose sprays for fluid in the middle ear:   Flonase (fluticasone) or Nasonex (mometasone) - 1 spray in each nostril once daily (preferably at bedtime). You can do it 1 additional time a day if needed for worse symptoms.   These are available over the counter.     An alternative option would be an antihistamine nose spray, such as azelastine (Astelin).

## 2024-12-17 ASSESSMENT — SLEEP AND FATIGUE QUESTIONNAIRES
HOW LIKELY ARE YOU TO NOD OFF OR FALL ASLEEP WHILE SITTING QUIETLY AFTER LUNCH WITHOUT ALCOHOL: SLIGHT CHANCE OF DOZING
HOW LIKELY ARE YOU TO NOD OFF OR FALL ASLEEP IN A CAR, WHILE STOPPED FOR A FEW MINUTES IN TRAFFIC: WOULD NEVER DOZE
HOW LIKELY ARE YOU TO NOD OFF OR FALL ASLEEP WHILE LYING DOWN TO REST IN THE AFTERNOON WHEN CIRCUMSTANCES PERMIT: MODERATE CHANCE OF DOZING
HOW LIKELY ARE YOU TO NOD OFF OR FALL ASLEEP WHILE WATCHING TV: MODERATE CHANCE OF DOZING
HOW LIKELY ARE YOU TO NOD OFF OR FALL ASLEEP WHILE SITTING AND TALKING TO SOMEONE: WOULD NEVER DOZE
HOW LIKELY ARE YOU TO NOD OFF OR FALL ASLEEP WHILE SITTING AND READING: SLIGHT CHANCE OF DOZING
HOW LIKELY ARE YOU TO NOD OFF OR FALL ASLEEP WHILE SITTING INACTIVE IN A PUBLIC PLACE: WOULD NEVER DOZE
HOW LIKELY ARE YOU TO NOD OFF OR FALL ASLEEP WHEN YOU ARE A PASSENGER IN A CAR FOR AN HOUR WITHOUT A BREAK: MODERATE CHANCE OF DOZING

## 2024-12-18 ENCOUNTER — VIRTUAL VISIT (OUTPATIENT)
Dept: SLEEP MEDICINE | Facility: CLINIC | Age: 24
End: 2024-12-18
Attending: FAMILY MEDICINE
Payer: COMMERCIAL

## 2024-12-18 VITALS — HEIGHT: 67 IN | HEART RATE: 91 BPM | WEIGHT: 293 LBS | BODY MASS INDEX: 45.99 KG/M2

## 2024-12-18 DIAGNOSIS — G47.33 OBSTRUCTIVE SLEEP APNEA: Primary | ICD-10-CM

## 2024-12-18 DIAGNOSIS — G47.69 OTHER SLEEP RELATED MOVEMENT DISORDERS: ICD-10-CM

## 2024-12-18 PROCEDURE — 99203 OFFICE O/P NEW LOW 30 MIN: CPT | Mod: 95 | Performed by: STUDENT IN AN ORGANIZED HEALTH CARE EDUCATION/TRAINING PROGRAM

## 2024-12-18 RX ORDER — ZOLPIDEM TARTRATE 10 MG/1
10 TABLET ORAL
Qty: 1 TABLET | Refills: 0 | Status: SHIPPED | OUTPATIENT
Start: 2024-12-18

## 2024-12-18 ASSESSMENT — PAIN SCALES - GENERAL: PAINLEVEL_OUTOF10: NO PAIN (0)

## 2024-12-18 NOTE — NURSING NOTE
Current patient location:  [facility]     Is the patient currently in the state of [place]? YES    Visit mode:VIDEO    If the visit is dropped, the patient can be reconnected by:VIDEO VISIT: Text to cell phone:   Telephone Information:   Mobile [phone]       Will anyone else be joining the visit? NO  (If patient encounters technical issues they should call [phone]    Are changes needed to the allergy or medication list? Pt stated no changes to allergies and Pt stated no med changes    Are refills needed on medications prescribed by this physician? NO    Rooming Documentation:  Questionnaire(s) completed    Reason for visit: Consult    Cassandra FIELDS

## 2024-12-18 NOTE — PROGRESS NOTES
Virtual Visit Details  Type of service:  Video Visit   Video Start Time:  4:02 PM  Video End Time: 4:21 PM  Originating Location (pt. Location): Home  Distant Location (provider location):  On-site  Platform used for Video Visit: [facility]  Consultation Note    Name: Norma Collins MRN#: [id number]   Age: 24 year old YOB: 2000     Date of Consultation: 12/18/24  Consultation is requested by: Zuri Boudreaux  Primary care provider: XENA Deng CNP    History of Present Illness:   Norma Collins is a 24 year old female patient with Pre-T2DM, PCOS, obesity, and iron deficiency anemia   She is sent by Zuri Boudreaux for a sleep consultation regarding Consult  .    Norma Collins main reason for visit: (Patient-Rptd) I had a friend spending the night and she said she woke up to me, shaking in my sleep. She said throughout the night in the days that I would talk in my sleep, punch slap in my sleep, and I was very restless.  Patient states problem(s) started: (Patient-Rptd) Unsure  Norma Collins's goals for this visit: (Patient-Rptd) Figure out why I am so restless and shaking in my sleep    She has not had a previous sleep study.    CPAP: No    Assessment and Plan:     Problem List Items Addressed This Visit       Obstructive sleep apnea - Primary     STOP BANG score is 4/8. Patient likely has sleep apnea based on clinical history of snoring, EDS(ESS 8/24), BMI 59, neck circumference, insomnia(DANTE 15/28), nocturia, GERD, morning headache, sleep inertia, and non-restorative sleep.   Obstructive sleep apnea reviewed. Complications of Untreated Sleep Apnea Reviewed.  HST/ Polysomnography reviewed. Information provided regarding treatment options for ROXANA.  Recommend in-lab sleep study to assess for sleep disordered breathing due to high pretest probability of ROXANA, hypoxemia, and hypoventilation   Home Sleep Testing is contraindicated due obesity, r          Relevant  Medications    zolpidem (AMBIEN) 10 MG tablet    Other Relevant Orders    Comprehensive Sleep Study     Other Visit Diagnoses       Other sleep related movement disorders                SLEEP-WAKE SCHEDULE:   Work/School Days: Patient goes to school/work: (Patient-Rptd) Yes   Usually gets into bed at (Patient-Rptd) It s different every day  Takes patient about (Patient-Rptd) 2hrs to fall asleep  Has trouble falling asleep (Patient-Rptd) 3 nights per week  Wakes up in the middle of the night (Patient-Rptd) 3 times.  Wakes up due to (Patient-Rptd) Use the bathroom;Nightmares;Uncertain  She has trouble falling back asleep (Patient-Rptd) 2 times a week.   It usually takes (Patient-Rptd) 30 mins - 1 hr to get back to sleep  Patient is usually up at (Patient-Rptd) All depends on work  Uses alarm: (Patient-Rptd) Yes  Sleep Inertia: Yes    Weekends/Non-work Days/All Other Days:  Usually gets into bed at (Patient-Rptd) All depends on work   Takes patient about (Patient-Rptd) Hour and a half to fall asleep  Patient is usually up at (Patient-Rptd) It all depends on work  Uses alarm: (Patient-Rptd) Yes    Sleep Need  Patient gets  (Patient-Rptd) 6 hrs rené sleep on average   Patient thinks She needs about (Patient-Rptd) 8hrs sleep    Normarobinson Collins prefers to sleep in this position(s): (Patient-Rptd) Back;Side;Stomach   Patient states they do the following activities in bed: (Patient-Rptd) Use phone, computer, or tablet    Naps  Patient takes a purposeful nap (Patient-Rptd) 0 times a week and naps are usually (Patient-Rptd) 0 in duration  She feels better after a nap: (Patient-Rptd) Yes  She dozes off unintentionally (Patient-Rptd) 2 days per week  Patient has had a driving accident or near-miss due to sleepiness/drowsiness: (Patient-Rptd) No      SLEEP DISRUPTIONS:  Breathing/Snoring  Patient snores:(Patient-Rptd) Yes  Other people complain about Her snoring: (Patient-Rptd) Yes  Patient has been told She stops breathing in Her  sleep:(Patient-Rptd) No  She has issues with the following: (Patient-Rptd) Morning headaches;Heartburn or reflux at night;Getting up to urinate more than once    Movement:  Patient gets pain, discomfort, with an urge to move:  (Patient-Rptd) No  It happens when She is resting:  (Patient-Rptd) Yes  It happens more at night:  (Patient-Rptd) Yes  Patient has been told Norma Collins kicks Her legs at night:  (Patient-Rptd) Yes     Behaviors in Sleep:  Norma Collins has experienced the following behaviors while sleeping: (Patient-Rptd) Recurring Nightmares;Sleep-talking;Kicking or punching;Night terrors (screaming,yelling or acting afraid but not recalling event);See or hear things that are not really there upon awakening or just falling asleep  She has experienced sudden muscle weakness during the day: (Patient-Rptd) No    Is there anything else you would like your sleep provider to know: (Patient-Rptd) No      CAFFEINE AND OTHER SUBSTANCES:  Patient consumes caffeinated beverages per day:  (Patient-Rptd) 1  Last caffeine use is usually: (Patient-Rptd) 11am  List of any prescribed or over the counter stimulants that patient takes:    List of any prescribed or over the counter sleep medication patient takes:    List of previous sleep medications that patient has tried:    Patient drinks alcohol to help them sleep: (Patient-Rptd) No  Patient drinks alcohol near bedtime: (Patient-Rptd) No    Family History:  Patient has a family member been diagnosed with a sleep disorder: (Patient-Rptd) No            SCALES:  EPWORTH SLEEPINESS SCALE       12/17/2024     7:52 PM    [place] Sleepiness Scale ( ANNE Tello  [id number]<br>ESS - [place]/English - Final version - 21 Nov 07 - [facility].)   Sitting and reading Slight chance of dozing   Watching TV Moderate chance of dozing   Sitting, inactive in a public place (e.g. a theatre or a meeting) Would never doze   As a passenger in a car for an hour without a break Moderate  chance of dozing   Lying down to rest in the afternoon when circumstances permit Moderate chance of dozing   Sitting and talking to someone Would never doze   Sitting quietly after a lunch without alcohol Slight chance of dozing   In a car, while stopped for a few minutes in traffic Would never doze   McIntire Score (MC) 8   McIntire Score (Sleep) 8        Patient-reported       INSOMNIA SEVERITY INDEX (DANTE)        12/17/2024     7:41 PM   Insomnia Severity Index (DANTE)   Difficulty falling asleep 2    Difficulty staying asleep 2    Problems waking up too early 2    How SATISFIED/DISSATISFIED are you with your CURRENT sleep pattern? 3    How NOTICEABLE to others do you think your sleep problem is in terms of impairing the quality of your life? 1    How WORRIED/DISTRESSED are you about your current sleep problem? 3    To what extent do you consider your sleep problem to INTERFERE with your daily functioning (e.g. daytime fatigue, mood, ability to function at work/daily chores, concentration, memory, mood, etc.) CURRENTLY? 2    DANTE Total Score 15        Patient-reported    Guidelines for Scoring/Interpretation:  Total score categories:  0-7 = No clinically significant insomnia   8-14 = Subthreshold insomnia   15-21 = Clinical insomnia (moderate severity)  22-28 = Clinical insomnia (severe)  Used via courtesy of www.[place].gov with permission from Jay Crouch PhD., [facility]      STOP BANG   ROXANA Medium Risk             Total Score: 4    ROXANA: Snores loudly    ROXANA: Often tired    ROXANA: BMI over 35 kg/m2    ROXANA: Neck Circum >16 in          GAD7      2/27/2024     8:14 AM   LC-7    1. Feeling nervous, anxious, or on edge 2    2. Not being able to stop or control worrying 1    3. Worrying too much about different things 1    4. Trouble relaxing 1    5. Being so restless that it is hard to sit still 1    6. Becoming easily annoyed or irritable 1    7. Feeling afraid, as if something awful might happen 1   "  LC-7 Total Score 8   If you checked any problems, how difficult have they made it for you to do your work, take care of things at home, or get along with other people? Somewhat difficult        Patient-reported         CAGE-AID       No data to display              CAGE-AID reprinted with permission from the [place] Medical Journal, HORTENSIA Jones. and MICHELLE López, \"Conjoint screening questionnaires for alcohol and drug abuse\" [place] Medical Journal 94: 135-140, 1995.      PATIENT HEALTH QUESTIONNAIRE-9 (PHQ - 9)      2/27/2024     8:14 AM   PHQ-9 ([employer])   1.  Little interest or pleasure in doing things 1    2.  Feeling down, depressed, or hopeless 0    3.  Trouble falling or staying asleep, or sleeping too much 1    4.  Feeling tired or having little energy 1    5.  Poor appetite or overeating 0    6.  Feeling bad about yourself - or that you are a failure or have let yourself or your family down 0    7.  Trouble concentrating on things, such as reading the newspaper or watching television 1    8.  Moving or speaking so slowly that other people could have noticed. Or the opposite - being so fidgety or restless that you have been moving around a lot more than usual 0    9.  Thoughts that you would be better off dead, or of hurting yourself in some way 0    PHQ-9 Total Score 4   6.  Feeling bad about yourself 0    7.  Trouble concentrating 1    8.  Moving slowly or restless 0    9.  Suicidal or self-harm thoughts 0    1.  Little interest or pleasure in doing things Several days   2.  Feeling down, depressed, or hopeless Not at all   3.  Trouble falling or staying asleep, or sleeping too much Several days   4.  Feeling tired or having little energy Several days   5.  Poor appetite or overeating Not at all   6.  Feeling bad about yourself Not at all   7.  Trouble concentrating Several days   8.  Moving slowly or restless Not at all   9.  Suicidal or self-harm thoughts Not at all   PHQ-9 via MyChart TOTAL " SCORE-----> 4 (Minimal depression)   Difficulty at work, home, or with people Somewhat difficult       Patient-reported     Developed by aDnyell Weber, Abeba Cardoza, Jabier Coats and colleagues, with an educational annie from [employer] No permission required to reproduce, translate, display or distribute.      Allergies:    Allergies   Allergen Reactions    Bees Swelling    [place] Sprouts [Brassica Oleracea]     Keflex [Cephalexin] Itching       Medications:    Current Outpatient Medications   Medication Sig Dispense Refill    Ascorbic Acid (VITAMIN C) 500 MG CAPS Take by mouth.      buPROPion (WELLBUTRIN XL) 150 MG 24 hr tablet Take 2 tablets (300 mg) by mouth daily 180 tablet 3    cholecalciferol (VITAMIN D3) 25 mcg (1000 units) capsule Take 1 capsule by mouth daily.      Cranberry 125 MG TABS Take by mouth.      cyclobenzaprine (FLEXERIL) 10 MG tablet Take 10 mg by mouth.      EPINEPHrine (ANY BX GENERIC EQUIV) 0.3 MG/0.3ML injection 2-pack Inject 0.3 mLs (0.3 mg) into the muscle as needed for anaphylaxis. May repeat one time in 5-15 minutes if response to initial dose is inadequate. 2 each 2    famotidine (PEPCID) 20 MG tablet Take 1 tablet (20 mg) by mouth at bedtime.      fluticasone (FLONASE) 50 MCG/ACT nasal spray Spray 1 spray into both nostrils daily. 18.2 mL 1    hydrOXYzine delta (VISTARIL) 25 MG capsule Take 1-2 capsules (25-50 mg) by mouth 3 times daily as needed for anxiety 180 capsule 3    ketorolac (TORADOL) 10 MG tablet Take 1 tablet (10 mg) by mouth 2 times daily as needed for moderate pain. 10 tablet 0    Lidocaine (LIDOCARE) 4 % Patch Apply to intact skin to cover most painful area for max 12hr per 24hr period.      polyethylene glycol (MIRALAX) 17 GM/Dose powder Take by mouth every 24 hours      zolpidem (AMBIEN) 10 MG tablet Take 1 tablet (10 mg) by mouth nightly as needed for sleep. 1 tablet 0       Problem List:  Patient Active Problem List    Diagnosis Date Noted     Obstructive sleep apnea 12/18/2024     Priority: Medium    Prediabetes 10/31/2023     Priority: Medium    Abnormal uterine bleeding due to endometrial polyp 08/24/2022     Priority: Medium    Change in bowel habit 08/23/2022     Priority: Medium    Ileitis 08/23/2022     Priority: Medium    Family history of colon cancer 07/08/2021     Priority: Medium     Mother at age 40      Family history of hemochromatosis 07/08/2021     Priority: Medium     Father and Grandfather      Acanthosis nigricans 12/08/2020     Priority: Medium     Created by Conversion      Last Assessment & Plan:   Patient doing very well and adding exercise, and watching diet closely.  We'll now add metformin as per orders.  Side effects and precautions discussed.  Follow up in one month.      Arthropathy of ankle and foot 12/08/2020     Priority: Medium     Created by Conversion    Replacement Utility updated for latest IMO load    Last Assessment & Plan:   With diffuse joint aches treating with progressive weight loss and lifestyle modifications.      Iron deficiency anemia due to chronic blood loss 11/22/2019     Priority: Medium    Aphthous ulcer of ileum 10/30/2019     Priority: Medium    Elevated BP without diagnosis of hypertension 10/16/2017     Priority: Medium    Adenotonsillar hypertrophy 01/04/2017     Priority: Medium    PCOS (polycystic ovarian syndrome) 02/29/2016     Priority: Medium     Last Assessment & Plan:   Her dietary changes are working very well.  Also working very well for the household.  She is exercising more, having more energy, and pants are fitting looser.  We'll plan for in body at next visit along with remeasurement of abdominal and neck circumference.  Also with her forgetting the metformin in the morning, will move it to the afternoon with lunch.      Class 3 severe obesity due to excess calories without serious comorbidity with body mass index (BMI) of 50.0 to 59.9 in adult (H) 10/30/2015     Priority: Medium      "Last Assessment & Plan:   Continue lifestyle modifications.  Will breakfast a [place] protein-based with shakes or aches.  May also use chicken or steak.  Stopped carbohydrates in the morning.  Better food choices discussed.  Encouraged to watch the documentary, \"In defense of food\" before next visit.          Past Medical/Surgical History:  Past Medical History:   Diagnosis Date    Abnormal uterine bleeding due to endometrial polyp     Acanthosis nigricans     Adenotonsillar hypertrophy     Aphthous ulcer of ileum     Arthropathy of ankle and foot     Family history of hemochromatosis     Iron deficiency anemia due to chronic blood loss     Motion sickness     Obesity     Obstructive sleep apnea 12/18/2024    PCOS (polycystic ovarian syndrome)      Past Surgical History:   Procedure Laterality Date    ABDOMEN SURGERY  2017    Gallbladder removal    CHOLECYSTECTOMY      COLONOSCOPY N/A 01/04/2024    Procedure: COLONOSCOPY WITH BIOPSIES;  Surgeon: Agustin Bailey MD;  Location: [facility] OR    ESOPHAGOSCOPY, GASTROSCOPY, DUODENOSCOPY (EGD), COMBINED N/A 01/04/2024    Procedure: ESOPHAGOGASTRODUODENOSCOPY WITH BIOPSIES;  Surgeon: Agustin Bailey MD;  Location: [facility] OR    HYSTEROSCOPY DIAGNOSTIC N/A 10/14/2022    Procedure: HYSTEROSCOPY WITH POLYPECTOMY;  Surgeon: Aston Yanes MD;  [place] [facility] [place]    LAPAROSCOPIC CHOLECYSTECTOMY      OTHER SURGICAL HISTORY      none    TONSILLECTOMY      TONSILLECTOMY & ADENOIDECTOMY Bilateral 02/16/2017    Procedure: BILATERAL TONSILLECTOMY AND ADENOIDECTOMY;  Surgeon: Jacob Arguello MD;  Location: [facility] OR;  Service:        Social History:  Social History     Socioeconomic History    Marital status: Single     Spouse name: Not on file    Number of children: Not on file    Years of education: Not on file    Highest education level: Not on file   Occupational History    Not on file   Tobacco Use    Smoking status: Never     Passive " exposure: Never    Smokeless tobacco: Never   Vaping Use    Vaping status: Former    Substances: Nicotine, Flavoring    Devices: Disposable   Substance and Sexual Activity    Alcohol use: Yes     Comment: occ    Drug use: Never    Sexual activity: Yes     Partners: Male     Birth control/protection: Pull-out method   Other Topics Concern    Parent/sibling w/ CABG, MI or angioplasty before 65F 55M? No   Social History Narrative    Not on file     Social Drivers of Health     Financial Resource Strain: Low Risk  (2/27/2024)    Financial Resource Strain     Within the past 12 months, have you or your family members you live with been unable to get utilities (heat, electricity) when it was really needed?: No   Food Insecurity: Low Risk  (2/27/2024)    Food Insecurity     Within the past 12 months, did you worry that your food would run out before you got money to buy more?: No     Within the past 12 months, did the food you bought just not last and you didn t have money to get more?: No   Transportation Needs: Low Risk  (2/27/2024)    Transportation Needs     Within the past 12 months, has lack of transportation kept you from medical appointments, getting your medicines, non-medical meetings or appointments, work, or from getting things that you need?: No   Physical Activity: Unknown (2/27/2024)    Exercise Vital Sign     Days of Exercise per Week: 3 days     Minutes of Exercise per Session: Not on file   Stress: No Stress Concern Present (2/27/2024)    [place] [employer] - Occupational Stress Questionnaire     Feeling of Stress : Only a little   Social Connections: Unknown (2/27/2024)    Social Connection and Isolation Panel [NHANES]     Frequency of Communication with Friends and Family: Not on file     Frequency of Social Gatherings with Friends and Family: Once a week     Attends [employer] Services: Not on file     Active Member of Clubs or Organizations: Not on file     Attends Club or  Organization Meetings: Not on file     Marital Status: Not on file   Interpersonal Safety: Low Risk  (6/28/2024)    Interpersonal Safety     Do you feel physically and emotionally safe where you currently live?: Yes     Within the past 12 months, have you been hit, slapped, kicked or otherwise physically hurt by someone?: No     Within the past 12 months, have you been humiliated or emotionally abused in other ways by your partner or ex-partner?: No   Housing Stability: Low Risk  (2/27/2024)    Housing Stability     Do you have housing? : Yes     Are you worried about losing your housing?: No       Family History:  Family History   Problem Relation Age of Onset    Colon Cancer Mother 43    Cancer Mother         colon    Venous thrombosis Mother         cancer-related    Cerebrovascular Disease Mother     Liver Disease Father         cirrhosis of liver    Other - See Comments Father         high iron    Cirrhosis Father     Hemochromatosis Father     Hypertension Father     Hyperlipidemia Father     Myocardial Infarction Maternal Grandmother     Heart Disease Maternal Grandmother     Dementia Maternal Grandmother     Cerebrovascular Disease Paternal Grandmother     Other - See Comments Maternal Grandfather         heart attack or cancer/unsure    Bladder Cancer Maternal Grandfather     Cirrhosis Paternal Grandfather     Other - See Comments Paternal Grandfather         high iron    Diabetes Paternal Grandfather     Hemochromatosis Paternal Grandfather     Kidney Disease Paternal Grandfather     Hypertension Paternal Grandfather     Colon Cancer Maternal Great-Grandmother         70-90 years old     Uterine Cancer Maternal Great-Grandmother     Breast Cancer Maternal Great-Grandmother     Brain Cancer Maternal Aunt        Review of Systems:   A complete review of systems reviewed by me is negative with the exeption of what has been mentioned in the history of present illness.  In the last TWO WEEKS have you  "experienced any of the following symptoms?  Fevers: (Patient-Rptd) No  Night Sweats: (Patient-Rptd) No  Weight Gain: (Patient-Rptd) No  Pain at Night: (Patient-Rptd) No  Double Vision: (Patient-Rptd) No  Changes in Vision: (Patient-Rptd) No  Difficulty Breathing through Nose: (Patient-Rptd) No  Sore Throat in Morning: (Patient-Rptd) No  Dry Mouth in the Morning: (Patient-Rptd) No  Shortness of Breath Lying Flat: (Patient-Rptd) No  Shortness of Breath With Activity: (Patient-Rptd) No  Awakening with Shortness of Breath: (Patient-Rptd) No  Increased Cough: (Patient-Rptd) No  Heart Racing at Night: (Patient-Rptd) Yes  Swelling in Feet or Legs: (Patient-Rptd) No  Diarrhea at Night: (Patient-Rptd) Yes  Heartburn at Night: (Patient-Rptd) Yes  Urinating More than Once at Night: (Patient-Rptd) Yes  Losing Control of Urine at Night: (Patient-Rptd) No  Joint Pains at Night: (Patient-Rptd) No  Headaches in Morning: (Patient-Rptd) Yes  Weakness in Arms or Legs: (Patient-Rptd) No  Depressed Mood: (Patient-Rptd) No  Anxiety: (Patient-Rptd) Yes     Physical Exam:   Vitals: Pulse 91   Ht 1.702 m (5' 7\")   Wt (!) 170.1 kg (375 lb)   LMP 11/04/2024 (Exact Date)   BMI 58.73 kg/m    BMI= Body mass index is 58.73 kg/m .     GENERAL: alert, no distress, and obese  EYES: Eyes grossly normal to inspection.  No discharge or erythema, or obvious scleral/conjunctival abnormalities.  RESP: No audible wheeze, cough, or visible cyanosis.    SKIN: Visible skin clear. No significant rash, abnormal pigmentation or lesions.  NEURO: Cranial nerves grossly intact.  Mentation and speech appropriate for age.  PSYCH: Appropriate affect, tone, and pace of words         Data: All pertinent previous laboratory data reviewed     Recent Labs   Lab Test 12/11/24  1757 09/18/24  1642    139   POTASSIUM 4.0 4.1   CHLORIDE 103 100   CO2 26 27   ANIONGAP 11 12   * 96   BUN 13.7 12.2   CR 0.96* 0.93   LOUIS 9.6 10.0       Recent Labs   Lab Test " "12/11/24  1757   WBC 12.2*   RBC 4.74   HGB 13.5   HCT 40.6   MCV 86   MCH 28.5   MCHC 33.3   RDW 12.3          Recent Labs   Lab Test 09/18/24  1642   PROTTOTAL 7.6   ALBUMIN 4.6   BILITOTAL 0.2   ALKPHOS 95   AST 40   ALT 60*       TSH   Date Value   10/22/2024 2.77 uIU/mL   10/17/2023 2.52 uIU/mL   09/20/2021 1.96 mU/L   09/05/2019 3.28 uIU/mL       No results found for: \"UAMP\", \"UBARB\", \"BENZODIAZEUR\", \"UCANN\", \"UCOC\", \"OPIT\", \"UPCP\"    Ferritin   Date/Time Value Ref Range Status   10/04/2022 02:26  6 - 175 ng/mL Final       No results found for: \"PH\", \"PHARTERIAL\", \"PO2\", \"[id number]\", \"SAT\", \"PCO2\", \"HCO3\", \"BASEEXCESS\", \"JOSELIN\", \"BEB\"    @LABRCNTIPR(phv:4,pco2v:4,po2v:4,hco3v:4,lisa:4,o2per:4)@    Echocardiology: No results found for this or any previous visit (from the past 4320 hours).    Chest x-ray:   XR CHEST 2 VIEWS 06/16/2024    Narrative  For Patients: As a result of the 21st Century Cures Act, medical imaging exams and procedure reports are released immediately into your electronic medical record. You may view this report before your referring provider. If you have questions, please contact your health care provider.    EXAM: XR CHEST 2 VIEWS PA AND LATERAL  LOCATION: [facility]  DATE: 6/16/2024    INDICATION: Chest pain  COMPARISON: 4/13/2024 CT AP which includes the lower chest and CXR 11/14/2023    Impression  Lungs are clear. No pleural effusion. Heart size and pulmonary vascularity within normal limits.      Chest CT:   No results found for this or any previous visit from the past 365 days.      PFT: Most Recent [facility] Pulmonary Function Testing  No results found    Patient was strongly advised to avoid driving, operating any heavy machinery or other hazardous situations while drowsy or sleepy.  Patient was counseled on the importance of driving while alert, to pull over if drowsy, or nap before getting into the vehicle if sleepy.      Plan is for Norma Collins " to follow up in about 2 week(s) following PSG.     The above note was dictated using voice recognition software. Although reviewed after completion, some word and grammatical error may remain . Please contact the author for any clarifications.    Total time spent reviewing medical records, history and physical examination, review of previous testing and interpretation as well as documentation on this date, 12/18/24: 34 minutes    Garth Blanchard MD on 12/18/24  [facility] [facility]   Floor 1, [address]   [address] [place], [place] [address]. [place] [address]   Appointments: [phone]     CC: Zuri Boudreaux

## 2024-12-18 NOTE — PATIENT INSTRUCTIONS
"MY INFORMATION ON SLEEP APNEA-  Norma Collins    DOCTOR : Garth Blanchard MD  SLEEP CENTER :      MY CONTACT NUMBER:    [facility]   [phone]  [facility]    Am I having a sleep study at a sleep center?  Make sure you have an appointment for the study before you leave!  [email or web address]    Am I having a home sleep study?  Watch the video for the device you are using:  /drop off device, Noxturnal T-3: [email or web address] [email or web address]?v=[email or web address]  Disposable device sent out require phone/computer application, WatchPAT1: [email or web address]?[email or web address]_vbiwxE  The sleep lab will call you for this appointment   If you wish to reschedule the sleep study or contact the sleep lab scheduling call [phone]        Frequently asked questions:  1. What is Obstructive Sleep Apnea (ROXANA)? ROXANA is the most common type of sleep apnea. Apnea means, \"without breath.\"  Apnea is most often caused by narrowing or collapse of the upper airway as muscles relax during sleep.   Almost everyone has occasional apneas. Most people with sleep apnea have had brief interruptions at night frequently for many years.  The severity of sleep apnea is related to how frequent and severe the events are.   Apneas are any events where there is no breathing.  Hypopneas are any events where there is shallow breathing. Sleep studies will track and then add all of the apneas and hypopneas into a total and then divided this number by the total time asleep to obtain the apnea hypopnea index(AHI). 0-5 events/per hour = No Sleep Apnea; 5-15 events/per hour = Mild Sleep Apnea; 15-30 events/per hour = Moderate Sleep Apnea; Greater than 30 events/per hour = Severe Sleep Apnea.  Sleep apnea is typically worse during REM sleep due to complete muscle relaxation, including the muscles of the airway. Sleep apnea is also typically worse " during supine(sleeping on your back) sleep due to internal structures more easily falling on the top of the airway and external pressures more easily crushing the airway.  The respiratory disturbance index(RDI) includes apneas, hypopneas, and other respiratory events such as snoring that may disturb your sleep.  2. What are the consequences of ROXANA? Symptoms include: feeling sleepy during the day, snoring loudly, gasping or stopping of breathing, trouble sleeping, and occasionally morning headaches or heartburn at night.  Sleepiness can be serious and even increase the risk of falling asleep while driving. Other health consequences may include development of high blood pressure and other cardiovascular disease in persons who are susceptible. Untreated ROXANA  can contribute to heart disease, stroke and diabetes.   3. What are the treatment options? In most situations, sleep apnea is a lifelong disease that must be managed with daily therapy. Medications are not effective for sleep apnea and surgery is generally not considered until other therapies have been tried. Your treatment is your choice . Continuous Positive Airway (CPAP) works right away and is the therapy that is effective in nearly everyone. An oral device to hold your jaw forward is usually the next most reliable option. Other options include postioning devices (to keep you off your back), weight loss, and surgery including a tongue pacing device. There is more detail about some of these options below.    Important tips for using CPAP and similar devices   Know your equipment:  CPAP is continuous positive airway pressure that prevents obstructive sleep apnea by keeping the throat from collapsing while you are sleeping. In most cases, the device is  smart  and can slowly self-adjusts if your throat collapses and keeps a record every day of how well you are treated-this information is available to you and your care team.  BPAP is bilevel positive airway  pressure that keeps your throat open and also assists each breath with a pressure boost to maintain adequate breathing.  Special kinds of BPAP are used in patients who have inadequate breathing from lung or heart disease. In most cases, the device is  smart  and can slowly self-adjusts to assist breathing. Like CPAP, the device keeps a record of how well you are treated.  Your mask is your connection to the device. You get to choose what feels most comfortable and the staff will help to make sure if fits. Here: are some examples of the different masks that are available:       Key points to remember on your journey with sleep apnea:  Sleep study.  PAP devices often need to be adjusted during a sleep study to show that they are effective and adjusted right.  Good tips to remember: Try wearing just the mask during a quiet time during the day so your body adapts to wearing it. A humidifier is recommended for comfort in most cases to prevent drying of your nose and throat. Allergy medication from your provider may help you if you are having nasal congestion.  Getting settled-in. It takes more than one night for most of us to get used to wearing a mask. Try wearing just the mask during a quiet time during the day so your body adapts to wearing it. A humidifier is recommended for comfort in most cases. Our team will work with you carefully on the first day and will be in contact within 4 days and again at 2 and 4 weeks for advice and remote device adjustments. Your therapy is evaluated by the device each day.   Use it every night. The more you are able to sleep naturally for 7-8 hours, the more likely you will have good sleep and to prevent health risks or symptoms from sleep apnea. Even if you use it 4 hours it helps. Occasionally all of us are unable to use a medical therapy, in sleep apnea, it is not dangerous to miss one night.   Communicate. Call our skilled team on the number provided on the first day if your visit  for problems that make it difficult to wear the device. Over 2 out of 3 patients can learn to wear the device long-term with help from our team. Remember to call our team or your sleep providers if you are unable to wear the device as we may have other solutions for those who cannot adapt to mask CPAP therapy. It is recommended that you sleep your sleep provider within the first 3 months and yearly after that if you are not having problems.   Take care of your equipment. Make sure you clean your mask and tubing using directions every day and that your filter and mask are replaced as recommended or if they are not working.     BESIDES CPAP, WHAT OTHER THERAPIES ARE THERE?    Positioning Device  Positioning devices are generally used when sleep apnea is mild and only occurs on your back.This example shows a pillow that straps around the waist. It may be appropriate for those whose sleep study shows milder sleep apnea that occurs primarily when lying flat on one's back. Preliminary studies have shown benefit but effectiveness at home may need to be verified by a home sleep test. These devices are generally not covered by medical insurance.    Oral Appliance  What is oral appliance therapy?  An oral appliance device fits on your teeth at night like a retainer used after having braces. The device is made by a specialized dentist and requires several visits over 1-2 months before a manufactured device is made to fit your teeth and is adjusted to prevent your sleep apnea. Once an oral device is working properly, snoring should be improved. A home sleep test may be recommended at that time if to determine whether the sleep apnea is adequately treated.       Some things to remember:  -Oral devices are often, but not always, covered by your medical insurance. Be sure to check with your insurance provider.   -If you are referred for oral therapy, you will be given a list of specialized dentists to consider or you may choose to  visit the Web site of the American Academy of Dental Sleep Medicine  -Oral devices are less likely to work if you have severe sleep apnea or are extremely overweight.     More detailed information  An oral appliance is a small acrylic device that fits over the upper and lower teeth  (similar to a retainer or a mouth guard). This device slightly moves jaw forward, which moves the base of the tongue forward, opens the airway, improves breathing for effective treat snoring and obstructive sleep apnea in perhaps 7 out of 10 people .  The best working devices are custom-made by a dental device  after a mold is made of the teeth 1, 2, 3.  When is an oral appliance indicated?  Oral appliance therapy is recommended as a first-line treatment for patients with primary snoring, mild sleep apnea, and for patients with moderate sleep apnea who prefer appliance therapy to use of CPAP4, 5. Severity of sleep apnea is determined by sleep testing and is based on the number of respiratory events per hour of sleep.   How successful is oral appliance therapy?  The success rate of oral appliance therapy in patients with mild sleep apnea is 75-80% while in patients with moderate sleep apnea it is 50-70%. The chance of success in patients with severe sleep apnea is 40-50%. The research also shows that oral appliances have a beneficial effect on the cardiovascular health of ROXANA patients at the same magnitude as CPAP therapy7.  Oral appliances should be a second-line treatment in cases of severe sleep apnea, but if not completely successful then a combination therapy utilizing CPAP plus oral appliance therapy may be effective. Oral appliances tend to be effective in a broad range of patients although studies show that the patients who have the highest success are females, younger patients, those with milder disease, and less severe obesity. 3, 6.   Finding a dentist that practices dental sleep medicine  Specific training is  available through the American Academy of Dental Sleep Medicine for dentists interested in working in the field of sleep. To find a dentist who is educated in the field of sleep and the use of oral appliances, near you, visit the Web site of the American Academy of Dental Sleep Medicine.    References  1. Natalie et al. Objectively measured vs self-reported compliance during oral appliance therapy for sleep-disordered breathing. Chest 2013; 144(5): 0877-1162.  2. Kaushal, et al. Objective measurement of compliance during oral appliance therapy for sleep-disordered breathing. Thorax 2013; 68(1): 91-96.  3. Javon et al. Mandibular advancement devices in 620 men and women with ROXANA and snoring: tolerability and predictors of treatment success. Chest 2004; 125: 4961-7362.  4. Parveen et al. Oral appliances for snoring and ROXANA: a review. Sleep 2006; 29: 244-262.  5. Edenilson, et al. Oral appliance treatment for ROXANA: an update. J Clin Sleep Med 2014; 10(2): 215-227.  6. Inocencio et al. Predictors of OSAH treatment outcome. J Dent Res 2007; 86: 7236-6450.      Weight Loss:    Weight loss is a long-term strategy that may improve sleep apnea in some patients.    Weight management is a personal decision.  If you are interested in exploring weight loss strategies, the following discussion covers the impact on weight loss on sleep apnea and the approaches that may be successful.    Weight loss decreases severity of sleep apnea in most people with obesity. For those with mild obesity who have developed snoring with weight gain, even 15-30 pound weight loss can improve and occasionally eliminate sleep apnea.  Structured and life-long dietary and health habits are necessary to lose weight and keep healthier weight levels.     Though there may be significant health benefits from weight loss, long-term weight loss is very difficult to achieve- studies show success with dietary management in less than 10% of people. In  addition, substantial weight loss may require years of dietary control and may be difficult if patients have severe obesity. In these cases, surgical management may be considered.  Finally, older individuals who have tolerated obesity without health complications may be less likely to benefit from weight loss strategies.    Your BMI is Body mass index is 58.73 kg/m .    Weight management plan: Discussed healthy diet and exercise guidelines    Surgery:    Surgery for obstructive sleep apnea is considered generally only when other therapies fail to work. Surgery may be discussed with you if you are having a difficult time tolerating CPAP and or when there is an abnormal structure that requires surgical correction.  Nose and throat surgeries often enlarge the airway to prevent collapse.  Most of these surgeries create pain for 1-2 weeks and up to half of the most common surgeries are not effective throughout life.  You should carefully discuss the benefits and drawbacks to surgery with your sleep provider and surgeon to determine if it is the best solution for you.   More information  Surgery for ROXANA is directed at areas that are responsible for narrowing or complete obstruction of the airway during sleep.  There are a wide range of procedures available to enlarge and/or stabilize the airway to prevent blockage of breathing in the three major areas where it can occur: the palate, tongue, and nasal regions.  Successful surgical treatment depends on the accurate identification of the factors responsible for obstructive sleep apnea in each person.  A personalized approach is required because there is no single treatment that works well for everyone.  Because of anatomic variation, consultation with an examination by a sleep surgeon is a critical first step in determining what surgical options are best for each patient.  In some cases, examination during sedation may be recommended in order to guide the selection of  procedures.  Patients will be counseled about risks and benefits as well as the typical recovery course after surgery. Surgery is typically not a cure for a person s ROXANA.  However, surgery will often significantly improve one s ROXANA severity (termed  success rate ).  Even in the absence of a cure, surgery will decrease the cardiovascular risk associated with OSA7; improve overall quality of life8 (sleepiness, functionality, sleep quality, etc).      Palate Procedures:  Patients with ROXANA often have narrowing of their airway in the region of their tonsils and uvula.  The goals of palate procedures are to widen the airway in this region as well as to help the tissues resist collapse.  Modern palate procedure techniques focus on tissue conservation and soft tissue rearrangement, rather than tissue removal.  Often the uvula is preserved in this procedure. Residual sleep apnea is common in patient after pharyngoplasty with an average reduction in sleep apnea events of 33%2.      Tongue Procedures:  ExamWhile patients are awake, the muscles that surround the throat are active and keep this region open for breathing. These muscles relax during sleep, allowing the tongue and other structures to collapse and block breathing.  There are several different tongue procedures available.  Selection of a tongue base procedure depends on characteristics seen on physical exam.  Generally, procedures are aimed at removing bulky tissues in this area or preventing the back of the tongue from falling back during sleep.  Success rates for tongue surgery range from 50-62%3.    Hypoglossal Nerve Stimulation:  Hypoglossal nerve stimulation has recently received approval from the [place] Food and Drug Administration for the treatment of obstructive sleep apnea.  This is based on research showing that the system was safe and effective in treating sleep apnea6.  Results showed that the median AHI score decreased 68%, from 29.3 to 9.0. This  therapy uses an implant system that senses breathing patterns and delivers mild stimulation to airway muscles, which keeps the airway open during sleep.  The system consists of three fully implanted components: a small generator (similar in size to a pacemaker), a breathing sensor, and a stimulation lead.  Using a small handheld remote, a patient turns the therapy on before bed and off upon awakening.    Candidates for this device must be greater than 22 years of age, have moderate to severe ROXANA (AHI between 20-65), BMI less than 32, have tried CPAP/oral appliance without success, and have appropriate upper airway anatomy (determined by a sleep endoscopy performed by Dr. Dyer).    Hypoglossal Nerve Stimulation Pathway:    The sleep surgeon s office will work with the patient through the insurance prior-authorization process (including communications and appeals).    Nasal Procedures:  Nasal obstruction can interfere with nasal breathing during the day and night.  Studies have shown that relief of nasal obstruction can improve the ability of some patients to tolerate positive airway pressure therapy for obstructive sleep apnea1.  Treatment options include medications such as nasal saline, topical corticosteroid and antihistamine sprays, and oral medications such as antihistamines or decongestants. Non-surgical treatments can include external nasal dilators for selected patients. If these are not successful by themselves, surgery can improve the nasal airway either alone or in combination with these other options.      Combination Procedures:  Combination of surgical procedures and other treatments may be recommended, particularly if patients have more than one area of narrowing or persistent positional disease.  The success rate of combination surgery ranges from 66-80%2,3.    References  Brent JIMENEZ. The Role of the Nose in Snoring and Obstructive Sleep Apnoea: An Update.  Eur Arch Otorhinolaryngol. 2011; 268:  1365-73.   Roxana SM; Khadijah JA; London JR; Pallanch JF; Nicole MB; Christina SG; Marques PRITCHARD. Surgical modifications of the upper airway for obstructive sleep apnea in adults: a systematic review and meta-analysis. SLEEP 2010;33(10):0053-2306. Chao RAMOS. Hypopharyngeal surgery in obstructive sleep apnea: an evidence-based medicine review.  Arch Otolaryngol Head Neck Surg. 2006 Feb;132(2):206-13.  Yogesh YH1, Elyse Y, Miguel Angel AVANI. The efficacy of anatomically based multilevel surgery for obstructive sleep apnea. Otolaryngol Head Neck Surg. 2003 Oct;129(4):327-35.  Chao RAMOS, Goldberg A. Hypopharyngeal Surgery in Obstructive Sleep Apnea: An Evidence-Based Medicine Review. Arch Otolaryngol Head Neck Surg. 2006 Feb;132(2):206-13.  Chelsi SANTANA et al. Upper-Airway Stimulation for Obstructive Sleep Apnea.  N Engl J Med. 2014 Jan 9;370(2):139-49.  Luis Y et al. Increased Incidence of Cardiovascular Disease in Middle-aged Men with Obstructive Sleep Apnea. Am J Respir Crit Care Med; 2002 166: 159-165  Noland EM et al. Studying Life Effects and Effectiveness of Palatopharyngoplasty (SLEEP) study: Subjective Outcomes of Isolated Uvulopalatopharyngoplasty. Otolaryngol Head Neck Surg. 2011; 144: 623-631.

## 2024-12-18 NOTE — ASSESSMENT & PLAN NOTE
STOP BANG score is 4/8. Patient likely has sleep apnea based on clinical history of snoring, EDS(ESS 8/24), BMI 59, neck circumference, insomnia(DANTE 15/28), nocturia, GERD, morning headache, sleep inertia, and non-restorative sleep.   Obstructive sleep apnea reviewed. Complications of Untreated Sleep Apnea Reviewed.  HST/ Polysomnography reviewed. Information provided regarding treatment options for ROXANA.  Recommend in-lab sleep study to assess for sleep disordered breathing due to high pretest probability of ROXANA, hypoxemia, and hypoventilation   Home Sleep Testing is contraindicated due obesity, r

## 2024-12-30 ENCOUNTER — NURSE TRIAGE (OUTPATIENT)
Dept: FAMILY MEDICINE | Facility: CLINIC | Age: 24
End: 2024-12-30
Payer: COMMERCIAL

## 2024-12-30 NOTE — TELEPHONE ENCOUNTER
Reason for Disposition   MODERATE pain (e.g., interferes with normal activities that comes and goes (cramps) lasts > 24 hours  (Exception: Pain with Vomiting or Diarrhea - see that Protocol.)    Protocols used: Abdominal Pain - Female-A-OH      Nurse Triage SBAR    Is this a 2nd Level Triage? NO    Situation: New onset abdominal pain that started 12/24/2024 and has been ongoing     Background: Patient reports history of similar abdominal symptoms - states she will get symptoms intermittently; though, this episode is lasting longer than normal     Assessment: Reporting RUQ abdominal pain  Rates the pain 6/10 on the pain scale  Describes the pain as occasionally stabbing and mostly dull/aching   States the severity waxes and wanes   States the pain will radiate up to her breast on the right side   States the pain feels like it's from bloating but patient denies abdominal bloating and states her clothes fit per normal    Denies abdominal bloating  Denies abdominal distension  Denies pain with palpation   Denies pain with jumping   Denies urinary symptoms  Denies nausea/vomiting  Denies fevers     Patient reports passing gas normally  Reporting normal bowel movements - last BM was last night   Denied blood in stools - does report small amount of bright red blood with wiping - patient believes she has a hemorrhoid and is using a hemorrhoid cream     Patient states her menstrual cycle is 19-20 days late   Reports cycle has been becoming irregular recently   Denies chance of pregnancy     Protocol Recommended Disposition:   See in Office Today    Recommendation: No appointments in Clinic today or tomorrow  Advised UC for abdominal evaluation  Patient verbalized understanding  No further questions/concerns    Routed to provider    Does the patient meet one of the following criteria for ADS visit consideration? No    Lio Centeno, Clinic RN  [facility]

## 2025-01-09 ENCOUNTER — OFFICE VISIT (OUTPATIENT)
Dept: FAMILY MEDICINE | Facility: CLINIC | Age: 25
End: 2025-01-09
Payer: COMMERCIAL

## 2025-01-09 VITALS
HEIGHT: 67 IN | TEMPERATURE: 98.5 F | SYSTOLIC BLOOD PRESSURE: 126 MMHG | DIASTOLIC BLOOD PRESSURE: 88 MMHG | HEART RATE: 91 BPM | RESPIRATION RATE: 18 BRPM | OXYGEN SATURATION: 97 % | BODY MASS INDEX: 45.99 KG/M2 | WEIGHT: 293 LBS

## 2025-01-09 DIAGNOSIS — R10.9 BILATERAL FLANK PAIN: ICD-10-CM

## 2025-01-09 DIAGNOSIS — R10.11 ABDOMINAL PAIN, RIGHT UPPER QUADRANT: ICD-10-CM

## 2025-01-09 DIAGNOSIS — E66.01 CLASS 3 SEVERE OBESITY DUE TO EXCESS CALORIES WITHOUT SERIOUS COMORBIDITY WITH BODY MASS INDEX (BMI) OF 50.0 TO 59.9 IN ADULT (H): ICD-10-CM

## 2025-01-09 DIAGNOSIS — R07.89 ATYPICAL CHEST PAIN: ICD-10-CM

## 2025-01-09 DIAGNOSIS — G47.33 OBSTRUCTIVE SLEEP APNEA: ICD-10-CM

## 2025-01-09 DIAGNOSIS — E66.813 CLASS 3 SEVERE OBESITY DUE TO EXCESS CALORIES WITHOUT SERIOUS COMORBIDITY WITH BODY MASS INDEX (BMI) OF 50.0 TO 59.9 IN ADULT (H): ICD-10-CM

## 2025-01-09 DIAGNOSIS — N92.6 IRREGULAR MENSES: Primary | ICD-10-CM

## 2025-01-09 DIAGNOSIS — R73.03 PREDIABETES: ICD-10-CM

## 2025-01-09 LAB
ALBUMIN SERPL BCG-MCNC: 4.4 G/DL (ref 3.5–5.2)
ALP SERPL-CCNC: 81 U/L (ref 40–150)
ALT SERPL W P-5'-P-CCNC: 47 U/L (ref 0–50)
ANION GAP SERPL CALCULATED.3IONS-SCNC: 12 MMOL/L (ref 7–15)
AST SERPL W P-5'-P-CCNC: 29 U/L (ref 0–45)
BASOPHILS # BLD AUTO: 0 10E3/UL (ref 0–0.2)
BASOPHILS NFR BLD AUTO: 0 %
BILIRUB SERPL-MCNC: 0.3 MG/DL
BUN SERPL-MCNC: 14.2 MG/DL (ref 6–20)
CALCIUM SERPL-MCNC: 9.6 MG/DL (ref 8.8–10.4)
CHLORIDE SERPL-SCNC: 103 MMOL/L (ref 98–107)
CREAT SERPL-MCNC: 0.94 MG/DL (ref 0.51–0.95)
CRP SERPL-MCNC: 7.76 MG/L
EGFRCR SERPLBLD CKD-EPI 2021: 86 ML/MIN/1.73M2
EOSINOPHIL # BLD AUTO: 0.3 10E3/UL (ref 0–0.7)
EOSINOPHIL NFR BLD AUTO: 3 %
ERYTHROCYTE [DISTWIDTH] IN BLOOD BY AUTOMATED COUNT: 12.7 % (ref 10–15)
ERYTHROCYTE [SEDIMENTATION RATE] IN BLOOD BY WESTERGREN METHOD: 9 MM/HR (ref 0–20)
EST. AVERAGE GLUCOSE BLD GHB EST-MCNC: 114 MG/DL
GLUCOSE SERPL-MCNC: 96 MG/DL (ref 70–99)
HBA1C MFR BLD: 5.6 % (ref 0–5.6)
HCO3 SERPL-SCNC: 24 MMOL/L (ref 22–29)
HCT VFR BLD AUTO: 39.9 % (ref 35–47)
HGB BLD-MCNC: 13.2 G/DL (ref 11.7–15.7)
IMM GRANULOCYTES # BLD: 0 10E3/UL
IMM GRANULOCYTES NFR BLD: 0 %
LIPASE SERPL-CCNC: 22 U/L (ref 13–60)
LYMPHOCYTES # BLD AUTO: 1.9 10E3/UL (ref 0.8–5.3)
LYMPHOCYTES NFR BLD AUTO: 22 %
MCH RBC QN AUTO: 27.9 PG (ref 26.5–33)
MCHC RBC AUTO-ENTMCNC: 33.1 G/DL (ref 31.5–36.5)
MCV RBC AUTO: 84 FL (ref 78–100)
MIS SERPL-MCNC: 3.78 NG/ML (ref 1.2–12)
MONOCYTES # BLD AUTO: 0.5 10E3/UL (ref 0–1.3)
MONOCYTES NFR BLD AUTO: 6 %
NEUTROPHILS # BLD AUTO: 6 10E3/UL (ref 1.6–8.3)
NEUTROPHILS NFR BLD AUTO: 69 %
PLATELET # BLD AUTO: 252 10E3/UL (ref 150–450)
POTASSIUM SERPL-SCNC: 4.5 MMOL/L (ref 3.4–5.3)
PROT SERPL-MCNC: 7.2 G/DL (ref 6.4–8.3)
RBC # BLD AUTO: 4.73 10E6/UL (ref 3.8–5.2)
SHBG SERPL-SCNC: 17 NMOL/L (ref 30–135)
SODIUM SERPL-SCNC: 139 MMOL/L (ref 135–145)
WBC # BLD AUTO: 8.7 10E3/UL (ref 4–11)

## 2025-01-09 NOTE — PROGRESS NOTES
Assessment & Plan     Irregular menses    - DHEA sulfate; Future  - Testosterone Free and Total; Future  - Androstenedione; Future  - 17 OH progesterone; Future  - Anti-Mullerian hormone; Future  - DHEA sulfate  - Testosterone Free and Total  - Androstenedione  - 17 OH progesterone  - Anti-Mullerian hormone    Abdominal pain, right upper quadrant    - US Abdomen Complete; Future  - Comprehensive metabolic panel (BMP + Alb, Alk Phos, ALT, AST, Total. Bili, TP); Future  - Lipase; Future  - CBC with platelets and differential; Future  - CRP, inflammation; Future  - Comprehensive metabolic panel (BMP + Alb, Alk Phos, ALT, AST, Total. Bili, TP)  - Lipase  - CBC with platelets and differential  - CRP, inflammation    Bilateral flank pain    - US Abdomen Complete; Future  - Urine Culture Aerobic Bacterial - lab collect; Future  - Urine Culture Aerobic Bacterial - lab collect    Class 3 severe obesity due to excess calories without serious comorbidity with body mass index (BMI) of 50.0 to 59.9 in adult (H)    - Echocardiogram Complete; Future    Obstructive sleep apnea    - Echocardiogram Complete; Future    Atypical chest pain    - Echocardiogram Complete; Future  - ESR: Erythrocyte sedimentation rate; Future  - ESR: Erythrocyte sedimentation rate    Prediabetes    - Hemoglobin A1c; Future  - Hemoglobin A1c        MED REC REQUIRED    Post Medication Reconciliation Status: patient was not discharged from an inpatient facility or TCU    FURTHER TESTING:       - abd ultrasound       - echo    FUTURE APPOINTMENTS:       - Follow-up for annual visit or as needed  See Patient Instructions    Time spent in chart review in preparation to see patient, time with patient for interview/exam, ordering medications/tests/and/or procedures, and time spent in charting and coordinating care: 35 minutes.     Jason   Norma is a 24 year old, presenting for the following health issues:  ER F/U        1/9/2025    10:16 AM   Additional  "Questions   Roomed by Leonarda BROWNE     [facility]       ED/UC Followup:    Facility:  [facility]   Date of visit: 12/31/24, 1/6/25  Reason for visit: kidney infection  Current Status: started menses. Thinks that some of the pain was due to that. Still having some stabbing pain in her right side. Some bloating. Was told the second time she was in they told her it was not a bladder infection but that she had a cyst on her right ovary but shouldn't be the reason she was in pain.       Hx of chronic pelvic and back pain cyclic in nature. Periods have previously been regular until May 2024 they have become irregular. Has been diagnosed with PCOS in the past from a weight loss clinic, has not had labs done. There are no ovarian cysts seen on pelvic US from 07/2024. Will start pelvic floor therapy this month. Is working to get in with GYN for eval for endometriosis. She states this pain did feel more severe and different than in the past. Hx of cholecystectomy and fatty liver. Pain worsens with food and radiates to bilateral shoulder blades and flanks. Has chronic recurring chest pain that is also flared with this. She has had cardiac ruled out many times in ER. She has a sleep apnea test coming up soon. She is seeing Rheumatology coming up for possible inflammatory cause as her CRP is chronically elevated. Movement and deep breaths does not make the pain worse. Records and results from 2 [facility] visits are not available.       Review of Systems  Constitutional, HEENT, cardiovascular, pulmonary, gi and gu systems are negative, except as otherwise noted.      Objective    /88   Pulse 91   Temp 98.5  F (36.9  C) (Tympanic)   Resp 18   Ht 1.702 m (5' 7\")   Wt (!) 168.3 kg (371 lb)   LMP 01/08/2025 (Exact Date)   SpO2 97%   BMI 58.11 kg/m    Body mass index is 58.11 kg/m .  Physical Exam   GENERAL: alert and no distress  EYES: Eyes grossly normal to inspection and conjunctivae and sclerae normal  HENT: " normal cephalic/atraumatic, ear canals and TM's normal, oropharynx clear, and oral mucous membranes moist  NECK: no adenopathy, no asymmetry, masses, or scars  RESP: lungs clear to auscultation - no rales, rhonchi or wheezes  CV: regular rate and rhythm, normal S1 S2, no S3 or S4, no murmur, click or rub, no peripheral edema  ABDOMEN: tenderness RUQ and exam limited due to body habitus  MS: no gross musculoskeletal defects noted, no edema  SKIN: no suspicious lesions or rashes  NEURO: Normal strength and tone, mentation intact and speech normal  BACK: no CVA tenderness, no paralumbar tenderness    Results for orders placed or performed in visit on 01/09/25   ESR: Erythrocyte sedimentation rate     Status: Normal   Result Value Ref Range    Erythrocyte Sedimentation Rate 9 0 - 20 mm/hr   Hemoglobin A1c     Status: Normal   Result Value Ref Range    Estimated Average Glucose 114 <117 mg/dL    Hemoglobin A1C 5.6 0.0 - 5.6 %   CBC with platelets and differential     Status: None   Result Value Ref Range    WBC Count 8.7 4.0 - 11.0 10e3/uL    RBC Count 4.73 3.80 - 5.20 10e6/uL    Hemoglobin 13.2 11.7 - 15.7 g/dL    Hematocrit 39.9 35.0 - 47.0 %    MCV 84 78 - 100 fL    MCH 27.9 26.5 - 33.0 pg    MCHC 33.1 31.5 - 36.5 g/dL    RDW 12.7 10.0 - 15.0 %    Platelet Count 252 150 - 450 10e3/uL    % Neutrophils 69 %    % Lymphocytes 22 %    % Monocytes 6 %    % Eosinophils 3 %    % Basophils 0 %    % Immature Granulocytes 0 %    Absolute Neutrophils 6.0 1.6 - 8.3 10e3/uL    Absolute Lymphocytes 1.9 0.8 - 5.3 10e3/uL    Absolute Monocytes 0.5 0.0 - 1.3 10e3/uL    Absolute Eosinophils 0.3 0.0 - 0.7 10e3/uL    Absolute Basophils 0.0 0.0 - 0.2 10e3/uL    Absolute Immature Granulocytes 0.0 <=0.4 10e3/uL   CBC with platelets and differential     Status: None    Narrative    The following orders were created for panel order CBC with platelets and differential.  Procedure                               Abnormality         Status                      ---------                               -----------         ------                     CBC with platelets and d...[[id number]]                      Final result                 Please view results for these tests on the individual orders.   Testosterone Free and Total     Status: None (In process)    Narrative    The following orders were created for panel order Testosterone Free and Total.  Procedure                               Abnormality         Status                     ---------                               -----------         ------                     Sex Hormone Binding Glob...[[id number]]                      In process                 Testosterone Free and Total[[id number]]                      In process                   Please view results for these tests on the individual orders.           Signed Electronically by: XENA Deng CNP

## 2025-01-10 NOTE — RESULT ENCOUNTER NOTE
Zena Green    Your crp (inflammation) is elevated consistent with past levels. Rest of labs so far are normal. Hormone labs still in process.  Please let us know if you have any questions.     Take care,    XENA Rojo CNP

## 2025-01-12 LAB
ANDROST SERPL-MCNC: 0.88 NG/ML
TESTOST FREE SERPL-MCNC: 0.12 NG/DL
TESTOST SERPL-MCNC: 5 NG/DL (ref 8–60)

## 2025-01-14 ENCOUNTER — HOSPITAL ENCOUNTER (OUTPATIENT)
Dept: ULTRASOUND IMAGING | Facility: CLINIC | Age: 25
Discharge: HOME | End: 2025-01-14
Attending: NURSE PRACTITIONER
Payer: COMMERCIAL

## 2025-01-14 DIAGNOSIS — R10.11 ABDOMINAL PAIN, RIGHT UPPER QUADRANT: ICD-10-CM

## 2025-01-14 DIAGNOSIS — R10.9 BILATERAL FLANK PAIN: ICD-10-CM

## 2025-01-14 PROCEDURE — 76700 US EXAM ABDOM COMPLETE: CPT

## 2025-01-14 NOTE — RESULT ENCOUNTER NOTE
Zena Green    Your kidneys are normal. The liver is enlarged and has fatty tissue surrounding it which may impair it's function. Have you ever seen the liver specialist? Weight loss can help lower the amount of fatty tissue around the liver. Have you ever seen the weight loss clinic or have we tried to run through a GLP-1 injection your insurance to help with weight loss? Let me know. Please let us know if you have any questions.     Take care,    XENA Rojo CNP

## 2025-01-16 ENCOUNTER — HOSPITAL ENCOUNTER (OUTPATIENT)
Dept: CARDIOLOGY | Facility: CLINIC | Age: 25
End: 2025-01-16
Attending: NURSE PRACTITIONER
Payer: COMMERCIAL

## 2025-01-16 DIAGNOSIS — G47.33 OBSTRUCTIVE SLEEP APNEA: ICD-10-CM

## 2025-01-16 DIAGNOSIS — E66.813 CLASS 3 SEVERE OBESITY DUE TO EXCESS CALORIES WITHOUT SERIOUS COMORBIDITY WITH BODY MASS INDEX (BMI) OF 50.0 TO 59.9 IN ADULT (H): ICD-10-CM

## 2025-01-16 DIAGNOSIS — E66.01 CLASS 3 SEVERE OBESITY DUE TO EXCESS CALORIES WITHOUT SERIOUS COMORBIDITY WITH BODY MASS INDEX (BMI) OF 50.0 TO 59.9 IN ADULT (H): ICD-10-CM

## 2025-01-16 DIAGNOSIS — R07.89 ATYPICAL CHEST PAIN: ICD-10-CM

## 2025-01-16 LAB
17OHP SERPL-MCNC: 12 NG/DL
LVEF ECHO: NORMAL

## 2025-01-16 PROCEDURE — 999N000208 ECHOCARDIOGRAM COMPLETE

## 2025-01-16 PROCEDURE — 255N000002 HC RX 255 OP 636: Performed by: NURSE PRACTITIONER

## 2025-01-16 PROCEDURE — C8929 TTE W OR WO FOL WCON,DOPPLER: HCPCS

## 2025-01-16 RX ADMIN — HUMAN ALBUMIN MICROSPHERES AND PERFLUTREN 2 ML: 10; .22 INJECTION, SOLUTION INTRAVENOUS at 15:17

## 2025-01-16 NOTE — RESULT ENCOUNTER NOTE
Zena Green    Your echo is normal.  Please let us know if you have any questions.     Take care,    XENA Rojo CNP

## 2025-03-06 NOTE — PROGRESS NOTES
"  Assessment/Plan:     1. Viral bronchitis  Reviewed nature condition.  Advised adequate fluid hydration, rest as needed, may consider over-the-counter cough suppressants if desired.  Discussed anticipated course.  Notify with persistence or worsening.  - XR Chest PA and Lateral    2. Epigastric pain  Evaluation at last visit unremarkable.  Given persistence, will refer for ultrasound of the gallbladder and liver.  Further follow-up and recommendations pending results.  I have also advised that she repeat a 2 week course of Prilosec.    Patient's father given verbal consent for assessment and treatment at time of scheduling visit.  I attempted to call patient's father following visit and he is unavailable.  Detailed written account of her visit is provided to him today letting him know diagnoses and anticipated plan.    Subjective:      Norma Collins is a 17 y.o. female presented clinic today for evaluation of illness present for the past 2 weeks or so.  She is at her friend's house where her father had an ongoing cough and cold syndrome.  Patient developed a cough about 2 weeks ago, nonproductive, sometimes short of breath during times of coughing but not in between.  Her cough is described as raspy, also affecting her voice M.  She has not had any fevers.  She notes some mild tightness in her upper chest.  No wheezing and no history of asthma.  She denies any chest pain.  No other known exposures.  Denies nasal congestion, headaches, facial pressure, nausea, or vomiting.  She is status post tonsillectomy and adenoidectomy.  Notes on a single episode she coughs so hard that she began to \"see stars\" and had to miss work.    Ongoing difficulties with epigastric pain off and on, last occurred last week and caused her to double over.  She completed a two-week course of Prilosec, some discomfort during that time.  Interested in further evaluation of ongoing epigastric pain.  We reviewed normal labs at last " "visit.    Current Outpatient Prescriptions   Medication Sig Dispense Refill     levonorgestrel-ethinyl estradiol (NORDETTE) 0.15-0.03 mg per tablet Take 1 tablet by mouth daily. 84 tablet 3     No current facility-administered medications for this visit.        Past Medical History, Family History, and Social History reviewed.  Past Medical History:   Diagnosis Date     Acanthosis nigricans      Adenotonsillar hypertrophy      Arthropathy of ankle/foot      Obesity      PCOS (polycystic ovarian syndrome)      Past Surgical History:   Procedure Laterality Date     none       TONSILLECTOMY AND ADENOIDECTOMY Bilateral 2/16/2017    Procedure: BILATERAL TONSILLECTOMY AND ADENOIDECTOMY;  Surgeon: Jacob Arguello MD;  Location: [facility];  Service:      Other food allergy  Family History   Problem Relation Age of Onset     Cancer Mother      colon     Venous thrombosis Mother      cancer-related     Stroke Paternal Grandmother      Social History     Social History     Marital status: Single     Spouse name: N/A     Number of children: N/A     Years of education: N/A     Occupational History     Not on file.     Social History Main Topics     Smoking status: Never Smoker     Smokeless tobacco: Current User      Comment: occas. e-cigarette     Alcohol use No     Drug use: No     Sexual activity: Not Currently     Other Topics Concern     Not on file     Social History Narrative    Lives with father.  4 siblings including twin brother and 3 sisters.  Works at [employer].         Review of systems is as stated in HPI, and the remainder of the 10 system review is otherwise negative.    Objective:     Vitals:    10/03/17 1413   BP: 128/80   Patient Site: Right Arm   Patient Position: Sitting   Cuff Size: Adult Large   Pulse: 83   Resp: 20   Temp: 98.1  F (36.7  C)   TempSrc: Other   SpO2: 98%   Weight: (!) 323 lb (146.5 kg)   Height: 5' 7\" (1.702 m)    Body mass index is 50.59 kg/(m^2).    Alert female.  " Pupils are equal, round, reactive to light.  Tympanic membrane spelling translucent.  Nasal mucosa pink and moist.  Oropharynx is clear.  Neck supple without lymphadenopathy or thyromegaly.  No sinus tenderness.  Heart with regular rate and rhythm.  Lungs are with some initial inspiratory mucous findings in the right upper and mid lung, these clear with prolonged deep breathing.  No wheezing or rhonchi otherwise.  Extremities without edema.    I ordered personally reviewed a 2 view chest x-ray which is within normal limits.      This note has been dictated using voice recognition software. Any grammatical or context distortions are unintentional and inherent to the the software.    Detail Level: Zone Photo Preface (Leave Blank If You Do Not Want): Photographs were obtained today

## (undated) DEVICE — BRIEF STRETCH XL MPS40

## (undated) DEVICE — ENDO SNARE EXACTO COLD 9MM LOOP 2.4MMX230CM 00711115

## (undated) DEVICE — DEVICE TISSUE REMOVAL HYSTEROSCOPIC MYOSURE XL 50-601XL

## (undated) DEVICE — DECANTER VIAL 2006S

## (undated) DEVICE — PREP SCRUB SOL EXIDINE 4% CHG 4OZ 29002-404

## (undated) DEVICE — SUCTION MANIFOLD NEPTUNE 2 SYS 1 PORT 702-025-000

## (undated) DEVICE — Device

## (undated) DEVICE — GOWN XXL 9575

## (undated) DEVICE — GLOVE SURG PI ULTRA TOUCH M SZ 7-1/2 LF

## (undated) DEVICE — TUBING SUCTION MEDI-VAC 1/4"X20' N620A

## (undated) DEVICE — SOL WATER IRRIG 1000ML BOTTLE 2F7114

## (undated) DEVICE — DRSG TELFA 3X4" 1050

## (undated) DEVICE — KIT PROCEDURE FLUENT IN/OUT FLOWPAK TISS TRAP FLT-112S

## (undated) DEVICE — FORCEP BIOPSY 2.3MM DISP COATED 000388

## (undated) DEVICE — MAT FLOOR SURGICAL 40X38 0702140238

## (undated) DEVICE — SOLUTION IRRIG 2B7127 .9NS 3000ML BAG

## (undated) DEVICE — CUSTOM PACK PERI GYN SMA5BPGHEA

## (undated) RX ORDER — PROPOFOL 10 MG/ML
INJECTION, EMULSION INTRAVENOUS
Status: DISPENSED
Start: 2022-10-14

## (undated) RX ORDER — FENTANYL CITRATE 50 UG/ML
INJECTION, SOLUTION INTRAMUSCULAR; INTRAVENOUS
Status: DISPENSED
Start: 2022-10-14

## (undated) RX ORDER — ONDANSETRON 2 MG/ML
INJECTION INTRAMUSCULAR; INTRAVENOUS
Status: DISPENSED
Start: 2024-01-04

## (undated) RX ORDER — ONDANSETRON 2 MG/ML
INJECTION INTRAMUSCULAR; INTRAVENOUS
Status: DISPENSED
Start: 2022-10-14

## (undated) RX ORDER — LIDOCAINE HYDROCHLORIDE 10 MG/ML
INJECTION, SOLUTION EPIDURAL; INFILTRATION; INTRACAUDAL; PERINEURAL
Status: DISPENSED
Start: 2024-01-04

## (undated) RX ORDER — DEXAMETHASONE SODIUM PHOSPHATE 10 MG/ML
INJECTION, SOLUTION INTRAMUSCULAR; INTRAVENOUS
Status: DISPENSED
Start: 2022-10-14

## (undated) RX ORDER — KETOROLAC TROMETHAMINE 30 MG/ML
INJECTION, SOLUTION INTRAMUSCULAR; INTRAVENOUS
Status: DISPENSED
Start: 2022-10-14

## (undated) RX ORDER — PROPOFOL 10 MG/ML
INJECTION, EMULSION INTRAVENOUS
Status: DISPENSED
Start: 2024-01-04

## (undated) RX ORDER — LIDOCAINE HYDROCHLORIDE 10 MG/ML
INJECTION, SOLUTION EPIDURAL; INFILTRATION; INTRACAUDAL; PERINEURAL
Status: DISPENSED
Start: 2022-10-14